# Patient Record
Sex: FEMALE | Race: WHITE | NOT HISPANIC OR LATINO | Employment: OTHER | URBAN - METROPOLITAN AREA
[De-identification: names, ages, dates, MRNs, and addresses within clinical notes are randomized per-mention and may not be internally consistent; named-entity substitution may affect disease eponyms.]

---

## 2017-02-03 DIAGNOSIS — M05.742 RHEUMATOID ARTHRITIS INVOLVING BOTH HANDS WITH POSITIVE RHEUMATOID FACTOR (H): Primary | ICD-10-CM

## 2017-02-03 DIAGNOSIS — M05.741 RHEUMATOID ARTHRITIS INVOLVING BOTH HANDS WITH POSITIVE RHEUMATOID FACTOR (H): Primary | ICD-10-CM

## 2017-02-03 NOTE — TELEPHONE ENCOUNTER
golimumab (SIMPONI) 50 MG/0.5ML auto-injector pen     Last Written Prescription Date:  9/2/16  Last Fill Quantity: 6,   # refills: 0  Last Office Visit with G, P or [facility] prescribing provider: 7/28/16  Future Office visit:   None    Routing refill request to provider for review/approval because:  golimumab (SIMPONI) 50 MG/0.5ML auto-injector pen. lv > 6mths . No f/u scheduled.

## 2017-03-02 ENCOUNTER — TELEPHONE (OUTPATIENT)
Dept: RHEUMATOLOGY | Facility: CLINIC | Age: 62
End: 2017-03-02

## 2017-03-02 DIAGNOSIS — M05.742 RHEUMATOID ARTHRITIS INVOLVING BOTH HANDS WITH POSITIVE RHEUMATOID FACTOR (H): Primary | ICD-10-CM

## 2017-03-02 DIAGNOSIS — M05.741 RHEUMATOID ARTHRITIS INVOLVING BOTH HANDS WITH POSITIVE RHEUMATOID FACTOR (H): Primary | ICD-10-CM

## 2017-03-02 RX ORDER — FOLIC ACID 1 MG/1
2 TABLET ORAL DAILY
Qty: 180 TABLET | Refills: 3 | Status: SHIPPED | OUTPATIENT
Start: 2017-03-02 | End: 2018-01-24

## 2017-03-02 NOTE — TELEPHONE ENCOUNTER
Folic Acid      Last Written Prescription Date:  1-28-16  Last Fill Quantity: 180,   # refills: 3  Last Office Visit : 7-28-16  Future Office visit:  5-25-17    Kathleen M Doege RN

## 2017-03-23 ENCOUNTER — TRANSFERRED RECORDS (OUTPATIENT)
Dept: HEALTH INFORMATION MANAGEMENT | Facility: CLINIC | Age: 62
End: 2017-03-23

## 2017-03-31 ENCOUNTER — TELEPHONE (OUTPATIENT)
Dept: RHEUMATOLOGY | Facility: CLINIC | Age: 62
End: 2017-03-31

## 2017-03-31 NOTE — TELEPHONE ENCOUNTER
Pt seeing Dr. Josh Lopez ([facility], [place], [place]). Says she is having been bad reflux that is hurting her vocal chords. Wants to put her med, thinks it is Prilosec. D/t pt being on methotrexate, Dr. Lopez trying to contact Dr. Walton but has not been able to do so. Dr. Lopez can be reached at [phone]. Please advise. Sent to [employer]

## 2017-04-03 NOTE — TELEPHONE ENCOUNTER
Spoke with Dr. Lopez, relayed Dr. Walton's message. Dr. Lopez plans to start on 20 of omeprazole.

## 2017-04-03 NOTE — TELEPHONE ENCOUNTER
Dr. Lopez's office called back. Dr. Lopez can be reached on his back line instead of through his office at [phone]. Sent to ZOILA Hodge.

## 2017-04-03 NOTE — TELEPHONE ENCOUNTER
I have no objection to her receiving a PPI for her GERD.  The potential interaction with methotrexate is not significant. Thomas BONDS

## 2017-04-04 NOTE — TELEPHONE ENCOUNTER
Pt called back re: PPI used. Dr. Walton's 3/31/17 approval relayed, advised pt to confirm dosing w/ Dr. Lopez before beginning med.  Sent to rheum pool.

## 2017-05-25 DIAGNOSIS — M05.741 RHEUMATOID ARTHRITIS INVOLVING BOTH HANDS WITH POSITIVE RHEUMATOID FACTOR (H): ICD-10-CM

## 2017-05-25 DIAGNOSIS — M05.742 RHEUMATOID ARTHRITIS INVOLVING BOTH HANDS WITH POSITIVE RHEUMATOID FACTOR (H): ICD-10-CM

## 2017-05-25 DIAGNOSIS — Z79.899 ENCOUNTER FOR LONG-TERM CURRENT USE OF MEDICATION: ICD-10-CM

## 2017-05-25 RX ORDER — CELECOXIB 200 MG/1
400 CAPSULE ORAL DAILY
Qty: 180 CAPSULE | Refills: 0 | Status: SHIPPED | OUTPATIENT
Start: 2017-05-25 | End: 2017-08-03

## 2017-05-25 NOTE — TELEPHONE ENCOUNTER
This is a pt of Dr Walton that needs refills of her Celebrex/ methotrexate.  Last seen: 7/28/16  Pending appt: to be scheduled  Medication: Celebrex/ Methotrexate    Last filled: 4/6/17  Qty: 36/96  Lab: last done 11/4/16. Orders written to have labs done every 6 months.    WBC   Date Value Ref Range Status   01/28/2016 6.4 4.0 - 11.0 10e9/L Final     RBC Count   Date Value Ref Range Status   01/28/2016 4.03 3.8 - 5.2 10e12/L Final     Hemoglobin   Date Value Ref Range Status   11/04/2016 12.9 11.7 - 15.7 g/dL Final     Hematocrit   Date Value Ref Range Status   01/28/2016 36.6 35.0 - 47.0 % Final     MCV   Date Value Ref Range Status   01/28/2016 91 78 - 100 fl Final     MCH   Date Value Ref Range Status   01/28/2016 30.5 26.5 - 33.0 pg Final     Platelet Count   Date Value Ref Range Status   01/28/2016 253 150 - 450 10e9/L Final     % Lymphocytes   Date Value Ref Range Status   01/28/2016 43.1 % Final     AST   Date Value Ref Range Status   11/04/2016 21 0 - 45 U/L Final     ALT   Date Value Ref Range Status   11/04/2016 30 0 - 50 U/L Final     Albumin   Date Value Ref Range Status   01/28/2016 4.0 3.4 - 5.0 g/dL Final     Alkaline Phosphatase   Date Value Ref Range Status   12/10/2012 66 40 - 150 U/L Final     Creatinine   Date Value Ref Range Status   11/04/2016 0.68 0.52 - 1.04 mg/dL Final     GFR Estimate   Date Value Ref Range Status   11/04/2016 87 >60 mL/min/1.7m2 Final     Comment:     Non  GFR Calc     GFR Estimate If Black   Date Value Ref Range Status   11/04/2016 >90   GFR Calc   >60 mL/min/1.7m2 Final     Creatinine Urine   Date Value Ref Range Status   05/13/2011 46 mg/dL Final     Sed Rate   Date Value Ref Range Status   09/03/2014 11 0 - 30 mm/h Final     CRP Inflammation   Date Value Ref Range Status   06/11/2015 4.7 0.0 - 8.0 mg/L Final     Set up and routed to Dr Jackie, consult rheumatologist.    LITO TannerN RN  Rheumatology RN Coordinator  [facility]

## 2017-05-25 NOTE — TELEPHONE ENCOUNTER
Pt of Dr Walton reporting she needs a refill of her Simponi.    Last seen: 7/26/17  Pending appt: will be scheduling  Medication: Simponi   Last filled: unknown  Qty: 6  Lab:    Did not meet standing order criteria, routed to MD.

## 2017-06-17 ENCOUNTER — HEALTH MAINTENANCE LETTER (OUTPATIENT)
Age: 62
End: 2017-06-17

## 2017-08-03 ENCOUNTER — OFFICE VISIT (OUTPATIENT)
Dept: RHEUMATOLOGY | Facility: CLINIC | Age: 62
End: 2017-08-03
Attending: INTERNAL MEDICINE
Payer: COMMERCIAL

## 2017-08-03 VITALS
SYSTOLIC BLOOD PRESSURE: 136 MMHG | TEMPERATURE: 98 F | HEART RATE: 72 BPM | DIASTOLIC BLOOD PRESSURE: 82 MMHG | BODY MASS INDEX: 20.89 KG/M2 | WEIGHT: 130 LBS | OXYGEN SATURATION: 100 % | HEIGHT: 66 IN

## 2017-08-03 DIAGNOSIS — M05.741 RHEUMATOID ARTHRITIS INVOLVING BOTH HANDS WITH POSITIVE RHEUMATOID FACTOR (H): Primary | ICD-10-CM

## 2017-08-03 DIAGNOSIS — M05.741 RHEUMATOID ARTHRITIS INVOLVING BOTH HANDS WITH POSITIVE RHEUMATOID FACTOR (H): ICD-10-CM

## 2017-08-03 DIAGNOSIS — Z79.899 ENCOUNTER FOR LONG-TERM CURRENT USE OF MEDICATION: ICD-10-CM

## 2017-08-03 DIAGNOSIS — M05.742 RHEUMATOID ARTHRITIS INVOLVING BOTH HANDS WITH POSITIVE RHEUMATOID FACTOR (H): ICD-10-CM

## 2017-08-03 DIAGNOSIS — M05.742 RHEUMATOID ARTHRITIS INVOLVING BOTH HANDS WITH POSITIVE RHEUMATOID FACTOR (H): Primary | ICD-10-CM

## 2017-08-03 LAB
ALT SERPL W P-5'-P-CCNC: 35 U/L (ref 0–50)
AST SERPL W P-5'-P-CCNC: 22 U/L (ref 0–45)
CREAT SERPL-MCNC: 0.8 MG/DL (ref 0.52–1.04)
CRP SERPL-MCNC: <2.9 MG/L (ref 0–8)
GFR SERPL CREATININE-BSD FRML MDRD: 72 ML/MIN/1.7M2
HGB BLD-MCNC: 13.4 G/DL (ref 11.7–15.7)

## 2017-08-03 PROCEDURE — 36415 COLL VENOUS BLD VENIPUNCTURE: CPT | Performed by: INTERNAL MEDICINE

## 2017-08-03 PROCEDURE — 84460 ALANINE AMINO (ALT) (SGPT): CPT | Performed by: INTERNAL MEDICINE

## 2017-08-03 PROCEDURE — 86140 C-REACTIVE PROTEIN: CPT | Performed by: INTERNAL MEDICINE

## 2017-08-03 PROCEDURE — 84450 TRANSFERASE (AST) (SGOT): CPT | Performed by: INTERNAL MEDICINE

## 2017-08-03 PROCEDURE — 82565 ASSAY OF CREATININE: CPT | Performed by: INTERNAL MEDICINE

## 2017-08-03 PROCEDURE — 85018 HEMOGLOBIN: CPT | Performed by: INTERNAL MEDICINE

## 2017-08-03 PROCEDURE — 99212 OFFICE O/P EST SF 10 MIN: CPT | Mod: ZF

## 2017-08-03 RX ORDER — LEUCOVORIN CALCIUM 5 MG/1
5 TABLET ORAL WEEKLY
Qty: 12 TABLET | Refills: 3 | Status: SHIPPED | OUTPATIENT
Start: 2017-08-03 | End: 2018-01-23

## 2017-08-03 RX ORDER — CELECOXIB 200 MG/1
200 CAPSULE ORAL DAILY
Qty: 90 CAPSULE | Refills: 3 | Status: SHIPPED | OUTPATIENT
Start: 2017-08-03 | End: 2018-07-25

## 2017-08-03 ASSESSMENT — PAIN SCALES - GENERAL: PAINLEVEL: NO PAIN (0)

## 2017-08-03 NOTE — LETTER
8/3/2017      RE: Ruba Major  [id number]  [place] [address]       Rheumatology Visit     Ruba Major MRN# [id number]   YOB: 1955 Age: 62 year old     Date of Visit: August 3, 2017  Primary care provider: Eduarda Arredondo          Assessment and Plan:   1. 63 yo female with RA, CCP+: She had more active disease which was limiting on single agent Methotrexate. I believe that regarding the inflammatory component of her joint pain after 6 months of Humira she was improved but not in a remission, able to play tennis and golf with symptoms. No overt synovitis was noted. We switched to Enbrel to attempt to achieve a better response and hopefully remission. Unfortunately she did not respond at 3 months even partially to this.      She continues to be improved on Simponi without side effects. She has some waning at 3-3.5 weeks. She does have mild weekly SE on Methotrexate that are very stable. Her occasional diffuse mouth soreness may be due to this.   Assessment of response of her RA has been complicated by concomitant DJD affecting first CMC joints and her feet. She is much better now so it would seem inflammation was clearly playing a part.   She had L knee arthroscopic knee surgery.She however is limited and can no longer play tennis, although can golf.   She had cervical spine surgery and now recent epidurals for L5 disc and is now in PT.  She is having some recurrent L arm tingling and scheduled for CT scan.  Lab has been normal and per her report just done by her PCP   2. Type I DM on insulin pump.   3. Possible peripheral neuropathy, now improved   4. DJD      PLAN: discussed in detail with the patient  1. She will continue Simponi and I encouraged her to take at 3-3.5 weeks consistently  2. Continue Methotrexate 15 mg weekly; continue folic acid and Leucovorin - renewed  3. Can continue Celebrex 200 mg daily.   4. She has had the flu shot   5. Return in 6 months; lab in the  interim as well as today  6. Call if problems occur     Gilberto Walton MD                History of Present Illness:   63 yo F is seen in followup for RA, CCP+. I saw her last in July 2016. EPIC reviewed.  HISTORY CARRIED FORWARD:  To review, patient initially presented in 5/2011 with c/o 7-8 year h/o fever, pain and swelling in bilateral wrists, 1st MCPs, Ankles, and 1st MTPs. She also noted prolonged morning stiffness, fatique, dry eyes, and ocular photosensativity. Also endorsed long history of oral aphthous ulcers. Initial labs revealed negative RF, SSA, SSB, TTG, complements, and thyroid studies, however CCP was positive (127). Recent CCP again is positive.  She is currently being managed with Methotrexate 15 mg q week with Leucovorin and Folic acid. Humira added in July 2012 and switched to Enbrel at visit in March 2013. See correspondence. Due to insurance coverage Enbrel was substituted for original consideration of Simponi. With no response to Enbrel it was switched to Simponi in summer 2013.   She was significantly improved at less than 3 months and this continues. At prior visit she did note that variably some months she had more sx at about three weeks. She took the last dose early and she noted immediate improvement. Since then she continues to take it around 23 days and continues to do better. She has minimal sx now except in her L knee which has had arthroscopy and has known DJD. She notes minimal swelling. Her feet are stable with minimal residual hand pain. No systemic symptoms. She is taking Celebrex at 200 mg just daily now. No SE.  She had interval cervical spine surgery and is healed. She has some residual L arm symptoms that are not clearly related, perhaps Rotator cuff; she is in PT.   On Humira she had no rashes or injection site reactions. Enbrel had a little more burning in thigh injection site but did not seem to help.    INTERVAL HISTORY:    The [place] has no pain with  injection. The patient was initially having significant GI side effects related to the methotrexate, and was started on leucovorin 5mg q week with good resolution. She still feels mildly off on the days she takes methotrexate but is otherwise well.  No significant flares.  We discussed any peripheral symptoms at length.  There is little if any change. She thinks her rings are  fitting the same. She has less extension of R wrist.  Feet are stable.  Her current problem is persistence of herniated R L5-S1 disk. She has had 4 epidurals without lasting response.  This inhibits her playing tennis though she can golf. She has no weakness and apparently EMG was reassuring.     She had interval lab again at her PCP yearly check, reported to her as normal.  We will obtain today.  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  She spends a lot of time in [place].  ROS otherwise negative.         Review of Systems:   Review Of Systems  Skin: negative  Eyes: negative  Ears/Nose/Throat: negative  Respiratory: No shortness of breath, dyspnea on exertion, cough, or hemoptysis  Cardiovascular: negative  Gastrointestinal: negative  Genitourinary: negative  Musculoskeletal: see HPI  Neurologic: negative  Psychiatric: negative  Hematologic/Lymphatic/Immunologic: negative  Endocrine: negative          Past Medical History:     Past Medical History:   Diagnosis Date     Rheumatoid arthritis(714.0)      Type II or unspecified type diabetes mellitus without mention of complication, not stated as uncontrolled        Patient Active Problem List    Diagnosis Date Noted     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 Provider to  "review               Past Surgical History:     Past Surgical History:   Procedure Laterality Date     FOOT SURGERY  1998, 10/2010    Bunionectomy             Social History:     Social History   Substance Use Topics     Smoking status: Never Smoker     Smokeless tobacco: Never Used     Alcohol use Yes      Comment: glass of wine daily             Family History:   No family history on file.         Allergies:   No Known Allergies          Medications:     Current Outpatient Prescriptions   Medication Sig Dispense Refill     insulin regular (HUMULIN R/NOVOLIN R) 100 UNIT/ML injection Inject Subcutaneous 3 times daily (before meals)       celecoxib (CELEBREX) 200 MG capsule Take 2 capsules (400 mg) by mouth daily (Patient taking differently: Take 200 mg by mouth daily ) 180 capsule 0     methotrexate 2.5 MG tablet CHEMO Take 6 tablets once weekly 72 tablet 0     golimumab (SIMPONI) 50 MG/0.5ML auto-injector pen Inject subcutaneously every 21 to 28 days 6 each 0     folic acid (FOLVITE) 1 MG tablet Take 2 tablets (2 mg) by mouth daily (Patient taking differently: Take 1 mg by mouth daily ) 180 tablet 3     leucovorin (WELLCOVORIN) 5 MG tablet Take 1 tablet (5 mg) by mouth once a week 12 hours after taking Methotrexate dose. 12 tablet 3     Pregabalin (LYRICA PO) Take 50 mg by mouth daily        zolpidem (AMBIEN) 10 MG tablet Take 10 mg by mouth nightly as needed.       INSULIN INFUSION PUMP        melatonin 5 MG tablet Take 5 mg by mouth nightly as needed for sleep              Physical Exam:   Blood pressure 136/82, pulse 72, temperature 98  F (36.7  C), temperature source Oral, height 1.676 m (5' 6\"), weight 59 kg (130 lb), SpO2 100 %.  Constitutional: WD-WN-WG cooperative   Eyes: nl conjunctiva, sclera   ENT: nl external ears, nose, throat. Healed anterior cervical scar   MS: No definite active synovitis on exam today. She has pain with finger curl and 1+T L 4th PIP.  45 degrees dorsiflexion R wrist without " swelling.  Tinel negative. No dactylitis, tenosynovitis, enthesopathy   Skin: no nail pitting, alopecia, rash, nodules or lesions   Psych: nl affect.       Data:     Lab Results   Component Value Date    WBC 6.4 01/28/2016    WBC 3.2 (L) 06/11/2015    WBC 4.4 12/03/2014    HGB 12.9 11/04/2016    HGB 12.3 01/28/2016    HGB 12.7 06/11/2015    HCT 36.6 01/28/2016    HCT 37.3 06/11/2015    HCT 35.8 12/03/2014    MCV 91 01/28/2016    MCV 89 06/11/2015    MCV 90 12/03/2014     01/28/2016     06/11/2015     12/03/2014     Lab Results   Component Value Date    BUN 19 12/10/2012    BUN 16 08/28/2008     No components found for: SEDRATE  Lab Results   Component Value Date    TSH 0.91 06/11/2015    TSH 1.58 12/10/2012    TSH 1.57 09/12/2008     Lab Results   Component Value Date    AST 21 11/04/2016    AST 19 01/28/2016    AST 18 06/11/2015    ALT 30 11/04/2016    ALT 28 01/28/2016    ALT 28 06/11/2015    ALKPHOS 66 12/10/2012    ALKPHOS 101 08/28/2008     Reviewed Rheumatology lab flowsheet    Gilberto Walton

## 2017-08-03 NOTE — PROGRESS NOTES
Rheumatology Visit     Ruba Major MRN# [id number]   YOB: 1955 Age: 62 year old     Date of Visit: August 3, 2017  Primary care provider: Eduarda Arredondo          Assessment and Plan:   1. 61 yo female with RA, CCP+: She had more active disease which was limiting on single agent Methotrexate. I believe that regarding the inflammatory component of her joint pain after 6 months of Humira she was improved but not in a remission, able to play tennis and golf with symptoms. No overt synovitis was noted. We switched to Enbrel to attempt to achieve a better response and hopefully remission. Unfortunately she did not respond at 3 months even partially to this.      She continues to be improved on Simponi without side effects. She has some waning at 3-3.5 weeks. She does have mild weekly SE on Methotrexate that are very stable. Her occasional diffuse mouth soreness may be due to this.   Assessment of response of her RA has been complicated by concomitant DJD affecting first CMC joints and her feet. She is much better now so it would seem inflammation was clearly playing a part.   She had L knee arthroscopic knee surgery.She however is limited and can no longer play tennis, although can golf.   She had cervical spine surgery and now recent epidurals for L5 disc and is now in PT.  She is having some recurrent L arm tingling and scheduled for CT scan.  Lab has been normal and per her report just done by her PCP   2. Type I DM on insulin pump.   3. Possible peripheral neuropathy, now improved   4. DJD      PLAN: discussed in detail with the patient  1. She will continue Simponi and I encouraged her to take at 3-3.5 weeks consistently  2. Continue Methotrexate 15 mg weekly; continue folic acid and Leucovorin - renewed  3. Can continue Celebrex 200 mg daily.   4. She has had the flu shot   5. Return in 6 months; lab in the interim as well as today  6. Call if problems occur     Gilberto Walton MD   Associate  Professor             History of Present Illness:   61 yo F is seen in followup for RA, CCP+. I saw her last in July 2016. EPIC reviewed.  HISTORY CARRIED FORWARD:  To review, patient initially presented in 5/2011 with c/o 7-8 year h/o fever, pain and swelling in bilateral wrists, 1st MCPs, Ankles, and 1st MTPs. She also noted prolonged morning stiffness, fatique, dry eyes, and ocular photosensativity. Also endorsed long history of oral aphthous ulcers. Initial labs revealed negative RF, SSA, SSB, TTG, complements, and thyroid studies, however CCP was positive (127). Recent CCP again is positive.  She is currently being managed with Methotrexate 15 mg q week with Leucovorin and Folic acid. Humira added in July 2012 and switched to Enbrel at visit in March 2013. See correspondence. Due to insurance coverage Enbrel was substituted for original consideration of Simponi. With no response to Enbrel it was switched to Simponi in summer 2013.   She was significantly improved at less than 3 months and this continues. At prior visit she did note that variably some months she had more sx at about three weeks. She took the last dose early and she noted immediate improvement. Since then she continues to take it around 23 days and continues to do better. She has minimal sx now except in her L knee which has had arthroscopy and has known DJD. She notes minimal swelling. Her feet are stable with minimal residual hand pain. No systemic symptoms. She is taking Celebrex at 200 mg just daily now. No SE.  She had interval cervical spine surgery and is healed. She has some residual L arm symptoms that are not clearly related, perhaps Rotator cuff; she is in PT.   On Humira she had no rashes or injection site reactions. Enbrel had a little more burning in thigh injection site but did not seem to help.    INTERVAL HISTORY:    The Simponi has no pain with injection. The patient was initially having significant GI side effects related to  the methotrexate, and was started on leucovorin 5mg q week with good resolution. She still feels mildly off on the days she takes methotrexate but is otherwise well.  No significant flares.  We discussed any peripheral symptoms at length.  There is little if any change. She thinks her rings are  fitting the same. She has less extension of R wrist.  Feet are stable.  Her current problem is persistence of herniated R L5-S1 disk. She has had 4 epidurals without lasting response.  This inhibits her playing tennis though she can golf. She has no weakness and apparently EMG was reassuring.     She had interval lab again at her PCP yearly check, reported to her as normal.  We will obtain today.  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  She spends a lot of time in [place].  ROS otherwise negative.         Review of Systems:   Review Of Systems  Skin: negative  Eyes: negative  Ears/Nose/Throat: negative  Respiratory: No shortness of breath, dyspnea on exertion, cough, or hemoptysis  Cardiovascular: negative  Gastrointestinal: negative  Genitourinary: negative  Musculoskeletal: see HPI  Neurologic: negative  Psychiatric: negative  Hematologic/Lymphatic/Immunologic: negative  Endocrine: negative          Past Medical History:     Past Medical History:   Diagnosis Date     Rheumatoid arthritis(714.0)      Type II or unspecified type diabetes mellitus without mention of complication, not stated as uncontrolled        Patient Active Problem List    Diagnosis Date Noted     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 Provider to review               Past Surgical History:     Past Surgical History:   Procedure  "Laterality Date     FOOT SURGERY  1998, 10/2010    Bunionectomy             Social History:     Social History   Substance Use Topics     Smoking status: Never Smoker     Smokeless tobacco: Never Used     Alcohol use Yes      Comment: glass of wine daily             Family History:   No family history on file.         Allergies:   No Known Allergies          Medications:     Current Outpatient Prescriptions   Medication Sig Dispense Refill     insulin regular (HUMULIN R/NOVOLIN R) 100 UNIT/ML injection Inject Subcutaneous 3 times daily (before meals)       celecoxib (CELEBREX) 200 MG capsule Take 2 capsules (400 mg) by mouth daily (Patient taking differently: Take 200 mg by mouth daily ) 180 capsule 0     methotrexate 2.5 MG tablet CHEMO Take 6 tablets once weekly 72 tablet 0     golimumab (SIMPONI) 50 MG/0.5ML auto-injector pen Inject subcutaneously every 21 to 28 days 6 each 0     folic acid (FOLVITE) 1 MG tablet Take 2 tablets (2 mg) by mouth daily (Patient taking differently: Take 1 mg by mouth daily ) 180 tablet 3     leucovorin (WELLCOVORIN) 5 MG tablet Take 1 tablet (5 mg) by mouth once a week 12 hours after taking Methotrexate dose. 12 tablet 3     Pregabalin (LYRICA PO) Take 50 mg by mouth daily        zolpidem (AMBIEN) 10 MG tablet Take 10 mg by mouth nightly as needed.       INSULIN INFUSION PUMP        melatonin 5 MG tablet Take 5 mg by mouth nightly as needed for sleep              Physical Exam:   Blood pressure 136/82, pulse 72, temperature 98  F (36.7  C), temperature source Oral, height 1.676 m (5' 6\"), weight 59 kg (130 lb), SpO2 100 %.  Constitutional: WD-WN-WG cooperative   Eyes: nl conjunctiva, sclera   ENT: nl external ears, nose, throat. Healed anterior cervical scar   MS: No definite active synovitis on exam today. She has pain with finger curl and 1+T L 4th PIP.  45 degrees dorsiflexion R wrist without swelling.  Tinel negative. No dactylitis, tenosynovitis, enthesopathy   Skin: no nail " pitting, alopecia, rash, nodules or lesions   Psych: nl affect.       Data:     Lab Results   Component Value Date    WBC 6.4 01/28/2016    WBC 3.2 (L) 06/11/2015    WBC 4.4 12/03/2014    HGB 12.9 11/04/2016    HGB 12.3 01/28/2016    HGB 12.7 06/11/2015    HCT 36.6 01/28/2016    HCT 37.3 06/11/2015    HCT 35.8 12/03/2014    MCV 91 01/28/2016    MCV 89 06/11/2015    MCV 90 12/03/2014     01/28/2016     06/11/2015     12/03/2014     Lab Results   Component Value Date    BUN 19 12/10/2012    BUN 16 08/28/2008     No components found for: SEDRATE  Lab Results   Component Value Date    TSH 0.91 06/11/2015    TSH 1.58 12/10/2012    TSH 1.57 09/12/2008     Lab Results   Component Value Date    AST 21 11/04/2016    AST 19 01/28/2016    AST 18 06/11/2015    ALT 30 11/04/2016    ALT 28 01/28/2016    ALT 28 06/11/2015    ALKPHOS 66 12/10/2012    ALKPHOS 101 08/28/2008     Reviewed Rheumatology lab flowsheet    Gilberto Walton

## 2017-08-03 NOTE — NURSING NOTE
Chief Complaint   Patient presents with     RECHECK     RA   Pt roomed, vitals, meds, and allergies reviewed with pt. Pt ready for provider.  Cirilo Dorman, CMA

## 2017-08-03 NOTE — MR AVS SNAPSHOT
After Visit Summary   8/3/2017    Ruba Major    MRN: [id number]           Patient Information     Date Of Birth          1955        Visit Information        Provider Department      8/3/2017 4:00 PM Gilberto Walton MD [facility] Rheumatology        Today's Diagnoses     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 factor (H)    -  1    Encounter for long-term current use of medication           Follow-ups after your visit        Follow-up notes from your care team     Return in about 6 months (around 2/3/2018).      Your next 10 appointments already scheduled     Jan 17, 2018  9:00 AM CST   (Arrive by 8:45 AM)   Return Visit with Gilberto Walton MD   [facility] Rheumatology ([facility] [place])    [address]  [address] [facility] [address] [phone]              Who to contact     If you have questions or need follow up information about today's clinic visit or your schedule please contact [facility] RHEUMATOLOGY directly at [phone].  Normal or non-critical lab and imaging results will be communicated to you by [employer]Str[employer], letter or phone within 4 business days after the clinic has received the results. If you do not hear from us within 7 days, please contact the clinic through [employer] or phone. If you have a critical or abnormal lab result, we will notify you by phone as soon as possible.  Submit refill requests through [employer] or call your pharmacy and they will forward the refill request to us. Please allow 3 business days for your refill to be completed.          Additional Information About Your Visit        [employer] Information     [employer] gives you secure access to your electronic health record. If you see a primary care provider, you can also send messages to your care team and make appointments. If you have questions, please call your primary care clinic.  If you do not have a primary care provider, please call [phone]  "and they will assist you.        Care EveryWhere ID     This is your Care EveryWhere ID. This could be used by other organizations to access your [facility] medical records  [id number]        Your Vitals Were     Pulse Temperature Height Pulse Oximetry BMI (Body Mass Index)       72 98  F (36.7  C) (Oral) 1.676 m (5' 6\") 100% 20.98 kg/m2        Blood Pressure from Last 3 Encounters:   08/03/17 136/82   07/28/16 145/77   01/28/16 134/73    Weight from Last 3 Encounters:   08/03/17 59 kg (130 lb)   07/28/16 58.5 kg (129 lb)   06/11/15 57.9 kg (127 lb 9.6 oz)                 Today's Medication Changes          These changes are accurate as of: 8/3/17 11:59 PM.  If you have any questions, ask your nurse or doctor.               These medicines have changed or have updated prescriptions.        Dose/Directions    folic acid 1 MG tablet   Commonly known as:  FOLVITE   This may have changed:  how much to take   Used for:  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 factor (H)        Dose:  2 mg   Take 2 tablets (2 mg) by mouth daily   Quantity:  180 tablet   Refills:  3            Where to get your medicines      These medications [id number] - [place], [place] - [address]  [address] [facility] [address]     Phone:  [phone]     celecoxib 200 MG capsule    leucovorin 5 MG tablet    methotrexate 2.5 MG tablet CHEMO                Primary Care Provider Office Phone # Fax #    Eduarda Arredondo [phone]       [facility] [address] [facility] [address]  [facility] [address]        Equal Access to Services     SANKET [facility] Center[facility] : Hadii fortino Cueto, valerie baeza, qahelen mace, beni adorno. So [facility] [phone].    ATENCIÓN: Si habla español, tiene a burgess disposición servicios gratuitos de asistencia lingüística. Llame al [phone].    We comply with applicable federal civil rights laws and " [place] laws. We do not discriminate on the basis of race, color, national origin, age, disability sex, sexual orientation or gender identity.            Thank you!     Thank you for choosing [facility] RHEUMATOLOGY  for your care. Our goal is always to provide you with excellent care. Hearing back from our patients is one way we can continue to improve our services. Please take a few minutes to complete the written survey that you may receive in the mail after your visit with us. Thank you!             Your Updated Medication List - Protect others around you: Learn how to safely use, store and throw away your medicines at www.disposemymeds.org.          This list is accurate as of: 8/3/17 11:59 PM.  Always use your most recent med list.                   Brand Name Dispense Instructions for use Diagnosis    AMBIEN 10 MG tablet   Generic drug:  zolpidem      Take 10 mg by mouth nightly as needed.    Rheumatoid arthritis(714.0), Encounter for long-term (current) use of other medications       celecoxib 200 MG capsule    celeBREX    90 capsule    Take 1 capsule (200 mg) by mouth daily    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 factor (H), Encounter for long-term current use of medication       folic acid 1 MG tablet    FOLVITE    180 tablet    Take 2 tablets (2 mg) by mouth daily    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 factor (H)       golimumab 50 MG/0.5ML auto-injector pen    SIMPONI    6 each    Inject subcutaneously every 21 to 28 days    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 factor (H)       INSULIN INFUSION PUMP       Rheumatoid arthritis(714.0), Encounter for long-term (current) use of other medications       insulin regular 100 UNIT/ML injection    HumuLIN R/NovoLIN R     Inject Subcutaneous 3 times daily (before meals)    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 factor (H)       leucovorin 5 MG  tablet    WELLCOVORIN    12 tablet    Take 1 tablet (5 mg) by mouth once a week 12 hours after taking Methotrexate dose.    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 factor (H), Encounter for long-term current use of medication       LYRICA PO      Take 50 mg by mouth daily        melatonin 5 MG tablet      Take 5 mg by mouth nightly as needed for sleep        methotrexate 2.5 MG tablet CHEMO     72 tablet    Take 6 tablets once weekly    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 factor (H)

## 2017-08-17 ENCOUNTER — TELEPHONE (OUTPATIENT)
Dept: RHEUMATOLOGY | Facility: CLINIC | Age: 62
End: 2017-08-17

## 2017-08-17 NOTE — TELEPHONE ENCOUNTER
I just saw the message about pt having shingles, and holding the medication. Should I hold off on submitting a prior auth for Simponi at this time or should I go forward with this?

## 2017-08-17 NOTE — TELEPHONE ENCOUNTER
Pt called and reported that she was just diagnosed with shingles today.  She started feeling poorly last night, and when she woke up this morning, she had lines running all the way up her arm.  Pt went to PCP this morning.  Has been place on Acyclovir, and is now wondering if she needs to hold her Methotrexate and Simponi?    Pt did take Methotrexate on Monday, and due for Simponi today.  She will hold Simponi until she hears from us.     Will discuss with Dr. Walton let pt know how long to hold medications.    Erum Adame RN  Rheumatology Clinic

## 2017-08-17 NOTE — TELEPHONE ENCOUNTER
[facility] Prior Authorization Team   Phone: [phone]  Fax: [phone]      PA Initiation    Medication: simponi  Insurance Company: [employer] - Phone [phone] Fax [phone]  Pharmacy Filling the Rx: [facility] - [place], [place] - [address]  Filling Pharmacy Phone: [phone]  Filling Pharmacy Fax: [phone]  Start Date: 8/17/2017    NOTE: PTS PRIOR AUTH EXPIRES ON 8/31/17, THIS IS A PROACTIVE PRIOR AUTH AS TO NOT DELAY THERAPY

## 2017-08-17 NOTE — TELEPHONE ENCOUNTER
Discussed with Dr. Walton.  He would like her to hold her Methotrexate and Simponi for 2 weeks.     Have left a message letting pt know how long she should hold her medications as indicated above.  Pt had stated in earlier conversation to please leave a detailed voicemail if she does not answer.    Erum Adame RN  Rheumatology Clinic

## 2017-08-21 NOTE — TELEPHONE ENCOUNTER
[facility] Prior Authorization Team   Phone: [phone]  Fax: [phone]    PA Initiation    Medication: simponi  Insurance Company: [employer] - Phone [phone] Fax [phone]  Pharmacy Filling the Rx: [facility] - [place], [place] - [address]  Filling Pharmacy Phone: [phone]  Filling Pharmacy Fax: [phone]  Start Date: 8/21//2017    PA HAS BEEN SUBMITTED 8/21/17

## 2017-09-01 NOTE — TELEPHONE ENCOUNTER
[facility] Prior Authorization Team   Phone: [phone]  Fax: [phone]      Prior Authorization Approval    Authorization Effective Date: 7/21/2017  Authorization Expiration Date: 8/21/2018  Medication: simponi-approved  Approved Dose/Quantity:0.5/28ds  Reference #: manual fax   Insurance Company: [employer] - Phone [phone] Fax [phone]  Expected CoPay:       CoPay Card Available:      Foundation Assistance Needed:    Which Pharmacy is filling the prescription (Not needed for infusion/clinic administered): [facility] - [place], [place] - [facility] [address]  Pharmacy Notified: Yes  Patient Notified: Yes

## 2017-09-07 ENCOUNTER — TELEPHONE (OUTPATIENT)
Dept: ENDOCRINOLOGY | Facility: CLINIC | Age: 62
End: 2017-09-07

## 2017-09-07 NOTE — TELEPHONE ENCOUNTER
Spoke with pt and scheduled 1/30/17 at 1330 with Dr. Patten, she will have records from [place] fax to our clinic sometime in Nov.     ----- Message -----     From: Nataliia Teresa     Sent: 9/6/2017  10:39 AM       To: Med Specialties Endo Triage-  Subject: [place] foundation pt- Negar appt                  Pt is a [facility] pt that is wanting to be seen in Endocrinology. Pt is a former pt of Dr Bills (last seen in 2009) and is wanting to come back to see Dr Bills again. Pt stated Dr Bills was the one who diagnosed pt with type 1 diabetes and is currently on an insulin pump. Pt is also seeing Dr Walton in Rheumatology and would like to have all of her care here. Would Dr Bills be willing to see pt?    Pt can be reached at [phone]    Thanks,  Nataliia

## 2017-10-04 ENCOUNTER — TRANSFERRED RECORDS (OUTPATIENT)
Dept: HEALTH INFORMATION MANAGEMENT | Facility: CLINIC | Age: 62
End: 2017-10-04

## 2017-10-18 DIAGNOSIS — M05.741 RHEUMATOID ARTHRITIS INVOLVING BOTH HANDS WITH POSITIVE RHEUMATOID FACTOR (H): ICD-10-CM

## 2017-10-18 DIAGNOSIS — M05.742 RHEUMATOID ARTHRITIS INVOLVING BOTH HANDS WITH POSITIVE RHEUMATOID FACTOR (H): ICD-10-CM

## 2017-10-19 NOTE — TELEPHONE ENCOUNTER
golimumab       Last Written Prescription Date:  5/25/17  Last Fill Quantity: 6,   # refills: 0  Last Office Visit : 8/3/17  Future Office visit:  1/17/18

## 2018-01-23 ENCOUNTER — OFFICE VISIT (OUTPATIENT)
Dept: ENDOCRINOLOGY | Facility: CLINIC | Age: 63
End: 2018-01-23
Payer: COMMERCIAL

## 2018-01-23 VITALS
HEART RATE: 69 BPM | BODY MASS INDEX: 20.7 KG/M2 | HEIGHT: 66 IN | WEIGHT: 128.8 LBS | DIASTOLIC BLOOD PRESSURE: 74 MMHG | SYSTOLIC BLOOD PRESSURE: 130 MMHG

## 2018-01-23 DIAGNOSIS — M85.80 OSTEOPENIA, UNSPECIFIED LOCATION: ICD-10-CM

## 2018-01-23 DIAGNOSIS — E10.9 TYPE 1 DIABETES MELLITUS WITHOUT COMPLICATION (H): ICD-10-CM

## 2018-01-23 DIAGNOSIS — E10.9 TYPE 1 DIABETES MELLITUS WITHOUT COMPLICATION (H): Primary | ICD-10-CM

## 2018-01-23 LAB
ALBUMIN SERPL-MCNC: 4.4 G/DL (ref 3.4–5)
CALCIUM SERPL-MCNC: 8.8 MG/DL (ref 8.5–10.1)
CREAT UR-MCNC: 52 MG/DL
DEPRECATED CALCIDIOL+CALCIFEROL SERPL-MC: 21 UG/L (ref 20–75)
HBA1C MFR BLD: 7.3 % (ref 4.3–6)
MICROALBUMIN UR-MCNC: <5 MG/L
MICROALBUMIN/CREAT UR: NORMAL MG/G CR (ref 0–25)
PTH-INTACT SERPL-MCNC: 48 PG/ML (ref 12–72)

## 2018-01-23 ASSESSMENT — PAIN SCALES - GENERAL: PAINLEVEL: MODERATE PAIN (4)

## 2018-01-23 NOTE — PROGRESS NOTES
This 63-year-old woman is here to establish care for her type 1 diabetes.  I made the diagnosis in 2008 when she presented with hyperglycemia in the absence of DKA.  She was managed here for a time but has been followed at [facility] for many years.  She now is getting care for her rheumatoid arthritis at the [place] and she decided to transfer all of her care here.    She currently manages her diabetes with a MiniMed 530 G pump.  She is not wearing the sensor that comes with it because she finds it very uncomfortable.  She always has pain when she inserts her insulin infusion set and often has bleeding.  Her current pump settings are:    Midnight 0.4    5 AM 0.675    7 AM 1.05    Noon 0.925    4 PM 1.0    10 PM 0.375    Carb ratios:    Midnight 12    6 AM 6.8    11:30 AM 15    5:30 PM 8    Sensitivity:    Midnight 65    6 AM 54    7 PM 75    Glucose targets: Midnight 100/1 5 0    7 AM 90/110    7 /1 5 0    Active insulin is 3 hours    Ruthie says she lives a very active life and really does not follow any particular schedule.  She has a home in [place] where she spends a lot of time so she travels between [place] and [place] frequently.  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  She plays golf a lot when she is in [place].  She tends to eat a very low carb diet.  When she gets up in the morning she first has coffee and then will have some oatmeal after about an hour.  She tends to have salads or meat and vegetables at her other meals.  She will sometimes snack on a granola bar.    Review of her pump download shows she changes her sensor site every 2-3 days.  She checks her blood sugar 6.8 times a day on average she uses the bolus wizard exclusively and boluses about 5 times each day.  Her average glucose over the last month was 175.  68% of her values were above target.  Her basal insulin represents 71% of her daily insulin.  Review of  the last couple of weeks shows that she generally wakes in the  range.  In the middle of the day she is frequently in the 200-300 range although she may eat frequently during these times and some of these values are postmeal.  By dinnertime she is usually down to 7250 and her bedtimes are usually in the low 100s as well.  She usually recognizes her lows in the 60s but sometimes they are much lower than that.  She has not needed anyone else to help her recently.    She is up-to-date with her eye visits and has never been told she had retinopathy.  She has no foot concerns.  She denies having any neuropathic symptoms.  She has been treated with a statin in the past but always developed muscle cramps on the drug.  She prefers not to take it.    She brought her recent DEXA scan with her for me to review and asks what needs to be done about it.  She reminded me she had an eating disorder during her early adulthood that is under control currently.  She has had her vitamin D and calcium checked by other providers and has been told they are all normal.  She does get weightbearing exercise.  She has never had a fracture.  Her mother did not have a hip or spinal fracture.    Patient Active Problem List   Diagnosis     Encounter for long-term current use of medication     Muscle cramps     Diabetes mellitus (H)     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 factor (H)     Type 1 diabetes mellitus without complication (H)       Current Outpatient Prescriptions on File Prior to Visit:  golimumab (SIMPONI) 50 MG/0.5ML auto-injector pen INJECT THE CONTENTS OF ONE PEN (50MG) UNDER THE SKIN ONCE EVERY 21 TO 28 DAYS   celecoxib (CELEBREX) 200 MG capsule Take 1 capsule (200 mg) by mouth daily   methotrexate 2.5 MG tablet CHEMO Take 6 tablets once weekly (Patient taking differently: Take 6 tablets once weekly- pt currently taking 5 tablets once weekly)   folic acid (FOLVITE) 1 MG tablet Take 2 tablets (2  "mg) by mouth daily (Patient taking differently: Take 1 mg by mouth daily )   Pregabalin (LYRICA PO) Take 75 mg by mouth daily    melatonin 5 MG tablet Take 5 mg by mouth nightly as needed for sleep   zolpidem (AMBIEN) 10 MG tablet Take 10 mg by mouth nightly as needed.   INSULIN INFUSION PUMP      No current facility-administered medications on file prior to visit.     ROS: 10 point ROS neg other than the symptoms noted above in the HPI.    So Hx -now has 4 grandchildren.  2 of her daughters developed gestational diabetes during her pregnancies.    Vital signs:   /74 (BP Location: Right arm, Patient Position: Sitting, Cuff Size: Adult Regular)  Pulse 69  Ht 1.676 m (5' 6\")  Wt 58.4 kg (128 lb 12.8 oz)  BMI 20.79 kg/m2  Estimated body mass index is 20.79 kg/(m^2) as calculated from the following:    Height as of this encounter: 1.676 m (5' 6\").    Weight as of this encounter: 58.4 kg (128 lb 12.8 oz).    VSS  NAD  Eyes - no periorbital edema, conjunctival injection, scleral icterus  Neck - no thyromegaly  CV - RRR.    Skin - normal texture   Feet - no ulcers   Mood - cheerful    Recent Labs   Lab Test  01/23/18   1331 01/23/18 08/03/17   1654  11/04/16   1130   06/11/15   1326   05/23/13   1601   12/10/12   1051   A1C   --    --    --    --    --    --    --   8.2*   --    --    HEMOGLOBINA1   --   7.3*   --    --    --    --    --    --    --    --    TSH   --    --    --    --    --   0.91   --    --    --   1.58   CR   --    --   0.80  0.68   < >  0.64   < >  0.62   < >  0.67   MICROL  <5   --    --    --    --    --    --    --    --    --     < > = values in this interval not displayed.     DEXA scan was done at [facility].  It showed her T score of her left hip to be -2.36.  Her T score of her LS spine was -1.54.    Assessment and plan:    1.  Diabetes control.  Her overall control is quite good.  However, I am not sure she really needs all of the different basal and bolus rates " she has programmed into her pump, particularly since she does not follow the same schedule each day.  I think she would really benefit from wearing the continuous glucose monitor so we could assess the accuracy of her settings.  Will refer to the nurse educator to learn about the jackson system.  I will see if the nurse educator can see her back after a week or so of wearing the jackson to download her pump and determine if the settings should be changed.  I will also have the nurse educator review her insulin infusion set insertion technique to be certain there is not a way it can be less uncomfortable for the patient.  She will be returning to [place] for just a few days at the beginning of March.  I will have her see 1 of the physician assistants at that time to review her pump and CGM data to determine what other changes need to be made.    2.  Diabetes complications.  I will check her renal function today.  It is been normal in the past.  She is up-to-date with her eye visits.  She has no foot concerns.    3.  Osteoporosis.  Her FRAX score is  13% for major osteoporotic fracture and 2.5% for a hip fracture in the next 10 years.  This is below the level that requires therapy.  However, we will repeat her vitamin D measurements to be sure she is not insufficient.  We will plan to repeat a DEXA scan in 2019.    4. CVD risk. BP is at target.  10 year ASCVD risk is less than 7.5% so statin not needed.    Follow-up with me in about 6 months.    Veronica Bills MD

## 2018-01-23 NOTE — LETTER
1/23/2018       RE: Ruba Major  [address]  [facility] [address]     Dear Colleague,    Thank you for referring your patient, Ruba Major, to the [facility] ENDOCRINOLOGY at [facility]. Please see a copy of my visit note below.    This 63-year-old woman is here to establish care for her type 1 diabetes.  I made the diagnosis in 2008 when she presented with hyperglycemia in the absence of DKA.  She was managed here for a time but has been followed at [facility] for many years.  She now is getting care for her rheumatoid arthritis at the [facility] and she decided to transfer all of her care here.    She currently manages her diabetes with a MiniMed 530 G pump.  She is not wearing the sensor that comes with it because she finds it very uncomfortable.  She always has pain when she inserts her insulin infusion set and often has bleeding.  Her current pump settings are:    Midnight 0.4    5 AM 0.675    7 AM 1.05    Noon 0.925    4 PM 1.0    10 PM 0.375    Carb ratios:    Midnight 12    6 AM 6.8    11:30 AM 15    5:30 PM 8    Sensitivity:    Midnight 65    6 AM 54    7 PM 75    Glucose targets: Midnight 100/1 5 0    7 AM 90/110    7 /1 5 0    Active insulin is 3 hours    Ruthie says she lives a very active life and really does not follow any particular schedule.  She has a home in [place] where she spends a lot of time so she travels between [place] and [place] frequently.  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  She plays golf a lot when she is in [place].  She tends to eat a very low carb diet.  When she gets up in the morning she first has coffee and then will have some oatmeal after about an hour.  She tends to have salads or meat and vegetables at her other meals.  She will sometimes snack on a granola bar.    Review of her pump download shows she changes her sensor site every 2-3 days.   She checks her blood sugar 6.8 times a day on average she uses the bolus wizard exclusively and boluses about 5 times each day.  Her average glucose over the last month was 175.  68% of her values were above target.  Her basal insulin represents 71% of her daily insulin.  Review of the last couple of weeks shows that she generally wakes in the  range.  In the middle of the day she is frequently in the 200-300 range although she may eat frequently during these times and some of these values are postmeal.  By dinnertime she is usually down to 7250 and her bedtimes are usually in the low 100s as well.  She usually recognizes her lows in the 60s but sometimes they are much lower than that.  She has not needed anyone else to help her recently.    She is up-to-date with her eye visits and has never been told she had retinopathy.  She has no foot concerns.  She denies having any neuropathic symptoms.  She has been treated with a statin in the past but always developed muscle cramps on the drug.  She prefers not to take it.    She brought her recent DEXA scan with her for me to review and asks what needs to be done about it.  She reminded me she had an eating disorder during her early adulthood that is under control currently.  She has had her vitamin D and calcium checked by other providers and has been told they are all normal.  She does get weightbearing exercise.  She has never had a fracture.  Her mother did not have a hip or spinal fracture.    Patient Active Problem List   Diagnosis     Encounter for long-term current use of medication     Muscle cramps     Diabetes mellitus (H)     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 factor (H)     Type 1 diabetes mellitus without complication (H)       Current Outpatient Prescriptions on File Prior to Visit:  golimumab (SIMPONI) 50 MG/0.5ML auto-injector pen INJECT THE CONTENTS OF ONE PEN (50MG) UNDER THE SKIN ONCE EVERY 21 TO 28 DAYS  "  celecoxib (CELEBREX) 200 MG capsule Take 1 capsule (200 mg) by mouth daily   methotrexate 2.5 MG tablet CHEMO Take 6 tablets once weekly (Patient taking differently: Take 6 tablets once weekly- pt currently taking 5 tablets once weekly)   folic acid (FOLVITE) 1 MG tablet Take 2 tablets (2 mg) by mouth daily (Patient taking differently: Take 1 mg by mouth daily )   Pregabalin (LYRICA PO) Take 75 mg by mouth daily    melatonin 5 MG tablet Take 5 mg by mouth nightly as needed for sleep   zolpidem (AMBIEN) 10 MG tablet Take 10 mg by mouth nightly as needed.   INSULIN INFUSION PUMP      No current facility-administered medications on file prior to visit.     ROS: 10 point ROS neg other than the symptoms noted above in the HPI.    So Hx -now has 4 grandchildren.  2 of her daughters developed gestational diabetes during her pregnancies.    Vital signs:   /74 (BP Location: Right arm, Patient Position: Sitting, Cuff Size: Adult Regular)  Pulse 69  Ht 1.676 m (5' 6\")  Wt 58.4 kg (128 lb 12.8 oz)  BMI 20.79 kg/m2  Estimated body mass index is 20.79 kg/(m^2) as calculated from the following:    Height as of this encounter: 1.676 m (5' 6\").    Weight as of this encounter: 58.4 kg (128 lb 12.8 oz).    VSS  NAD  Eyes - no periorbital edema, conjunctival injection, scleral icterus  Neck - no thyromegaly  CV - RRR.    Skin - normal texture   Feet - no ulcers   Mood - cheerful    Recent Labs   Lab Test  01/23/18   1331 01/23/18 08/03/17   1654  11/04/16   1130   06/11/15   1326   05/23/13   1601   12/10/12   1051   A1C   --    --    --    --    --    --    --   8.2*   --    --    HEMOGLOBINA1   --   7.3*   --    --    --    --    --    --    --    --    TSH   --    --    --    --    --   0.91   --    --    --   1.58   CR   --    --   0.80  0.68   < >  0.64   < >  0.62   < >  0.67   MICROL  <5   --    --    --    --    --    --    --    --    --     < > = values in this interval not displayed.     DEXA scan was done at " [facility].  It showed her T score of her left hip to be -2.36.  Her T score of her LS spine was -1.54.    Assessment and plan:    1.  Diabetes control.  Her overall control is quite good.  However, I am not sure she really needs all of the different basal and bolus rates she has programmed into her pump, particularly since she does not follow the same schedule each day.  I think she would really benefit from wearing the continuous glucose monitor so we could assess the accuracy of her settings.  Will refer to the nurse educator to learn about the jackson system.  I will see if the nurse educator can see her back after a week or so of wearing the jackson to download her pump and determine if the settings should be changed.  I will also have the nurse educator review her insulin infusion set insertion technique to be certain there is not a way it can be less uncomfortable for the patient.  She will be returning to [place] for just a few days at the beginning of March.  I will have her see 1 of the physician assistants at that time to review her pump and CGM data to determine what other changes need to be made.    2.  Diabetes complications.  I will check her renal function today.  It is been normal in the past.  She is up-to-date with her eye visits.  She has no foot concerns.    3.  Osteoporosis.  Her FRAX score is  13% for major osteoporotic fracture and 2.5% for a hip fracture in the next 10 years.  This is below the level that requires therapy.  However, we will repeat her vitamin D measurements to be sure she is not insufficient.  We will plan to repeat a DEXA scan in 2019.    4. CVD risk. BP is at target.  10 year ASCVD risk is less than 7.5% so statin not needed.    Follow-up with me in about 6 months.    Veronica Bills MD

## 2018-01-23 NOTE — MR AVS SNAPSHOT
After Visit Summary   1/23/2018    Ruba Major    MRN: [id number]           Patient Information     Date Of Birth          1955        Visit Information        Provider Department      1/23/2018 12:00 PM Veronica Bills MD [facility]        Today's Diagnoses     Type 1 diabetes mellitus without complication (H)    -  1    Osteopenia, unspecified location           Follow-ups after your visit        Additional Services     DIABETES EDUCATOR REFERRAL       DIABETES SELF MANAGEMENT TRAINING (DSMT)      Your provider has referred you to Diabetes Education: [facility] [[email or web address]]: Diabetes Education - Formerly named [facility] [phone] [email or web address]    A  will call you to make your appointment. If it has been more than 3 business days since your referral was placed, please call the above phone number to schedule.    Type of training and number of hours: Preoperative Intensive Glucose Management     Medicare covers: 10 hours of initial DSMT in 12 month period from the time of first visit, plus 2 hours of follow-up DSMT annually, and additional hours as requested for insulin training.    Diabetes Type: Type 1             Diabetes Co-Morbidities: none               A1C Goal:  <8.0       A1C is: Lab Results       Component                Value               Date                       A1C                      8.2                 05/23/2013              Diabetes Education Topics: Continuous Glucose Monitor: Personal CGM.  Please show her how to use jackson CGM    Also - review pump insertion technique.  She reports it is always painful    Special Educational Needs Requiring Individual DSMT: None       MEDICAL NUTRITION THERAPY (MNT) for Diabetes    Medical Nutrition Therapy with a Registered Dietitian can be provided in coordination with Diabetes Self-Management Training to assist in achieving  optimal diabetes management.    MNT Type and Hours: Do not initiate MNT at this time.                       Medicare will cover: 3 hours initial MNT in 12 month period after first visit, plus 2 hours of follow-up MNT annually    Please be aware that coverage of these services is subject to the terms and limitations of your health insurance plan.  Call member services at your health plan to determine Diabetes Self-Management Training (Codes  &amp; ) and Medical Nutrition Therapy (Codes 43797 & 31313) benefits and ask which blood glucose monitor brands are covered by your plan.  Please bring the following with you to your appointment:    (1)  List of current medications   (2)  List of Blood Glucose Monitor brands that are covered by your insurance plan  (3)  Blood Glucose Monitor and log book  (4)   Food records for the 3 days prior to your visit    The Certified Diabetes Educator may make diabetes medication adjustments per the CDE Protocol and Collaborative Practice Agreement.                  Your next 10 appointments already scheduled     Jan 24, 2018  9:00 AM CST   (Arrive by 8:45 AM)   Return Visit with Gilberto Walton MD   [facility] Rheumatology ([facility])    [address] [place] [id number]   [phone]            Mar 02, 2018  9:30 AM CST   (Arrive by 9:15 AM)   Office Visit with Iris Sheikh RN   [facility] Diabetes ([facility])    [address] [place] [id number]   [phone]           Bring a current list of meds and any records pertaining to this visit. For Physicals, please bring immunization records and any forms needing to be filled out. Please arrive 10 minutes early to complete paperwork.            May 07, 2018  9:30 AM CDT   (Arrive by 9:15 AM)   RETURN DIABETES with Marbella Min PA-C   [facility] Endocrinology ([facility])    [address] [place] [address]  "[facility] [id number] [phone]            Jul 24, 2018  3:00 PM CDT   (Arrive by 2:45 PM)   RETURN DIABETES with Veronica Bills MD   [facility] Endocrinology ([facility])    [address] [facility]  [address] [facility] [id number] [phone]              Who to contact     Please call your clinic at [phone] to:    Ask questions about your health    Make or cancel appointments    Discuss your medicines    Learn about your test results    Speak to your doctor   If you have compliments or concerns about an experience at your clinic, or if you wish to file a complaint, please contact [facility] Patient Relations at [phone] or email us at [email or web address]         Additional Information About Your Visit        2CRiskhart Information     [employer] gives you secure access to your electronic health record. If you see a primary care provider, you can also send messages to your care team and make appointments. If you have questions, please call your primary care clinic.  If you do not have a primary care provider, please call [phone] and they will assist you.      [employer] is an electronic gateway that provides easy, online access to your medical records. With [employer], you can request a clinic appointment, read your test results, renew a prescription or communicate with your care team.     To access your existing account, please contact your [facility] or call [phone] for assistance.        Care EveryWhere ID     This is your Care EveryWhere ID. This could be used by other organizations to access your [facility] medical records  [id number]        Your Vitals Were     Pulse Height BMI (Body Mass Index)             69 1.676 m (5' 6\") 20.79 kg/m2          Blood Pressure from Last 3 Encounters:   01/23/18 130/74   08/03/17 136/82   07/28/16 145/77    Weight from Last 3 Encounters:   01/23/18 " 58.4 kg (128 lb 12.8 oz)   08/03/17 59 kg (130 lb)   07/28/16 58.5 kg (129 lb)              We Performed the Following     DIABETES EDUCATOR REFERRAL     Hemoglobin A1c POCT          Today's Medication Changes          These changes are accurate as of: 1/23/18  5:23 PM.  If you have any questions, ask your nurse or doctor.               These medicines have changed or have updated prescriptions.        Dose/Directions    folic acid 1 MG tablet   Commonly known as:  FOLVITE   This may have changed:  how much to take   Used for:  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 factor (H)        Dose:  2 mg   Take 2 tablets (2 mg) by mouth daily   Quantity:  180 tablet   Refills:  3       methotrexate 2.5 MG tablet CHEMO   This may have changed:  additional instructions   Used for:  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 factor (H)        Take 6 tablets once weekly   Quantity:  72 tablet   Refills:  1         Stop taking these medicines if you haven't already. Please contact your care team if you have questions.     insulin regular 100 UNIT/ML injection   Commonly known as:  HumuLIN R/NovoLIN R   Stopped by:  Veronica Bills MD           leucovorin 5 MG tablet   Commonly known as:  WELLCOVORIN   Stopped by:  Veronica Bills MD                    Primary Care Provider Office Phone # Fax #    Eduarda MARSHALL Arnulfo [phone]       [facility] [address] [place] [id number]        Equal Access to Services     [facility] AH: Hadii fortino vann hadasho Soken, waaxda luqadaha, qaybta kaalmada adeegyada, beni ortiz hayshaylee toro . So [facility] [phone].    ATENCIÓN: Si habla español, tiene a burgess disposición servicios gratuitos de asistencia lingüística. Frank al [phone].    We comply with applicable federal civil rights laws and [place] laws. We do not discriminate on the basis of race, color, national origin, age,  disability, sex, sexual orientation, or gender identity.            Thank you!     Thank you for choosing [facility] ENDOCRINOLOGY  for your care. Our goal is always to provide you with excellent care. Hearing back from our patients is one way we can continue to improve our services. Please take a few minutes to complete the written survey that you may receive in the mail after your visit with us. Thank you!             Your Updated Medication List - Protect others around you: Learn how to safely use, store and throw away your medicines at www.disposemymeds.org.          This list is accurate as of: 1/23/18  5:23 PM.  Always use your most recent med list.                   Brand Name Dispense Instructions for use Diagnosis    AMBIEN 10 MG tablet   Generic drug:  zolpidem      Take 10 mg by mouth nightly as needed.    Rheumatoid arthritis(714.0), Encounter for long-term (current) use of other medications       celecoxib 200 MG capsule    celeBREX    90 capsule    Take 1 capsule (200 mg) by mouth daily    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 factor (H), Encounter for long-term current use of medication       folic acid 1 MG tablet    FOLVITE    180 tablet    Take 2 tablets (2 mg) by mouth daily    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 factor (H)       golimumab 50 MG/0.5ML auto-injector pen    SIMPONI    6 each    INJECT THE CONTENTS OF ONE PEN (50MG) UNDER THE SKIN ONCE EVERY 21 TO 28 DAYS    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 factor (H)       INSULIN INFUSION PUMP       Rheumatoid arthritis(714.0), Encounter for long-term (current) use of other medications       insulin lispro 100 UNIT/ML injection    HumaLOG     by Device route See Admin Instructions        LYRICA PO      Take 75 mg by mouth daily        melatonin 5 MG tablet      Take 5 mg by mouth nightly as needed for sleep        methotrexate 2.5 MG tablet CHEMO     72  tablet    Take 6 tablets once weekly    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 factor (H)

## 2018-01-23 NOTE — NURSING NOTE
"Chief Complaint   Patient presents with     Consult     DIABETES TYPE 1 F/U        Initial /74 (BP Location: Right arm, Patient Position: Sitting, Cuff Size: Adult Regular)  Pulse 69  Ht 1.676 m (5' 6\")  Wt 58.4 kg (128 lb 12.8 oz)  BMI 20.79 kg/m2 Estimated body mass index is 20.79 kg/(m^2) as calculated from the following:    Height as of this encounter: 1.676 m (5' 6\").    Weight as of this encounter: 58.4 kg (128 lb 12.8 oz).  Medication Reconciliation: complete       Performed A1C test - patient tolerated well.    Nydia Hamilton, [facility]       "

## 2018-01-24 ENCOUNTER — OFFICE VISIT (OUTPATIENT)
Dept: RHEUMATOLOGY | Facility: CLINIC | Age: 63
End: 2018-01-24
Attending: INTERNAL MEDICINE
Payer: COMMERCIAL

## 2018-01-24 VITALS
DIASTOLIC BLOOD PRESSURE: 79 MMHG | BODY MASS INDEX: 20.57 KG/M2 | OXYGEN SATURATION: 100 % | HEIGHT: 66 IN | WEIGHT: 128 LBS | HEART RATE: 68 BPM | SYSTOLIC BLOOD PRESSURE: 128 MMHG

## 2018-01-24 DIAGNOSIS — M05.741 RHEUMATOID ARTHRITIS INVOLVING BOTH HANDS WITH POSITIVE RHEUMATOID FACTOR (H): ICD-10-CM

## 2018-01-24 DIAGNOSIS — Z79.899 ENCOUNTER FOR LONG-TERM CURRENT USE OF MEDICATION: ICD-10-CM

## 2018-01-24 DIAGNOSIS — M05.742 RHEUMATOID ARTHRITIS INVOLVING BOTH HANDS WITH POSITIVE RHEUMATOID FACTOR (H): ICD-10-CM

## 2018-01-24 LAB
ALT SERPL W P-5'-P-CCNC: 32 U/L (ref 0–50)
AST SERPL W P-5'-P-CCNC: 22 U/L (ref 0–45)
CREAT SERPL-MCNC: 0.7 MG/DL (ref 0.52–1.04)
CRP SERPL-MCNC: <2.9 MG/L (ref 0–8)
ERYTHROCYTE [DISTWIDTH] IN BLOOD BY AUTOMATED COUNT: 12.7 % (ref 10–15)
GFR SERPL CREATININE-BSD FRML MDRD: 84 ML/MIN/1.7M2
HCT VFR BLD AUTO: 39.9 % (ref 35–47)
HGB BLD-MCNC: 13.4 G/DL (ref 11.7–15.7)
MCH RBC QN AUTO: 31.1 PG (ref 26.5–33)
MCHC RBC AUTO-ENTMCNC: 33.6 G/DL (ref 31.5–36.5)
MCV RBC AUTO: 93 FL (ref 78–100)
PLATELET # BLD AUTO: 237 10E9/L (ref 150–450)
RBC # BLD AUTO: 4.31 10E12/L (ref 3.8–5.2)
WBC # BLD AUTO: 4.3 10E9/L (ref 4–11)

## 2018-01-24 PROCEDURE — 86140 C-REACTIVE PROTEIN: CPT | Performed by: INTERNAL MEDICINE

## 2018-01-24 PROCEDURE — G0463 HOSPITAL OUTPT CLINIC VISIT: HCPCS | Mod: ZF

## 2018-01-24 PROCEDURE — 84460 ALANINE AMINO (ALT) (SGPT): CPT | Performed by: INTERNAL MEDICINE

## 2018-01-24 PROCEDURE — 84450 TRANSFERASE (AST) (SGOT): CPT | Performed by: INTERNAL MEDICINE

## 2018-01-24 PROCEDURE — 36415 COLL VENOUS BLD VENIPUNCTURE: CPT | Performed by: INTERNAL MEDICINE

## 2018-01-24 PROCEDURE — 82565 ASSAY OF CREATININE: CPT | Performed by: INTERNAL MEDICINE

## 2018-01-24 PROCEDURE — 85027 COMPLETE CBC AUTOMATED: CPT | Performed by: INTERNAL MEDICINE

## 2018-01-24 RX ORDER — FOLIC ACID 1 MG/1
2 TABLET ORAL DAILY
Qty: 180 TABLET | Refills: 3 | Status: SHIPPED | OUTPATIENT
Start: 2018-01-24 | End: 2018-12-10

## 2018-01-24 RX ORDER — LEUCOVORIN CALCIUM 5 MG/1
5 TABLET ORAL WEEKLY
Qty: 12 TABLET | Refills: 3 | Status: SHIPPED | OUTPATIENT
Start: 2018-01-24 | End: 2018-12-10

## 2018-01-24 ASSESSMENT — PAIN SCALES - GENERAL: PAINLEVEL: SEVERE PAIN (6)

## 2018-01-24 NOTE — MR AVS SNAPSHOT
After Visit Summary   1/24/2018    Ruba Major    MRN: [id number]           Patient Information     Date Of Birth          1955        Visit Information        Provider Department      1/24/2018 9:00 AM Gilberto Walton MD [facility] Rheumatology        Today's Diagnoses     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 factor (H)        Encounter for long-term current use of medication           Follow-ups after your visit        Follow-up notes from your care team     Return in about 6 months (around 7/24/2018).      Your next 10 appointments already scheduled     Mar 02, 2018  9:30 AM CST   (Arrive by 9:15 AM)   Office Visit with Iris Sheikh RN   [facility] Diabetes ([facility])    [address] [place] [id number] [phone]           Bring a current list of meds and any records pertaining to this visit. For Physicals, please bring immunization records and any forms needing to be filled out. Please arrive 10 minutes early to complete paperwork.            May 07, 2018  9:30 AM CDT   (Arrive by 9:15 AM)   RETURN DIABETES with Marbella Min PA-C   [facility] Endocrinology ([facility])    [address] [place] [address]   [phone]            Jul 24, 2018  3:00 PM CDT   (Arrive by 2:45 PM)   RETURN DIABETES with Veronica Bills MD   [facility] Endocrinology ([facility])    [address] [place] [address]   [phone]            Jul 25, 2018  9:30 AM CDT   (Arrive by 9:15 AM)   Return Visit with Gilberto Walton MD   [facility] Rheumatology ([facility])    [address] [place] [address]  [address]  [facility] [address]   [phone]              Future tests that were ordered for you today     Open Standing Orders        Priority Remaining Interval Expires Ordered    AST Routine 7/9 every  "12 weeks 1/24/2019 1/24/2018    ALT Routine 7/9 every 12 weeks 1/24/2019 1/24/2018    CBC with platelets Routine 7/9 every 12 weeks 1/24/2019 1/24/2018    Creatinine Routine 7/9 every 12 weeks 1/24/2019 1/24/2018    CRP inflammation Routine 7/9 every 12 weeks 1/24/2019 1/24/2018            Who to contact     If you have questions or need follow up information about today's clinic visit or your schedule please contact [facility] RHEUMATOLOGY directly at [phone].  Normal or non-critical lab and imaging results will be communicated to you by CytoLogichart, letter or phone within 4 business days after the clinic has received the results. If you do not hear from us within 7 days, please contact the clinic through AcuFocus or phone. If you have a critical or abnormal lab result, we will notify you by phone as soon as possible.  Submit refill requests through AcuFocus or call your pharmacy and they will forward the refill request to us. Please allow 3 business days for your refill to be completed.          Additional Information About Your Visit        CytoLogichart Information     AcuFocus gives you secure access to your electronic health record. If you see a primary care provider, you can also send messages to your care team and make appointments. If you have questions, please call your primary care clinic.  If you do not have a primary care provider, please call [phone] and they will assist you.        Care EveryWhere ID     This is your Care EveryWhere ID. This could be used by other organizations to access your [place] medical records  [id number]        Your Vitals Were     Pulse Height Pulse Oximetry BMI (Body Mass Index)          68 1.676 m (5' 6\") 100% 20.66 kg/m2         Blood Pressure from Last 3 Encounters:   01/24/18 128/79   01/23/18 130/74   08/03/17 136/82    Weight from Last 3 Encounters:   01/24/18 58.1 kg (128 lb)   01/23/18 58.4 kg (128 lb 12.8 oz)   08/03/17 59 kg (130 lb)              We Performed the " Following     ALT     AST     CBC with platelets     Creatinine     CRP inflammation          Today's Medication Changes          These changes are accurate as of 1/24/18  4:45 PM.  If you have any questions, ask your nurse or doctor.               Start taking these medicines.        Dose/Directions    leucovorin 5 MG tablet   Commonly known as:  WELLCOVORIN   Used for:  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 factor (H), Encounter for long-term current use of medication   Started by:  Gilberto Walton MD        Dose:  5 mg   Take 1 tablet (5 mg) by mouth once a week 12 hours after taking Methotrexate dose.   Quantity:  12 tablet   Refills:  3         These medicines have changed or have updated prescriptions.        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 [address]     Phone:  [phone]     folic acid 1 MG tablet    leucovorin 5 MG tablet    methotrexate 2.5 MG tablet CHEMO                Primary Care Provider Office Phone # Fax #    Eduarda Arredondo [phone]       [facility] [address] [place] [address] [address]  [facility] [address]        Equal Access to Services     SLIME CRAWFORD AH: Gaviota vann  dominique Cueto, ramónda lulenoraadaha, qaethanta kasandrada nakita, beni vu labozenamillie tila. So [facility] [phone].    ATENCIÓN: Si damionla paco, tiene a burgess disposición servicios gratuitos de asistencia lingüística. Frank al [phone].    We comply with applicable federal civil rights laws and [place] laws. We do not discriminate on the basis of race, color, national origin, age, disability, sex, sexual orientation, or gender identity.            Thank you!     Thank you for choosing [facility] RHEUMATOLOGY  for your care. Our goal is always to provide you with excellent care. Hearing back from our patients is one way we can continue to improve our services. Please take a few minutes to complete the written survey that you may receive in the mail after your visit with us. Thank you!             Your Updated Medication List - Protect others around you: Learn how to safely use, store and throw away your medicines at www.disposemymeds.org.          This list is accurate as of 1/24/18  4:45 PM.  Always use your most recent med list.                   Brand Name Dispense Instructions for use Diagnosis    AMBIEN 10 MG tablet   Generic drug:  zolpidem      Take 10 mg by mouth nightly as needed.    Rheumatoid arthritis(714.0), Encounter for long-term (current) use of other medications       celecoxib 200 MG capsule    celeBREX    90 capsule    Take 1 capsule (200 mg) by mouth daily    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 factor (H), Encounter for long-term current use of medication       folic acid 1 MG tablet    FOLVITE    180 tablet    Take 2 tablets (2 mg) by mouth daily    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 factor (H), Encounter for long-term current use of medication       golimumab 50 MG/0.5ML auto-injector pen    SIMPONI    6 each    INJECT THE CONTENTS OF ONE PEN (50MG) UNDER THE SKIN ONCE EVERY 21 TO 28 DAYS    Rheumatoid arthritis  involving both hands with positive rheumatoid factor (H)       INSULIN INFUSION PUMP       Rheumatoid arthritis(714.0), Encounter for long-term (current) use of other medications       insulin lispro 100 UNIT/ML injection    HumaLOG     by Device route See Admin Instructions        leucovorin 5 MG tablet    WELLCOVORIN    12 tablet    Take 1 tablet (5 mg) by mouth once a week 12 hours after taking Methotrexate dose.    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 factor (H), Encounter for long-term current use of medication       LYRICA PO      Take 75 mg by mouth daily        melatonin 5 MG tablet      Take 5 mg by mouth nightly as needed for sleep        methotrexate 2.5 MG tablet CHEMO     72 tablet    Take 6 tablets once weekly    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 factor (H), Encounter for long-term current use of medication

## 2018-01-24 NOTE — LETTER
1/24/2018      RE: Ruba Major  [address]  [place] [address]       Rheumatology Visit     Ruba Major MRN# [id number]   YOB: 1955 Age: 63 year old     Date of Visit: January 24, 2018  Primary care provider: Eduarda Arredondo          Assessment and Plan:   1. 64 yo female with RA, CCP+: She had more active disease which was limiting on single agent Methotrexate. I believe that regarding the inflammatory component of her joint pain after 6 months of Humira she was improved but not in a remission, able to play tennis and golf with symptoms. No overt synovitis was noted. We switched to Enbrel to attempt to achieve a better response and hopefully remission. Unfortunately she did not respond at 3 months even partially to this.       She continues to be improved on Simponi without side effects. She has some waning at 3-3.5 weeks. She does have mild weekly SE on Methotrexate that are very stable. Her occasional diffuse mouth soreness may be due to this.   Assessment of response of her RA has been complicated by concomitant DJD affecting first CMC joints and her feet. She is much better now so it would seem inflammation was clearly playing a part.   She had L knee arthroscopic knee surgery.She however is limited and can no longer play tennis, although can golf.   She had cervical spine surgery and now recent epidurals for L5 disc and is now in PT.  She is having some recurrent L arm tingling and scheduled for CT scan.  Lab has been normal but she is due.  2. Type I DM on insulin pump.   3. Possible peripheral neuropathy, now improved   4. DJD       PLAN: discussed in detail with the patient  1. She will continue Simponi and I encouraged her to take at 3-3.5 weeks consistently  2. Continue Methotrexate but decrease to 10 mg weekly; continue folic acid and Leucovorin - renewed  3. Can continue Celebrex 200 mg daily. She wonders if some bleeding at insulin pump site is due to this. As a  selective Burgos inhibitor there is little if any platelet effect so this would be unusual.  4. She has had the flu shot   5. Return in 6 months; lab in the interim as well as today  6. Call if problems occur      Gilberto Walton MD                History of Present Illness:   64 yo F is seen in followup for RA, CCP+. I saw her last in August 2017. EPIC reviewed.  HISTORY CARRIED FORWARD:  To review, patient initially presented in 5/2011 with c/o 7-8 year h/o fever, pain and swelling in bilateral wrists, 1st MCPs, Ankles, and 1st MTPs. She also noted prolonged morning stiffness, fatique, dry eyes, and ocular photosensativity. Also endorsed long history of oral aphthous ulcers. Initial labs revealed negative RF, SSA, SSB, TTG, complements, and thyroid studies, however CCP was positive (127). Recent CCP again is positive.  She is currently being managed with Methotrexate 15 mg q week with Leucovorin and Folic acid. Humira added in July 2012 and switched to Enbrel at visit in March 2013. See correspondence. Due to insurance coverage Enbrel was substituted for original consideration of Simponi. With no response to Enbrel it was switched to Simponi in summer 2013.   She was significantly improved at less than 3 months and this continues. At prior visit she did note that variably some months she had more sx at about three weeks. She took the last dose early and she noted immediate improvement. Since then she continues to take it around 23 days and continues to do better. She has minimal sx now except in her L knee which has had arthroscopy and has known DJD. She notes minimal swelling. Her feet are stable with minimal residual hand pain. No systemic symptoms. She is taking Celebrex at 200 mg just daily now. No SE.  She had interval cervical spine surgery and is healed. She has some residual L arm symptoms that are not clearly related, perhaps Rotator cuff; she is in PT.   On Humira she had no rashes or  injection site reactions. Enbrel had a little more burning in thigh injection site but did not seem to help.    INTERVAL HISTORY:    The [place] has no pain with injection.   The patient was initially having significant GI side effects related to the methotrexate, and was started on leucovorin 5mg q week with good resolution. She however is having more symptoms on the days she takes methotrexate. She misses some Leucovorin doses. She cut down herself to 12.5 mg weekly MTX. No significant flares.  We discussed any peripheral symptoms at length.  There is little if any change. She thinks her rings are  fitting the same. She has less extension of R wrist.  Feet are stable.  Her current problem is persistence of herniated R L5-S1 disk. She has had 4 epidurals without lasting response.  This inhibits her playing tennis though she can golf. She has no weakness and apparently EMG was reassuring.     She had interval lab again at her PCP yearly check, reported to her as normal.  We will obtain today.  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  She spends a lot of time in [place].  She saw Dr. Williamson yesterday, transferring her Diabetes care.  ROS otherwise negative.         Review of Systems:   Review Of Systems  Skin: negative  Eyes: negative  Ears/Nose/Throat: negative  Respiratory: No shortness of breath, dyspnea on exertion, cough, or hemoptysis  Cardiovascular: negative  Gastrointestinal: negative  Genitourinary: negative  Musculoskeletal: see HPI  Neurologic: negative  Psychiatric: negative  Hematologic/Lymphatic/Immunologic: negative  Endocrine: see Endo note          Past Medical History:     Past Medical History:   Diagnosis Date     Rheumatoid arthritis(714.0)      Type II or unspecified type diabetes mellitus without mention of complication, not stated as uncontrolled        Patient Active Problem List    Diagnosis Date Noted     Type 1 diabetes mellitus without complication (H) 01/23/2018     Priority:  "Medium     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 Provider to review               Past Surgical History:     Past Surgical History:   Procedure Laterality Date     FOOT SURGERY  1998, 10/2010    Bunionectomy             Social History:     Social History   Substance Use Topics     Smoking status: Never Smoker     Smokeless tobacco: Never Used     Alcohol use Yes      Comment: glass of wine daily             Family History:   No family history on file.         Allergies:   No Known Allergies          Medications:     Current Outpatient Prescriptions   Medication Sig Dispense Refill     insulin lispro (HUMALOG) 100 UNIT/ML injection by Device route See Admin Instructions       golimumab (SIMPONI) 50 MG/0.5ML auto-injector pen INJECT THE CONTENTS OF ONE PEN (50MG) UNDER THE SKIN ONCE EVERY 21 TO 28 DAYS 6 each 0     celecoxib (CELEBREX) 200 MG capsule Take 1 capsule (200 mg) by mouth daily 90 capsule 3     methotrexate 2.5 MG tablet CHEMO Take 6 tablets once weekly (Patient taking differently: Take 6 tablets once weekly- pt currently taking 5 tablets once weekly) 72 tablet 1     folic acid (FOLVITE) 1 MG tablet Take 2 tablets (2 mg) by mouth daily (Patient taking differently: Take 1 mg by mouth daily ) 180 tablet 3     Pregabalin (LYRICA PO) Take 75 mg by mouth daily        melatonin 5 MG tablet Take 5 mg by mouth nightly as needed for sleep       zolpidem (AMBIEN) 10 MG tablet Take 10 mg by mouth nightly as needed.       INSULIN INFUSION PUMP               Physical Exam:   Blood pressure 128/79, pulse 68, height 1.676 m (5' 6\"), weight 58.1 kg (128 lb), SpO2 100 %.    Constitutional: [id number]-[id number] " cooperative   Eyes: nl conjunctiva, sclera   ENT: nl external ears, nose, throat. Healed anterior cervical scar   MS: No definite active synovitis on exam today. She has pain with finger curl and 1+T L 4th PIP.  45 degrees dorsiflexion R wrist without swelling.  Tinel negative. No dactylitis, tenosynovitis, enthesopathy   Skin: no nail pitting, alopecia, rash, nodules or lesions   Psych: nl affect.       Data:     Lab Results   Component Value Date    WBC 6.4 01/28/2016    WBC 3.2 (L) 06/11/2015    WBC 4.4 12/03/2014    HGB 13.4 08/03/2017    HGB 12.9 11/04/2016    HGB 12.3 01/28/2016    HCT 36.6 01/28/2016    HCT 37.3 06/11/2015    HCT 35.8 12/03/2014    MCV 91 01/28/2016    MCV 89 06/11/2015    MCV 90 12/03/2014     01/28/2016     06/11/2015     12/03/2014     Lab Results   Component Value Date    BUN 19 12/10/2012    BUN 16 08/28/2008     No components found for: SEDRATE  Lab Results   Component Value Date    TSH 0.91 06/11/2015    TSH 1.58 12/10/2012    TSH 1.57 09/12/2008     Lab Results   Component Value Date    AST 22 08/03/2017    AST 21 11/04/2016    AST 19 01/28/2016    ALT 35 08/03/2017    ALT 30 11/04/2016    ALT 28 01/28/2016    ALKPHOS 66 12/10/2012    ALKPHOS 101 08/28/2008     Reviewed Rheumatology lab flowsheet    Gilberto Walton

## 2018-01-24 NOTE — PROGRESS NOTES
Rheumatology Visit     Ruba Major MRN# [id number]   YOB: 1955 Age: 63 year old     Date of Visit: January 24, 2018  Primary care provider: Eduarda Arredondo          Assessment and Plan:   1. 62 yo female with RA, CCP+: She had more active disease which was limiting on single agent Methotrexate. I believe that regarding the inflammatory component of her joint pain after 6 months of Humira she was improved but not in a remission, able to play tennis and golf with symptoms. No overt synovitis was noted. We switched to Enbrel to attempt to achieve a better response and hopefully remission. Unfortunately she did not respond at 3 months even partially to this.       She continues to be improved on Simponi without side effects. She has some waning at 3-3.5 weeks. She does have mild weekly SE on Methotrexate that are very stable. Her occasional diffuse mouth soreness may be due to this.   Assessment of response of her RA has been complicated by concomitant DJD affecting first CMC joints and her feet. She is much better now so it would seem inflammation was clearly playing a part.   She had L knee arthroscopic knee surgery.She however is limited and can no longer play tennis, although can golf.   She had cervical spine surgery and now recent epidurals for L5 disc and is now in PT.  She is having some recurrent L arm tingling and scheduled for CT scan.  Lab has been normal but she is due.  2. Type I DM on insulin pump.   3. Possible peripheral neuropathy, now improved   4. DJD       PLAN: discussed in detail with the patient  1. She will continue Simponi and I encouraged her to take at 3-3.5 weeks consistently  2. Continue Methotrexate but decrease to 10 mg weekly; continue folic acid and Leucovorin - renewed  3. Can continue Celebrex 200 mg daily. She wonders if some bleeding at insulin pump site is due to this. As a selective Burgos inhibitor there is little if any platelet effect so this would be  unusual.  4. She has had the flu shot   5. Return in 6 months; lab in the interim as well as today  6. Call if problems occur      Gilberto Walton MD                History of Present Illness:   62 yo F is seen in followup for RA, CCP+. I saw her last in August 2017. EPIC reviewed.  HISTORY CARRIED FORWARD:  To review, patient initially presented in 5/2011 with c/o 7-8 year h/o fever, pain and swelling in bilateral wrists, 1st MCPs, Ankles, and 1st MTPs. She also noted prolonged morning stiffness, fatique, dry eyes, and ocular photosensativity. Also endorsed long history of oral aphthous ulcers. Initial labs revealed negative RF, SSA, SSB, TTG, complements, and thyroid studies, however CCP was positive (127). Recent CCP again is positive.  She is currently being managed with Methotrexate 15 mg q week with Leucovorin and Folic acid. Humira added in July 2012 and switched to Enbrel at visit in March 2013. See correspondence. Due to insurance coverage Enbrel was substituted for original consideration of Simponi. With no response to Enbrel it was switched to Simponi in summer 2013.   She was significantly improved at less than 3 months and this continues. At prior visit she did note that variably some months she had more sx at about three weeks. She took the last dose early and she noted immediate improvement. Since then she continues to take it around 23 days and continues to do better. She has minimal sx now except in her L knee which has had arthroscopy and has known DJD. She notes minimal swelling. Her feet are stable with minimal residual hand pain. No systemic symptoms. She is taking Celebrex at 200 mg just daily now. No SE.  She had interval cervical spine surgery and is healed. She has some residual L arm symptoms that are not clearly related, perhaps Rotator cuff; she is in PT.   On Humira she had no rashes or injection site reactions. Enbrel had a little more burning in thigh injection site  but did not seem to help.    INTERVAL HISTORY:    The [place] has no pain with injection.   The patient was initially having significant GI side effects related to the methotrexate, and was started on leucovorin 5mg q week with good resolution. She however is having more symptoms on the days she takes methotrexate. She misses some Leucovorin doses. She cut down herself to 12.5 mg weekly MTX. No significant flares.  We discussed any peripheral symptoms at length.  There is little if any change. She thinks her rings are  fitting the same. She has less extension of R wrist.  Feet are stable.  Her current problem is persistence of herniated R L5-S1 disk. She has had 4 epidurals without lasting response.  This inhibits her playing tennis though she can golf. She has no weakness and apparently EMG was reassuring.     She had interval lab again at her PCP yearly check, reported to her as normal.  We will obtain today.  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  She spends a lot of time in [place].  She saw Dr. Williamson yesterday, transferring her Diabetes care.  ROS otherwise negative.         Review of Systems:   Review Of Systems  Skin: negative  Eyes: negative  Ears/Nose/Throat: negative  Respiratory: No shortness of breath, dyspnea on exertion, cough, or hemoptysis  Cardiovascular: negative  Gastrointestinal: negative  Genitourinary: negative  Musculoskeletal: see HPI  Neurologic: negative  Psychiatric: negative  Hematologic/Lymphatic/Immunologic: negative  Endocrine: see Endo note          Past Medical History:     Past Medical History:   Diagnosis Date     Rheumatoid arthritis(714.0)      Type II or unspecified type diabetes mellitus without mention of complication, not stated as uncontrolled        Patient Active Problem List    Diagnosis Date Noted     Type 1 diabetes mellitus without complication (H) 01/23/2018     Priority: Medium     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 factor  "(H) 01/28/2016     Priority: Medium     Diabetes mellitus (H) 05/23/2013     Priority: Medium     Muscle cramps 12/10/2012     Priority: Medium     Encounter for long-term current use of medication 08/30/2011     Priority: Medium     Problem list name updated by automated process. Provider to review               Past Surgical History:     Past Surgical History:   Procedure Laterality Date     FOOT SURGERY  1998, 10/2010    Bunionectomy             Social History:     Social History   Substance Use Topics     Smoking status: Never Smoker     Smokeless tobacco: Never Used     Alcohol use Yes      Comment: glass of wine daily             Family History:   No family history on file.         Allergies:   No Known Allergies          Medications:     Current Outpatient Prescriptions   Medication Sig Dispense Refill     insulin lispro (HUMALOG) 100 UNIT/ML injection by Device route See Admin Instructions       golimumab (SIMPONI) 50 MG/0.5ML auto-injector pen INJECT THE CONTENTS OF ONE PEN (50MG) UNDER THE SKIN ONCE EVERY 21 TO 28 DAYS 6 each 0     celecoxib (CELEBREX) 200 MG capsule Take 1 capsule (200 mg) by mouth daily 90 capsule 3     methotrexate 2.5 MG tablet CHEMO Take 6 tablets once weekly (Patient taking differently: Take 6 tablets once weekly- pt currently taking 5 tablets once weekly) 72 tablet 1     folic acid (FOLVITE) 1 MG tablet Take 2 tablets (2 mg) by mouth daily (Patient taking differently: Take 1 mg by mouth daily ) 180 tablet 3     Pregabalin (LYRICA PO) Take 75 mg by mouth daily        melatonin 5 MG tablet Take 5 mg by mouth nightly as needed for sleep       zolpidem (AMBIEN) 10 MG tablet Take 10 mg by mouth nightly as needed.       INSULIN INFUSION PUMP               Physical Exam:   Blood pressure 128/79, pulse 68, height 1.676 m (5' 6\"), weight 58.1 kg (128 lb), SpO2 100 %.    Constitutional: WD-[id number] cooperative   Eyes: nl conjunctiva, sclera   ENT: nl external ears, nose, throat. Healed anterior " cervical scar   MS: No definite active synovitis on exam today. She has pain with finger curl and 1+T L 4th PIP.  45 degrees dorsiflexion R wrist without swelling.  Tinel negative. No dactylitis, tenosynovitis, enthesopathy   Skin: no nail pitting, alopecia, rash, nodules or lesions   Psych: nl affect.       Data:     Lab Results   Component Value Date    WBC 6.4 01/28/2016    WBC 3.2 (L) 06/11/2015    WBC 4.4 12/03/2014    HGB 13.4 08/03/2017    HGB 12.9 11/04/2016    HGB 12.3 01/28/2016    HCT 36.6 01/28/2016    HCT 37.3 06/11/2015    HCT 35.8 12/03/2014    MCV 91 01/28/2016    MCV 89 06/11/2015    MCV 90 12/03/2014     01/28/2016     06/11/2015     12/03/2014     Lab Results   Component Value Date    BUN 19 12/10/2012    BUN 16 08/28/2008     No components found for: SEDRATE  Lab Results   Component Value Date    TSH 0.91 06/11/2015    TSH 1.58 12/10/2012    TSH 1.57 09/12/2008     Lab Results   Component Value Date    AST 22 08/03/2017    AST 21 11/04/2016    AST 19 01/28/2016    ALT 35 08/03/2017    ALT 30 11/04/2016    ALT 28 01/28/2016    ALKPHOS 66 12/10/2012    ALKPHOS 101 08/28/2008     Reviewed Rheumatology lab flowsheet    Gilberto Walton

## 2018-02-14 DIAGNOSIS — M05.741 RHEUMATOID ARTHRITIS INVOLVING BOTH HANDS WITH POSITIVE RHEUMATOID FACTOR (H): ICD-10-CM

## 2018-02-14 DIAGNOSIS — M05.742 RHEUMATOID ARTHRITIS INVOLVING BOTH HANDS WITH POSITIVE RHEUMATOID FACTOR (H): ICD-10-CM

## 2018-02-14 NOTE — TELEPHONE ENCOUNTER
golimumab (SIMPONI  Last Written Prescription Date:  10/19/17  Last Fill Quantity: 6,   # refills: 0  Last Office Visit :1/24/18  Future Office visit:  7/25/18

## 2018-05-07 ENCOUNTER — OFFICE VISIT (OUTPATIENT)
Dept: ENDOCRINOLOGY | Facility: CLINIC | Age: 63
End: 2018-05-07
Payer: COMMERCIAL

## 2018-05-07 ENCOUNTER — OFFICE VISIT (OUTPATIENT)
Dept: EDUCATION SERVICES | Facility: CLINIC | Age: 63
End: 2018-05-07
Payer: COMMERCIAL

## 2018-05-07 VITALS
HEART RATE: 74 BPM | BODY MASS INDEX: 20.66 KG/M2 | DIASTOLIC BLOOD PRESSURE: 73 MMHG | SYSTOLIC BLOOD PRESSURE: 123 MMHG | WEIGHT: 128 LBS

## 2018-05-07 DIAGNOSIS — E10.9 TYPE 1 DIABETES MELLITUS WITHOUT COMPLICATION (H): Primary | ICD-10-CM

## 2018-05-07 LAB — HBA1C MFR BLD: 8 % (ref 4.3–6)

## 2018-05-07 RX ORDER — INSULIN GLARGINE 100 [IU]/ML
15 INJECTION, SOLUTION SUBCUTANEOUS DAILY
COMMUNITY
Start: 2017-03-03 | End: 2018-06-04

## 2018-05-07 ASSESSMENT — PAIN SCALES - GENERAL: PAINLEVEL: SEVERE PAIN (6)

## 2018-05-07 NOTE — PROGRESS NOTES
[facility]  Endocrinology  Marbella Min PA-C  05/07/2018      Chief Complaint:   Diabetes    History of Present Illness:   Ruba Major is a 63 year old female with a history of type 2 diabetes mellitus and RA with positive rheumatoid factor, who presents alone for evaluation of diabetes follow up. The patient was initially diagnosed with diabetes in 2008 when she presented with hyperglycemia.  The patient reports that she has always questioned her type 1 diagnosis from day one and wonders still if she might not be lillian to take a pill. She does endorse a history of DKA one time while she was in [place] though she describes her previous PN physician treating by algorithm over the phone with insulin and Gatorade      Today, the patient's HbA1c is at 8%. She reports that she has been having lows, however she denies needing assistance from anyone else. She reports feeling her highs and lows and is able to adjust for this. Overall, the patient reports that she is not extremely satisfied with her current pump due to the size and interference while she is partaking in daily activities and golf. The patient has had this pump for a few years. She reports having difficulty with sensors as she has difficulty with placement of sensors as she is very sensitive. She has attempted to use an Omnipod in the past. The patient does change her insertion site every three days as she bleeds with every insertion.     She reports that she does not eat many carbs though she notes that her  is currently on a keto diet. The patient reports that she has not been consuming many carbohydrates, but she does like oatmeal in the morning. She mentions that her meals are high in protein and vegetables. She does consume some dairy and is taking a number of supplements to maintain her levels. The patient denies history of heart disease and high cholesterol. She does mention a familial history of tobacco use with extensive cardiac  concerns. Her father had his first MI at 34 yo.      The patient is traveling to [place] soon for a couple weeks and requests medication updates so that she does not run out while traveling.     Of note, the patient has a history of knee concerns. She reports having increased knee discomfort and is looking to have another knee orthoscopic procedure this summer.     Blood Glucose Monitoring:  We reviewed glucometer data together.    Average B+/- 61  BG readings:   107, 7.5 per day   Readings > Target:  73, 68%  Readings < Target:  1, 1%  Average carbs:  26 +/- 10 daily   Average insulin:  26.9 +/- 2.7 daily   Average basal:  18.7, 69%    Hypoglycemia:  68, 70, 74, 78     Current Insulin Pump Settings:  Type of Pump: Medtronic Minimed: Model 530G  BASAL RATES and times:  12    AM (midnight): 0.4 units/hour    5      AM: 0.675 units/hour   7     AM: 1.05 units/hour   12   PM (noon): 0.925 units/hour   4     PM: 1.0 units/hour   10    PM: 0.375 units/hour    Carb ratio:   CARB RATIO and times:  12   AM (midnight): 12  6      AM:  6.8  11:30 AM: 15  5:30 PM:  8  Correction Factor (Sensitivity) and times:  12    AM (midnight): 65 mg/dL  6      AM: 54 mg/dL  7     PM: 75 mg/dL  Target BG Ranges:   BLOOD GLUCOSE TARGET and times:  12    AM (midnight): 100 - 150  7      AM:  90 - 110  7     PM:  100 - 150  Amount of Time Insulin is Active:  3 hrs    Diabetes monitoring and complications:  CAD: None known   Last eye exam results: Unknown at this time   Last dental exam: Unknown at this time   Microalbuminuria: Unable to calculate on 2018 due to low value   HTN: No  On Statin: No  On Aspirin: No  Depression: Unknown at this time   Erectile dysfunction: N/A    Patient Supplied Answers To Diabetic Questionnaire  including 2018   1. Since your last visit to our clinic (or if this your first visit, since you last saw your primary care provider), have you experienced any of the following symptoms that may be  related to low blood sugars? No, I have not experienced any of these symptoms   2. Since your last visit to our clinic (or if this your first visit, since you last saw your primary care provider), have you experienced any of the following symptoms that may be related to prolonged high blood sugars? More thirst than usual, Increased urination, No, I have not experienced any of these symptoms   3. Have you had any concerns that you would like to discuss today? Other   4. Do you have any female family members who have had heart attacks or strokes before age 60 or male relatives who have had heart attacks or strokes before age 50? No   5. Do you have any family members who have had complications from diabetes such as kidney disease, heart disease or strokes, retinopathy (a vision problem), or amputations? No   6. Who do you live with?  spouse   Little interest or pleasure in doing things? Not at all   Feeling down, depressed, or hopeless? Not at all   10.  Are you considering a pregnancy or interested in discussing pregnancy prevention today? No        Review of Systems:   Pertinent items are noted in HPI.  All other systems are negative.    Active Medications:      celecoxib (CELEBREX) 200 MG capsule, Take 1 capsule (200 mg) by mouth daily, Disp: 90 capsule, Rfl: 3     folic acid (FOLVITE) 1 MG tablet, Take 2 tablets (2 mg) by mouth daily, Disp: 180 tablet, Rfl: 3     golimumab (SIMPONI) 50 MG/0.5ML auto-injector pen, INJECT THE CONTENTS OF ONE PEN (50MG) UNDER THE SKIN ONCE EVERY 21 TO 28 DAYS, Disp: 6 each, Rfl: 0     insulin lispro (HUMALOG) 100 UNIT/ML injection, by Device route See Admin Instructions, Disp: , Rfl:      leucovorin (WELLCOVORIN) 5 MG tablet, Take 1 tablet (5 mg) by mouth once a week 12 hours after taking Methotrexate dose., Disp: 12 tablet, Rfl: 3     melatonin 5 MG tablet, Take 5 mg by mouth nightly as needed for sleep, Disp: , Rfl:      methotrexate 2.5 MG tablet CHEMO, Take 6 tablets once weekly,  "Disp: 72 tablet, Rfl: 1     Pregabalin (LYRICA PO), Take 75 mg by mouth daily , Disp: , Rfl:      zolpidem (AMBIEN) 10 MG tablet, Take 10 mg by mouth nightly as needed., Disp: , Rfl:       Allergies:   Review of patient's allergies indicates no known allergies.      Past Medical History:  Rheumatoid arthritis, bilateral hands with positive rheumatoid factor   Type 2 diabetes mellitus without mention of complication   Encounter for long-term current use of medication   Muscle cramps      Past Surgical History:  Bunionectomy     Social History:   The patient is , a nonsmoker, and does consume alcohol. She is currently coaching high school tennis. The patient recently returned from [place] from staying there most of the winter.      Physical Exam:   /73  Pulse 74  Wt 58.1 kg (128 lb)  BMI 20.66 kg/m2     Wt Readings from Last 10 Encounters:   05/07/18 58.1 kg (128 lb)   01/24/18 58.1 kg (128 lb)   01/23/18 58.4 kg (128 lb 12.8 oz)   08/03/17 59 kg (130 lb)   07/28/16 58.5 kg (129 lb)   06/11/15 57.9 kg (127 lb 9.6 oz)   09/03/14 59.1 kg (130 lb 6.4 oz)   07/23/13 59 kg (130 lb)   05/23/13 59 kg (130 lb)   01/16/13 59 kg (130 lb)        General: Pleasant, well nourished and hydrated female in NAD.   Psych:  Mood is \"good,\" affect is appropriate.  Thought form and content are fluid and coherent.    HEENT: Eyes and sclera are clear. Extraocular movements are grossly intact without proptosis.  Nares are patent, mucous membranes moist.  Neck: No masses or JVD are noted.    Resp: Easy and unlabored breathing.   Neuro: Alert and oriented, communicating clearly.   Ext: no swelling or edema     Data:  Lab Results   Component Value Date     12/10/2012    POTASSIUM 4.1 12/10/2012    CHLORIDE 98 12/10/2012    CO2 29 12/10/2012    ANIONGAP 12 12/10/2012     (H) 12/10/2012    BUN 19 12/10/2012    CR 0.70 01/24/2018    CELSO 8.8 01/23/2018     Lab Results   Component Value Date    GFRESTIMATED 84 " 01/24/2018    GFRESTIMATED 72 08/03/2017    GFRESTIMATED 87 11/04/2016    GFRESTBLACK >90 01/24/2018    GFRESTBLACK 88 08/03/2017    GFRESTBLACK >90   GFR Calc   11/04/2016      Lab Results   Component Value Date    MICROL <5 01/23/2018    UMALCR Unable to calculate due to low value 01/23/2018        Lab Results   Component Value Date    A1C 8.2 (H) 05/23/2013    A1C 9.9 (H) 08/28/2008    HEMOGLOBINA1 7.3 (A) 01/23/2018    HEMOGLOBINA1 8.4 (H) 02/11/2009     Lab Results   Component Value Date    GADAB  12/10/2012     <5.0  Reference range: 0.0 to 5.0  Unit: IU/mL  (Note)  INTERPRETIVE INFORMATION:  Glutamic Acid Decarboxylase  Antibody    A value greater than 5.0 IU/mL is considered positive for  Glutamic Acid Decarboxylase Antibody.  Performed by [facility],  [address] [place] [id number] [phone]  [email or web address], Judie Akins MD, Lab. Director       Lipids Panel Completed on 06/23/2018  Total cholesterol:  160  Triglycerides:   61  HDL Cholesterol:  60  Chol/HDL Ratio:  2.67  LDL Cholesterol:  88  Non-HDL Cholesterol: 100    At time of diagnosis, I see had BG >500 and insulin level <2.      Assessment and Plan:  Type 1 diabetes mellitus without complication (H)  Patient will further discuss a different pump and potential sensor with our diabetes educator at her appointment this afternoon as she would like to further control her highs and lows. I also advise considering adjusting the patient's basal rates during the day in particular as I believe these are too high. Could consider a 10% decrease. Repeat lipid panel indicated as last check was in June of last year.    - I provided education why I too believe she is an insulin dependent type 1 diabetic.  Could recheck insulin with glucose level and C-peptide, but I would not expect this to change.    - Lipid panel reflex to direct LDL Fasting   - Back up Basaglar, Humalog quick pen and pen needles in case of pen failure.   - will  review alternate infusion sets and sites with educator, possibly alternate pump and sensor.        Follow-up: RTC as scheduled wit Dr Bills in July.  She will have a fasting lipid panel prior.      >50% of 30 minute visit spent in face to face counseling, education and coordination of care related to options for better glycemic control as well as preventing, detecting, and treating hypoglycemia.      My best wishes,    Marbella Min PA-C, [facility]  [facility]  Diabetes, Endocrinology, and Metabolism  [phone] Appointments/Nurse  [phone] nurse line  [phone] URGENTafter hours/weekend Endocrinologist on call           Scribe Disclosure:  I, Chrissie Puentes, am serving as a scribe to document services personally performed by Marbella iMn PA-C at this visit, based upon the provider's statements to me. All documentation has been reviewed by the aforementioned provider prior to being entered into the official medical record.     Portions of this medical record were completed by a scribe. UPON MY REVIEW AND AUTHENTICATION BY ELECTRONIC SIGNATURE, this confirms (a) I performed the applicable clinical services, and (b) the record is accurate.

## 2018-05-07 NOTE — PATIENT INSTRUCTIONS
It is so nice to meet you!  I'm sorry to hear of your ongoing difficulty with infusion sites.    I see that you overall your doing quite well.  It is excellent that you continue to have a healthy and active lifestyle.    Things to consider:  Please get lipids checked fasting.   I think your basal rates might be too high, I know you see educator today.  Consider a 10% decrease and perhaps can limit low BG in the daytime.      My best wishes,    Marbella Min PA-C, [facility]  [facility]  Diabetes, Endocrinology, and Metabolism  [phone] Appointments/Nurse  [phone] nurse line  [phone] URGENTafter hours/weekend Endocrinologist on call

## 2018-05-07 NOTE — MR AVS SNAPSHOT
After Visit Summary   5/7/2018    Ruba Major    MRN: [id number]           Patient Information     Date Of Birth          1955        Visit Information        Provider Department      5/7/2018 9:30 AM Marbella Min PA-C [facility] Endocrinology        Today's Diagnoses     Type 1 diabetes mellitus without complication (H)    -  1      Care Instructions    It is so nice to meet you!  I'm sorry to hear of your ongoing difficulty with infusion sites.    I see that you overall your doing quite well.  It is excellent that you continue to have a healthy and active lifestyle.    Things to consider:  Please get lipids checked fasting.   I think your basal rates might be too high, I know you see educator today.  Consider a 10% decrease and perhaps can limit low BG in the daytime.      My best wishes,    Marbella Min PA-C, [facility]  [facility]  Diabetes, Endocrinology, and Metabolism  [phone] Appointments/Nurse  [phone] nurse line  [phone] URGENTafter hours/weekend Endocrinologist on call                      Follow-ups after your visit        Your next 10 appointments already scheduled     Jul 24, 2018  3:00 PM CDT   (Arrive by 2:45 PM)   RETURN DIABETES with Veronica Bills MD   [facility] Endocrinology ([facility] [place])    [address]  3rd Floor  [facility] [facility] [address] [phone]            Jul 25, 2018  9:30 AM CDT   (Arrive by 9:15 AM)   Return Visit with Gilberto Walton MD   [facility] Rheumatology ([facility] [place])    [address]  [address]  [place] [place] [facility] [address] [phone]              Future tests that were ordered for you today     Open Future Orders        Priority Expected Expires Ordered    Islet cell antibody IgG Routine  5/7/2019 5/7/2018    Lipid panel reflex to direct LDL Fasting Routine  5/7/2019 5/7/2018            Who to contact     Please call your clinic at [phone]  to:    Ask questions about your health    Make or cancel appointments    Discuss your medicines    Learn about your test results    Speak to your doctor            Additional Information About Your Visit        Needlhartarpipe Information     nuMVC gives you secure access to your electronic health record. If you see a primary care provider, you can also send messages to your care team and make appointments. If you have questions, please call your primary care clinic.  If you do not have a primary care provider, please call [phone] and they will assist you.      nuMVC is an electronic gateway that provides easy, online access to your medical records. With nuMVC, you can request a clinic appointment, read your test results, renew a prescription or communicate with your care team.     To access your existing account, please contact your [facility] or call [phone] for assistance.        Care EveryWhere ID     This is your Care EveryWhere ID. This could be used by other organizations to access your [place] medical records  [id number]        Your Vitals Were     Pulse BMI (Body Mass Index)                74 20.66 kg/m2           Blood Pressure from Last 3 Encounters:   05/07/18 123/73   01/24/18 128/79   01/23/18 130/74    Weight from Last 3 Encounters:   05/07/18 58.1 kg (128 lb)   01/24/18 58.1 kg (128 lb)   01/23/18 58.4 kg (128 lb 12.8 oz)                 Where to get your medicines      These medications were sent to [facility] - [place], [place] - [address]  [address] [address]    Hours:  Mon-Fri 8:30am-5:00pm Toll Free [phone] Phone:  [phone]     insulin pen needle 31G X 5 MM          Primary Care Provider Office Phone # Fax #    Eduarda Arredondo [phone]       [facility] [address]  [facility] [address]        Equal Access to Services     SLIME CRAWFORD AH: Gaviota vann  dominique Cueto, walyndsayda lulenoraadaha, qaethanta kasergio mace, beni brunson panchitoramsey onielzulema laJaspershaylee tila. So [facility] [phone].    ATENCIÓN: Si habla paco, tiene a burgess disposición servicios gratuitos de asistencia lingüística. Frank al [phone].    We comply with applicable federal civil rights laws and [place] laws. We do not discriminate on the basis of race, color, national origin, age, disability, sex, sexual orientation, or gender identity.            Thank you!     Thank you for choosing [facility]  for your care. Our goal is always to provide you with excellent care. Hearing back from our patients is one way we can continue to improve our services. Please take a few minutes to complete the written survey that you may receive in the mail after your visit with us. Thank you!             Your Updated Medication List - Protect others around you: Learn how to safely use, store and throw away your medicines at www.disposemymeds.org.          This list is accurate as of 5/7/18  1:29 PM.  Always use your most recent med list.                   Brand Name Dispense Instructions for use Diagnosis    AMBIEN 10 MG tablet   Generic drug:  zolpidem      Take 10 mg by mouth nightly as needed.    Rheumatoid arthritis(714.0), Encounter for long-term (current) use of other medications       BASAGLAR 100 UNIT/ML injection      Inject 15 Units Subcutaneous daily In case of pump failure        celecoxib 200 MG capsule    celeBREX    90 capsule    Take 1 capsule (200 mg) by mouth daily    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 factor (H), Encounter for long-term current use of medication       folic acid 1 MG tablet    FOLVITE    180 tablet    Take 2 tablets (2 mg) by mouth daily    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 factor (H), Encounter for long-term current use of medication       golimumab 50 MG/0.5ML auto-injector pen    SIMPONI    6 each    INJECT  THE CONTENTS OF ONE PEN (50MG) UNDER THE SKIN ONCE EVERY 21 TO 28 DAYS    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 factor (H)       INSULIN INFUSION PUMP       Rheumatoid arthritis(714.0), Encounter for long-term (current) use of other medications       * insulin lispro 100 UNIT/ML injection    HumaLOG     by Device route See Admin Instructions        * insulin lispro 100 UNIT/ML injection    HumaLOG PEN     Inject 1-8 Units Subcutaneous 4 times daily (before meals and nightly) As instructed (roughly 1 unit: 8 g carbohydrate) incase of pump failure.        insulin pen needle 31G X 5 MM    B-D U/F    100 each    Inject 1 Units Subcutaneous 4 times daily (before meals and nightly)    Type 1 diabetes mellitus without complication (H)       leucovorin 5 MG tablet    WELLCOVORIN    12 tablet    Take 1 tablet (5 mg) by mouth once a week 12 hours after taking Methotrexate dose.    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 factor (H), Encounter for long-term current use of medication       LYRICA PO      Take 75 mg by mouth daily        melatonin 5 MG tablet      Take 5 mg by mouth nightly as needed for sleep        methotrexate 2.5 MG tablet CHEMO     72 tablet    Take 6 tablets once weekly    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 factor (H), Encounter for long-term current use of medication       * Notice:  This list has 2 medication(s) that are the same as other medications prescribed for you. Read the directions carefully, and ask your doctor or other care provider to review them with you.

## 2018-05-07 NOTE — MR AVS SNAPSHOT
After Visit Summary   5/7/2018    Ruba Major    MRN: [id number]           Patient Information     Date Of Birth          1955        Visit Information        Provider Department      5/7/2018 10:30 AM Iris Sheikh RN [facility] Diabetes        Today's Diagnoses     Type 1 diabetes mellitus without complication (H)    -  1       Follow-ups after your visit        Your next 10 appointments already scheduled     Jul 24, 2018  3:00 PM CDT   (Arrive by 2:45 PM)   RETURN DIABETES with Veronica Bills MD   [facility] Endocrinology ([facility])    [address]  3rd Floor  [facility] [id number] [phone]            Jul 25, 2018  9:30 AM CDT   (Arrive by 9:15 AM)   Return Visit with Gilberto Walton MD   [facility] Rheumatology ([facility])    [address]  [address]  [facility] [address] [phone]              Future tests that were ordered for you today     Open Future Orders        Priority Expected Expires Ordered    Islet cell antibody IgG Routine  5/7/2019 5/7/2018    Lipid panel reflex to direct LDL Fasting Routine  5/7/2019 5/7/2018            Who to contact     Please call your clinic at [phone] to:    Ask questions about your health    Make or cancel appointments    Discuss your medicines    Learn about your test results    Speak to your doctor            Additional Information About Your Visit        MyChart Information     Myze gives you secure access to your electronic health record. If you see a primary care provider, you can also send messages to your care team and make appointments. If you have questions, please call your primary care clinic.  If you do not have a primary care provider, please call [phone] and they will assist you.      Myze is an electronic gateway that provides easy, online access to your medical records. With Myze, you can request a clinic appointment, read  your test results, renew a prescription or communicate with your care team.     To access your existing account, please contact your [facility] or call [phone] for assistance.        Care EveryWhere ID     This is your Care EveryWhere ID. This could be used by other organizations to access your [place] medical records  [id number]         Blood Pressure from Last 3 Encounters:   05/07/18 123/73   01/24/18 128/79   01/23/18 130/74    Weight from Last 3 Encounters:   05/07/18 58.1 kg (128 lb)   01/24/18 58.1 kg (128 lb)   01/23/18 58.4 kg (128 lb 12.8 oz)              Today, you had the following     No orders found for display         Where to get your medicines      These medications were sent to [facility] - [place], [place] - [address]   [address] , [facility] [address]    Hours:  Mon-Fri 8:30am-5:00pm Toll Free [phone] Phone:  [phone]     insulin pen needle 31G X 5 MM          Primary Care Provider Office Phone # Fax #    Eduarda MARSHALL Arnulfo [phone]       [facility] [address] [address]        Equal Access to Services     SLIME CRAWFORD AH: Hadii aad ku hadasho Soomaali, waaxda luqadaha, qaybta kaalmada adeegyada, waxay idiin hayaan aderamsey toro . So [facility] [phone].    ATENCIÓN: Si habla español, tiene a burgess disposición servicios gratuitos de asistencia lingüística. Natalieame al [phone].    We comply with applicable federal civil rights laws and [place] laws. We do not discriminate on the basis of race, color, national origin, age, disability, sex, sexual orientation, or gender identity.            Thank you!     Thank you for choosing [facility] DIABETES  for your care. Our goal is always to provide you with excellent care. Hearing back from our patients is one way we can continue to improve our services. Please take a few minutes to complete the written survey that  you may receive in the mail after your visit with us. Thank you!             Your Updated Medication List - Protect others around you: Learn how to safely use, store and throw away your medicines at www.disposemymeds.org.          This list is accurate as of 5/7/18 11:59 PM.  Always use your most recent med list.                   Brand Name Dispense Instructions for use Diagnosis    AMBIEN 10 MG tablet   Generic drug:  zolpidem      Take 10 mg by mouth nightly as needed.    Rheumatoid arthritis(714.0), Encounter for long-term (current) use of other medications       BASAGLAR 100 UNIT/ML injection      Inject 15 Units Subcutaneous daily In case of pump failure        celecoxib 200 MG capsule    celeBREX    90 capsule    Take 1 capsule (200 mg) by mouth daily    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 factor (H), Encounter for long-term current use of medication       folic acid 1 MG tablet    FOLVITE    180 tablet    Take 2 tablets (2 mg) by mouth daily    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 factor (H), Encounter for long-term current use of medication       golimumab 50 MG/0.5ML auto-injector pen    SIMPONI    6 each    INJECT THE CONTENTS OF ONE PEN (50MG) UNDER THE SKIN ONCE EVERY 21 TO 28 DAYS    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 factor (H)       INSULIN INFUSION PUMP       Rheumatoid arthritis(714.0), Encounter for long-term (current) use of other medications       * insulin lispro 100 UNIT/ML injection    HumaLOG     by Device route See Admin Instructions        * insulin lispro 100 UNIT/ML injection    HumaLOG PEN     Inject 1-8 Units Subcutaneous 4 times daily (before meals and nightly) As instructed (roughly 1 unit: 8 g carbohydrate) incase of pump failure.        insulin pen needle 31G X 5 MM    B-D U/F    100 each    Inject 1 Units Subcutaneous 4 times daily (before meals and nightly)    Type 1 diabetes mellitus  without complication (H)       leucovorin 5 MG tablet    WELLCOVORIN    12 tablet    Take 1 tablet (5 mg) by mouth once a week 12 hours after taking Methotrexate dose.    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 factor (H), Encounter for long-term current use of medication       LYRICA PO      Take 75 mg by mouth daily        melatonin 5 MG tablet      Take 5 mg by mouth nightly as needed for sleep        methotrexate 2.5 MG tablet CHEMO     72 tablet    Take 6 tablets once weekly    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 factor (H), Encounter for long-term current use of medication       * Notice:  This list has 2 medication(s) that are the same as other medications prescribed for you. Read the directions carefully, and ask your doctor or other care provider to review them with you.

## 2018-05-07 NOTE — LETTER
5/7/2018       RE: Ruba Major  [address]  [facility] [address]     Dear Colleague,    Thank you for referring your patient, Ruba Major, to the [facility] ENDOCRINOLOGY at [facility]. Please see a copy of my visit note below.    [facility]  Endocrinology  Marbella Min PA-C  05/07/2018      Chief Complaint:   Diabetes    History of Present Illness:   Ruba Major is a 63 year old female with a history of type 2 diabetes mellitus and RA with positive rheumatoid factor, who presents alone for evaluation of diabetes follow up. The patient was initially diagnosed with diabetes in 2008 when she presented with hyperglycemia.  The patient reports that she has always questioned her type 1 diagnosis from day one and wonders still if she might not be lillian to take a pill. She does endorse a history of DKA one time while she was in [place] though she describes her previous PN physician treating by algorithm over the phone with insulin and Gatorade      Today, the patient's HbA1c is at 8%. She reports that she has been having lows, however she denies needing assistance from anyone else. She reports feeling her highs and lows and is able to adjust for this. Overall, the patient reports that she is not extremely satisfied with her current pump due to the size and interference while she is partaking in daily activities and golf. The patient has had this pump for a few years. She reports having difficulty with sensors as she has difficulty with placement of sensors as she is very sensitive. She has attempted to use an Omnipod in the past. The patient does change her insertion site every three days as she bleeds with every insertion.     She reports that she does not eat many carbs though she notes that her  is currently on a keto diet. The patient reports that she has not been consuming many carbohydrates, but she does like oatmeal in the morning. She mentions that  her meals are high in protein and vegetables. She does consume some dairy and is taking a number of supplements to maintain her levels. The patient denies history of heart disease and high cholesterol. She does mention a familial history of tobacco use with extensive cardiac concerns. Her father had his first MI at 36 yo.      The patient is traveling to [place] soon for a couple weeks and requests medication updates so that she does not run out while traveling.     Of note, the patient has a history of knee concerns. She reports having increased knee discomfort and is looking to have another knee orthoscopic procedure this summer.     Blood Glucose Monitoring:  We reviewed glucometer data together.    Average B+/- 61  BG readings:   107, 7.5 per day   Readings > Target:  73, 68%  Readings < Target:  1, 1%  Average carbs:  26 +/- 10 daily   Average insulin:  26.9 +/- 2.7 daily   Average basal:  18.7, 69%    Hypoglycemia:  68, 70, 74, 78     Current Insulin Pump Settings:  Type of Pump: Medtronic Minimed: Model 530G  BASAL RATES and times:  12    AM (midnight): 0.4 units/hour    5      AM: 0.675 units/hour   7     AM: 1.05 units/hour   12   PM (noon): 0.925 units/hour   4     PM: 1.0 units/hour   10    PM: 0.375 units/hour    Carb ratio:   CARB RATIO and times:  12   AM (midnight): 12  6      AM:  6.8  11:30 AM: 15  5:30 PM:  8  Correction Factor (Sensitivity) and times:  12    AM (midnight): 65 mg/dL  6      AM: 54 mg/dL  7     PM: 75 mg/dL  Target BG Ranges:   BLOOD GLUCOSE TARGET and times:  12    AM (midnight): 100 - 150  7      AM:  90 - 110  7     PM:  100 - 150  Amount of Time Insulin is Active:  3 hrs    Diabetes monitoring and complications:  CAD: None known   Last eye exam results: Unknown at this time   Last dental exam: Unknown at this time   Microalbuminuria: Unable to calculate on 2018 due to low value   HTN: No  On Statin: No  On Aspirin: No  Depression: Unknown at this time   Erectile  dysfunction: N/A    Patient Supplied Answers To Diabetic Questionnaire  including 4/30/2018   1. Since your last visit to our clinic (or if this your first visit, since you last saw your primary care provider), have you experienced any of the following symptoms that may be related to low blood sugars? No, I have not experienced any of these symptoms   2. Since your last visit to our clinic (or if this your first visit, since you last saw your primary care provider), have you experienced any of the following symptoms that may be related to prolonged high blood sugars? More thirst than usual, Increased urination, No, I have not experienced any of these symptoms   3. Have you had any concerns that you would like to discuss today? Other   4. Do you have any female family members who have had heart attacks or strokes before age 60 or male relatives who have had heart attacks or strokes before age 50? No   5. Do you have any family members who have had complications from diabetes such as kidney disease, heart disease or strokes, retinopathy (a vision problem), or amputations? No   6. Who do you live with?  spouse   Little interest or pleasure in doing things? Not at all   Feeling down, depressed, or hopeless? Not at all   10.  Are you considering a pregnancy or interested in discussing pregnancy prevention today? No      Review of Systems:   Pertinent items are noted in HPI.  All other systems are negative.    Active Medications:      celecoxib (CELEBREX) 200 MG capsule, Take 1 capsule (200 mg) by mouth daily, Disp: 90 capsule, Rfl: 3     folic acid (FOLVITE) 1 MG tablet, Take 2 tablets (2 mg) by mouth daily, Disp: 180 tablet, Rfl: 3     golimumab (SIMPONI) 50 MG/0.5ML auto-injector pen, INJECT THE CONTENTS OF ONE PEN (50MG) UNDER THE SKIN ONCE EVERY 21 TO 28 DAYS, Disp: 6 each, Rfl: 0     insulin lispro (HUMALOG) 100 UNIT/ML injection, by Device route See Admin Instructions, Disp: , Rfl:      leucovorin (WELLCOVORIN) 5  "MG tablet, Take 1 tablet (5 mg) by mouth once a week 12 hours after taking Methotrexate dose., Disp: 12 tablet, Rfl: 3     melatonin 5 MG tablet, Take 5 mg by mouth nightly as needed for sleep, Disp: , Rfl:      methotrexate 2.5 MG tablet CHEMO, Take 6 tablets once weekly, Disp: 72 tablet, Rfl: 1     Pregabalin (LYRICA PO), Take 75 mg by mouth daily , Disp: , Rfl:      zolpidem (AMBIEN) 10 MG tablet, Take 10 mg by mouth nightly as needed., Disp: , Rfl:       Allergies:   Review of patient's allergies indicates no known allergies.      Past Medical History:  Rheumatoid arthritis, bilateral hands with positive rheumatoid factor   Type 2 diabetes mellitus without mention of complication   Encounter for long-term current use of medication   Muscle cramps      Past Surgical History:  Bunionectomy     Social History:   The patient is , a nonsmoker, and does consume alcohol. She is currently coaching high school tennis. The patient recently returned from [place] from staying there most of the winter.      Physical Exam:   /73  Pulse 74  Wt 58.1 kg (128 lb)  BMI 20.66 kg/m2     Wt Readings from Last 10 Encounters:   05/07/18 58.1 kg (128 lb)   01/24/18 58.1 kg (128 lb)   01/23/18 58.4 kg (128 lb 12.8 oz)   08/03/17 59 kg (130 lb)   07/28/16 58.5 kg (129 lb)   06/11/15 57.9 kg (127 lb 9.6 oz)   09/03/14 59.1 kg (130 lb 6.4 oz)   07/23/13 59 kg (130 lb)   05/23/13 59 kg (130 lb)   01/16/13 59 kg (130 lb)        General: Pleasant, well nourished and hydrated female in NAD.   Psych:  Mood is \"good,\" affect is appropriate.  Thought form and content are fluid and coherent.    HEENT: Eyes and sclera are clear. Extraocular movements are grossly intact without proptosis.  Nares are patent, mucous membranes moist.  Neck: No masses or JVD are noted.    Resp: Easy and unlabored breathing.   Neuro: Alert and oriented, communicating clearly.   Ext: no swelling or edema     Data:  Lab Results   Component Value Date    "  12/10/2012    POTASSIUM 4.1 12/10/2012    CHLORIDE 98 12/10/2012    CO2 29 12/10/2012    ANIONGAP 12 12/10/2012     (H) 12/10/2012    BUN 19 12/10/2012    CR 0.70 01/24/2018    CELSO 8.8 01/23/2018     Lab Results   Component Value Date    GFRESTIMATED 84 01/24/2018    GFRESTIMATED 72 08/03/2017    GFRESTIMATED 87 11/04/2016    GFRESTBLACK >90 01/24/2018    GFRESTBLACK 88 08/03/2017    GFRESTBLACK >90   GFR Calc   11/04/2016      Lab Results   Component Value Date    MICROL <5 01/23/2018    UMALCR Unable to calculate due to low value 01/23/2018        Lab Results   Component Value Date    A1C 8.2 (H) 05/23/2013    A1C 9.9 (H) 08/28/2008    HEMOGLOBINA1 7.3 (A) 01/23/2018    HEMOGLOBINA1 8.4 (H) 02/11/2009     Lab Results   Component Value Date    GADAB  12/10/2012     <5.0  Reference range: 0.0 to 5.0  Unit: IU/mL  (Note)  INTERPRETIVE INFORMATION:  Glutamic Acid Decarboxylase  Antibody    A value greater than 5.0 IU/mL is considered positive for  Glutamic Acid Decarboxylase Antibody.  Performed by [facility],  [address] [place] [id number] [phone]  [email or web address], Judie Akins MD, Lab. Director     Lipids Panel Completed on 06/23/2018  Total cholesterol:  160  Triglycerides:   61  HDL Cholesterol:  60  Chol/HDL Ratio:  2.67  LDL Cholesterol:  88  Non-HDL Cholesterol: 100    At time of diagnosis, I see had BG >500 and insulin level <2.      Assessment and Plan:  Type 1 diabetes mellitus without complication (H)  Patient will further discuss a different pump and potential sensor with our diabetes educator at her appointment this afternoon as she would like to further control her highs and lows. I also advise considering adjusting the patient's basal rates during the day in particular as I believe these are too high. Could consider a 10% decrease. Repeat lipid panel indicated as last check was in June of last year.    - I provided education why I too believe she is an  insulin dependent type 1 diabetic.  Could recheck insulin with glucose level and C-peptide, but I would not expect this to change.    - Lipid panel reflex to direct LDL Fasting   - Back up Basaglar, Humalog quick pen and pen needles in case of pen failure.   - will review alternate infusion sets and sites with educator, possibly alternate pump and sensor.        Follow-up: RTC as scheduled wit Dr Bills in July.  She will have a fasting lipid panel prior.      >50% of 30 minute visit spent in face to face counseling, education and coordination of care related to options for better glycemic control as well as preventing, detecting, and treating hypoglycemia.      My best wishes,    Marbella Min PA-C, [facility]  [facility]  Diabetes, Endocrinology, and Metabolism  [phone] Appointments/Nurse  [phone] nurse line  [phone] URGENTafter hours/weekend Endocrinologist on call    Scribe Disclosure:  I, Chrissie Puentes, am serving as a scribe to document services personally performed by Marbella Min PA-C at this visit, based upon the provider's statements to me. All documentation has been reviewed by the aforementioned provider prior to being entered into the official medical record.     Portions of this medical record were completed by a scribe. UPON MY REVIEW AND AUTHENTICATION BY ELECTRONIC SIGNATURE, this confirms (a) I performed the applicable clinical services, and (b) the record is accurate.     Again, thank you for allowing me to participate in the care of your patient.      Sincerely,    Marbella Min PA-C

## 2018-05-08 NOTE — PROGRESS NOTES
"DIABETES EDUCATION NOTE:    Ruba Major presents today for education related to Type 1 diabetes    Patient is being treated with:  insulin pump  She is accompanied by self    PATIENT CONCERNS RELATED TO DIABETES SELF MANAGEMENT:     \"I'm just sick of all this.\"  She is indicating diabetes management.    She relates that she is having difficulty with her current pump infusion sets (6mm Manilla's), which cause bleeding and some bruising at times.  She is currently only using her belly for insertion.  She has not used other sites.  She is slim, and it's likely that she is sticking the infusion set into muscle, which would account for the bleeding/bruising problem.   She is somewhat interested in getting a CGM, but has tried the Medtronic sensor in the past and found it to be intolerable in terms of taping, discomfort, etc.  Her current pump is getting close to being out of warranty and she is wondering what her options might be for pump replacement.  Apparently she and Dr. Bills have talked about perhaps using the Uriel Flash system.     EDUCATION and INSTRUCTION PROVIDED AT THIS VISIT:       Discussed what sort of things are important to her in an insulin pump.  She is most concerned with having fewer things to carry around, and doesn't like the size of her current pump as she feels that it is too bulky and awkward to wear under dresses, etc.  She wants something smaller.  She doesn't like that the Uriel needs to be worn on the arm, as she wears sleeveless things in the summer and she doesn't like to have anything showing.  She was very much put off by the size of the new Medtronic pump and the CGM.      Reviewed and demonstrated operation of the following pumps:  The Omnipod 400, Medtronic 670G with Guardian 3 sensor, and Tandem X2 with Dexcom G5 sensor.     Discussed unique features of each pump and what qualities are most important to patient.  Discussion included the operation of the 670G Hybrid Closed Loop " "system and the particular behaviors around that pump that need to be adhered to, including using the bolus calculator, counting carbohydrates accurately, calibrating the sensor appropriately.  Discussed that the results seen in the studies of the system that were done were a result of staying in \"auto\" mode > 85% of the time.      Explained the upgrade process, which includes making sure that warranty has  on the current pump, completing the CMN, processing through the pump company and approval by insurance company.  Also reviewed training that would be necessary to ensure safe operation of the pump.      At the end of the discussion, she wanted to pursue using the Dexcom G6, and is going to check with [employer] to find out what the warranty status of her current pump is.  She was attracted to the Omnipod system.  She took a \"2018 Pump Guide\" with her so she can do a more objective comparison.  She filled out an AOB for the Dexcom G6--she liked the fact that it would communicate with her phone but that she would need to open up a program on her phone in order to see the trend graph.  She likes the idea of warnings, as she has little awareness of her lows anymore.  She checks blood glucose 6 times daily.  She golfs frequently and finds herself going low while playing, but doesn't generally feel symptoms until she is less than 55.  She is going to consider which pump option may be best for her.  She is going to reconsider using the Omnipod again as the size of it has gotten smaller.  Suggested that she may want to consider wearing it with saline for a few days so she can get a better idea of how it might work for her.        Patient-stated goal written and given to Ruba Major.  Verbalized and demonstrated understanding of instructions.     PLAN:    Application for Dexcom G6 completed.  She wants to wait until she returns from her  trip in July to actually order the device.  Explained that we will " send in the Authorization of Benefits and then complete the CMN when she returns.      AVS printed and given to patient    FOLLOW-UP:        Time spent with patient at today's visit was 60 minutes.      Any diabetes medication dose changes were made via the CDE Protocol and Collaborative Practice Agreement with [facility] and  [facility].  A copy of this encounter was provided to patient's referring provider.

## 2018-05-16 ENCOUNTER — TELEPHONE (OUTPATIENT)
Dept: RHEUMATOLOGY | Facility: CLINIC | Age: 63
End: 2018-05-16

## 2018-05-16 DIAGNOSIS — M06.9 FLARE OF RHEUMATOID ARTHRITIS (H): Primary | ICD-10-CM

## 2018-05-16 RX ORDER — PREDNISONE 5 MG/1
15 TABLET ORAL DAILY
Qty: 30 TABLET | Refills: 0 | Status: SHIPPED | OUTPATIENT
Start: 2018-05-16 | End: 2018-07-25 | Stop reason: SINTOL

## 2018-05-16 NOTE — TELEPHONE ENCOUNTER
In looking at her labs I note that we do not have any labs since January although she is on methotrexate.  Please have her do those, and include a CRP so we can get a sense of how much inflammation she is dealing with      You can then give her 5 mg prednisone tablets.  She can try taking 15 mg a day for a few days and see if that will get on top of this and then get back with us.  Obviously she wants to use as low dose as possible and taper it as quickly as possible given the diabetes.

## 2018-05-16 NOTE — TELEPHONE ENCOUNTER
Called and spoke with pt, about a week ago, she stated that she started noticing that her hands and knees were swelling and having increased pain.  It has continued to increase, she has tried to manage pain by increasing celebrex 5 days ago to BID, has taken warm baths, hot packs, cold packs and nothing has helped.  She now aches all over, states she feels like she has the flu, but doesn't have a fever, she has checked. She also feels very puffy.  She is still taking her lyrica, took Methotrexate last night, and is not due for Simponi for several weeks yet.    She is very uncomfortable and would like to know what to do, as she is not able to be active and it is impacting her sleep.  She has been resistant to prednisone as she is a Type I DM, however, she is so uncomfortable that she is willing to try anything at this point.     Will send to on-call for review.    Last OFV note 1/24/18:   1. 62 yo female with RA, CCP+: She had more active disease which was limiting on single agent Methotrexate. I believe that regarding the inflammatory component of her joint pain after 6 months of Humira she was improved but not in a remission, able to play tennis and golf with symptoms. No overt synovitis was noted. We switched to Enbrel to attempt to achieve a better response and hopefully remission. Unfortunately she did not respond at 3 months even partially to this.       She continues to be improved on Simponi without side effects. She has some waning at 3-3.5 weeks. She does have mild weekly SE on Methotrexate that are very stable. Her occasional diffuse mouth soreness may be due to this.   Assessment of response of her RA has been complicated by concomitant DJD affecting first CMC joints and her feet. She is much better now so it would seem inflammation was clearly playing a part.   She had L knee arthroscopic knee surgery.She however is limited and can no longer play tennis, although can golf.   She had cervical spine  surgery and now recent epidurals for L5 disc and is now in PT.  She is having some recurrent L arm tingling and scheduled for CT scan.  Lab has been normal but she is due.  2. Type I DM on insulin pump.   3. Possible peripheral neuropathy, now improved   4. DJD       PLAN: discussed in detail with the patient  1. She will continue Simponi and I encouraged her to take at 3-3.5 weeks consistently  2. Continue Methotrexate but decrease to 10 mg weekly; continue folic acid and Leucovorin - renewed  3. Can continue Celebrex 200 mg daily. She wonders if some bleeding at insulin pump site is due to this. As a selective Burgos inhibitor there is little if any platelet effect so this would be unusual.  4. She has had the flu shot   5. Return in 6 months; lab in the interim as well as today  6. Call if problems occur      Gilberto Walton MD          Erum Adame RN  Rheumatology Clinic

## 2018-05-16 NOTE — TELEPHONE ENCOUNTER
[facility] Call Center    Phone Message    May a detailed message be left on voicemail: yes    Reason for Call: Other: Patient is having a major flare-up and has been self treating for a week. Patient would like a clal back.      Action Taken: Message routed to:  [facility] rheum

## 2018-05-16 NOTE — TELEPHONE ENCOUNTER
Called and spoke with pt. Provided pt Dr. Mejia's response, she will get labs done tomorrow morning and then will start prednisone tomorrow am.  She will take for 2 days and then will call back on Friday afternoon.    Erum Adame RN  Rheumatology Clinic

## 2018-05-17 DIAGNOSIS — M05.742 RHEUMATOID ARTHRITIS INVOLVING BOTH HANDS WITH POSITIVE RHEUMATOID FACTOR (H): ICD-10-CM

## 2018-05-17 DIAGNOSIS — Z79.899 ENCOUNTER FOR LONG-TERM CURRENT USE OF MEDICATION: ICD-10-CM

## 2018-05-17 DIAGNOSIS — E10.9 TYPE 1 DIABETES MELLITUS WITHOUT COMPLICATION (H): ICD-10-CM

## 2018-05-17 DIAGNOSIS — M05.741 RHEUMATOID ARTHRITIS INVOLVING BOTH HANDS WITH POSITIVE RHEUMATOID FACTOR (H): ICD-10-CM

## 2018-05-17 LAB
ALT SERPL W P-5'-P-CCNC: 30 U/L (ref 0–50)
AST SERPL W P-5'-P-CCNC: 21 U/L (ref 0–45)
CHOLEST SERPL-MCNC: 167 MG/DL
CREAT SERPL-MCNC: 0.68 MG/DL (ref 0.52–1.04)
CRP SERPL-MCNC: <2.9 MG/L (ref 0–8)
ERYTHROCYTE [DISTWIDTH] IN BLOOD BY AUTOMATED COUNT: 13.2 % (ref 10–15)
GFR SERPL CREATININE-BSD FRML MDRD: 87 ML/MIN/1.7M2
HCT VFR BLD AUTO: 39.5 % (ref 35–47)
HDLC SERPL-MCNC: 81 MG/DL
HGB BLD-MCNC: 13.1 G/DL (ref 11.7–15.7)
LDLC SERPL CALC-MCNC: 78 MG/DL
MCH RBC QN AUTO: 30.9 PG (ref 26.5–33)
MCHC RBC AUTO-ENTMCNC: 33.2 G/DL (ref 31.5–36.5)
MCV RBC AUTO: 93 FL (ref 78–100)
NONHDLC SERPL-MCNC: 86 MG/DL
PLATELET # BLD AUTO: 242 10E9/L (ref 150–450)
RBC # BLD AUTO: 4.24 10E12/L (ref 3.8–5.2)
TRIGL SERPL-MCNC: 37 MG/DL
WBC # BLD AUTO: 4.4 10E9/L (ref 4–11)

## 2018-05-18 NOTE — TELEPHONE ENCOUNTER
Up to her.  If she is getting better without prednisone, given the normal CRP, I would not start it.  If she is not getting better she can start it but just go down quickly every 2-3 days until she is off.

## 2018-05-18 NOTE — TELEPHONE ENCOUNTER
Called and spoke with pt. She is feeling some relief.  Shooting pain in wrist and hand have been subsiding a little bit.  Hands are swollen still.   Blood sugar has been high, but she is monitoring.  Has been able to manage despite prednisone.  Does not have much of an appetite right now.  Things have gotten somewhat better overall.  Still has some issues, but feels that things are improving.    Will send to Dr. Mejia to see if he wants to taper prednisone.    Erum Adame RN  Rheumatology Clinic

## 2018-05-18 NOTE — TELEPHONE ENCOUNTER
Called and spoke with pt, discussed that she is having some relief, but not a lot from prednisone.  She is ok with tapering down prednisone every 2-3 days until off.  Will touch base with pt next week to see how she is feeling.    Erum Adame RN  Rheumatology Clinic

## 2018-05-19 LAB — PANC ISLET CELL AB TITR SER: NORMAL {TITER}

## 2018-05-23 ENCOUNTER — MEDICAL CORRESPONDENCE (OUTPATIENT)
Dept: HEALTH INFORMATION MANAGEMENT | Facility: CLINIC | Age: 63
End: 2018-05-23

## 2018-05-29 ENCOUNTER — TELEPHONE (OUTPATIENT)
Dept: EDUCATION SERVICES | Facility: CLINIC | Age: 63
End: 2018-05-29

## 2018-05-29 NOTE — TELEPHONE ENCOUNTER
Left message last Friday requesting return call to see how she feels.    Erum Adame RN  Rheumatology Clinic

## 2018-05-29 NOTE — TELEPHONE ENCOUNTER
[facility] Call Center    Phone Message    May a detailed message be left on voicemail: yes    Reason for Call: Other: Patient received her Dexacom but is not sure how to use it. Please follow up with patient and guide patient.     Action Taken: Message routed to:  [facility] Vibha Fu

## 2018-06-04 ENCOUNTER — TELEPHONE (OUTPATIENT)
Dept: ENDOCRINOLOGY | Facility: CLINIC | Age: 63
End: 2018-06-04

## 2018-06-04 ENCOUNTER — MYC MEDICAL ADVICE (OUTPATIENT)
Dept: ENDOCRINOLOGY | Facility: CLINIC | Age: 63
End: 2018-06-04

## 2018-06-04 DIAGNOSIS — E10.9 TYPE 1 DIABETES MELLITUS (H): Primary | ICD-10-CM

## 2018-06-04 RX ORDER — INSULIN LISPRO 100 [IU]/ML
INJECTION, SOLUTION INTRAVENOUS; SUBCUTANEOUS
Qty: 30 ML | Refills: 1 | Status: SHIPPED | OUTPATIENT
Start: 2018-06-04 | End: 2019-11-05

## 2018-06-04 RX ORDER — INSULIN GLARGINE 100 [IU]/ML
15 INJECTION, SOLUTION SUBCUTANEOUS DAILY
Qty: 15 ML | Refills: 0 | Status: SHIPPED | OUTPATIENT
Start: 2018-06-04 | End: 2019-11-05

## 2018-06-04 NOTE — TELEPHONE ENCOUNTER
[facility] Call Center    Phone Message    May a detailed message be left on voicemail: no    Reason for Call: Other: [facility] is requesting a call back to request verbal change on pt's test strip rx. They want to dispense the contour next test strips instead of the one listed on the rx, Please f/u.     Action Taken: Message routed to:  [facility] Crystal

## 2018-06-04 NOTE — TELEPHONE ENCOUNTER
[facility] Call Center    Phone Message    May a detailed message be left on voicemail: yes    Reason for Call: Medication Question or concern regarding medication   Prescription Clarification  Name of Medication: insulin lispro (HUMALOG) 100 UNIT/ML injection  Prescribing Provider: ADAMARIS HUSAIN   Pharmacy:    [id number] [place], [place] - [address]     What on the order needs clarification? PATIENT NEEDS QUICKPENS NOT VIALS PLEASE FIX ORDER AND SEND BACK           Action Taken: Message routed to:  [facility] SULEMA

## 2018-06-05 DIAGNOSIS — E10.9 TYPE 1 DIABETES MELLITUS WITHOUT COMPLICATION (H): Primary | ICD-10-CM

## 2018-06-05 RX ORDER — PROCHLORPERAZINE 25 MG/1
1 SUPPOSITORY RECTAL
Qty: 1 EACH | Refills: 3 | Status: SHIPPED | OUTPATIENT
Start: 2018-06-05 | End: 2019-07-08

## 2018-06-05 RX ORDER — PROCHLORPERAZINE 25 MG/1
90 SUPPOSITORY RECTAL CONTINUOUS PRN
Qty: 3 EACH | Refills: 3 | Status: SHIPPED | OUTPATIENT
Start: 2018-06-05 | End: 2018-06-15

## 2018-06-29 ENCOUNTER — TELEPHONE (OUTPATIENT)
Dept: ENDOCRINOLOGY | Facility: CLINIC | Age: 63
End: 2018-06-29

## 2018-06-29 DIAGNOSIS — E10.9 TYPE 1 DIABETES MELLITUS WITHOUT COMPLICATION (H): Primary | ICD-10-CM

## 2018-06-30 NOTE — TELEPHONE ENCOUNTER
Called about insulin pump being lost. She has extra basaglar and humalog for when she went to [place] just in case. She will be getting a new pump on Tuesday.    We discussed her pump settings below.     I instructed her to take 16 units basaglar daily (18.8 is her total basal on the pump)    1/7 with breakfast, 1/15 with lunch, and 1/8 with dinner carb coverage    Use a correction of 1 unit for every 65 above 150     She will check her sugar tonight at 0200 to make sure it is not too high or too low.     On the day she gets her pump, I told her to hook it up and start the basal rate about 2 hours before her scheduled basaglar injection (which would be in the evening around 8 pm on Tuesday)    Type of Pump: Medtronic Minimed: Model 530G  BASAL RATES and times:  12                  AM (midnight): 0.4 units/hour    5                    AM: 0.675 units/hour   7                    AM: 1.05 units/hour   12                  PM (noon): 0.925 units/hour   4                    PM: 1.0 units/hour   10                  PM: 0.375 units/hour    Carb ratio:   CARB RATIO and times:  12                  AM (midnight): 12  6                    AM:  6.8  11:30             AM: 15  5:30               PM:  8  Correction Factor (Sensitivity) and times:  12                  AM (midnight): 65 mg/dL  6                    AM: 54 mg/dL  7                    PM: 75 mg/dL  Target BG Ranges:   BLOOD GLUCOSE TARGET and times:  12                  AM (midnight): 100 - 150  7                    AM:  90 - 110  7                    PM:  100 - 150  Amount of Time Insulin is Active:  3 hrs    Slick Vaughn MD   Endocrinology Fellow  Pager: [phone]

## 2018-07-16 DIAGNOSIS — M05.742 RHEUMATOID ARTHRITIS INVOLVING BOTH HANDS WITH POSITIVE RHEUMATOID FACTOR (H): ICD-10-CM

## 2018-07-16 DIAGNOSIS — M05.741 RHEUMATOID ARTHRITIS INVOLVING BOTH HANDS WITH POSITIVE RHEUMATOID FACTOR (H): ICD-10-CM

## 2018-07-17 ASSESSMENT — ENCOUNTER SYMPTOMS
NAIL CHANGES: 0
INSOMNIA: 1
DEPRESSION: 0
POOR WOUND HEALING: 0
SKIN CHANGES: 1
DECREASED CONCENTRATION: 0
PANIC: 0
NERVOUS/ANXIOUS: 1

## 2018-07-20 DIAGNOSIS — M05.741 RHEUMATOID ARTHRITIS INVOLVING BOTH HANDS WITH POSITIVE RHEUMATOID FACTOR (H): ICD-10-CM

## 2018-07-20 DIAGNOSIS — M05.742 RHEUMATOID ARTHRITIS INVOLVING BOTH HANDS WITH POSITIVE RHEUMATOID FACTOR (H): ICD-10-CM

## 2018-07-20 NOTE — TELEPHONE ENCOUNTER
simponi     Last Written Prescription Date: 2/14/18  Last Fill Quantity: 6,   # refills: 0  Last Office Visit : 1/24/18  Future Office visit:  7/25/18  Spoke to pharmacy, last filled 6/25 no refills left. Pt needs dose prior to next appt

## 2018-07-24 ENCOUNTER — OFFICE VISIT (OUTPATIENT)
Dept: ENDOCRINOLOGY | Facility: CLINIC | Age: 63
End: 2018-07-24
Payer: COMMERCIAL

## 2018-07-24 VITALS
HEART RATE: 93 BPM | HEIGHT: 66 IN | SYSTOLIC BLOOD PRESSURE: 136 MMHG | DIASTOLIC BLOOD PRESSURE: 77 MMHG | BODY MASS INDEX: 21.21 KG/M2 | WEIGHT: 132 LBS

## 2018-07-24 DIAGNOSIS — E10.9 TYPE 1 DIABETES MELLITUS WITHOUT COMPLICATION (H): Primary | ICD-10-CM

## 2018-07-24 ASSESSMENT — PAIN SCALES - GENERAL: PAINLEVEL: MODERATE PAIN (5)

## 2018-07-24 NOTE — MR AVS SNAPSHOT
After Visit Summary   7/24/2018    Ruba Major    MRN: [id number]           Patient Information     Date Of Birth          1955        Visit Information        Provider Department      7/24/2018 3:00 PM Veronica Bills MD  [facility]        Today's Diagnoses     Type 1 diabetes mellitus without complication (H)    -  1       Follow-ups after your visit        Follow-up notes from your care team     Return for f/u 3 mo with Delicia Martel and 6 mo with me.      Your next 10 appointments already scheduled     Jul 25, 2018  9:30 AM CDT   (Arrive by 9:15 AM)   Return Visit with Gilberto Walton MD   [facility] ([facility])    [address]  [address]  [place] [address] [phone]            Oct 29, 2018  9:30 AM CDT   (Arrive by 9:15 AM)   RETURN DIABETES with DONALD Gudino [facility] ([facility])    [address]  3rd Floor  [place] [address] [phone]              Who to contact     Please call your clinic at [phone] to:    Ask questions about your health    Make or cancel appointments    Discuss your medicines    Learn about your test results    Speak to your doctor            Additional Information About Your Visit        Weather[employer] Information     We[employer] gives you secure access to your electronic health record. If you see a primary care provider, you can also send messages to your care team and make appointments. If you have questions, please call your primary care clinic.  If you do not have a primary care provider, please call [phone] and they will assist you.      [employer] is an electronic gateway that provides easy, online access to your medical records. With [employer], you can request a clinic appointment, read your test results, renew a prescription or communicate with your care team.     To access your existing account, please contact your  "[facility] or call [phone] for assistance.        Care EveryWhere ID     This is your Care EveryWhere ID. This could be used by other organizations to access your [place] medical records  [id number]        Your Vitals Were     Pulse Height BMI (Body Mass Index)             93 1.676 m (5' 6\") 21.31 kg/m2          Blood Pressure from Last 3 Encounters:   07/24/18 136/77   05/07/18 123/73   01/24/18 128/79    Weight from Last 3 Encounters:   07/24/18 59.9 kg (132 lb)   05/07/18 58.1 kg (128 lb)   01/24/18 58.1 kg (128 lb)              Today, you had the following     No orders found for display       Primary Care Provider Office Phone # Fax #    Eduarda MARSHALL Arnulfo [phone]       [facility] [address]  [facility] [address]        Equal Access to Services     [facility]: Hadii aad ku hadasho Soomaali, waaxda luqadaha, qaybta kaalmada adeegyada, waxay idiin hayjaquann magy toro . So [facility] [phone].    ATENCIÓN: Si damionla español, tiene a burgess disposición servicios gratuitos de asistencia lingüística. Llame al [phone].    We comply with applicable federal civil rights laws and [place] laws. We do not discriminate on the basis of race, color, national origin, age, disability, sex, sexual orientation, or gender identity.            Thank you!     Thank you for choosing [facility]  for your care. Our goal is always to provide you with excellent care. Hearing back from our patients is one way we can continue to improve our services. Please take a few minutes to complete the written survey that you may receive in the mail after your visit with us. Thank you!             Your Updated Medication List - Protect others around you: Learn how to safely use, store and throw away your medicines at www.disposemymeds.org.          This list is accurate as of 7/24/18  7:09 PM.  Always use your most recent med list.                   Brand " Name Dispense Instructions for use Diagnosis    AMBIEN 10 MG tablet   Generic drug:  zolpidem      Take 10 mg by mouth nightly as needed.    Rheumatoid arthritis(714.0), Encounter for long-term (current) use of other medications       BASAGLAR 100 UNIT/ML injection     15 mL    Inject 15 Units Subcutaneous daily In case of pump failure    Type 1 diabetes mellitus (H)       FARRAH CONTOUR test strip   Generic drug:  blood glucose monitoring     900 strip    Use to test blood sugar 9 times daily or as directed.    Type 1 diabetes mellitus (H)       celecoxib 200 MG capsule    celeBREX    90 capsule    Take 1 capsule (200 mg) by mouth daily    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 factor (H), Encounter for long-term current use of medication       DEXCOM G6 TRANSMITTER Misc     1 each    1 each every 3 months Change every 90 days    Type 1 diabetes mellitus without complication (H)       folic acid 1 MG tablet    FOLVITE    180 tablet    Take 2 tablets (2 mg) by mouth daily    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 factor (H), Encounter for long-term current use of medication       golimumab 50 MG/0.5ML auto-injector pen    SIMPONI    1 each    INJECT THE CONTENTS OF ONE PEN (50MG) UNDER THE SKIN ONCE EVERY 21 TO 28 DAYS    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 factor (H)       INSULIN INFUSION PUMP       Rheumatoid arthritis(714.0), Encounter for long-term (current) use of other medications       * insulin lispro 100 UNIT/ML injection    HumaLOG PEN     Inject 1-8 Units Subcutaneous 4 times daily (before meals and nightly) As instructed (roughly 1 unit: 8 g carbohydrate) incase of pump failure.        * insulin lispro 100 UNIT/ML injection    HumaLOG    30 mL    Inject as needed approx 20 units daily    Type 1 diabetes mellitus (H)       * HumaLOG KWIKpen 100 UNIT/ML injection   Generic drug:  insulin lispro     30 mL    Inject as  needed approx 20 units daily    Type 1 diabetes mellitus (H)       insulin pen needle 31G X 5 MM    B-D U/F    100 each    Inject 1 Units Subcutaneous 4 times daily (before meals and nightly)    Type 1 diabetes mellitus without complication (H)       leucovorin 5 MG tablet    WELLCOVORIN    12 tablet    Take 1 tablet (5 mg) by mouth once a week 12 hours after taking Methotrexate dose.    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 factor (H), Encounter for long-term current use of medication       LYRICA PO      Take 75 mg by mouth daily        melatonin 5 MG tablet      Take 5 mg by mouth nightly as needed for sleep        methotrexate 2.5 MG tablet CHEMO     72 tablet    Take 6 tablets once weekly    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 factor (H), Encounter for long-term current use of medication       predniSONE 5 MG tablet    DELTASONE    30 tablet    Take 3 tablets (15 mg) by mouth daily Call on Friday to let Rheumatology know how you are feeling    Flare of rheumatoid arthritis (H)       * Notice:  This list has 3 medication(s) that are the same as other medications prescribed for you. Read the directions carefully, and ask your doctor or other care provider to review them with you.

## 2018-07-24 NOTE — LETTER
7/24/2018       RE: Ruba Major  [address]  [place] [address]     Dear Colleague,    Thank you for referring your patient, Ruba Major, to the [facility] ENDO[facility]OLOGY at [facility]. Please see a copy of my visit note below.    Ms. Major is a 63 year-old with a history of RA here to follow-up for her type 1 diabetes. Her primary concern today is uneven sugar control in the past few weeks and months.     Ms. Major recently began using the Dexcom G5 and anticipating the G6 in August. Her settings are as follows:    Basal  Mid - 0.5  05 - 0.675  0700- 1.05  1200- 0.925  1600 - 1.0  2200 -0.375    IC Ratio  Mid - 12  0600 - 6.8  1130 - 15.0  1730 - 8.0    Sensitivity  Mid  - 65  0600 - 54  2100 - 75    Target   Mid - 100 - 150  0700 - 90 - 110  2100 - 100 - 150      Reviewing her BGs from June 25-July 8 show that Ms. Major tends to be higher in the late afternoon. On average, her BG was 173 + 65, with 40% of readings above target. Her daily insulin is delivered as 78% basal. Her daily routine involves a lot of activity; she is currently playing golf and used to play tennis before her knee injury. When she plays golf she uses a temp basal of 35%, and suspends when she is playing tennis. She wakes up in the mornings around 6-7am, has coffee, and eats breakfast a few hours later. She boluses right before eating her meal, on average 15 units, but this is based on her BG prior to the meal more than the contents of her meal itself. She will bolus again on average 15 units for lunch, and 10-18 units for dinner depending on if she is running high throughout the day. She experiences lows rarely; the most recent was this morning after a golf lesson. She feels lows in the 70s and corrects with juice.     She is up-to-date with optho. Her RA is managed by Dr. Walton.     ROS  Skin: Lesions on her left elbow (followed-up with biopsy pending results), but no itchiness,  "redness, or rashes otherwise.  ENT: No blurriness, redness, itchiness, tinnitus, difficulty swallowing or post-nasal drip.  Resp: No SOB, dyspnea on exertion, coughing.  CV: No palpitations, chest pain, edema.  GI: No nausea, vomiting, abdominal pain, change in bowel movements.  : No hesitancy, frequency, or urgency. No dysuria.   MSK: Long-standing R knee pain, RA in fingers bilat. No lower back pain  Neuro: No numbness, tingling, loss of sensation.   Psych: Some anxiety and concern given recent stressors in life, but no depression.    Past Medical History:   Diagnosis Date     Rheumatoid arthritis(714.0)      Type II or unspecified type diabetes mellitus without mention of complication, not stated as uncontrolled        Past Surgical History:   Procedure Laterality Date     FOOT SURGERY  1998, 10/2010    Bunionectomy       History reviewed. No pertinent family history.    Social History   Substance Use Topics     Smoking status: Never Smoker     Smokeless tobacco: Never Used     Alcohol use Yes      Comment: glass of wine daily       Current Outpatient Prescriptions   Medication     BASAGLAR 100 UNIT/ML injection     FARRAH CONTOUR test strip     celecoxib (CELEBREX) 200 MG capsule     Continuous Blood Gluc Transmit (DEXCOM G6 TRANSMITTER) MISC     folic acid (FOLVITE) 1 MG tablet     golimumab (SIMPONI) 50 MG/0.5ML auto-injector pen     HUMALOG KWIKPEN 100 UNIT/ML soln     INSULIN INFUSION PUMP     insulin lispro (HUMALOG PEN) 100 UNIT/ML injection     insulin lispro (HUMALOG) 100 UNIT/ML injection     insulin pen needle (B-D U/F) 31G X 5 MM     leucovorin (WELLCOVORIN) 5 MG tablet     melatonin 5 MG tablet     methotrexate 2.5 MG tablet CHEMO     predniSONE (DELTASONE) 5 MG tablet     Pregabalin (LYRICA PO)     zolpidem (AMBIEN) 10 MG tablet     No current facility-administered medications for this visit.      Physical Exam  Vital signs:      BP: 136/77 Pulse: 93           Height: 167.6 cm (5' 6\") Weight: 59.9 " "kg (132 lb)  Estimated body mass index is 21.31 kg/(m^2) as calculated from the following:    Height as of this encounter: 1.676 m (5' 6\").    Weight as of this encounter: 59.9 kg (132 lb).    Gen: NAD, pleasant, outgoing.  Psych: Good mood, thought form and content fluid and coherent.   Eyes: Extraocular movements grossly intact, sclera clear without icterus. No periorbital edema, proptosis, conjunctival injection.  Feet: Intact to monofilament bilaterally. DP and PT pulses present. No edema, ulcers.     Recent Labs   Lab Test  05/17/18   0813 05/07/18 01/24/18   1011  01/23/18   1331 01/23/18   06/11/15   1326   05/23/13   1601   12/10/12   1051   A1C   --    --    --    --    --    --    --    --   8.2*   --    --    HEMOGLOBINA1   --   8.0*   --    --   7.3*   --    --    --    --    --    --    TSH   --    --    --    --    --    --   0.91   --    --    --   1.58   LDL  78   --    --    --    --    --    --    --    --    --    --    HDL  81   --    --    --    --    --    --    --    --    --    --    TRIG  37   --    --    --    --    --    --    --    --    --    --    CR  0.68   --   0.70   --    --    < >  0.64   < >  0.62   < >  0.67   MICROL   --    --    --   <5   --    --    --    --    --    --    --     < > = values in this interval not displayed.     Assessment/Plan:  1. Diabetes: Overall, Ms. Rico diabetes is fairly controlled, but she acknowledges she could do better with her numbers.   -- She recently switched to the Dexcom G5, and is happy with the change. She is anticipating the G6 in August. She will try to use the G6 without help, but will contact for nurse educator if necessary.  -- Given her higher numbers in the afternoons, she will try new basal rates as shown below:   Mid - 0.5  05 - 0.675  0700- 1.05  1200- 1.0 (previously 0.925)  1600 - 1.0  2200 -0.375    2. Diabetes complications:   -- Retinopathy: Up to date with optho.  -- Nephropathy: Kidneys checked and ok in 05/2018.  -- " Neuropathy: Feet intact to monofilament.    3. CV risk: Lipids were checked and ok 05/2018.     4. RA: Managed by Dr. Novak, up-to-date with care.    RTC in 3 months with Delicia, 6 months with Dr. Bills.    I, Patti Garnica MS3, saw this patient in the presence of Dr. Veronica Bills MD.        This 63-year-old woman was seen in follow-up for her type 1 diabetes with medical student Patti Garnica.  Please see student's note of the same date for full details.    In brief, she has found it challenging to get the new Dex com device but hopes that will arrive in a couple of weeks.  She continues to use her pump and generally likes it.  She has not been able to exercise by playing tennis because of knee pain.  She is scheduled to have arthroscopic evaluation in a couple of weeks.  We reviewed the available CGM data as outlined by Ms. Garnica.  Ruba is usually in target in the morning but seems to be above target between noon and 6 PM.  She is recognizing her lows.  She thinks her sugars may be up a bit because the immunotherapy she takes for her rheumatoid arthritis has been delayed in dosing.      Current Outpatient Prescriptions on File Prior to Visit:  BASAGLAR 100 UNIT/ML injection Inject 15 Units Subcutaneous daily In case of pump failure   FARRAH CONTOUR test strip Use to test blood sugar 9 times daily or as directed.   celecoxib (CELEBREX) 200 MG capsule Take 1 capsule (200 mg) by mouth daily   Continuous Blood Gluc Transmit (DEXCOM G6 TRANSMITTER) MISC 1 each every 3 months Change every 90 days   folic acid (FOLVITE) 1 MG tablet Take 2 tablets (2 mg) by mouth daily   golimumab (SIMPONI) 50 MG/0.5ML auto-injector pen INJECT THE CONTENTS OF ONE PEN (50MG) UNDER THE SKIN ONCE EVERY 21 TO 28 DAYS   HUMALOG KWIKPEN 100 UNIT/ML soln Inject as needed approx 20 units daily   INSULIN INFUSION PUMP    insulin lispro (HUMALOG PEN) 100 UNIT/ML injection Inject 1-8 Units Subcutaneous 4 times daily (before meals  "and nightly) As instructed (roughly 1 unit: 8 g carbohydrate) incase of pump failure.   insulin lispro (HUMALOG) 100 UNIT/ML injection Inject as needed approx 20 units daily   insulin pen needle (B-D U/F) 31G X 5 MM Inject 1 Units Subcutaneous 4 times daily (before meals and nightly)   leucovorin (WELLCOVORIN) 5 MG tablet Take 1 tablet (5 mg) by mouth once a week 12 hours after taking Methotrexate dose.   melatonin 5 MG tablet Take 5 mg by mouth nightly as needed for sleep   methotrexate 2.5 MG tablet CHEMO Take 6 tablets once weekly   predniSONE (DELTASONE) 5 MG tablet Take 3 tablets (15 mg) by mouth daily Call on Friday to let Rheumatology know how you are feeling   Pregabalin (LYRICA PO) Take 75 mg by mouth daily    zolpidem (AMBIEN) 10 MG tablet Take 10 mg by mouth nightly as needed.     No current facility-administered medications on file prior to visit.     ROS: 10 point ROS neg other than the symptoms noted above in the HPI.    So Hx -  with adult children.she has been traveling a lot this summer.  Vital signs:   /77  Pulse 93  Ht 1.676 m (5' 6\")  Wt 59.9 kg (132 lb)  BMI 21.31 kg/m2  Estimated body mass index is 21.31 kg/(m^2) as calculated from the following:    Height as of this encounter: 1.676 m (5' 6\").    Weight as of this encounter: 59.9 kg (132 lb).  NAD  Eyes - no periorbital edema, conjunctival injection, scleral icterus  CV - .  No edema  Skin - normal texture   Feet - no ulcers   Mood - not depressed    Recent Labs   Lab Test  05/17/18   0813 05/07/18 01/24/18   1011  01/23/18   1331 01/23/18   06/11/15   1326   05/23/13   1601   12/10/12   1051   A1C   --    --    --    --    --    --    --    --   8.2*   --    --    HEMOGLOBINA1   --   8.0*   --    --   7.3*   --    --    --    --    --    --    TSH   --    --    --    --    --    --   0.91   --    --    --   1.58   LDL  78   --    --    --    --    --    --    --    --    --    --    HDL  81   --    --    --    --    --    -- "    --    --    --    --    TRIG  37   --    --    --    --    --    --    --    --    --    --    CR  0.68   --   0.70   --    --    < >  0.64   < >  0.62   < >  0.67   MICROL   --    --    --   <5   --    --    --    --    --    --    --     < > = values in this interval not displayed.       Assessment and plan:    1.  Diabetes control.  Her A1c continues to be above target, but not by much.  I suggested she increase her basal rate during the day, as noted by the medical student.  I urged her to follow-up with the nurse educators for training on the Dex com if she cannot figure it out by herself.    2.  Diabetes complications.  Up to date with screens and has none.      I spent 25 minutes with this patient face to face and explained the conditions and plans (more than 50% of time was counseling/coordination of diabetes care, discussing management before and after upcoming arthroscopic surgery) . The patient understood and is satisfied with today's visit.   F/u with Delicia Martel in 3 mo and me in 6 months    Veronica Bills MD

## 2018-07-24 NOTE — PROGRESS NOTES
Ms. Major is a 63 year-old with a history of RA here to follow-up for her type 1 diabetes. Her primary concern today is uneven sugar control in the past few weeks and months.     Ms. Major recently began using the Dexcom G5 and anticipating the G6 in August. Her settings are as follows:    Basal  Mid - 0.5  05 - 0.675  0700- 1.05  1200- 0.925  1600 - 1.0  2200 -0.375    IC Ratio  Mid - 12  0600 - 6.8  1130 - 15.0  1730 - 8.0    Sensitivity  Mid  - 65  0600 - 54  2100 - 75    Target   Mid - 100 - 150  0700 - 90 - 110  2100 - 100 - 150      Reviewing her BGs from June 25-July 8 show that Ms. Major tends to be higher in the late afternoon. On average, her BG was 173 + 65, with 40% of readings above target. Her daily insulin is delivered as 78% basal. Her daily routine involves a lot of activity; she is currently playing golf and used to play tennis before her knee injury. When she plays golf she uses a temp basal of 35%, and suspends when she is playing tennis. She wakes up in the mornings around 6-7am, has coffee, and eats breakfast a few hours later. She boluses right before eating her meal, on average 15 units, but this is based on her BG prior to the meal more than the contents of her meal itself. She will bolus again on average 15 units for lunch, and 10-18 units for dinner depending on if she is running high throughout the day. She experiences lows rarely; the most recent was this morning after a golf lesson. She feels lows in the 70s and corrects with juice.     She is up-to-date with [employer]. Her RA is managed by Dr. Walton.     ROS  Skin: Lesions on her left elbow (followed-up with biopsy pending results), but no itchiness, redness, or rashes otherwise.  ENT: No blurriness, redness, itchiness, tinnitus, difficulty swallowing or post-nasal drip.  Resp: No SOB, dyspnea on exertion, coughing.  CV: No palpitations, chest pain, edema.  GI: No nausea, vomiting, abdominal pain, change in bowel movements.  : No  "hesitancy, frequency, or urgency. No dysuria.   MSK: Long-standing R knee pain, RA in fingers bilat. No lower back pain  Neuro: No numbness, tingling, loss of sensation.   Psych: Some anxiety and concern given recent stressors in life, but no depression.    Past Medical History:   Diagnosis Date     Rheumatoid arthritis(714.0)      Type II or unspecified type diabetes mellitus without mention of complication, not stated as uncontrolled        Past Surgical History:   Procedure Laterality Date     FOOT SURGERY  1998, 10/2010    Bunionectomy       History reviewed. No pertinent family history.    Social History   Substance Use Topics     Smoking status: Never Smoker     Smokeless tobacco: Never Used     Alcohol use Yes      Comment: glass of wine daily       Current Outpatient Prescriptions   Medication     BASAGLAR 100 UNIT/ML injection     FARRAH CONTOUR test strip     celecoxib (CELEBREX) 200 MG capsule     Continuous Blood Gluc Transmit (DEXCOM G6 TRANSMITTER) MISC     folic acid (FOLVITE) 1 MG tablet     golimumab (SIMPONI) 50 MG/0.5ML auto-injector pen     HUMALOG KWIKPEN 100 UNIT/ML soln     INSULIN INFUSION PUMP     insulin lispro (HUMALOG PEN) 100 UNIT/ML injection     insulin lispro (HUMALOG) 100 UNIT/ML injection     insulin pen needle (B-D U/F) 31G X 5 MM     leucovorin (WELLCOVORIN) 5 MG tablet     melatonin 5 MG tablet     methotrexate 2.5 MG tablet CHEMO     predniSONE (DELTASONE) 5 MG tablet     Pregabalin (LYRICA PO)     zolpidem (AMBIEN) 10 MG tablet     No current facility-administered medications for this visit.      Physical Exam  Vital signs:      BP: 136/77 Pulse: 93           Height: 167.6 cm (5' 6\") Weight: 59.9 kg (132 lb)  Estimated body mass index is 21.31 kg/(m^2) as calculated from the following:    Height as of this encounter: 1.676 m (5' 6\").    Weight as of this encounter: 59.9 kg (132 lb).    Gen: NAD, pleasant, outgoing.  Psych: Good mood, thought form and content fluid and coherent. "   Eyes: Extraocular movements grossly intact, sclera clear without icterus. No periorbital edema, proptosis, conjunctival injection.  Feet: Intact to monofilament bilaterally. DP and PT pulses present. No edema, ulcers.     Recent Labs   Lab Test  05/17/18   0813 05/07/18 01/24/18   1011  01/23/18   1331 01/23/18   06/11/15   1326   05/23/13   1601   12/10/12   1051   A1C   --    --    --    --    --    --    --    --   8.2*   --    --    HEMOGLOBINA1   --   8.0*   --    --   7.3*   --    --    --    --    --    --    TSH   --    --    --    --    --    --   0.91   --    --    --   1.58   LDL  78   --    --    --    --    --    --    --    --    --    --    HDL  81   --    --    --    --    --    --    --    --    --    --    TRIG  37   --    --    --    --    --    --    --    --    --    --    CR  0.68   --   0.70   --    --    < >  0.64   < >  0.62   < >  0.67   MICROL   --    --    --   <5   --    --    --    --    --    --    --     < > = values in this interval not displayed.     Assessment/Plan:  1. Diabetes: Overall, Ms. Rico diabetes is fairly controlled, but she acknowledges she could do better with her numbers.   -- She recently switched to the Dexcom G5, and is happy with the change. She is anticipating the G6 in August. She will try to use the G6 without help, but will contact for nurse educator if necessary.  -- Given her higher numbers in the afternoons, she will try new basal rates as shown below:   Mid - 0.5  05 - 0.675  0700- 1.05  1200- 1.0 (previously 0.925)  1600 - 1.0  2200 -0.375    2. Diabetes complications:   -- Retinopathy: Up to date with optho.  -- Nephropathy: Kidneys checked and ok in 05/2018.  -- Neuropathy: Feet intact to monofilament.    3. CV risk: Lipids were checked and ok 05/2018.     4. RA: Managed by Dr. Novak, up-to-date with care.    RTC in 3 months with Delicia, 6 months with Dr. Bills.    I, Patti Garnica MS3, saw this patient in the presence of Dr. Veronica REDDING  MD Negar.

## 2018-07-25 ENCOUNTER — OFFICE VISIT (OUTPATIENT)
Dept: RHEUMATOLOGY | Facility: CLINIC | Age: 63
End: 2018-07-25
Attending: INTERNAL MEDICINE
Payer: COMMERCIAL

## 2018-07-25 VITALS
HEIGHT: 66 IN | DIASTOLIC BLOOD PRESSURE: 72 MMHG | HEART RATE: 71 BPM | BODY MASS INDEX: 21.31 KG/M2 | OXYGEN SATURATION: 100 % | RESPIRATION RATE: 16 BRPM | TEMPERATURE: 97.7 F | SYSTOLIC BLOOD PRESSURE: 117 MMHG

## 2018-07-25 DIAGNOSIS — Z79.899 ENCOUNTER FOR LONG-TERM CURRENT USE OF MEDICATION: ICD-10-CM

## 2018-07-25 DIAGNOSIS — M05.741 RHEUMATOID ARTHRITIS INVOLVING BOTH HANDS WITH POSITIVE RHEUMATOID FACTOR (H): Primary | ICD-10-CM

## 2018-07-25 DIAGNOSIS — M05.742 RHEUMATOID ARTHRITIS INVOLVING BOTH HANDS WITH POSITIVE RHEUMATOID FACTOR (H): Primary | ICD-10-CM

## 2018-07-25 PROCEDURE — G0463 HOSPITAL OUTPT CLINIC VISIT: HCPCS | Mod: ZF

## 2018-07-25 RX ORDER — CELECOXIB 200 MG/1
200 CAPSULE ORAL DAILY
Qty: 120 CAPSULE | Refills: 3 | Status: SHIPPED | OUTPATIENT
Start: 2018-07-25 | End: 2019-06-05

## 2018-07-25 ASSESSMENT — PAIN SCALES - GENERAL: PAINLEVEL: SEVERE PAIN (6)

## 2018-07-25 NOTE — LETTER
7/25/2018       RE: Ruba Major  [address]  [place] [address]     Dear Colleague,    Thank you for referring your patient, Ruba Major, to the [facility] RHEUMATOLOGY at [facility]. Please see a copy of my visit note below.    Rheumatology Visit     Ruba Major MRN# [id number]   YOB: 1955 Age: 63 year old     Date of Visit: July 25, 2018  Primary care provider: Eduarda Arredondo          Assessment and Plan:   1. 62 yo female with RA, CCP+: She had more active disease which was limiting on single agent Methotrexate. I believe that regarding the inflammatory component of her joint pain after 6 months of Humira she was improved but not in a remission, able to play tennis and golf with symptoms. No overt synovitis was noted. We switched to Enbrel to attempt to achieve a better response and hopefully remission. Unfortunately she did not respond at 3 months even partially to this.       She continues to be improved on Simponi without side effects. She has some waning at 3-3.5 weeks. She does have mild weekly SE on Methotrexate that are very stable. Her occasional diffuse mouth soreness may be due to this.   Assessment of response of her RA has been complicated by concomitant DJD affecting first CMC joints and her feet. She is much better now so it would seem inflammation was clearly playing a part.   She had L knee arthroscopic knee surgery.She however is limited and can no longer play tennis, although can golf.   She had cervical spine surgery and now recent epidurals for L5 disc and is now in PT.  She is having some recurrent L arm tingling and scheduled for CT scan.  Lab has been normal but she is due.  2. Type I DM on insulin pump.   3. Possible peripheral neuropathy, now improved   4. DJD       PLAN: discussed in detail with the patient  1. She will continue Simponi and I encouraged her to take at 3-3.5 weeks consistently. Rx updated.  2.  Continue Methotrexate 10 mg weekly; continue folic acid and Leucovorin - renewed. I offered trial of Leflunomide instead, she will consider.  3. Can continue Celebrex 200 mg daily and discussed dosing data and prior concerns.  She could take briefly bid for flares.  4. She has had the flu shot   5. Return in 6 months; lab in the interim as scheduled.  6. Call if problems occur      Gilberto Walton MD                History of Present Illness:   64 yo F is seen in followup for RA, CCP+. I saw her last in 1/2018. EPIC reviewed.  HISTORY CARRIED FORWARD:  To review, patient initially presented in 5/2011 with c/o 7-8 year h/o fever, pain and swelling in bilateral wrists, 1st MCPs, Ankles, and 1st MTPs. She also noted prolonged morning stiffness, fatique, dry eyes, and ocular photosensativity. Also endorsed long history of oral aphthous ulcers. Initial labs revealed negative RF, SSA, SSB, TTG, complements, and thyroid studies, however CCP was positive (127). Recent CCP again is positive.  She is currently being managed with Methotrexate 15 mg q week with Leucovorin and Folic acid. Humira added in July 2012 and switched to Enbrel at visit in March 2013. See correspondence. Due to insurance coverage Enbrel was substituted for original consideration of Simponi. With no response to Enbrel it was switched to Simponi in summer 2013.   She was significantly improved at less than 3 months and this continues. At prior visit she did note that variably some months she had more sx at about three weeks. She took the last dose early and she noted immediate improvement. Since then she continues to take it around 23 days and continues to do better. She has minimal sx now except in her L knee which has had arthroscopy and has known DJD. She notes minimal swelling. Her feet are stable with minimal residual hand pain. No systemic symptoms. She is taking Celebrex at 200 mg just daily now. No SE.  She had interval cervical  spine surgery and is healed. She has some residual L arm symptoms that are not clearly related, perhaps Rotator cuff; she is in PT.   On Humira she had no rashes or injection site reactions. Enbrel had a little more burning in thigh injection site but did not seem to help.    INTERVAL HISTORY:    The [place] has no pain with injection.  She had a flare in the spring per phone notes helped minimally with Prednisone. SHe does not like to take it and plans not to in future. She is back to baseline.  She had issues with her Rx for Simponi last week that we reviewed in detail and I apologized from our end for any delay. I have rewritten the Rx for 21 day interval and 1 year refill.  The patient was initially having significant GI side effects related to the methotrexate, and was started on leucovorin 5mg q week with good resolution. She however is having more symptoms on the days she takes methotrexate. She misses some Leucovorin doses. She cut down herself to 12.5 mg weekly MTX. No significant flares. She wonders about alternatives.  We discussed any peripheral symptoms at length.  There is little if any change. She thinks her rings are  fitting the same. She has less extension of R wrist.  Feet are stable.  She is going to have another L knee arthroscopy at [facility] to hopefully delay TKA.  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   She had interval lab in May, normal.  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  She spends a lot of time in [place].  She saw Dr. Williamson and is transferring her Diabetes care.  ROS otherwise negative.         Review of Systems:   Review Of Systems  Skin: negative  Eyes: negative  Ears/Nose/Throat: negative  Respiratory: No shortness of breath, dyspnea on exertion, cough, or hemoptysis  Cardiovascular: negative  Gastrointestinal: negative  Genitourinary: negative  Musculoskeletal: see HPI  Neurologic: negative  Psychiatric: negative  Hematologic/Lymphatic/Immunologic:  negative  Endocrine: negative          Past Medical History:     Past Medical History:   Diagnosis Date     Rheumatoid arthritis(714.0)      Type II or unspecified type diabetes mellitus without mention of complication, not stated as uncontrolled        Patient Active Problem List    Diagnosis Date Noted     Type 1 diabetes mellitus without complication (H) 01/23/2018     Priority: Medium     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 Provider to review               Past Surgical History:     Past Surgical History:   Procedure Laterality Date     FOOT SURGERY  1998, 10/2010    Bunionectomy             Social History:     Social History   Substance Use Topics     Smoking status: Former Smoker     Quit date: 7/25/1985     Smokeless tobacco: Never Used     Alcohol use Yes      Comment: glass of wine daily             Family History:   No family history on file.         Allergies:   No Known Allergies          Medications:     Current Outpatient Prescriptions   Medication Sig Dispense Refill     FARRAH CONTOUR test strip Use to test blood sugar 9 times daily or as directed. 900 strip 1     celecoxib (CELEBREX) 200 MG capsule Take 1 capsule (200 mg) by mouth daily 90 capsule 3     Continuous Blood Gluc Transmit (DEXCOM G6 TRANSMITTER) MISC 1 each every 3 months Change every 90 days 1 each 3     folic acid (FOLVITE) 1 MG tablet Take 2 tablets (2 mg) by mouth daily 180 tablet 3     golimumab (SIMPONI) 50 MG/0.5ML auto-injector pen INJECT THE CONTENTS OF ONE PEN (50MG) UNDER THE SKIN ONCE EVERY 21 TO 28 DAYS 1 each 0     HUMALOG KWIKPEN 100 UNIT/ML soln Inject as needed approx 20 units daily 30 mL 1     INSULIN  "INFUSION PUMP        insulin lispro (HUMALOG PEN) 100 UNIT/ML injection Inject 1-8 Units Subcutaneous 4 times daily (before meals and nightly) As instructed (roughly 1 unit: 8 g carbohydrate) incase of pump failure.       insulin lispro (HUMALOG) 100 UNIT/ML injection Inject as needed approx 20 units daily 30 mL 1     insulin pen needle (B-D U/F) 31G X 5 MM Inject 1 Units Subcutaneous 4 times daily (before meals and nightly) 100 each 1     leucovorin (WELLCOVORIN) 5 MG tablet Take 1 tablet (5 mg) by mouth once a week 12 hours after taking Methotrexate dose. 12 tablet 3     melatonin 5 MG tablet Take 5 mg by mouth nightly as needed for sleep       methotrexate 2.5 MG tablet CHEMO Take 6 tablets once weekly 72 tablet 1     Pregabalin (LYRICA PO) Take 75 mg by mouth daily        zolpidem (AMBIEN) 10 MG tablet Take 10 mg by mouth nightly as needed.       BASAGLAR 100 UNIT/ML injection Inject 15 Units Subcutaneous daily In case of pump failure (Patient not taking: Reported on 7/25/2018) 15 mL 0            Physical Exam:   Blood pressure 117/72, pulse 71, temperature 97.7  F (36.5  C), temperature source Oral, resp. rate 16, height 1.676 m (5' 6\"), SpO2 100 %.  Constitutional: [id number] cooperative   Eyes: nl conjunctiva, sclera   ENT: nl external ears, nose, throat. Healed anterior cervical scar   MS: No definite active synovitis on exam today. She has pain with finger curl and 1+T L 4th PIP without synovial thickening or deformity.  45 degrees dorsiflexion R wrist without swelling.  Tinel negative. No dactylitis, tenosynovitis, enthesopathy   Skin: no nail pitting, alopecia, rash, nodules or lesions   Psych: nl affect.         Data:     Lab Results   Component Value Date    WBC 4.4 05/17/2018    WBC 4.3 01/24/2018    WBC 6.4 01/28/2016    HGB 13.1 05/17/2018    HGB 13.4 01/24/2018    HGB 13.4 08/03/2017    HCT 39.5 05/17/2018    HCT 39.9 01/24/2018    HCT 36.6 01/28/2016    MCV 93 05/17/2018    MCV 93 01/24/2018    MCV " 91 01/28/2016     05/17/2018     01/24/2018     01/28/2016     Lab Results   Component Value Date    BUN 19 12/10/2012    BUN 16 08/28/2008     No components found for: SEDRATE  Lab Results   Component Value Date    TSH 0.91 06/11/2015    TSH 1.58 12/10/2012    TSH 1.57 09/12/2008     Lab Results   Component Value Date    AST 21 05/17/2018    AST 22 01/24/2018    AST 22 08/03/2017    ALT 30 05/17/2018    ALT 32 01/24/2018    ALT 35 08/03/2017    ALKPHOS 66 12/10/2012    ALKPHOS 101 08/28/2008     Reviewed Rheumatology lab flowsheet    Again, thank you for allowing me to participate in the care of your patient.      Sincerely,    Gilberto Walton MD

## 2018-07-25 NOTE — PROGRESS NOTES
This 63-year-old woman was seen in follow-up for her type 1 diabetes with medical student Patti Garnica.  Please see student's note of the same date for full details.    In brief, she has found it challenging to get the new Dex com device but hopes that will arrive in a couple of weeks.  She continues to use her pump and generally likes it.  She has not been able to exercise by playing tennis because of knee pain.  She is scheduled to have arthroscopic evaluation in a couple of weeks.  We reviewed the available CGM data as outlined by Ms. Choudharyfelipe.  Ruba is usually in target in the morning but seems to be above target between noon and 6 PM.  She is recognizing her lows.  She thinks her sugars may be up a bit because the immunotherapy she takes for her rheumatoid arthritis has been delayed in dosing.      Current Outpatient Prescriptions on File Prior to Visit:  BASAGLAR 100 UNIT/ML injection Inject 15 Units Subcutaneous daily In case of pump failure   FARRAH CONTOUR test strip Use to test blood sugar 9 times daily or as directed.   celecoxib (CELEBREX) 200 MG capsule Take 1 capsule (200 mg) by mouth daily   Continuous Blood Gluc Transmit (DEXCOM G6 TRANSMITTER) MISC 1 each every 3 months Change every 90 days   folic acid (FOLVITE) 1 MG tablet Take 2 tablets (2 mg) by mouth daily   golimumab (SIMPONI) 50 MG/0.5ML auto-injector pen INJECT THE CONTENTS OF ONE PEN (50MG) UNDER THE SKIN ONCE EVERY 21 TO 28 DAYS   HUMALOG KWIKPEN 100 UNIT/ML soln Inject as needed approx 20 units daily   INSULIN INFUSION PUMP    insulin lispro (HUMALOG PEN) 100 UNIT/ML injection Inject 1-8 Units Subcutaneous 4 times daily (before meals and nightly) As instructed (roughly 1 unit: 8 g carbohydrate) incase of pump failure.   insulin lispro (HUMALOG) 100 UNIT/ML injection Inject as needed approx 20 units daily   insulin pen needle (B-D U/F) 31G X 5 MM Inject 1 Units Subcutaneous 4 times daily (before meals and nightly)   leucovorin  "(WELLCOVORIN) 5 MG tablet Take 1 tablet (5 mg) by mouth once a week 12 hours after taking Methotrexate dose.   melatonin 5 MG tablet Take 5 mg by mouth nightly as needed for sleep   methotrexate 2.5 MG tablet CHEMO Take 6 tablets once weekly   predniSONE (DELTASONE) 5 MG tablet Take 3 tablets (15 mg) by mouth daily Call on Friday to let Rheumatology know how you are feeling   Pregabalin (LYRICA PO) Take 75 mg by mouth daily    zolpidem (AMBIEN) 10 MG tablet Take 10 mg by mouth nightly as needed.     No current facility-administered medications on file prior to visit.     ROS: 10 point ROS neg other than the symptoms noted above in the HPI.    So Hx -  with adult children.she has been traveling a lot this summer.  Vital signs:   /77  Pulse 93  Ht 1.676 m (5' 6\")  Wt 59.9 kg (132 lb)  BMI 21.31 kg/m2  Estimated body mass index is 21.31 kg/(m^2) as calculated from the following:    Height as of this encounter: 1.676 m (5' 6\").    Weight as of this encounter: 59.9 kg (132 lb).  NAD  Eyes - no periorbital edema, conjunctival injection, scleral icterus  CV - .  No edema  Skin - normal texture   Feet - no ulcers   Mood - not depressed    Recent Labs   Lab Test  05/17/18   0813 05/07/18 01/24/18   1011  01/23/18   1331 01/23/18   06/11/15   1326   05/23/13   1601   12/10/12   1051   A1C   --    --    --    --    --    --    --    --   8.2*   --    --    HEMOGLOBINA1   --   8.0*   --    --   7.3*   --    --    --    --    --    --    TSH   --    --    --    --    --    --   0.91   --    --    --   1.58   LDL  78   --    --    --    --    --    --    --    --    --    --    HDL  81   --    --    --    --    --    --    --    --    --    --    TRIG  37   --    --    --    --    --    --    --    --    --    --    CR  0.68   --   0.70   --    --    < >  0.64   < >  0.62   < >  0.67   MICROL   --    --    --   <5   --    --    --    --    --    --    --     < > = values in this interval not displayed. "       Assessment and plan:    1.  Diabetes control.  Her A1c continues to be above target, but not by much.  I suggested she increase her basal rate during the day, as noted by the medical student.  I urged her to follow-up with the nurse educators for training on the Dex com if she cannot figure it out by herself.    2.  Diabetes complications.  Up to date with screens and has none.      I spent 25 minutes with this patient face to face and explained the conditions and plans (more than 50% of time was counseling/coordination of diabetes care, discussing management before and after upcoming arthroscopic surgery) . The patient understood and is satisfied with today's visit.   F/u with Delicia Martel in 3 mo and me in 6 months    Veronica Bills MD

## 2018-07-25 NOTE — MR AVS SNAPSHOT
After Visit Summary   7/25/2018    Ruba Maojr    MRN: [id number]           Patient Information     Date Of Birth          1955        Visit Information        Provider Department      7/25/2018 9:30 AM Gilberto Walton MD [facility]        Today's Diagnoses     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 factor (H)    -  1    Encounter for long-term current use of medication           Follow-ups after your visit        Follow-up notes from your care team     Return in about 6 months (around 1/25/2019).      Your next 10 appointments already scheduled     Oct 29, 2018  9:30 AM CDT   (Arrive by 9:15 AM)   RETURN DIABETES with Delicia Martel PA-C   [facility] ([facility])    [address]  3rd Floor  [facility] [id number] [phone]            Jan 09, 2019  9:30 AM CST   (Arrive by 9:15 AM)   Return Visit with Gilberto Walton MD   [facility] ([facility] [place])    [address]  [address]  [facility] [address] [phone]              Who to contact     If you have questions or need follow up information about today's clinic visit or your schedule please contact [facility] directly at [phone].  Normal or non-critical lab and imaging results will be communicated to you by MyChart, letter or phone within 4 business days after the clinic has received the results. If you do not hear from us within 7 days, please contact the clinic through [employer] or phone. If you have a critical or abnormal lab result, we will notify you by phone as soon as possible.  Submit refill requests through [email or web address] or call your pharmacy and they will forward the refill request to us. Please allow 3 business days for your refill to be completed.          Additional Information About Your Visit        [employer] Information     [email or web address] gives you secure access to your electronic health  "record. If you see a primary care provider, you can also send messages to your care team and make appointments. If you have questions, please call your primary care clinic.  If you do not have a primary care provider, please call [phone] and they will assist you.        Care EveryWhere ID     This is your Care EveryWhere ID. This could be used by other organizations to access your [facility] medical records  [id number]        Your Vitals Were     Pulse Temperature Respirations Height Pulse Oximetry BMI (Body Mass Index)    71 97.7  F (36.5  C) (Oral) 16 1.676 m (5' 6\") 100% 21.31 kg/m2       Blood Pressure from Last 3 Encounters:   07/25/18 117/72   07/24/18 136/77   05/07/18 123/73    Weight from Last 3 Encounters:   07/24/18 59.9 kg (132 lb)   05/07/18 58.1 kg (128 lb)   01/24/18 58.1 kg (128 lb)              Today, you had the following     No orders found for display         Today's Medication Changes          These changes are accurate as of 7/25/18 11:59 PM.  If you have any questions, ask your nurse or doctor.               These medicines have changed or have updated prescriptions.        Dose/Directions    celecoxib 200 MG capsule   Commonly known as:  celeBREX   This may have changed:  additional instructions   Used for:  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 factor (H), Encounter for long-term current use of medication   Changed by:  Gilberto Walton MD        Dose:  200 mg   Take 1 capsule (200 mg) by mouth daily May take twice daily during RA flares.   Quantity:  120 capsule   Refills:  3       golimumab 50 MG/0.5ML auto-injector pen   Commonly known as:  SIMPONI   This may have changed:  additional instructions   Used for:  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 factor (H)   Changed by:  Gilberto Walton MD        INJECT THE CONTENTS OF ONE PEN (50MG) UNDER THE SKIN ONCE EVERY 21 DAYS   Quantity:  5 each   Refills:  11         Stop taking " these medicines if you haven't already. Please contact your care team if you have questions.     predniSONE 5 MG tablet   Commonly known as:  DELTASONE   Stopped by:  Gilberto Walton MD                Where to get your medicines      These medications were sent to [facility] - [place], [place] - [address]  [address], [facility] [address]    Hours:  Mon-Fri 8:30am-5:00pm Toll Free [phone] Phone:  [phone]     golimumab 50 MG/0.5ML auto-injector pen         These medications were sent to [id number] - [place], [place] - [address]  [address] [facility] [address]     Phone:  [phone]     celecoxib 200 MG capsule                Primary Care Provider Office Phone # Fax #    Eduarda MARSHALL Arnulfo [phone]       [facility] [address] [address]        Equal Access to Services     [facility]: Hadii aad ku hadasho Soomaali, waaxda luqadaha, qaybta kaalmada adeegyada, waxay idiin hayaan magy toro . So [facility] [phone].    ATENCIÓN: Si habla español, tiene a burgess disposición servicios gratuitos de asistencia lingüística. [facility] [phone].    We comply with applicable federal civil rights laws and [place] laws. We do not discriminate on the basis of race, color, national origin, age, disability, sex, sexual orientation, or gender identity.            Thank you!     Thank you for choosing [facility]  for your care. Our goal is always to provide you with excellent care. Hearing back from our patients is one way we can continue to improve our services. Please take a few minutes to complete the written survey that you may receive in the mail after your visit with us. Thank you!             Your Updated Medication List - Protect others around you: Learn how to safely use, store and throw away your medicines at www.disposemymeds.org.          This list is accurate  as of 7/25/18 11:59 PM.  Always use your most recent med list.                   Brand Name Dispense Instructions for use Diagnosis    AMBIEN 10 MG tablet   Generic drug:  zolpidem      Take 10 mg by mouth nightly as needed.    Rheumatoid arthritis(714.0), Encounter for long-term (current) use of other medications       BASAGLAR 100 UNIT/ML injection     15 mL    Inject 15 Units Subcutaneous daily In case of pump failure    Type 1 diabetes mellitus (H)       FARRAH CONTOUR test strip   Generic drug:  blood glucose monitoring     900 strip    Use to test blood sugar 9 times daily or as directed.    Type 1 diabetes mellitus (H)       celecoxib 200 MG capsule    celeBREX    120 capsule    Take 1 capsule (200 mg) by mouth daily May take twice daily during RA flares.    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 factor (H), Encounter for long-term current use of medication       DEXCOM G6 TRANSMITTER Misc     1 each    1 each every 3 months Change every 90 days    Type 1 diabetes mellitus without complication (H)       folic acid 1 MG tablet    FOLVITE    180 tablet    Take 2 tablets (2 mg) by mouth daily    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 factor (H), Encounter for long-term current use of medication       golimumab 50 MG/0.5ML auto-injector pen    SIMPONI    5 each    INJECT THE CONTENTS OF ONE PEN (50MG) UNDER THE SKIN ONCE EVERY 21 DAYS    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 factor (H)       INSULIN INFUSION PUMP       Rheumatoid arthritis(714.0), Encounter for long-term (current) use of other medications       * insulin lispro 100 UNIT/ML injection    HumaLOG PEN     Inject 1-8 Units Subcutaneous 4 times daily (before meals and nightly) As instructed (roughly 1 unit: 8 g carbohydrate) incase of pump failure.        * insulin lispro 100 UNIT/ML injection    HumaLOG    30 mL    Inject as needed approx 20 units daily    Type 1  diabetes mellitus (H)       * HumaLOG KWIKpen 100 UNIT/ML injection   Generic drug:  insulin lispro     30 mL    Inject as needed approx 20 units daily    Type 1 diabetes mellitus (H)       insulin pen needle 31G X 5 MM    B-D U/F    100 each    Inject 1 Units Subcutaneous 4 times daily (before meals and nightly)    Type 1 diabetes mellitus without complication (H)       leucovorin 5 MG tablet    WELLCOVORIN    12 tablet    Take 1 tablet (5 mg) by mouth once a week 12 hours after taking Methotrexate dose.    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 factor (H), Encounter for long-term current use of medication       LYRICA PO      Take 75 mg by mouth daily        melatonin 5 MG tablet      Take 5 mg by mouth nightly as needed for sleep        methotrexate 2.5 MG tablet CHEMO     72 tablet    Take 6 tablets once weekly    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 factor (H), Encounter for long-term current use of medication       * Notice:  This list has 3 medication(s) that are the same as other medications prescribed for you. Read the directions carefully, and ask your doctor or other care provider to review them with you.

## 2018-07-25 NOTE — NURSING NOTE
"Chief Complaint   Patient presents with     RECHECK     RA       /72 (BP Location: Right arm, Patient Position: Sitting, Cuff Size: Adult Small)  Pulse 71  Temp 97.7  F (36.5  C) (Oral)  Resp 16  Ht 1.676 m (5' 6\")  SpO2 100%  BMI 21.31 kg/m2    Merle Dang CMA  7/25/2018 9:31 AM      "

## 2018-07-25 NOTE — PROGRESS NOTES
Rheumatology Visit     Ruba Major MRN# [id number]   YOB: 1955 Age: 63 year old     Date of Visit: July 25, 2018  Primary care provider: Eduarda Arredondo          Assessment and Plan:   1. 64 yo female with RA, CCP+: She had more active disease which was limiting on single agent Methotrexate. I believe that regarding the inflammatory component of her joint pain after 6 months of Humira she was improved but not in a remission, able to play tennis and golf with symptoms. No overt synovitis was noted. We switched to Enbrel to attempt to achieve a better response and hopefully remission. Unfortunately she did not respond at 3 months even partially to this.       She continues to be improved on Simponi without side effects. She has some waning at 3-3.5 weeks. She does have mild weekly SE on Methotrexate that are very stable. Her occasional diffuse mouth soreness may be due to this.   Assessment of response of her RA has been complicated by concomitant DJD affecting first CMC joints and her feet. She is much better now so it would seem inflammation was clearly playing a part.   She had L knee arthroscopic knee surgery.She however is limited and can no longer play tennis, although can golf.   She had cervical spine surgery and now recent epidurals for L5 disc and is now in PT.  She is having some recurrent L arm tingling and scheduled for CT scan.  Lab has been normal but she is due.  2. Type I DM on insulin pump.   3. Possible peripheral neuropathy, now improved   4. DJD       PLAN: discussed in detail with the patient  1. She will continue Simponi and I encouraged her to take at 3-3.5 weeks consistently. Rx updated.  2. Continue Methotrexate 10 mg weekly; continue folic acid and Leucovorin - renewed. I offered trial of Leflunomide instead, she will consider.  3. Can continue Celebrex 200 mg daily and discussed dosing data and prior concerns.  She could take briefly bid for flares.  4. She has had the  flu shot   5. Return in 6 months; lab in the interim as scheduled.  6. Call if problems occur      Gilberto Walton MD                History of Present Illness:   62 yo F is seen in followup for RA, CCP+. I saw her last in 1/2018. EPIC reviewed.  HISTORY CARRIED FORWARD:  To review, patient initially presented in 5/2011 with c/o 7-8 year h/o fever, pain and swelling in bilateral wrists, 1st MCPs, Ankles, and 1st MTPs. She also noted prolonged morning stiffness, fatique, dry eyes, and ocular photosensativity. Also endorsed long history of oral aphthous ulcers. Initial labs revealed negative RF, SSA, SSB, TTG, complements, and thyroid studies, however CCP was positive (127). Recent CCP again is positive.  She is currently being managed with Methotrexate 15 mg q week with Leucovorin and Folic acid. Humira added in July 2012 and switched to Enbrel at visit in March 2013. See correspondence. Due to insurance coverage Enbrel was substituted for original consideration of Simponi. With no response to Enbrel it was switched to Simponi in summer 2013.   She was significantly improved at less than 3 months and this continues. At prior visit she did note that variably some months she had more sx at about three weeks. She took the last dose early and she noted immediate improvement. Since then she continues to take it around 23 days and continues to do better. She has minimal sx now except in her L knee which has had arthroscopy and has known DJD. She notes minimal swelling. Her feet are stable with minimal residual hand pain. No systemic symptoms. She is taking Celebrex at 200 mg just daily now. No SE.  She had interval cervical spine surgery and is healed. She has some residual L arm symptoms that are not clearly related, perhaps Rotator cuff; she is in PT.   On Humira she had no rashes or injection site reactions. Enbrel had a little more burning in thigh injection site but did not seem to help.    INTERVAL  HISTORY:    The Viniat has no pain with injection.  She had a flare in the spring per phone notes helped minimally with Prednisone. SHe does not like to take it and plans not to in future. She is back to baseline.  She had issues with her Rx for Simponi last week that we reviewed in detail and I apologized from our end for any delay. I have rewritten the Rx for 21 day interval and 1 year refill.  The patient was initially having significant GI side effects related to the methotrexate, and was started on leucovorin 5mg q week with good resolution. She however is having more symptoms on the days she takes methotrexate. She misses some Leucovorin doses. She cut down herself to 12.5 mg weekly MTX. No significant flares. She wonders about alternatives.  We discussed any peripheral symptoms at length.  There is little if any change. She thinks her rings are  fitting the same. She has less extension of R wrist.  Feet are stable.  She is going to have another L knee arthroscopy at [facility] to hopefully delay TKA.  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   She had interval lab in May, normal.  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  She spends a lot of time in [place].  She saw Dr. Williamson and is transferring her Diabetes care.  ROS otherwise negative.         Review of Systems:   Review Of Systems  Skin: negative  Eyes: negative  Ears/Nose/Throat: negative  Respiratory: No shortness of breath, dyspnea on exertion, cough, or hemoptysis  Cardiovascular: negative  Gastrointestinal: negative  Genitourinary: negative  Musculoskeletal: see HPI  Neurologic: negative  Psychiatric: negative  Hematologic/Lymphatic/Immunologic: negative  Endocrine: negative          Past Medical History:     Past Medical History:   Diagnosis Date     Rheumatoid arthritis(714.0)      Type II or unspecified type diabetes mellitus without mention of complication, not stated as uncontrolled        Patient Active Problem List    Diagnosis  Date Noted     Type 1 diabetes mellitus without complication (H) 01/23/2018     Priority: Medium     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 Provider to review               Past Surgical History:     Past Surgical History:   Procedure Laterality Date     FOOT SURGERY  1998, 10/2010    Bunionectomy             Social History:     Social History   Substance Use Topics     Smoking status: Former Smoker     Quit date: 7/25/1985     Smokeless tobacco: Never Used     Alcohol use Yes      Comment: glass of wine daily             Family History:   No family history on file.         Allergies:   No Known Allergies          Medications:     Current Outpatient Prescriptions   Medication Sig Dispense Refill     FARRAH CONTOUR test strip Use to test blood sugar 9 times daily or as directed. 900 strip 1     celecoxib (CELEBREX) 200 MG capsule Take 1 capsule (200 mg) by mouth daily 90 capsule 3     Continuous Blood Gluc Transmit (DEXCOM G6 TRANSMITTER) MISC 1 each every 3 months Change every 90 days 1 each 3     folic acid (FOLVITE) 1 MG tablet Take 2 tablets (2 mg) by mouth daily 180 tablet 3     golimumab (SIMPONI) 50 MG/0.5ML auto-injector pen INJECT THE CONTENTS OF ONE PEN (50MG) UNDER THE SKIN ONCE EVERY 21 TO 28 DAYS 1 each 0     HUMALOG KWIKPEN 100 UNIT/ML soln Inject as needed approx 20 units daily 30 mL 1     INSULIN INFUSION PUMP        insulin lispro (HUMALOG PEN) 100 UNIT/ML injection Inject 1-8 Units Subcutaneous 4 times daily (before meals and nightly) As instructed (roughly 1 unit: 8 g carbohydrate) incase of pump failure.       insulin lispro (HUMALOG) 100 UNIT/ML injection Inject as needed approx 20  "units daily 30 mL 1     insulin pen needle (B-D U/F) 31G X 5 MM Inject 1 Units Subcutaneous 4 times daily (before meals and nightly) 100 each 1     leucovorin (WELLCOVORIN) 5 MG tablet Take 1 tablet (5 mg) by mouth once a week 12 hours after taking Methotrexate dose. 12 tablet 3     melatonin 5 MG tablet Take 5 mg by mouth nightly as needed for sleep       methotrexate 2.5 MG tablet CHEMO Take 6 tablets once weekly 72 tablet 1     Pregabalin (LYRICA PO) Take 75 mg by mouth daily        zolpidem (AMBIEN) 10 MG tablet Take 10 mg by mouth nightly as needed.       BASAGLAR 100 UNIT/ML injection Inject 15 Units Subcutaneous daily In case of pump failure (Patient not taking: Reported on 7/25/2018) 15 mL 0            Physical Exam:   Blood pressure 117/72, pulse 71, temperature 97.7  F (36.5  C), temperature source Oral, resp. rate 16, height 1.676 m (5' 6\"), SpO2 100 %.  Constitutional: [id number] cooperative   Eyes: nl conjunctiva, sclera   ENT: nl external ears, nose, throat. Healed anterior cervical scar   MS: No definite active synovitis on exam today. She has pain with finger curl and 1+T L 4th PIP without synovial thickening or deformity.  45 degrees dorsiflexion R wrist without swelling.  Tinel negative. No dactylitis, tenosynovitis, enthesopathy   Skin: no nail pitting, alopecia, rash, nodules or lesions   Psych: nl affect.         Data:     Lab Results   Component Value Date    WBC 4.4 05/17/2018    WBC 4.3 01/24/2018    WBC 6.4 01/28/2016    HGB 13.1 05/17/2018    HGB 13.4 01/24/2018    HGB 13.4 08/03/2017    HCT 39.5 05/17/2018    HCT 39.9 01/24/2018    HCT 36.6 01/28/2016    MCV 93 05/17/2018    MCV 93 01/24/2018    MCV 91 01/28/2016     05/17/2018     01/24/2018     01/28/2016     Lab Results   Component Value Date    BUN 19 12/10/2012    BUN 16 08/28/2008     No components found for: SEDRATE  Lab Results   Component Value Date    TSH 0.91 06/11/2015    TSH 1.58 12/10/2012    TSH 1.57 " 09/12/2008     Lab Results   Component Value Date    AST 21 05/17/2018    AST 22 01/24/2018    AST 22 08/03/2017    ALT 30 05/17/2018    ALT 32 01/24/2018    ALT 35 08/03/2017    ALKPHOS 66 12/10/2012    ALKPHOS 101 08/28/2008     Reviewed Rheumatology lab flowsheet    Gilberto Walton

## 2018-07-25 NOTE — LETTER
7/25/2018      RE: Ruba Major  [address]  [place] [address]       Rheumatology Visit     Ruba Major MRN# [id number]   YOB: 1955 Age: 63 year old     Date of Visit: July 25, 2018  Primary care provider: Eduarda Arredondo          Assessment and Plan:   1. 62 yo female with RA, CCP+: She had more active disease which was limiting on single agent Methotrexate. I believe that regarding the inflammatory component of her joint pain after 6 months of Humira she was improved but not in a remission, able to play tennis and golf with symptoms. No overt synovitis was noted. We switched to Enbrel to attempt to achieve a better response and hopefully remission. Unfortunately she did not respond at 3 months even partially to this.       She continues to be improved on Simponi without side effects. She has some waning at 3-3.5 weeks. She does have mild weekly SE on Methotrexate that are very stable. Her occasional diffuse mouth soreness may be due to this.   Assessment of response of her RA has been complicated by concomitant DJD affecting first CMC joints and her feet. She is much better now so it would seem inflammation was clearly playing a part.   She had L knee arthroscopic knee surgery.She however is limited and can no longer play tennis, although can golf.   She had cervical spine surgery and now recent epidurals for L5 disc and is now in PT.  She is having some recurrent L arm tingling and scheduled for CT scan.  Lab has been normal but she is due.  2. Type I DM on insulin pump.   3. Possible peripheral neuropathy, now improved   4. DJD       PLAN: discussed in detail with the patient  1. She will continue Simponi and I encouraged her to take at 3-3.5 weeks consistently. Rx updated.  2. Continue Methotrexate 10 mg weekly; continue folic acid and Leucovorin - renewed. I offered trial of Leflunomide instead, she will consider.  3. Can continue Celebrex 200 mg daily and discussed dosing  data and prior concerns.  She could take briefly bid for flares.  4. She has had the flu shot   5. Return in 6 months; lab in the interim as scheduled.  6. Call if problems occur      Gilberto Walton MD                History of Present Illness:   64 yo F is seen in followup for RA, CCP+. I saw her last in 1/2018. EPIC reviewed.  HISTORY CARRIED FORWARD:  To review, patient initially presented in 5/2011 with c/o 7-8 year h/o fever, pain and swelling in bilateral wrists, 1st MCPs, Ankles, and 1st MTPs. She also noted prolonged morning stiffness, fatique, dry eyes, and ocular photosensativity. Also endorsed long history of oral aphthous ulcers. Initial labs revealed negative RF, SSA, SSB, TTG, complements, and thyroid studies, however CCP was positive (127). Recent CCP again is positive.  She is currently being managed with Methotrexate 15 mg q week with Leucovorin and Folic acid. Humira added in July 2012 and switched to Enbrel at visit in March 2013. See correspondence. Due to insurance coverage Enbrel was substituted for original consideration of Simponi. With no response to Enbrel it was switched to Simponi in summer 2013.   She was significantly improved at less than 3 months and this continues. At prior visit she did note that variably some months she had more sx at about three weeks. She took the last dose early and she noted immediate improvement. Since then she continues to take it around 23 days and continues to do better. She has minimal sx now except in her L knee which has had arthroscopy and has known DJD. She notes minimal swelling. Her feet are stable with minimal residual hand pain. No systemic symptoms. She is taking Celebrex at 200 mg just daily now. No SE.  She had interval cervical spine surgery and is healed. She has some residual L arm symptoms that are not clearly related, perhaps Rotator cuff; she is in PT.   On Humira she had no rashes or injection site reactions. Enbrel had  a little more burning in thigh injection site but did not seem to help.    INTERVAL HISTORY:    The [place] has no pain with injection.  She had a flare in the spring per phone notes helped minimally with Prednisone. SHe does not like to take it and plans not to in future. She is back to baseline.  She had issues with her Rx for Simponi last week that we reviewed in detail and I apologized from our end for any delay. I have rewritten the Rx for 21 day interval and 1 year refill.  The patient was initially having significant GI side effects related to the methotrexate, and was started on leucovorin 5mg q week with good resolution. She however is having more symptoms on the days she takes methotrexate. She misses some Leucovorin doses. She cut down herself to 12.5 mg weekly MTX. No significant flares. She wonders about alternatives.  We discussed any peripheral symptoms at length.  There is little if any change. She thinks her rings are  fitting the same. She has less extension of R wrist.  Feet are stable.  She is going to have another L knee arthroscopy at [facility] to hopefully delay TKA.  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   She had interval lab in May, normal.  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  She spends a lot of time in [place].  She saw Dr. Williamson and is transferring her Diabetes care.  ROS otherwise negative.         Review of Systems:   Review Of Systems  Skin: negative  Eyes: negative  Ears/Nose/Throat: negative  Respiratory: No shortness of breath, dyspnea on exertion, cough, or hemoptysis  Cardiovascular: negative  Gastrointestinal: negative  Genitourinary: negative  Musculoskeletal: see HPI  Neurologic: negative  Psychiatric: negative  Hematologic/Lymphatic/Immunologic: negative  Endocrine: negative          Past Medical History:     Past Medical History:   Diagnosis Date     Rheumatoid arthritis(714.0)      Type II or unspecified type diabetes mellitus without mention of  complication, not stated as uncontrolled        Patient Active Problem List    Diagnosis Date Noted     Type 1 diabetes mellitus without complication (H) 01/23/2018     Priority: Medium     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 Provider to review               Past Surgical History:     Past Surgical History:   Procedure Laterality Date     FOOT SURGERY  1998, 10/2010    Bunionectomy             Social History:     Social History   Substance Use Topics     Smoking status: Former Smoker     Quit date: 7/25/1985     Smokeless tobacco: Never Used     Alcohol use Yes      Comment: glass of wine daily             Family History:   No family history on file.         Allergies:   No Known Allergies          Medications:     Current Outpatient Prescriptions   Medication Sig Dispense Refill     FARRAH CONTOUR test strip Use to test blood sugar 9 times daily or as directed. 900 strip 1     celecoxib (CELEBREX) 200 MG capsule Take 1 capsule (200 mg) by mouth daily 90 capsule 3     Continuous Blood Gluc Transmit (DEXCOM G6 TRANSMITTER) MISC 1 each every 3 months Change every 90 days 1 each 3     folic acid (FOLVITE) 1 MG tablet Take 2 tablets (2 mg) by mouth daily 180 tablet 3     golimumab (SIMPONI) 50 MG/0.5ML auto-injector pen INJECT THE CONTENTS OF ONE PEN (50MG) UNDER THE SKIN ONCE EVERY 21 TO 28 DAYS 1 each 0     HUMALOG KWIKPEN 100 UNIT/ML soln Inject as needed approx 20 units daily 30 mL 1     INSULIN INFUSION PUMP        insulin lispro (HUMALOG PEN) 100 UNIT/ML injection Inject 1-8 Units Subcutaneous 4 times daily (before meals and nightly) As instructed (roughly 1 unit: 8 g carbohydrate) incase of pump  "failure.       insulin lispro (HUMALOG) 100 UNIT/ML injection Inject as needed approx 20 units daily 30 mL 1     insulin pen needle (B-D U/F) 31G X 5 MM Inject 1 Units Subcutaneous 4 times daily (before meals and nightly) 100 each 1     leucovorin (WELLCOVORIN) 5 MG tablet Take 1 tablet (5 mg) by mouth once a week 12 hours after taking Methotrexate dose. 12 tablet 3     melatonin 5 MG tablet Take 5 mg by mouth nightly as needed for sleep       methotrexate 2.5 MG tablet CHEMO Take 6 tablets once weekly 72 tablet 1     Pregabalin (LYRICA PO) Take 75 mg by mouth daily        zolpidem (AMBIEN) 10 MG tablet Take 10 mg by mouth nightly as needed.       BASAGLAR 100 UNIT/ML injection Inject 15 Units Subcutaneous daily In case of pump failure (Patient not taking: Reported on 7/25/2018) 15 mL 0            Physical Exam:   Blood pressure 117/72, pulse 71, temperature 97.7  F (36.5  C), temperature source Oral, resp. rate 16, height 1.676 m (5' 6\"), SpO2 100 %.  Constitutional: WD-WN-WG cooperative   Eyes: nl conjunctiva, sclera   ENT: nl external ears, nose, throat. Healed anterior cervical scar   MS: No definite active synovitis on exam today. She has pain with finger curl and 1+T L 4th PIP without synovial thickening or deformity.  45 degrees dorsiflexion R wrist without swelling.  Tinel negative. No dactylitis, tenosynovitis, enthesopathy   Skin: no nail pitting, alopecia, rash, nodules or lesions   Psych: nl affect.         Data:     Lab Results   Component Value Date    WBC 4.4 05/17/2018    WBC 4.3 01/24/2018    WBC 6.4 01/28/2016    HGB 13.1 05/17/2018    HGB 13.4 01/24/2018    HGB 13.4 08/03/2017    HCT 39.5 05/17/2018    HCT 39.9 01/24/2018    HCT 36.6 01/28/2016    MCV 93 05/17/2018    MCV 93 01/24/2018    MCV 91 01/28/2016     05/17/2018     01/24/2018     01/28/2016     Lab Results   Component Value Date    BUN 19 12/10/2012    BUN 16 08/28/2008     No components found for: SEDRATE  Lab " Results   Component Value Date    TSH 0.91 06/11/2015    TSH 1.58 12/10/2012    TSH 1.57 09/12/2008     Lab Results   Component Value Date    AST 21 05/17/2018    AST 22 01/24/2018    AST 22 08/03/2017    ALT 30 05/17/2018    ALT 32 01/24/2018    ALT 35 08/03/2017    ALKPHOS 66 12/10/2012    ALKPHOS 101 08/28/2008     Reviewed Rheumatology lab flowsheet    Gilberto Walton MD

## 2018-07-30 ENCOUNTER — TELEPHONE (OUTPATIENT)
Dept: RHEUMATOLOGY | Facility: CLINIC | Age: 63
End: 2018-07-30

## 2018-07-30 ENCOUNTER — MYC MEDICAL ADVICE (OUTPATIENT)
Dept: RHEUMATOLOGY | Facility: CLINIC | Age: 63
End: 2018-07-30

## 2018-07-30 NOTE — TELEPHONE ENCOUNTER
[facility] Prior Authorization Team   Phone: [phone]  Fax: [phone]    PA Initiation    Medication: Simponi 50mg/0.5ml auto-injector  Insurance Company: [employer] - Phone [phone] Fax [phone]  Pharmacy Filling the Rx: [facility] - [place], [place] - [facility] [address]  Filling Pharmacy Phone: [phone]  Filling Pharmacy Fax: [phone]  Start Date: 7/30/2018

## 2018-09-24 ENCOUNTER — ALLIED HEALTH/NURSE VISIT (OUTPATIENT)
Dept: EDUCATION SERVICES | Facility: CLINIC | Age: 63
End: 2018-09-24
Payer: COMMERCIAL

## 2018-09-24 DIAGNOSIS — E10.9 TYPE 1 DIABETES MELLITUS WITHOUT COMPLICATION (H): Primary | ICD-10-CM

## 2018-09-24 NOTE — PROGRESS NOTES
Diabetes Self-Management Education & Support    Diabetes Self-Management Education & Support - Insulin Pump/CGM Review    SUBJECTIVE/OBJECTIVE     Cultural Influences/Ethnic Background:  Maura Hendricks has been having some difficulty with blood glucoses rising in the morning almost as soon as she wakes up at 07:00am.  She states that this is happening whether she eats or not.  In addition she is having some low blood glucoses earlier in the overnight, around 1-2am.  Invariably she has a rebound high after treating these.      Patient seen today for Insulin Pump CGM  Review:    Currently using a Paradigm insulin pump with a Dexcom G6 CGM.      Insulin Pump Information:  Average BG (mg/dl) 194 +/- 46  /  5 BG Readings /day  Readings above Target:  68%   /   Readings below Target: 0 %  Avg Daily Carbs (g)  34 +/- 21  Average TDD insulin 26 +/- 2.8  Basal to Bolus Ratio:  72 % to 28%   BASAL RATES and times:  12   AM (midnight): 0.4 units/hour    5     AM: 0.675 units/hour   7     AM: 1.05 units/hour   12  PM (noon): 1 units/hour   4    PM: 1 units/hour   10   PM: 0.375 units/hour    CARB RATIO and times:  12   AM (midnight): 12  6     AM:  6.8  11:30 AM:  15  05:30 PM:  8  Corection Factor (Sensitivity) and times:  12   AM (midnight): 65 mg/dL  6     AM: 54 mg/dL  9    PM:  75 mg/dL  BLOOD GLUCOSE TARGET and times:  12   AM (midnight): 100 - 150  7     AM:  90 - 110  9    PM:  100 - 150  Active Insulin Time:  3  hours     LOGBOOK report:       CGM report                Taking Medications  Diabetes Medication(s)     Insulin Sig    BASAGLAR 100 UNIT/ML injection Inject 15 Units Subcutaneous daily In case of pump failure     Patient not taking:  Reported on 7/25/2018    HUMALOG KWIKPEN 100 UNIT/ML soln Inject as needed approx 20 units daily    insulin lispro (HUMALOG PEN) 100 UNIT/ML injection Inject 1-8 Units Subcutaneous 4 times daily (before meals and nightly) As instructed (roughly 1 unit: 8 g carbohydrate) aga  "of pump failure.    insulin lispro (HUMALOG) 100 UNIT/ML injection Inject as needed approx 20 units daily          ASSESSMENT    Difficult to interpret secondary to multiple basal rates, correction factors and insulin to carb ratios.  Patient open to trying to simplify a few things today.  She is frustrated because when her blood glucoses are too high, she attempts to correct and states that she feels that she isn't able to get enough insulin.  She has several different correction factors in her pump.  Today we made the following changes:      BASAL RATES and times:  12   AM (midnight): 0.375 units/hour    6     AM: 1 units/hour   9    PM: 0.375units/hour    Insulin to carb ratio:  Unchanged    Corection Factor (Sensitivity) and times:  12   AM (midnight): 55 mg/dL    Target BG:  Unchanged.    Active Insulin Time:  3 hours    Discussed having her upload her pump.  Instructions given as to how to do this.  We are able to see CGM data fairly easily, so in about one week, explained that it would be quite helpful to have her upload her pump so we can continue to adjust as needed.  Her insulin delivery is pretty heavily weighted toward basal insulin, she explains \"because I don't eat much carbohydrate.\"  She is probably eating even less than what shows on the report as she has been putting in \"fake carbs\" in order to get more correction insulin.  Hopefully by reducing the amount of basal late in the evening and overnight we can prevent some of the lows she is having around 1-2am, and by starting her increased basal rate a little earlier we can prevent some of the hyperglycemia she is experiencing upon rising.      Discussed her plans for future pump selection.  She is not enthusiastic about the 670G for several reasons, and unsure about the Tandem.  She expressed interest in the Omnipod as she feels that she would very much like the freedom to wear it in different places and not deal with tubing.  She plays and coaches " tennis, so quite active.  The Omnipod may be a very good fit for her.    Pt verbalized understanding of concepts discussed and recommendations provided today.         PLAN  See Patient Instructions for co-developed, patient-stated behavior change goals.  AVS printed and provided to patient today. See Follow-Up section for recommended follow-up.    Time Spent: 30 minutes  Encounter Type: Individual    Any diabetes medication dose changes were made via the CDE Protocol and Collaborative Practice Agreement with the patient's referring provider. A copy of this encounter was shared with the provider.

## 2018-10-09 DIAGNOSIS — E10.69 TYPE 1 DIABETES MELLITUS WITH OTHER SPECIFIED COMPLICATION (H): ICD-10-CM

## 2018-10-10 ENCOUNTER — MYC MEDICAL ADVICE (OUTPATIENT)
Dept: EDUCATION SERVICES | Facility: CLINIC | Age: 63
End: 2018-10-10

## 2018-10-10 DIAGNOSIS — E10.9 TYPE 1 DIABETES MELLITUS WITHOUT COMPLICATION (H): Primary | ICD-10-CM

## 2018-10-10 NOTE — TELEPHONE ENCOUNTER
"Diabetes Education Note:    Phone conversation of last week regarding Ruba's tendency to have a precipitous rise in her blood glucose in the morning after waking (without eating anything) that lasts about 3-4 hours, despite correction.  This has been a consistent pattern for her for a while but prior to using the Dexcom, it was not visible.  She often wakes up with a BG in range, but then it quickly rises as soon as she gets up, and seems to be unrelated to food.  Last week we increased her basal rate starting at 6am until 10am from 1 unit per hour to 1.25 units per hour and this seems to be addressing that problem.  She had multiple insulin to carb ratios programmed into her pump and we simplified these to one rate of 1 unit per 12 grams CHO for all meals.      Today, she states that she feels as if she is running high all afternoon.  When looking at the Dexcom report it appears that she is spiking after meals.  Suggested that she may want to look at trying to bolus 15-20 minutes before eating.  If that isn't helping with the after meal spikes, suggest that she change her insulin to carb ratio to 1:10 for all meals.     The highs \"all afternoon\" seem to be inconsistent.  It appears to me that the problem is related to either timing of insulin boluses or ICR being too weak, or inaccuracy in carb counting.  She continues to have some overnight hyperglycemia at times, but this is inconsistent as well and may be related to sleep quality or stress.  There isn't a consistent enough pattern, in my estimation, to warrant increasing her overnight basal rate.       Please see Dexcom Clarity report for details.  Asked Ruba to check in again early next week to see if these measures have helped with post meal excursions.      Current pump settings:  Basal rate:  12:00am:  0.375 unit per hour  06:00am:  1.25 units per hour  10:00am:  0.9 units per hour  10:00pm:  0.375 units per hour.    ICR:    12:00am to 12:00am:  " 12    Correction Factor  12:00am to 12:00am:  55     Active insulin Time:  3 hours.     LITO DiazN, RN, CDE  Diabetes   [facility] [address] [place] [address]  Email:  [email or web address]  Phone:  [phone]

## 2018-10-29 ENCOUNTER — PATIENT OUTREACH (OUTPATIENT)
Dept: CARE COORDINATION | Facility: CLINIC | Age: 63
End: 2018-10-29

## 2018-10-30 ENCOUNTER — OFFICE VISIT (OUTPATIENT)
Dept: ENDOCRINOLOGY | Facility: CLINIC | Age: 63
End: 2018-10-30
Payer: COMMERCIAL

## 2018-10-30 ENCOUNTER — ALLIED HEALTH/NURSE VISIT (OUTPATIENT)
Dept: EDUCATION SERVICES | Facility: CLINIC | Age: 63
End: 2018-10-30
Payer: COMMERCIAL

## 2018-10-30 VITALS
HEIGHT: 66 IN | DIASTOLIC BLOOD PRESSURE: 82 MMHG | WEIGHT: 132 LBS | SYSTOLIC BLOOD PRESSURE: 149 MMHG | BODY MASS INDEX: 21.21 KG/M2 | HEART RATE: 64 BPM

## 2018-10-30 DIAGNOSIS — M05.742 RHEUMATOID ARTHRITIS INVOLVING BOTH HANDS WITH POSITIVE RHEUMATOID FACTOR (H): ICD-10-CM

## 2018-10-30 DIAGNOSIS — M05.741 RHEUMATOID ARTHRITIS INVOLVING BOTH HANDS WITH POSITIVE RHEUMATOID FACTOR (H): ICD-10-CM

## 2018-10-30 DIAGNOSIS — E10.69 TYPE 1 DIABETES MELLITUS WITH OTHER SPECIFIED COMPLICATION (H): Primary | ICD-10-CM

## 2018-10-30 DIAGNOSIS — E10.9 TYPE 1 DIABETES MELLITUS WITHOUT COMPLICATION (H): Primary | ICD-10-CM

## 2018-10-30 LAB — HBA1C MFR BLD: 7 % (ref 4.3–6)

## 2018-10-30 ASSESSMENT — PAIN SCALES - GENERAL: PAINLEVEL: NO PAIN (0)

## 2018-10-30 NOTE — MR AVS SNAPSHOT
After Visit Summary   10/30/2018    Ruba Major    MRN: [id number]           Patient Information     Date Of Birth          1955        Visit Information        Provider Department      10/30/2018 8:30 AM Iris Sheikh RN [facility] Diabetes        Today's Diagnoses     Type 1 diabetes mellitus without complication (H)    -  1       Follow-ups after your visit        Your next 10 appointments already scheduled     Nov 26, 2018 10:00 AM CST   (Arrive by 9:45 AM)   RETURN DIABETES with DONALD Gudino [facility] Endocrinology ([facility])    [address]  3rd Floor  [facility] [id number] [phone]            Jan 09, 2019  9:30 AM CST   (Arrive by 9:15 AM)   Return Visit with Gilberto Walton MD   [facility] Rheumatology ([facility])    [address]  [address]  [facility] [address] [phone]              Who to contact     Please call your clinic at [phone] to:    Ask questions about your health    Make or cancel appointments    Discuss your medicines    Learn about your test results    Speak to your doctor            Additional Information About Your Visit        AqwiseharNeolane Information     c8apps gives you secure access to your electronic health record. If you see a primary care provider, you can also send messages to your care team and make appointments. If you have questions, please call your primary care clinic.  If you do not have a primary care provider, please call [phone] and they will assist you.      c8apps is an electronic gateway that provides easy, online access to your medical records. With c8apps, you can request a clinic appointment, read your test results, renew a prescription or communicate with your care team.     To access your existing account, please contact your [facility] or call [phone] for assistance.        Care EveryWhere ID     This is  your Care EveryWhere ID. This could be used by other organizations to access your [place] medical records  [id number]         Blood Pressure from Last 3 Encounters:   10/30/18 149/82   07/25/18 117/72   07/24/18 136/77    Weight from Last 3 Encounters:   10/30/18 59.9 kg (132 lb)   07/24/18 59.9 kg (132 lb)   05/07/18 58.1 kg (128 lb)              Today, you had the following     No orders found for display       Primary Care Provider Office Phone # Fax #    Eduarda Arredondo [phone]       [facility] [address]  [facility] [address]        Equal Access to Services     SLIME CRAWFORD : Hadii fortino ku hadasho Soorlandoali, waaxda luqadaha, qaybta kaalmada adeegyada, waxanjana toro . So [facility] [phone].    ATENCIÓN: Si habla español, tiene a burgess disposición servicios gratuitos de asistencia lingüística. [facility] [phone].    We comply with applicable federal civil rights laws and [place] laws. We do not discriminate on the basis of race, color, national origin, age, disability, sex, sexual orientation, or gender identity.            Thank you!     Thank you for choosing [facility] DIABETES  for your care. Our goal is always to provide you with excellent care. Hearing back from our patients is one way we can continue to improve our services. Please take a few minutes to complete the written survey that you may receive in the mail after your visit with us. Thank you!             Your Updated Medication List - Protect others around you: Learn how to safely use, store and throw away your medicines at www.disposemymeds.org.          This list is accurate as of 10/30/18 11:59 PM.  Always use your most recent med list.                   Brand Name Dispense Instructions for use Diagnosis    AMBIEN 10 MG tablet   Generic drug:  zolpidem      Take 10 mg by mouth nightly as needed.    Rheumatoid arthritis(714.0), Encounter for long-term (current) use of other  medications       BASAGLAR 100 UNIT/ML injection     15 mL    Inject 15 Units Subcutaneous daily In case of pump failure    Type 1 diabetes mellitus (H)       FARRAH CONTOUR test strip   Generic drug:  blood glucose monitoring     900 strip    Use to test blood sugar 9 times daily or as directed.    Type 1 diabetes mellitus (H)       celecoxib 200 MG capsule    celeBREX    120 capsule    Take 1 capsule (200 mg) by mouth daily May take twice daily during RA flares.    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 factor (H), Encounter for long-term current use of medication       DEXCOM G6 TRANSMITTER Misc     1 each    1 each every 3 months Change every 90 days    Type 1 diabetes mellitus without complication (H)       folic acid 1 MG tablet    FOLVITE    180 tablet    Take 2 tablets (2 mg) by mouth daily    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 factor (H), Encounter for long-term current use of medication       golimumab 50 MG/0.5ML auto-injector pen    SIMPONI    5 each    INJECT THE CONTENTS OF ONE PEN (50MG) UNDER THE SKIN ONCE EVERY 21 DAYS    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 factor (H)       INSULIN INFUSION PUMP       Rheumatoid arthritis(714.0), Encounter for long-term (current) use of other medications       * insulin lispro 100 UNIT/ML injection    HumaLOG PEN     Inject 1-8 Units Subcutaneous 4 times daily (before meals and nightly) As instructed (roughly 1 unit: 8 g carbohydrate) incase of pump failure.        * HumaLOG KWIKpen 100 UNIT/ML injection   Generic drug:  insulin lispro     30 mL    Inject as needed approx 20 units daily    Type 1 diabetes mellitus (H)       * insulin lispro 100 UNIT/ML injection    HumaLOG    60 mL    Use with insulin pump approx. 65 units daily    Type 1 diabetes mellitus with other specified complication (H)       insulin pen needle 31G X 5 MM    B-D U/F    100 each    Inject 1 Units  Subcutaneous 4 times daily (before meals and nightly)    Type 1 diabetes mellitus without complication (H)       leucovorin 5 MG tablet    WELLCOVORIN    12 tablet    Take 1 tablet (5 mg) by mouth once a week 12 hours after taking Methotrexate dose.    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 factor (H), Encounter for long-term current use of medication       LYRICA PO      Take 75 mg by mouth daily        melatonin 5 MG tablet      Take 5 mg by mouth nightly as needed for sleep        methotrexate 2.5 MG tablet CHEMO     72 tablet    Take 6 tablets once weekly    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 factor (H), Encounter for long-term current use of medication       * Notice:  This list has 3 medication(s) that are the same as other medications prescribed for you. Read the directions carefully, and ask your doctor or other care provider to review them with you.

## 2018-10-30 NOTE — PROGRESS NOTES
"Ms. Major is a 63 year-old with a history of RA here to follow-up for her type 1 diabetes. Her primary concern today is increasing difficulties of pressing the buttons on her Medtronic pump given her rheumatoid arthritis.      Patient requesting letter only to see if we can acquire a pump that her dexterity will allow her to better manage; met extensively with Iris Sheikh and we reviewed changes together.    She needed to leave clinic for another appointment.     /82  Pulse 64  Ht 1.676 m (5' 6\")  Wt 59.9 kg (132 lb)  BMI 21.31 kg/m2       Please see notes from NAIF Lutz and attached letter.      Blood pressure slightly elevated and needs to be reviewed at RTC.      It is my privilege to be involved in the care of the above patient.     Marbella Min PA-C, MPAS  [facility]  Diabetes, Endocrinology, and Metabolism  [phone] Appointments/Nurse  [phone] Fax  [phone] pager  [phone] nurse line          "

## 2018-10-30 NOTE — PROGRESS NOTES
"[facility]  Endocrinology  Marbella Min PA-C  10/30/2018      Chief Complaint:   Diabetes    History of Present Illness:   Ruba Major is a 63 year old female with a history of type 1 diabetes who presents for evaluation of ***.    The patient was originally diagnosed with diabetes in  when she presented with hyperglycemia in the absence of DKA. She uses immunotherapy for rheumatoid arthritis. The patient saw Dr. Bills on 18 at which time her basal rate was increased during the day. She was not able to exercise by playing tennis because of knee pain.     On 10/10/2018, the patient communicated to Diabetes educator Iris via [employer] that she was having a rise in her fasting sugar in the morning that lasted 3-4 hours despite correction. She would wake up with her sugar in range though it quickly aba as soon as she got up. This was not an issue prior to starting Dexcom in early 2018. In the first week of 2018, her basal rate at 1346-1373 was increased form 1 unit per hour to 1.25 units which seemed to help. She was also having spikes after meals and was encouraged to start her bolus 15-20 minutes prior to eating.    Blood Glucose Monitoring:  We reviewed glucometer and CGM*** data together.  Fasting:  ***    Noon/lunch: *** {BG Time:190832::\"Post-Meal (2 hours)\"}  Dinner:   *** {BG Time:435980::\"Post-Meal (2 hours)\"}  Bedtime: *** {BG Time:942194::\"Post-Meal (2 hours)\"}    Hypoglycemia:  ***    Current Insulin Pump Settings:  Type of Pump: {YES - TYPE/NO:777661}  {Pump Basal Rates-Times:795170::\"BASAL RATES and times:\",\"12   AM (midnight): *** units/hour  \"}  Carb ratio:   {CARB RATIO:928874::\"CARB RATIO and times:\",\"12   AM (midnight): ***\"}  Basal ***%  :  Bolus ***%  {CF / Sensitivity Times:479906::\"Corection Factor (Sensitivity) and times:\",\"12   AM (midnight): *** mg/dL\"}  Target BG Ranges:   {TARGET B::\"BLOOD GLUCOSE TARGET and times:\",\"12   AM (midnight): *** - " "***\"}  Amount of Time Insulin is Active:  *** hrs    Diabetes monitoring and complications:  CAD: {YES (EXPLAIN)/NO:068620}  Last eye exam results: : Not Found  Last dental exam: ***  Microalbuminuria: ***  HTN: {YES (EXPLAIN)/NO:448945}  On Statin: {YES NO:712806}  On Aspirin: {YES NO:250145}  Depression: {YES (EXPLAIN)/NO:114924}  Erectile dysfunction: {Yes No NA 2:146598}    Patient Supplied Answers To Diabetic Questionnaire  including 4/30/2018   1. Since your last visit to our clinic (or if this your first visit, since you last saw your primary care provider), have you experienced any of the following symptoms that may be related to low blood sugars? No, I have not experienced any of these symptoms   2. Since your last visit to our clinic (or if this your first visit, since you last saw your primary care provider), have you experienced any of the following symptoms that may be related to prolonged high blood sugars? More thirst than usual, Increased urination, No, I have not experienced any of these symptoms   3. Have you had any concerns that you would like to discuss today? Other   4. Do you have any female family members who have had heart attacks or strokes before age 60 or male relatives who have had heart attacks or strokes before age 50? No   5. Do you have any family members who have had complications from diabetes such as kidney disease, heart disease or strokes, retinopathy (a vision problem), or amputations? No   6. Who do you live with?  spouse   Little interest or pleasure in doing things? Not at all   Feeling down, depressed, or hopeless? Not at all   10.  Are you considering a pregnancy or interested in discussing pregnancy prevention today? No      Review of Systems:   Pertinent items are noted in HPI.  All other systems are negative.    Active Medications:     Current Outpatient Prescriptions:      BASAGLAR 100 UNIT/ML injection, Inject 15 Units Subcutaneous daily In case of pump failure " (Patient not taking: Reported on 7/25/2018), Disp: 15 mL, Rfl: 0     FARRAH CONTOUR test strip, Use to test blood sugar 9 times daily or as directed., Disp: 900 strip, Rfl: 1     celecoxib (CELEBREX) 200 MG capsule, Take 1 capsule (200 mg) by mouth daily May take twice daily during RA flares., Disp: 120 capsule, Rfl: 3     Continuous Blood Gluc Transmit (DEXCOM G6 TRANSMITTER) MISC, 1 each every 3 months Change every 90 days, Disp: 1 each, Rfl: 3     folic acid (FOLVITE) 1 MG tablet, Take 2 tablets (2 mg) by mouth daily, Disp: 180 tablet, Rfl: 3     golimumab (SIMPONI) 50 MG/0.5ML auto-injector pen, INJECT THE CONTENTS OF ONE PEN (50MG) UNDER THE SKIN ONCE EVERY 21 DAYS, Disp: 5 each, Rfl: 11     HUMALOG KWIKPEN 100 UNIT/ML soln, Inject as needed approx 20 units daily, Disp: 30 mL, Rfl: 1     INSULIN INFUSION PUMP, , Disp: , Rfl:      insulin lispro (HUMALOG PEN) 100 UNIT/ML injection, Inject 1-8 Units Subcutaneous 4 times daily (before meals and nightly) As instructed (roughly 1 unit: 8 g carbohydrate) incase of pump failure., Disp: , Rfl:      insulin lispro (HUMALOG) 100 UNIT/ML injection, Use with insulin pump approx. 65 units daily, Disp: 60 mL, Rfl: 3     insulin pen needle (B-D U/F) 31G X 5 MM, Inject 1 Units Subcutaneous 4 times daily (before meals and nightly), Disp: 100 each, Rfl: 1     leucovorin (WELLCOVORIN) 5 MG tablet, Take 1 tablet (5 mg) by mouth once a week 12 hours after taking Methotrexate dose., Disp: 12 tablet, Rfl: 3     melatonin 5 MG tablet, Take 5 mg by mouth nightly as needed for sleep, Disp: , Rfl:      methotrexate 2.5 MG tablet CHEMO, Take 6 tablets once weekly, Disp: 72 tablet, Rfl: 1     Pregabalin (LYRICA PO), Take 75 mg by mouth daily , Disp: , Rfl:      zolpidem (AMBIEN) 10 MG tablet, Take 10 mg by mouth nightly as needed., Disp: , Rfl:       Allergies:   Review of patient's allergies indicates no known allergies.      Past Medical History:  Rheumatoid arthritis involving both hands  "with positive rheumatoid factor   Type 2 or unspecified type diabetes mellitus without mention of complication, not stated as uncontrolled  Type 1 diabetes mellitus without complication   Encounter for long-term current use of medication  Muscle cramps     Past Surgical History:  Foot surgery: bunionectomy     Family History:   No family history on file.      Social History:   Smoking status: quit 1985  Smokeless tobacco: never used  Alcohol use: glass of wine daily      Physical Exam:   There were no vitals taken for this visit.     Wt Readings from Last 10 Encounters:   07/24/18 59.9 kg (132 lb)   05/07/18 58.1 kg (128 lb)   01/24/18 58.1 kg (128 lb)   01/23/18 58.4 kg (128 lb 12.8 oz)   08/03/17 59 kg (130 lb)   07/28/16 58.5 kg (129 lb)   06/11/15 57.9 kg (127 lb 9.6 oz)   09/03/14 59.1 kg (130 lb 6.4 oz)   07/23/13 59 kg (130 lb)   05/23/13 59 kg (130 lb)        ***   General: Pleasant, well nourished and hydrated female in NAD.   Psych:  Mood is \"good,\" affect is appropriate.  Thought form and content are fluid and coherent.    HEENT: Eyes and sclera are clear. Extraocular movements are grossly intact without proptosis.  Nares are patent, mucous membranes moist.  Neck: No masses or JVD are noted.    Resp: Easy and unlabored breathing.   Neuro: Alert and oriented, communicating clearly.   Ext: no swelling or edema    Data:  Lab Results   Component Value Date     12/10/2012    POTASSIUM 4.1 12/10/2012    CHLORIDE 98 12/10/2012    CO2 29 12/10/2012    ANIONGAP 12 12/10/2012     (H) 12/10/2012    BUN 19 12/10/2012    CR 0.68 05/17/2018    CELSO 8.8 01/23/2018     Lab Results   Component Value Date    GFRESTIMATED 87 05/17/2018    GFRESTIMATED 84 01/24/2018    GFRESTIMATED 72 08/03/2017    GFRESTBLACK >90 05/17/2018    GFRESTBLACK >90 01/24/2018    GFRESTBLACK 88 08/03/2017      Lab Results   Component Value Date    MICROL <5 01/23/2018    UMALCR Unable to calculate due to low value 01/23/2018        Lab " Results   Component Value Date    A1C 8.2 (H) 05/23/2013    A1C 9.9 (H) 08/28/2008    HEMOGLOBINA1 8.0 (A) 05/07/2018    HEMOGLOBINA1 7.3 (A) 01/23/2018    HEMOGLOBINA1 8.4 (H) 02/11/2009     Lab Results   Component Value Date    GADAB  12/10/2012     <5.0  Reference range: 0.0 to 5.0  Unit: IU/mL  (Note)  INTERPRETIVE INFORMATION:  Glutamic Acid Decarboxylase  Antibody    A value greater than 5.0 IU/mL is considered positive for  Glutamic Acid Decarboxylase Antibody.  Performed by [facility],  [address] [place] [address] [phone]  [email or web address], Judie Akins MD, Lab. Director    ISCAB <1:4 05/17/2018       Lab Results   Component Value Date    CHOL 167 05/17/2018    TRIG 37 05/17/2018    HDL 81 05/17/2018    LDL 78 05/17/2018    NHDL 86 05/17/2018       Assessment and Plan:  ***  There are no diagnoses linked to this encounter.         Follow-up: Data Unavailable     >50% of 30 minute visit spent in face to face counseling, education and coordination of care related to options for better glycemic control as well as preventing, detecting, and treating hypoglycemia.         Scribe Disclosure:   I, Cisco Rios, am serving as a scribe to document services personally performed by Marbella Min PA-C at this visit, based upon the provider's statements to me. All documentation has been reviewed by the aforementioned provider prior to being entered into the official medical record.     Portions of this medical record were completed by a scribe. UPON MY REVIEW AND AUTHENTICATION BY ELECTRONIC SIGNATURE, this confirms (a) I performed the applicable clinical services, and (b) the record is accurate.

## 2018-10-30 NOTE — LETTER
"10/30/2018       RE: Ruba Major  [address]  [facility] [address]     Dear Colleague,    Thank you for referring your patient, Ruba Major, to the [facility] ENDOCRINOLOGY at [facility]. Please see a copy of my visit note below.    Ms. Major is a 63 year-old with a history of RA here to follow-up for her type 1 diabetes. Her primary concern today is increasing difficulties of pressing the buttons on her Medtronic pump given her rheumatoid arthritis.      Patient requesting letter only to see if we can acquire a pump that her dexterity will allow her to better manage; met extensively with Iris Sheikh and we reviewed changes together.    She needed to leave clinic for another appointment.     /82  Pulse 64  Ht 1.676 m (5' 6\")  Wt 59.9 kg (132 lb)  BMI 21.31 kg/m2       Please see notes from NAIF Lutz and attached letter.      Blood pressure slightly elevated and needs to be reviewed at RTC.      It is my privilege to be involved in the care of the above patient.     Marbella Min PA-C, MPAS  [facility]  Diabetes, Endocrinology, and Metabolism  [phone] Appointments/Nurse  [phone] Fax  [phone] pager  [phone] nurse line            Again, thank you for allowing me to participate in the care of your patient.      Sincerely,    Marbella Min PA-C      "

## 2018-10-30 NOTE — MR AVS SNAPSHOT
After Visit Summary   10/30/2018    Ruba Major    MRN: [id number]           Patient Information     Date Of Birth          1955        Visit Information        Provider Department      10/30/2018 9:30 AM Marbella Min PA-C [facility] Endocrinology        Today's Diagnoses     Type 1 diabetes mellitus with other specified complication (H)    -  1    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 factor (H)           Follow-ups after your visit        Your next 10 appointments already scheduled     Nov 26, 2018 10:00 AM CST   (Arrive by 9:45 AM)   RETURN DIABETES with DONALD Gudino [facility] Endocrinology ([facility])    [address]  3rd Floor  [place] [address] [phone]            Jan 09, 2019  9:30 AM CST   (Arrive by 9:15 AM)   Return Visit with Gilberto Walton MD   [facility] Rheumatology ([facility])    [address]  [address]  [place] [address] [phone]              Who to contact     Please call your clinic at [phone] to:    Ask questions about your health    Make or cancel appointments    Discuss your medicines    Learn about your test results    Speak to your doctor            Additional Information About Your Visit        PageFreezerharAnsible Information     [employer] gives you secure access to your electronic health record. If you see a primary care provider, you can also send messages to your care team and make appointments. If you have questions, please call your primary care clinic.  If you do not have a primary care provider, please call [phone] and they will assist you.      [employer] is an electronic gateway that provides easy, online access to your medical records. With [employer], you can request a clinic appointment, read your test results, renew a prescription or communicate with your care team.     To access your existing account, please contact your [employer]  "[facility] or call [phone] for assistance.        Care EveryWhere ID     This is your Care EveryWhere ID. This could be used by other organizations to access your [facility] medical records  [id number]        Your Vitals Were     Pulse Height BMI (Body Mass Index)             64 1.676 m (5' 6\") 21.31 kg/m2          Blood Pressure from Last 3 Encounters:   10/30/18 149/82   07/25/18 117/72   07/24/18 136/77    Weight from Last 3 Encounters:   10/30/18 59.9 kg (132 lb)   07/24/18 59.9 kg (132 lb)   05/07/18 58.1 kg (128 lb)              We Performed the Following     Hemoglobin A1c POCT        Primary Care Provider Office Phone # Fax #    Eduarda MARSHALL Arnulfo [phone]       [facility] [address]  [facility] [address]        Equal Access to Services     SLIME CRAWFORD : Hadii aad ku hadasho Soomaali, waaxda luqadaha, qaybta kaalmada adeegyada, waxay idiin hayjaquann magy toro . So [facility] [phone].    ATENCIÓN: Si ken antonio, tiene a burgess disposición servicios gratuitos de asistencia lingüística. Natalieame al [phone].    We comply with applicable federal civil rights laws and [place] laws. We do not discriminate on the basis of race, color, national origin, age, disability, sex, sexual orientation, or gender identity.            Thank you!     Thank you for choosing [facility] ENDOCRINOLOGY  for your care. Our goal is always to provide you with excellent care. Hearing back from our patients is one way we can continue to improve our services. Please take a few minutes to complete the written survey that you may receive in the mail after your visit with us. Thank you!             Your Updated Medication List - Protect others around you: Learn how to safely use, store and throw away your medicines at www.disposemymeds.org.          This list is accurate as of 10/30/18 12:45 PM.  Always use your most recent med list.                   Brand Name Dispense " Instructions for use Diagnosis    AMBIEN 10 MG tablet   Generic drug:  zolpidem      Take 10 mg by mouth nightly as needed.    Rheumatoid arthritis(714.0), Encounter for long-term (current) use of other medications       BASAGLAR 100 UNIT/ML injection     15 mL    Inject 15 Units Subcutaneous daily In case of pump failure    Type 1 diabetes mellitus (H)       FARRAH CONTOUR test strip   Generic drug:  blood glucose monitoring     900 strip    Use to test blood sugar 9 times daily or as directed.    Type 1 diabetes mellitus (H)       celecoxib 200 MG capsule    celeBREX    120 capsule    Take 1 capsule (200 mg) by mouth daily May take twice daily during RA flares.    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 factor (H), Encounter for long-term current use of medication       DEXCOM G6 TRANSMITTER Misc     1 each    1 each every 3 months Change every 90 days    Type 1 diabetes mellitus without complication (H)       folic acid 1 MG tablet    FOLVITE    180 tablet    Take 2 tablets (2 mg) by mouth daily    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 factor (H), Encounter for long-term current use of medication       golimumab 50 MG/0.5ML auto-injector pen    SIMPONI    5 each    INJECT THE CONTENTS OF ONE PEN (50MG) UNDER THE SKIN ONCE EVERY 21 DAYS    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 factor (H)       INSULIN INFUSION PUMP       Rheumatoid arthritis(714.0), Encounter for long-term (current) use of other medications       * insulin lispro 100 UNIT/ML injection    HumaLOG PEN     Inject 1-8 Units Subcutaneous 4 times daily (before meals and nightly) As instructed (roughly 1 unit: 8 g carbohydrate) incase of pump failure.        * HumaLOG KWIKpen 100 UNIT/ML injection   Generic drug:  insulin lispro     30 mL    Inject as needed approx 20 units daily    Type 1 diabetes mellitus (H)       * insulin lispro 100 UNIT/ML injection    HumaLOG    60  mL    Use with insulin pump approx. 65 units daily    Type 1 diabetes mellitus with other specified complication (H)       insulin pen needle 31G X 5 MM    B-D U/F    100 each    Inject 1 Units Subcutaneous 4 times daily (before meals and nightly)    Type 1 diabetes mellitus without complication (H)       leucovorin 5 MG tablet    WELLCOVORIN    12 tablet    Take 1 tablet (5 mg) by mouth once a week 12 hours after taking Methotrexate dose.    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 factor (H), Encounter for long-term current use of medication       LYRICA PO      Take 75 mg by mouth daily        melatonin 5 MG tablet      Take 5 mg by mouth nightly as needed for sleep        methotrexate 2.5 MG tablet CHEMO     72 tablet    Take 6 tablets once weekly    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 factor (H), Encounter for long-term current use of medication       * Notice:  This list has 3 medication(s) that are the same as other medications prescribed for you. Read the directions carefully, and ask your doctor or other care provider to review them with you.

## 2018-10-31 NOTE — PROGRESS NOTES
Diabetes Self-Management Education & Support    Diabetes Self-Management Education & Support - Insulin Pump/CGM Review    SUBJECTIVE/OBJECTIVE     Cultural Influences/Ethnic Background:  American    Patient seen today for Insulin Pump CGM  Review:  She is currently using a Medtronic 530G insulin pump and using a Dexcom G6 continuous glucose monitor (CGM)    Reports:  Daily report from her Dexcom   Overall Sensor Glucose is 164 with 58% of time in target range  1% of the time she is below 70 and 41% of the time she is > 180.    She appears to remain in target range if she goes to bed with a BG in target range.  If she goes to bed with a high BG, she tends to stay high all night. Suspect that her dinner time ICR needs to be strengthened.        Insulin Pump Information   Average BG (mg/dl) 215 +/43  / 4 BG Readings /day  Average TDD insulin 26 units +/- 4  Basal to Bolus Ratio:  73 % to 27%   BASAL RATES and times:  12   AM (midnight): 0.375 units/hour    6     AM: 1.2 units/hour   10   AM: 0.950 units/hour   10   PM: 0.375 units/hour    CARB RATIO and times:  12   AM (midnight): 12  Corection Factor (Sensitivity) and times:  12   AM (midnight): 55 mg/dL  BLOOD GLUCOSE TARGET and times:  12   AM (midnight): 100 - 140  7     AM:  90 - 120  9    PM:  100 - 140  Active Insulin Time:  3 hours      Insulin Pump Review  Taking other diabetes medications?: No  Patient has glucagon emergency kit: Yes  Patient understands DKA prevention: Yes  Patient has ketone test strips: Yes  Patient has an insulin multiple daily injection back-up plan: Yes  Adjustment for exercise: Consumes carbohydrate around time of exercise  Patient would benefit from: Change in carbohydrate ratio(s), Bolusing before meals  Changes made to pump settings: Carb ratio  Changes made to sensor settings: None  Education specific to insulin pump provided today:  (Discussed importance of rotating sites and shown how to insert a longer, angled catheter)      Taking Medications  Diabetes Medication(s)     Insulin Sig    BASAGLAR 100 UNIT/ML injection Inject 15 Units Subcutaneous daily In case of pump failure     Patient not taking:  Reported on 7/25/2018    HUMALOG KWIKPEN 100 UNIT/ML soln Inject as needed approx 20 units daily    insulin lispro (HUMALOG PEN) 100 UNIT/ML injection Inject 1-8 Units Subcutaneous 4 times daily (before meals and nightly) As instructed (roughly 1 unit: 8 g carbohydrate) incase of pump failure.    insulin lispro (HUMALOG) 100 UNIT/ML injection Use with insulin pump approx. 65 units daily        Patient Activation Measure Survey Score:  No flowsheet data found.  ASSESSMENT    Ruba is doing better since our last visit.  We increased her basal rate at that time to 1.2 units/hour to compensate for a precipitous rise in her BG that occurs upon waking.  This has improved.  We had simplified her insulin to carb ratio to one ICR of 12 for the entire day.  It looks like she needs more insulin at both breakfast and dinner, so we changed her ICR from 07:00am to 10:00am from 12 to 10 and did the same for her dinner time bolus.      She is only entering 48 grams of carbohydrate into her pump per day and her insulin regimen appears to be over-basalized, but it seems to be working for her and A1C was measured today during her appointment with Marbella Min and had dropped form 8% to 7%.      She received a replacement pump and brought it in to make sure that she had put the settings in correctly.  She has rheumatoid arthritis and finds that the buttons on her pump are very difficult for her her to push and she is wondering if there is any way to get a touch screen pump before her current pump is out of warranty.  Explained that usually this requires a letter from her health care provider that outlines the need for this.  She would like to proceed with this option.      She is also having a lot of problems with her infusion sets.  She has been using her  abdomen pretty much exclusively since she started wearing a pump, and on exam, it appears that she has some scarring in the areas where she inserts it.  She has also had numerous times when her site bleeds after she discontinues it, as well as having significant discomfort with insertion.  Currently using a Medtronic Zeb 6mm set.  Discussed alternate sites, and had her try placing it in her upper buttock, which she was able to successfully do, but complained of pain with initial insertion, but she states that his resolved by the time she left the appointment.  I showed her how to use the Silohuette infusion set, which has been a good alternative for people who have proportionally less body fat.  Encouraged her to stay away from her abdomen for a while and try using her buttocks, hips, flank area or legs for a while.  Also suggested placing her Dexcom in her arm rather than her abdomen but she was not interested in doing this.       Opportunities for ongoing education and support in diabetes-self management were discussed.    Pt verbalized understanding of concepts discussed and recommendations provided today.      PLAN  We will go ahead and start the process to see if she can qualify for a Tandem X2 insulin pump.  This would have the advantage also of low glucose suspend and enables transmission between her Dexcom G6 and her insulin pump.    Time Spent: 30 minutes  Encounter Type: Individual    Any diabetes medication dose changes were made via the CDE Protocol and Collaborative Practice Agreement with the patient's referring provider. A copy of this encounter was shared with the provider.

## 2018-11-08 ENCOUNTER — MEDICAL CORRESPONDENCE (OUTPATIENT)
Dept: HEALTH INFORMATION MANAGEMENT | Facility: CLINIC | Age: 63
End: 2018-11-08

## 2018-11-12 ENCOUNTER — DOCUMENTATION ONLY (OUTPATIENT)
Dept: ENDOCRINOLOGY | Facility: CLINIC | Age: 63
End: 2018-11-12

## 2018-11-12 NOTE — PROGRESS NOTES
[facility] CLINICAL DOCUMENTATION    Form Documentation Form or Letter Request    Type or form/letter needing completion: Tandem CMN  Provider: Pako   Has provider seen patient for office visit related to reason for form request? Yes  Date form needed: ASAP  Once completed: Fax form to: [facility] [employer] [phone]

## 2018-11-26 ENCOUNTER — OFFICE VISIT (OUTPATIENT)
Dept: ENDOCRINOLOGY | Facility: CLINIC | Age: 63
End: 2018-11-26
Payer: COMMERCIAL

## 2018-11-26 VITALS
SYSTOLIC BLOOD PRESSURE: 135 MMHG | HEIGHT: 66 IN | DIASTOLIC BLOOD PRESSURE: 75 MMHG | WEIGHT: 128 LBS | BODY MASS INDEX: 20.57 KG/M2 | HEART RATE: 71 BPM

## 2018-11-26 DIAGNOSIS — E10.9 WELL CONTROLLED TYPE 1 DIABETES MELLITUS (H): Primary | ICD-10-CM

## 2018-11-26 NOTE — PATIENT INSTRUCTIONS
Change basal rates as follows:   Mid- 0.375 (no change)  7am- 1.45 (up from 1.4)  10am- 0.975 (no change)  10pm- 0.375 (no change)    SEnsitivity-   Mid- 55 (no change)  7am- 45 (was 55)  10am- 55 (no change)    Let me know if you start having AM lows.

## 2018-11-26 NOTE — LETTER
11/26/2018       RE: Ruba Major  [address]  [place] [address]     Dear Colleague,    Thank you for referring your patient, Ruba Major, to the [facility] ENDOCRINOLOGY at [facility]. Please see a copy of my visit note below.    HPI:   Ruba is a 64 yo woman here for follow up of type 1 diabetes, diagnosed in 2008.  She also sees Dr. Bills.  Today her concern is mainly the great spike in her glucose whenever she wakes up in the morning.  She reports that she feels quite sick when her sugars climb and not at all hungry.  On days when she sleeps in later, she does not spike. She has tried to address this by increasing her basal rates in the morning, but it continues.  She also takes multiple corrections, often taking more than recommended.  Once her sugars come down, the rest of the day settles down.  She occasionally has low sugars in the afternoon, but mainly with activity (golfing).  She does a temp basal while golfing.  She lives between [facility] and [place] and spends a lot of time with her grandchildren here in the [facility].      Her pump broke and she needed a new one and now has a Medtronic 530G. With her RA, it is very difficult to push the buttons and she finds this very frustrating.  She now has a new Tandem pump, which she is planning to start this week.  She will be seeing Iris Sheikh and the [employer] rep to start this up.      Ruba wears a Medtronic pump with basal rates as follows:     Mid- 0.375  7am- 1.4  10am- 0.975  10pm- 0.375    IC ratio-  Mid- 1:12g  7am- 1:10g  10am- 1:12g  5:30pm- 1:10g  8:30pm- 1:12g  Sensitivity- 55  Target glucose- 100-140 overnight, 7am- 9pm-     Average total daily insulin dose-29 Units (66 %basal, 34 %bolus)    Her sensor glucose over the past month is an average of 158 mg/dL.  She is testing 5 times a day with an overall average of 214 mg/dl.  A1c was 7.0% on 10/30/18.  Sensor confirms dramatic spike in  the morning, then settles down later in the day.  She does drink coffee with creamer and boluses for 15 g.  Despite this, sugars still climb.  She has no other concerns today.     Past Medical History:   Diagnosis Date     Rheumatoid arthritis(714.0)      Type II or unspecified type diabetes mellitus without mention of complication, not stated as uncontrolled        Past Surgical History:   Procedure Laterality Date     FOOT SURGERY  1998, 10/2010    Bunionectomy       No family history on file.    Social History     Social History     Marital status:      Spouse name: N/A     Number of children: N/A     Years of education: N/A     Social History Main Topics     Smoking status: Former Smoker     Quit date: 7/25/1985     Smokeless tobacco: Never Used     Alcohol use Yes      Comment: glass of wine daily     Drug use: No     Sexual activity: Not Asked     Other Topics Concern     None     Social History Narrative   Social Hx: .  Lives part time in [place], part time in [place].  She has grandchildren in the [facility] so comes often.  Formerly played a lot of tennis, but she has knee problems.  Now golfs 4 days a week and does a lot of weights/exercises at a gym.     Current Outpatient Prescriptions   Medication     FARRAH CONTOUR test strip     celecoxib (CELEBREX) 200 MG capsule     Continuous Blood Gluc Transmit (DEXCOM G6 TRANSMITTER) MISC     folic acid (FOLVITE) 1 MG tablet     golimumab (SIMPONI) 50 MG/0.5ML auto-injector pen     HUMALOG KWIKPEN 100 UNIT/ML soln     INSULIN INFUSION PUMP     insulin lispro (HUMALOG PEN) 100 UNIT/ML injection     insulin lispro (HUMALOG) 100 UNIT/ML injection     insulin pen needle (B-D U/F) 31G X 5 MM     leucovorin (WELLCOVORIN) 5 MG tablet     melatonin 5 MG tablet     methotrexate 2.5 MG tablet CHEMO     Pregabalin (LYRICA PO)     zolpidem (AMBIEN) 10 MG tablet     BASAGLAR 100 UNIT/ML injection     No current facility-administered medications for this visit.   "       No Known Allergies    Physical Exam  /75  Pulse 71  Ht 1.676 m (5' 6\")  Wt 58.1 kg (128 lb)  BMI 20.66 kg/m2  GENERAL:  Alert and oriented X3, NAD, well dressed, answering questions appropriately, appears stated age.  EXTREMITIES: no edema, +pulses, no rashes, no lesions    RESULTS  Lab Results   Component Value Date    A1C 8.2 (H) 05/23/2013    A1C 9.9 (H) 08/28/2008    HEMOGLOBINA1 7.0 (A) 10/30/2018    HEMOGLOBINA1 8.0 (A) 05/07/2018    HEMOGLOBINA1 7.3 (A) 01/23/2018    HEMOGLOBINA1 8.4 (H) 02/11/2009       TSH   Date Value Ref Range Status   06/11/2015 0.91 0.40 - 4.00 mU/L Final   12/10/2012 1.58 0.4 - 5.0 mU/L Final   09/12/2008 1.57 0.4 - 5.0 mU/L Final     T4 Free   Date Value Ref Range Status   09/12/2008 1.18 0.70 - 1.85 ng/dL Final       ALT   Date Value Ref Range Status   05/17/2018 30 0 - 50 U/L Final   01/24/2018 32 0 - 50 U/L Final   ]    Recent Labs   Lab Test  05/17/18   0813   CHOL  167   HDL  81   LDL  78   TRIG  37       Lab Results   Component Value Date     12/10/2012      Lab Results   Component Value Date    POTASSIUM 4.1 12/10/2012     Lab Results   Component Value Date    CHLORIDE 98 12/10/2012     Lab Results   Component Value Date    CELSO 8.8 01/23/2018     Lab Results   Component Value Date    CO2 29 12/10/2012     Lab Results   Component Value Date    BUN 19 12/10/2012     Lab Results   Component Value Date    CR 0.68 05/17/2018       GFR Estimate   Date Value Ref Range Status   05/17/2018 87 >60 mL/min/1.7m2 Final     Comment:     Non  GFR Calc   01/24/2018 84 >60 mL/min/1.7m2 Final     Comment:     Non  GFR Calc   08/03/2017 72 >60 mL/min/1.7m2 Final     Comment:     Non  GFR Calc     GFR Estimate If Black   Date Value Ref Range Status   05/17/2018 >90 >60 mL/min/1.7m2 Final     Comment:      GFR Calc   01/24/2018 >90 >60 mL/min/1.7m2 Final     Comment:      GFR Calc   08/03/2017 88 " >60 mL/min/1.7m2 Final     Comment:      GFR Calc       Lab Results   Component Value Date    MICROL <5 01/23/2018     No results found for: MICROALBUMIN  Lab Results   Component Value Date    GADAB  12/10/2012     <5.0  Reference range: 0.0 to 5.0  Unit: IU/mL  (Note)  INTERPRETIVE INFORMATION:  Glutamic Acid Decarboxylase  Antibody    A value greater than 5.0 IU/mL is considered positive for  Glutamic Acid Decarboxylase Antibody.  Performed by [facility],  [address] [place] [address],[place] [address] [phone]  [email or web address], Judie Akins MD, Lab. Director    ISCAB <1:4 05/17/2018       No results found for: B12]    Most recent eye exam date: : Not Found       Assessment/Plan:     1.  Type 1 diabetes-AM hyperglycemia is very bothersome to Ruba, but she is doing quite well the rest of the day.  Will cautiously increase her basal rate at 7am to 1.45 (was 1.4).  Also, will tighten sensitivity to 45 from 7am-10am and continue at 55 for the rest of the day.  Warned her that if her schedule changes and she sleeps in, she could be at risk for severe hypoglycemia with the higher basal rate.  She does hear the alarms on her sensor and does recognize hypoglycemia.  She will meet with Iris Sheikh this week to start her new Tandem pump with low glucose suspend.  She looks forward to this. She will let me know if she starts to have more hypoglycemia.     2.  Risk factors-     Retinopathy:  No.  Had eye exam within last year.   Nephropathy:  BP well controlled. No microalbuminuria.  Creatinine stable.   Neuropathy: No.    Feet: OK, no ulcers.   Taking ASA: no  Lipids:  LDL at target.     3.  F/U in 3 months with Dr. Bills.  I am also happy to see her as needed in the future.     I spent 25 minutes with this patient face to face and explained the conditions and plans (more than 50% of time was counseling/coordination of care, diabetes management, follow up plan for worsening hyper and hypoglycemia) .  The patient understood and is satisfied with today's visit.        This 63-year-old woman was seen in follow-up for her type 1 diabetes with medical student Patti Garnica.  Please see student's note of the same date for full details.    In brief, she has found it challenging to get the new Dex com device but hopes that will arrive in a couple of weeks.  She continues to use her pump and generally likes it.  She has not been able to exercise by playing tennis because of knee pain.  She is scheduled to have arthroscopic evaluation in a couple of weeks.  We reviewed the available CGM data as outlined by Ms. Sullivanri.  Ruba is usually in target in the morning but seems to be above target between noon and 6 PM.  She is recognizing her lows.  She thinks her sugars may be up a bit because the immunotherapy she takes for her rheumatoid arthritis has been delayed in dosing.      Current Outpatient Prescriptions on File Prior to Visit:  FARRAH CONTOUR test strip Use to test blood sugar 9 times daily or as directed.   celecoxib (CELEBREX) 200 MG capsule Take 1 capsule (200 mg) by mouth daily May take twice daily during RA flares.   Continuous Blood Gluc Transmit (DEXCOM G6 TRANSMITTER) MISC 1 each every 3 months Change every 90 days   folic acid (FOLVITE) 1 MG tablet Take 2 tablets (2 mg) by mouth daily   golimumab (SIMPONI) 50 MG/0.5ML auto-injector pen INJECT THE CONTENTS OF ONE PEN (50MG) UNDER THE SKIN ONCE EVERY 21 DAYS   HUMALOG KWIKPEN 100 UNIT/ML soln Inject as needed approx 20 units daily   INSULIN INFUSION PUMP    insulin lispro (HUMALOG PEN) 100 UNIT/ML injection Inject 1-8 Units Subcutaneous 4 times daily (before meals and nightly) As instructed (roughly 1 unit: 8 g carbohydrate) incase of pump failure.   insulin lispro (HUMALOG) 100 UNIT/ML injection Use with insulin pump approx. 65 units daily   insulin pen needle (B-D U/F) 31G X 5 MM Inject 1 Units Subcutaneous 4 times daily (before meals and nightly)  "  leucovorin (WELLCOVORIN) 5 MG tablet Take 1 tablet (5 mg) by mouth once a week 12 hours after taking Methotrexate dose.   melatonin 5 MG tablet Take 5 mg by mouth nightly as needed for sleep   methotrexate 2.5 MG tablet CHEMO Take 6 tablets once weekly   Pregabalin (LYRICA PO) Take 75 mg by mouth daily    zolpidem (AMBIEN) 10 MG tablet Take 10 mg by mouth nightly as needed.   BASAGLAR 100 UNIT/ML injection Inject 15 Units Subcutaneous daily In case of pump failure (Patient not taking: Reported on 7/25/2018)     No current facility-administered medications on file prior to visit.     ROS: 10 point ROS neg other than the symptoms noted above in the HPI.    So Hx -  with adult children.she has been traveling a lot this summer.  Vital signs:   /75  Pulse 71  Ht 1.676 m (5' 6\")  Wt 58.1 kg (128 lb)  BMI 20.66 kg/m2  Estimated body mass index is 20.66 kg/(m^2) as calculated from the following:    Height as of this encounter: 1.676 m (5' 6\").    Weight as of this encounter: 58.1 kg (128 lb).  NAD  Eyes - no periorbital edema, conjunctival injection, scleral icterus  CV - .  No edema  Skin - normal texture   Feet - no ulcers   Mood - not depressed    Recent Labs   Lab Test 10/30/18  05/17/18   0813 05/07/18 01/24/18   1011  01/23/18   1331   06/11/15   1326   05/23/13   1601   12/10/12   1051   A1C   --    --    --    --    --    --    --    --   8.2*   --    --    HEMOGLOBINA1  7.0*   --   8.0*   --    --    < >   --    --    --    --    --    TSH   --    --    --    --    --    --   0.91   --    --    --   1.58   LDL   --   78   --    --    --    --    --    --    --    --    --    HDL   --   81   --    --    --    --    --    --    --    --    --    TRIG   --   37   --    --    --    --    --    --    --    --    --    CR   --   0.68   --   0.70   --    < >  0.64   < >  0.62   < >  0.67   MICROL   --    --    --    --   <5   --    --    --    --    --    --     < > = values in this interval not " displayed.       Assessment and plan:    1.  Diabetes control.  Her A1c continues to be above target, but not by much.  I suggested she increase her basal rate during the day, as noted by the medical student.  I urged her to follow-up with the nurse educators for training on the Dex com if she cannot figure it out by herself.    2.  Diabetes complications.  Up to date with screens and has none.      I spent 25 minutes with this patient face to face and explained the conditions and plans (more than 50% of time was counseling/coordination of diabetes care, discussing management before and after upcoming arthroscopic surgery) . The patient understood and is satisfied with today's visit.   F/u with Delicia Martel in 3 mo and me in 6 months      Veronica Martel PA-C

## 2018-11-26 NOTE — MR AVS SNAPSHOT
After Visit Summary   11/26/2018    Ruba Major    MRN: [id number]           Patient Information     Date Of Birth          1955        Visit Information        Provider Department      11/26/2018 10:00 AM Delicia Martel PA-C [facility] Endocrinology        Today's Diagnoses     Well controlled type 1 diabetes mellitus (H)    -  1      Care Instructions    Change basal rates as follows:   Mid- 0.375 (no change)  7am- 1.45 (up from 1.4)  10am- 0.975 (no change)  10pm- 0.375 (no change)    SEnsitivity-   Mid- 55 (no change)  7am- 45 (was 55)  10am- 55 (no change)    Let me know if you start having AM lows.               Follow-ups after your visit        Your next 10 appointments already scheduled     Nov 28, 2018 12:30 PM CST   (Arrive by 12:15 PM)   Diabetes Education with Iris Sheikh RN   [facility] Diabetes ([facility])    [address] [place] [address] [phone]           Please bring to your appointment: 1) 2 - 3 day food journal. Write down everything you eat and drink for 2 - 3 days prior to your appointment. 2) Your insurance card. 3) A blood glucose meter. If you do not have one, contact your pharmacy or clinic prior to your appointment. Your pharmacist can assist you with choosing a meter suitable to your insurance needs. 4)  A list of your medications, including prescription, over the counter and herbal. Include dosage and frequency where appropriate.            Dec 10, 2018  8:00 AM CST   (Arrive by 7:45 AM)   Return Visit with Gilberto Walton MD   [facility] Rheumatology ([facility])    [address] [place] [address]  [address]  [facility] [place] [address] [phone]            May 14, 2019 10:00 AM CDT   (Arrive by 9:45 AM)   RETURN DIABETES with Veronica Bills MD   [facility] Endocrinology ([facility])    [address] [place] [address]  "  [phone]              Who to contact     Please call your clinic at [phone] to:    Ask questions about your health    Make or cancel appointments    Discuss your medicines    Learn about your test results    Speak to your doctor            Additional Information About Your Visit        [employer] Information     [employer] gives you secure access to your electronic health record. If you see a primary care provider, you can also send messages to your care team and make appointments. If you have questions, please call your primary care clinic.  If you do not have a primary care provider, please call [phone] and they will assist you.      [employer] is an electronic gateway that provides easy, online access to your medical records. With [employer], you can request a clinic appointment, read your test results, renew a prescription or communicate with your care team.     To access your existing account, please contact your [facility] or call [phone] for assistance.        Care EveryWhere ID     This is your Care EveryWhere ID. This could be used by other organizations to access your [facility] medical records  [id number]        Your Vitals Were     Pulse Height BMI (Body Mass Index)             71 1.676 m (5' 6\") 20.66 kg/m2          Blood Pressure from Last 3 Encounters:   11/26/18 135/75   10/30/18 149/82   07/25/18 117/72    Weight from Last 3 Encounters:   11/26/18 58.1 kg (128 lb)   10/30/18 59.9 kg (132 lb)   07/24/18 59.9 kg (132 lb)              Today, you had the following     No orders found for display       Primary Care Provider Office Phone # Fax #    Eduarda Arredondo [phone]       [facility] [address]  [facility] [address]        Equal Access to Services     SLIME CRAWFORD : Gaviota Cueto, valerie baeza, beni smith. So [facility] [phone].    ATENCIÓN: Si habla " español, tiene a burgess disposición servicios gratuitos de asistencia lingüística. Frank ford [phone].    We comply with applicable federal civil rights laws and [place] laws. We do not discriminate on the basis of race, color, national origin, age, disability, sex, sexual orientation, or gender identity.            Thank you!     Thank you for choosing [facility]  for your care. Our goal is always to provide you with excellent care. Hearing back from our patients is one way we can continue to improve our services. Please take a few minutes to complete the written survey that you may receive in the mail after your visit with us. Thank you!             Your Updated Medication List - Protect others around you: Learn how to safely use, store and throw away your medicines at www.disposemymeds.org.          This list is accurate as of 11/26/18 12:44 PM.  Always use your most recent med list.                   Brand Name Dispense Instructions for use Diagnosis    AMBIEN 10 MG tablet   Generic drug:  zolpidem      Take 10 mg by mouth nightly as needed.    Rheumatoid arthritis(714.0), Encounter for long-term (current) use of other medications       FARRAH CONTOUR test strip   Generic drug:  blood glucose monitoring     900 strip    Use to test blood sugar 9 times daily or as directed.    Type 1 diabetes mellitus (H)       celecoxib 200 MG capsule    celeBREX    120 capsule    Take 1 capsule (200 mg) by mouth daily May take twice daily during RA flares.    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 factor (H), Encounter for long-term current use of medication       DEXCOM G6 TRANSMITTER Misc     1 each    1 each every 3 months Change every 90 days    Type 1 diabetes mellitus without complication (H)       folic acid 1 MG tablet    FOLVITE    180 tablet    Take 2 tablets (2 mg) by mouth daily    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 factor (H), Encounter for  long-term current use of medication       golimumab 50 MG/0.5ML auto-injector pen    SIMPONI    5 each    INJECT THE CONTENTS OF ONE PEN (50MG) UNDER THE SKIN ONCE EVERY 21 DAYS    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 factor (H)       insulin glargine 100 UNIT/ML pen     15 mL    Inject 15 Units Subcutaneous daily In case of pump failure    Type 1 diabetes mellitus (H)       INSULIN INFUSION PUMP       Rheumatoid arthritis(714.0), Encounter for long-term (current) use of other medications       * insulin lispro 100 UNIT/ML pen    HumaLOG PEN     Inject 1-8 Units Subcutaneous 4 times daily (before meals and nightly) As instructed (roughly 1 unit: 8 g carbohydrate) incase of pump failure.        * HumaLOG KWIKpen 100 UNIT/ML pen   Generic drug:  insulin lispro     30 mL    Inject as needed approx 20 units daily    Type 1 diabetes mellitus (H)       * insulin lispro 100 UNIT/ML injection    HumaLOG    60 mL    Use with insulin pump approx. 65 units daily    Type 1 diabetes mellitus with other specified complication (H)       insulin pen needle 31G X 5 MM miscellaneous    B-D U/F    100 each    Inject 1 Units Subcutaneous 4 times daily (before meals and nightly)    Type 1 diabetes mellitus without complication (H)       leucovorin 5 MG tablet    WELLCOVORIN    12 tablet    Take 1 tablet (5 mg) by mouth once a week 12 hours after taking Methotrexate dose.    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 factor (H), Encounter for long-term current use of medication       LYRICA PO      Take 75 mg by mouth daily        melatonin 5 MG tablet      Take 5 mg by mouth nightly as needed for sleep        methotrexate 2.5 MG tablet CHEMO     72 tablet    Take 6 tablets once weekly    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 factor (H), Encounter for long-term current use of medication       * Notice:  This list has 3 medication(s) that are the same as  other medications prescribed for you. Read the directions carefully, and ask your doctor or other care provider to review them with you.

## 2018-11-26 NOTE — PROGRESS NOTES
HPI:   Ruba is a 62 yo woman here for follow up of type 1 diabetes, diagnosed in 2008.  She also sees Dr. Bills.  Today her concern is mainly the great spike in her glucose whenever she wakes up in the morning.  She reports that she feels quite sick when her sugars climb and not at all hungry.  On days when she sleeps in later, she does not spike. She has tried to address this by increasing her basal rates in the morning, but it continues.  She also takes multiple corrections, often taking more than recommended.  Once her sugars come down, the rest of the day settles down.  She occasionally has low sugars in the afternoon, but mainly with activity (golfing).  She does a temp basal while golfing.  She lives between [facility] and [place] and spends a lot of time with her grandchildren here in the [facility].      Her pump broke and she needed a new one and now has a Medtronic 530G. With her RA, it is very difficult to push the buttons and she finds this very frustrating.  She now has a new Tandem pump, which she is planning to start this week.  She will be seeing Iris Sheikh and the Tandem rep to start this up.      Ruba wears a Medtronic pump with basal rates as follows:     Mid- 0.375  7am- 1.4  10am- 0.975  10pm- 0.375    IC ratio-  Mid- 1:12g  7am- 1:10g  10am- 1:12g  5:30pm- 1:10g  8:30pm- 1:12g  Sensitivity- 55  Target glucose- 100-140 overnight, 7am- 9pm-     Average total daily insulin dose-29 Units (66 %basal, 34 %bolus)    Her sensor glucose over the past month is an average of 158 mg/dL.  She is testing 5 times a day with an overall average of 214 mg/dl.  A1c was 7.0% on 10/30/18.  Sensor confirms dramatic spike in the morning, then settles down later in the day.  She does drink coffee with creamer and boluses for 15 g.  Despite this, sugars still climb.  She has no other concerns today.     Past Medical History:   Diagnosis Date     Rheumatoid arthritis(714.0)      Type II or unspecified type  "diabetes mellitus without mention of complication, not stated as uncontrolled        Past Surgical History:   Procedure Laterality Date     FOOT SURGERY  1998, 10/2010    Bunionectomy       No family history on file.    Social History     Social History     Marital status:      Spouse name: N/A     Number of children: N/A     Years of education: N/A     Social History Main Topics     Smoking status: Former Smoker     Quit date: 7/25/1985     Smokeless tobacco: Never Used     Alcohol use Yes      Comment: glass of wine daily     Drug use: No     Sexual activity: Not Asked     Other Topics Concern     None     Social History Narrative   Social Hx: .  Lives part time in [place], part time in [place].  She has grandchildren in the [facility] so comes often.  Formerly played a lot of tennis, but she has knee problems.  Now golfs 4 days a week and does a lot of weights/exercises at a gym.     Current Outpatient Prescriptions   Medication     FARRAH CONTOUR test strip     celecoxib (CELEBREX) 200 MG capsule     Continuous Blood Gluc Transmit (DEXCOM G6 TRANSMITTER) MISC     folic acid (FOLVITE) 1 MG tablet     golimumab (SIMPONI) 50 MG/0.5ML auto-injector pen     HUMALOG KWIKPEN 100 UNIT/ML soln     INSULIN INFUSION PUMP     insulin lispro (HUMALOG PEN) 100 UNIT/ML injection     insulin lispro (HUMALOG) 100 UNIT/ML injection     insulin pen needle (B-D U/F) 31G X 5 MM     leucovorin (WELLCOVORIN) 5 MG tablet     melatonin 5 MG tablet     methotrexate 2.5 MG tablet CHEMO     Pregabalin (LYRICA PO)     zolpidem (AMBIEN) 10 MG tablet     BASAGLAR 100 UNIT/ML injection     No current facility-administered medications for this visit.         No Known Allergies    Physical Exam  /75  Pulse 71  Ht 1.676 m (5' 6\")  Wt 58.1 kg (128 lb)  BMI 20.66 kg/m2  GENERAL:  Alert and oriented X3, NAD, well dressed, answering questions appropriately, appears stated age.  EXTREMITIES: no edema, +pulses, no rashes, no " lesions    RESULTS  Lab Results   Component Value Date    A1C 8.2 (H) 05/23/2013    A1C 9.9 (H) 08/28/2008    HEMOGLOBINA1 7.0 (A) 10/30/2018    HEMOGLOBINA1 8.0 (A) 05/07/2018    HEMOGLOBINA1 7.3 (A) 01/23/2018    HEMOGLOBINA1 8.4 (H) 02/11/2009       TSH   Date Value Ref Range Status   06/11/2015 0.91 0.40 - 4.00 mU/L Final   12/10/2012 1.58 0.4 - 5.0 mU/L Final   09/12/2008 1.57 0.4 - 5.0 mU/L Final     T4 Free   Date Value Ref Range Status   09/12/2008 1.18 0.70 - 1.85 ng/dL Final       ALT   Date Value Ref Range Status   05/17/2018 30 0 - 50 U/L Final   01/24/2018 32 0 - 50 U/L Final   ]    Recent Labs   Lab Test  05/17/18   0813   CHOL  167   HDL  81   LDL  78   TRIG  37       Lab Results   Component Value Date     12/10/2012      Lab Results   Component Value Date    POTASSIUM 4.1 12/10/2012     Lab Results   Component Value Date    CHLORIDE 98 12/10/2012     Lab Results   Component Value Date    CELSO 8.8 01/23/2018     Lab Results   Component Value Date    CO2 29 12/10/2012     Lab Results   Component Value Date    BUN 19 12/10/2012     Lab Results   Component Value Date    CR 0.68 05/17/2018       GFR Estimate   Date Value Ref Range Status   05/17/2018 87 >60 mL/min/1.7m2 Final     Comment:     Non  GFR Calc   01/24/2018 84 >60 mL/min/1.7m2 Final     Comment:     Non  GFR Calc   08/03/2017 72 >60 mL/min/1.7m2 Final     Comment:     Non  GFR Calc     GFR Estimate If Black   Date Value Ref Range Status   05/17/2018 >90 >60 mL/min/1.7m2 Final     Comment:      GFR Calc   01/24/2018 >90 >60 mL/min/1.7m2 Final     Comment:      GFR Calc   08/03/2017 88 >60 mL/min/1.7m2 Final     Comment:      GFR Calc       Lab Results   Component Value Date    MICROL <5 01/23/2018     No results found for: MICROALBUMIN  Lab Results   Component Value Date    GADAB  12/10/2012     <5.0  Reference range: 0.0 to 5.0  Unit:  IU/mL  (Note)  INTERPRETIVE INFORMATION:  Glutamic Acid Decarboxylase  Antibody    A value greater than 5.0 IU/mL is considered positive for  Glutamic Acid Decarboxylase Antibody.  Performed by [facility],  [address] [place] [address] [phone]  [email or web address], Judie Akins MD, Lab. Director    ISCAB <1:4 05/17/2018       No results found for: B12]    Most recent eye exam date: : Not Found       Assessment/Plan:     1.  Type 1 diabetes-AM hyperglycemia is very bothersome to Ruba, but she is doing quite well the rest of the day.  Will cautiously increase her basal rate at 7am to 1.45 (was 1.4).  Also, will tighten sensitivity to 45 from 7am-10am and continue at 55 for the rest of the day.  Warned her that if her schedule changes and she sleeps in, she could be at risk for severe hypoglycemia with the higher basal rate.  She does hear the alarms on her sensor and does recognize hypoglycemia.  She will meet with Iris Sheikh this week to start her new Tandem pump with low glucose suspend.  She looks forward to this. She will let me know if she starts to have more hypoglycemia.     2.  Risk factors-     Retinopathy:  No.  Had eye exam within last year.   Nephropathy:  BP well controlled. No microalbuminuria.  Creatinine stable.   Neuropathy: No.    Feet: OK, no ulcers.   Taking ASA: no  Lipids:  LDL at target.     3.  F/U in 3 months with Dr. Bills.  I am also happy to see her as needed in the future.     I spent 25 minutes with this patient face to face and explained the conditions and plans (more than 50% of time was counseling/coordination of care, diabetes management, follow up plan for worsening hyper and hypoglycemia) . The patient understood and is satisfied with today's visit.      Delicia Martel PA-C, MPAS   [facility]  Department of Medicine  Division of Endocrinology and Diabetes          This 63-year-old woman was seen in follow-up for her type 1 diabetes with medical  student Patti Garnica.  Please see student's note of the same date for full details.    In brief, she has found it challenging to get the new Dex com device but hopes that will arrive in a couple of weeks.  She continues to use her pump and generally likes it.  She has not been able to exercise by playing tennis because of knee pain.  She is scheduled to have arthroscopic evaluation in a couple of weeks.  We reviewed the available CGM data as outlined by Ms. Nahun.  Ruba is usually in target in the morning but seems to be above target between noon and 6 PM.  She is recognizing her lows.  She thinks her sugars may be up a bit because the immunotherapy she takes for her rheumatoid arthritis has been delayed in dosing.      Current Outpatient Prescriptions on File Prior to Visit:  FARRAH CONTOUR test strip Use to test blood sugar 9 times daily or as directed.   celecoxib (CELEBREX) 200 MG capsule Take 1 capsule (200 mg) by mouth daily May take twice daily during RA flares.   Continuous Blood Gluc Transmit (DEXCOM G6 TRANSMITTER) MISC 1 each every 3 months Change every 90 days   folic acid (FOLVITE) 1 MG tablet Take 2 tablets (2 mg) by mouth daily   golimumab (SIMPONI) 50 MG/0.5ML auto-injector pen INJECT THE CONTENTS OF ONE PEN (50MG) UNDER THE SKIN ONCE EVERY 21 DAYS   HUMALOG KWIKPEN 100 UNIT/ML soln Inject as needed approx 20 units daily   INSULIN INFUSION PUMP    insulin lispro (HUMALOG PEN) 100 UNIT/ML injection Inject 1-8 Units Subcutaneous 4 times daily (before meals and nightly) As instructed (roughly 1 unit: 8 g carbohydrate) incase of pump failure.   insulin lispro (HUMALOG) 100 UNIT/ML injection Use with insulin pump approx. 65 units daily   insulin pen needle (B-D U/F) 31G X 5 MM Inject 1 Units Subcutaneous 4 times daily (before meals and nightly)   leucovorin (WELLCOVORIN) 5 MG tablet Take 1 tablet (5 mg) by mouth once a week 12 hours after taking Methotrexate dose.   melatonin 5 MG tablet Take 5 mg by  "mouth nightly as needed for sleep   methotrexate 2.5 MG tablet CHEMO Take 6 tablets once weekly   Pregabalin (LYRICA PO) Take 75 mg by mouth daily    zolpidem (AMBIEN) 10 MG tablet Take 10 mg by mouth nightly as needed.   BASAGLAR 100 UNIT/ML injection Inject 15 Units Subcutaneous daily In case of pump failure (Patient not taking: Reported on 7/25/2018)     No current facility-administered medications on file prior to visit.     ROS: 10 point ROS neg other than the symptoms noted above in the HPI.    So Hx -  with adult children.she has been traveling a lot this summer.  Vital signs:   /75  Pulse 71  Ht 1.676 m (5' 6\")  Wt 58.1 kg (128 lb)  BMI 20.66 kg/m2  Estimated body mass index is 20.66 kg/(m^2) as calculated from the following:    Height as of this encounter: 1.676 m (5' 6\").    Weight as of this encounter: 58.1 kg (128 lb).  NAD  Eyes - no periorbital edema, conjunctival injection, scleral icterus  CV - .  No edema  Skin - normal texture   Feet - no ulcers   Mood - not depressed    Recent Labs   Lab Test 10/30/18  05/17/18   0813 05/07/18 01/24/18   1011  01/23/18   1331   06/11/15   1326   05/23/13   1601   12/10/12   1051   A1C   --    --    --    --    --    --    --    --   8.2*   --    --    HEMOGLOBINA1  7.0*   --   8.0*   --    --    < >   --    --    --    --    --    TSH   --    --    --    --    --    --   0.91   --    --    --   1.58   LDL   --   78   --    --    --    --    --    --    --    --    --    HDL   --   81   --    --    --    --    --    --    --    --    --    TRIG   --   37   --    --    --    --    --    --    --    --    --    CR   --   0.68   --   0.70   --    < >  0.64   < >  0.62   < >  0.67   MICROL   --    --    --    --   <5   --    --    --    --    --    --     < > = values in this interval not displayed.       Assessment and plan:    1.  Diabetes control.  Her A1c continues to be above target, but not by much.  I suggested she increase her basal rate " during the day, as noted by the medical student.  I urged her to follow-up with the nurse educators for training on the Dex com if she cannot figure it out by herself.    2.  Diabetes complications.  Up to date with screens and has none.      I spent 25 minutes with this patient face to face and explained the conditions and plans (more than 50% of time was counseling/coordination of diabetes care, discussing management before and after upcoming arthroscopic surgery) . The patient understood and is satisfied with today's visit.   F/u with Delicia Martel in 3 mo and me in 6 months    Veronica Bills MD

## 2018-11-28 ENCOUNTER — ALLIED HEALTH/NURSE VISIT (OUTPATIENT)
Dept: EDUCATION SERVICES | Facility: CLINIC | Age: 63
End: 2018-11-28
Payer: COMMERCIAL

## 2018-11-28 DIAGNOSIS — E10.9 TYPE 1 DIABETES MELLITUS WITHOUT COMPLICATION (H): Primary | ICD-10-CM

## 2018-11-28 NOTE — MR AVS SNAPSHOT
After Visit Summary   11/28/2018    Ruba Major    MRN: [id number]           Patient Information     Date Of Birth          1955        Visit Information        Provider Department      11/28/2018 12:30 PM Iris Sheikh RN [facility] Diabetes        Today's Diagnoses     Type 1 diabetes mellitus without complication (H)    -  1       Follow-ups after your visit        Your next 10 appointments already scheduled     Dec 10, 2018  8:00 AM CST   (Arrive by 7:45 AM)   Return Visit with Gilberto Walton MD   [facility] Rheumatology ([facility])    [address]  [address]  [facility] [address] [phone]            May 14, 2019 10:00 AM CDT   (Arrive by 9:45 AM)   RETURN DIABETES with Veronica Bills MD   [facility] Endocrinology ([facility])    [address]  3rd Floor  [facility] [address] [phone]              Who to contact     Please call your clinic at [phone] to:    Ask questions about your health    Make or cancel appointments    Discuss your medicines    Learn about your test results    Speak to your doctor            Additional Information About Your Visit        [employer] Information     [employer] gives you secure access to your electronic health record. If you see a primary care provider, you can also send messages to your care team and make appointments. If you have questions, please call your primary care clinic.  If you do not have a primary care provider, please call [phone] and they will assist you.      [employer] is an electronic gateway that provides easy, online access to your medical records. With [employer], you can request a clinic appointment, read your test results, renew a prescription or communicate with your care team.     To access your existing account, please contact your [facility] or call [phone] for assistance.        Care EveryWhere ID      This is your Care EveryWhere ID. This could be used by other organizations to access your [facility] medical records  [id number]         Blood Pressure from Last 3 Encounters:   11/26/18 135/75   10/30/18 149/82   07/25/18 117/72    Weight from Last 3 Encounters:   11/26/18 58.1 kg (128 lb)   10/30/18 59.9 kg (132 lb)   07/24/18 59.9 kg (132 lb)              Today, you had the following     No orders found for display       Primary Care Provider Office Phone # Fax #    Eduarda Arredondo [phone]       [facility] [address]  [facility] [address]        Equal Access to Services     SLIME CRAWFORD : Hadii fortino ku hadasho Soomaali, waaxda luqadaha, qaybta kaalmada adeegyada, waxay shantalin hayshaylee toro . So [facility] [phone].    ATENCIÓN: Si habla español, tiene a burgess disposición servicios gratuitos de asistencia lingüística. [facility] [phone].    We comply with applicable federal civil rights laws and [place] laws. We do not discriminate on the basis of race, color, national origin, age, disability, sex, sexual orientation, or gender identity.            Thank you!     Thank you for choosing [facility] DIABETES  for your care. Our goal is always to provide you with excellent care. Hearing back from our patients is one way we can continue to improve our services. Please take a few minutes to complete the written survey that you may receive in the mail after your visit with us. Thank you!             Your Updated Medication List - Protect others around you: Learn how to safely use, store and throw away your medicines at www.disposemymeds.org.          This list is accurate as of 11/28/18 11:59 PM.  Always use your most recent med list.                   Brand Name Dispense Instructions for use Diagnosis    AMBIEN 10 MG tablet   Generic drug:  zolpidem      Take 10 mg by mouth nightly as needed.    Rheumatoid arthritis(714.0), Encounter for long-term (current) use of other  medications       FARRAH CONTOUR test strip   Generic drug:  blood glucose monitoring     900 strip    Use to test blood sugar 9 times daily or as directed.    Type 1 diabetes mellitus (H)       celecoxib 200 MG capsule    celeBREX    120 capsule    Take 1 capsule (200 mg) by mouth daily May take twice daily during RA flares.    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 factor (H), Encounter for long-term current use of medication       DEXCOM G6 TRANSMITTER Misc     1 each    1 each every 3 months Change every 90 days    Type 1 diabetes mellitus without complication (H)       folic acid 1 MG tablet    FOLVITE    180 tablet    Take 2 tablets (2 mg) by mouth daily    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 factor (H), Encounter for long-term current use of medication       golimumab 50 MG/0.5ML auto-injector pen    SIMPONI    5 each    INJECT THE CONTENTS OF ONE PEN (50MG) UNDER THE SKIN ONCE EVERY 21 DAYS    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 factor (H)       insulin glargine 100 UNIT/ML pen     15 mL    Inject 15 Units Subcutaneous daily In case of pump failure    Type 1 diabetes mellitus (H)       INSULIN INFUSION PUMP       Rheumatoid arthritis(714.0), Encounter for long-term (current) use of other medications       * insulin lispro 100 UNIT/ML pen    HumaLOG PEN     Inject 1-8 Units Subcutaneous 4 times daily (before meals and nightly) As instructed (roughly 1 unit: 8 g carbohydrate) incase of pump failure.        * HumaLOG KWIKpen 100 UNIT/ML pen   Generic drug:  insulin lispro     30 mL    Inject as needed approx 20 units daily    Type 1 diabetes mellitus (H)       * insulin lispro 100 UNIT/ML injection    HumaLOG    60 mL    Use with insulin pump approx. 65 units daily    Type 1 diabetes mellitus with other specified complication (H)       insulin pen needle 31G X 5 MM miscellaneous    B-D U/F    100 each    Inject 1 Units  Subcutaneous 4 times daily (before meals and nightly)    Type 1 diabetes mellitus without complication (H)       leucovorin 5 MG tablet    WELLCOVORIN    12 tablet    Take 1 tablet (5 mg) by mouth once a week 12 hours after taking Methotrexate dose.    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 factor (H), Encounter for long-term current use of medication       LYRICA PO      Take 75 mg by mouth daily        melatonin 5 MG tablet      Take 5 mg by mouth nightly as needed for sleep        methotrexate 2.5 MG tablet     72 tablet    Take 6 tablets once weekly    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 factor (H), Encounter for long-term current use of medication       * Notice:  This list has 3 medication(s) that are the same as other medications prescribed for you. Read the directions carefully, and ask your doctor or other care provider to review them with you.

## 2018-11-29 NOTE — PROGRESS NOTES
Diabetes Self Management Training: Insulin Pump Start    SUBJECTIVE:  Ruba Major presents for an insulin pump start.   Pump Type:  Tandem X2 with Basal IQ and Dexcom G6  ASSESSMENT:    Homework completed: Completed online training and Reviewed user guide.  She actually started with pump last evening with the  from [employer].   EDUCATION PROVIDED:  Training for both the Tandem T-Slim G4 done in accordance with the company training checklist, which has been scanned into the EMR.     Infusion set type for pump start:  T-90,  9mm,  23 inch tubing.  She is now wearing her infusion set in her upper buttocks and finds that this is much more tolerable than wearing it in her abdomen.  She is wearing the Dexcom G6 in the abdomen and wondering if she should wear it in her arm, as she is worried about pulling it out with the activities she does.  Reviewed wear exactly to place the sensor in her arm (back of the arm) as well as fixation.      She continues to have this spike in blood glucose starting at the time she wakes up.  At her last visit with Ms. Martel, her sensitivity was intensified to 45 mg/dL from 55 the remainder of the day.  She has a basal rate that increases from 0.375 overnight to 1.45 between 7am and 10am.  Despite this she is still having a rise in blood glucose at that time.  She is wondering if she should increase it again.  It concerns me that with any change in her routine, such as what happens when she goes to [place] for the winter, that she may have a sudden drop in her BG.  Instead of another basal rate change, we reduced her target during this time from 120 to 100, which would increase the amount of correction she receives if blood glucoses are high during this time.      Problem Solving:   Reviewed the load process with the pump, and answered questions.  She is overall pretty happy that she made the change to the Tandem pump.  Finds the pump easier to operate (touch screen is helpful), and  likes the size and the fact that it communicates with her Dexcom CGM.       PUMP SETTINGS     Time Block 12:00am   Basal Rate: 0.375units per hour  Correction Factor: 55  Carb Ratio:12  Target B    Time Block:  07:00am  Basal Rate:  1.45 units per hour  Correction Factor: 45  Carb Ratio: 10  Target B     Time Block: 10:00am  Basal Rate 0.975 units per hour  Correction Factor: 55  Carb Ratio: 12  Target B    Time Block: 5:30 pm  Basal Rate 0.975 units per hour  Correction Factor: 55  Carb Ratio: 10  Target B    Time Block: 8:30 pm  Basal Rate 0.975 units per hour  Correction Factor: 55  Carb Ratio: 12  Target B    Time Block: 10:00 pm  Basal Rate 0.375 units per hour  Correction Factor: 55  Carb Ratio: 12  Target B    DEXCOM CGM SETTINGS:  High Alert: 220 mg/dL  Basal IQ (low glucose suspend) is ON      PLAN AND FOLLOW-UP:    I encouraged Ruba to contact me directly with any questions about her new pump.  Otherwise follow up in endocrinology as previously planned.     Time Spent: 60  Visit Type: Individual    Scanned documents: Insulin pump initiation settings with MD signature, Insulin pump start checklist.

## 2018-12-10 ENCOUNTER — OFFICE VISIT (OUTPATIENT)
Dept: RHEUMATOLOGY | Facility: CLINIC | Age: 63
End: 2018-12-10
Attending: INTERNAL MEDICINE
Payer: COMMERCIAL

## 2018-12-10 VITALS
OXYGEN SATURATION: 99 % | HEART RATE: 74 BPM | SYSTOLIC BLOOD PRESSURE: 127 MMHG | TEMPERATURE: 98.2 F | DIASTOLIC BLOOD PRESSURE: 75 MMHG

## 2018-12-10 DIAGNOSIS — Z79.899 ENCOUNTER FOR LONG-TERM CURRENT USE OF MEDICATION: ICD-10-CM

## 2018-12-10 DIAGNOSIS — M05.742 RHEUMATOID ARTHRITIS INVOLVING BOTH HANDS WITH POSITIVE RHEUMATOID FACTOR (H): ICD-10-CM

## 2018-12-10 DIAGNOSIS — Z79.899 ENCOUNTER FOR LONG-TERM CURRENT USE OF MEDICATION: Primary | ICD-10-CM

## 2018-12-10 DIAGNOSIS — M05.741 RHEUMATOID ARTHRITIS INVOLVING BOTH HANDS WITH POSITIVE RHEUMATOID FACTOR (H): ICD-10-CM

## 2018-12-10 LAB
ALBUMIN SERPL-MCNC: 3.8 G/DL (ref 3.4–5)
ALT SERPL W P-5'-P-CCNC: 31 U/L (ref 0–50)
AST SERPL W P-5'-P-CCNC: 21 U/L (ref 0–45)
CREAT SERPL-MCNC: 0.68 MG/DL (ref 0.52–1.04)
CRP SERPL-MCNC: <2.9 MG/L (ref 0–8)
ERYTHROCYTE [DISTWIDTH] IN BLOOD BY AUTOMATED COUNT: 13.3 % (ref 10–15)
GFR SERPL CREATININE-BSD FRML MDRD: 87 ML/MIN/1.7M2
HCT VFR BLD AUTO: 38.3 % (ref 35–47)
HGB BLD-MCNC: 12.7 G/DL (ref 11.7–15.7)
MCH RBC QN AUTO: 31 PG (ref 26.5–33)
MCHC RBC AUTO-ENTMCNC: 33.2 G/DL (ref 31.5–36.5)
MCV RBC AUTO: 93 FL (ref 78–100)
PLATELET # BLD AUTO: 225 10E9/L (ref 150–450)
RBC # BLD AUTO: 4.1 10E12/L (ref 3.8–5.2)
WBC # BLD AUTO: 5.8 10E9/L (ref 4–11)

## 2018-12-10 PROCEDURE — 82040 ASSAY OF SERUM ALBUMIN: CPT | Performed by: INTERNAL MEDICINE

## 2018-12-10 PROCEDURE — 85027 COMPLETE CBC AUTOMATED: CPT | Performed by: INTERNAL MEDICINE

## 2018-12-10 PROCEDURE — G0463 HOSPITAL OUTPT CLINIC VISIT: HCPCS | Mod: ZF

## 2018-12-10 PROCEDURE — 86140 C-REACTIVE PROTEIN: CPT | Performed by: INTERNAL MEDICINE

## 2018-12-10 PROCEDURE — 84450 TRANSFERASE (AST) (SGOT): CPT | Performed by: INTERNAL MEDICINE

## 2018-12-10 PROCEDURE — 82565 ASSAY OF CREATININE: CPT | Performed by: INTERNAL MEDICINE

## 2018-12-10 PROCEDURE — 84460 ALANINE AMINO (ALT) (SGPT): CPT | Performed by: INTERNAL MEDICINE

## 2018-12-10 PROCEDURE — 36415 COLL VENOUS BLD VENIPUNCTURE: CPT | Performed by: INTERNAL MEDICINE

## 2018-12-10 RX ORDER — FOLIC ACID 1 MG/1
2 TABLET ORAL DAILY
Qty: 180 TABLET | Refills: 3 | Status: SHIPPED | OUTPATIENT
Start: 2018-12-10 | End: 2019-09-10

## 2018-12-10 RX ORDER — LEUCOVORIN CALCIUM 5 MG/1
5 TABLET ORAL WEEKLY
Qty: 12 TABLET | Refills: 3 | Status: SHIPPED | OUTPATIENT
Start: 2018-12-10 | End: 2019-06-05

## 2018-12-10 ASSESSMENT — PAIN SCALES - GENERAL: PAINLEVEL: MILD PAIN (3)

## 2018-12-10 NOTE — PROGRESS NOTES
Rheumatology Visit     Ruba Major MRN# [id number]   YOB: 1955 Age: 63 year old     Date of Visit: December 10, 2018  Primary care provider: Eduarda Arredondo          Assessment and Plan:   1. 64 yo female with RA, CCP+: She had more active disease which was limiting on single agent Methotrexate. I believe that regarding the inflammatory component of her joint pain after 6 months of Humira she was improved but not in a remission, able to play tennis and golf with symptoms. No overt synovitis was noted. We switched to Enbrel to attempt to achieve a better response and hopefully remission. Unfortunately she did not respond at 3 months even partially to this.       She continues to be improved on Simponi without side effects. She had some waning at 3-3.5 weeks and is doing better on every 3 week dosing. She does have mild weekly SE on Methotrexate that are very stable now with dose reduction to 12.5 mg weekly.     Assessment of response of her RA has been complicated by concomitant DJD affecting first CMC joints and her feet. She is much better now so it would seem inflammation was clearly playing a part.   She had bilateral arthroscopic knee surgery, most recently on R. She however is limited and can no longer play tennis, although can golf.  She will eventually need TKR.  She had cervical spine surgery.    Lab has been normal but she is due today    2. Type I DM on new insulin pump.   3. Possible peripheral neuropathy, now improved   4. DJD       PLAN: discussed in detail with the patient  1. She will continue Simponi q 3 weeks  2. Continue Methotrexate 12.5 mg weekly; continue folic acid and Leucovorin   3. Can continue Celebrex 200 mg daily and discussed dosing data and prior concerns.  She could take briefly bid for flares.  4. She has had the flu shot   5. Return in 6 months; lab in the interim as scheduled.  6. Call if problems occur      Gilberto Walton MD                 History of Present Illness:   62 yo F is seen in followup for RA, CCP+. I saw her last in July 2018. EPIC reviewed.    HISTORY CARRIED FORWARD:    To review, patient initially presented in 5/2011 with c/o 7-8 year h/o fever, pain and swelling in bilateral wrists, 1st MCPs, Ankles, and 1st MTPs. She also noted prolonged morning stiffness, fatique, dry eyes, and ocular photosensativity. Also endorsed long history of oral aphthous ulcers. Initial labs revealed negative RF, SSA, SSB, TTG, complements, and thyroid studies, however CCP was positive (127). Recent CCP again is positive.  She is currently being managed with Methotrexate 15 mg q week with Leucovorin and Folic acid. Humira added in July 2012 and switched to Enbrel at visit in March 2013. See correspondence. Due to insurance coverage Enbrel was substituted for original consideration of Simponi. With no response to Enbrel it was switched to Simponi in summer 2013.   She was significantly improved at less than 3 months and this continues. At prior visit she did note that variably some months she had more sx at about three weeks. She took the last dose early and she noted immediate improvement. Since then she continues to take it around 23 days and continues to do better. She has minimal sx now except in her L knee which has had arthroscopy and has known DJD. She notes minimal swelling. Her feet are stable with minimal residual hand pain. No systemic symptoms. She is taking Celebrex at 200 mg just daily now. No SE.  She had interval cervical spine surgery and is healed. She has some residual L arm symptoms that are not clearly related, perhaps Rotator cuff; she is in PT.   On Humira she had no rashes or injection site reactions. Enbrel had a little more burning in thigh injection site but did not seem to help.      INTERVAL HISTORY:      The Simponi has no pain with injection.  She remains on it every 21 days.      The patient was initially having significant GI  side effects related to the methotrexate, and was started on leucovorin 5mg q week with good resolution. She was having more symptoms on the days she takes methotrexate. She misses some Leucovorin doses. She cut down herself to 12.5 mg weekly MTX. No significant flares.     We discussed any peripheral symptoms at length.  There is little if any change. She thinks her rings are  fitting the same. She has less extension of R wrist.  Feet are stable.    She had another R knee arthroscopy at [facility] in August to hopefully delay TKA.  She did have a torn cartilage but surgeon stated her knee has significant damage.  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     She had interval lab in May which was normal but she is overdue.    She spends a lot of time in [place].    She saw Dr. Williamson and her Diabetes care is now managed here in [facility].  She has a new pump.    ROS otherwise negative.         Review of Systems:   Review Of Systems  Skin: negative  Eyes: negative  Ears/Nose/Throat: negative  Respiratory: No shortness of breath, dyspnea on exertion, cough, or hemoptysis  Cardiovascular: negative  Gastrointestinal: negative  Genitourinary: negative  Musculoskeletal: see HPI  Neurologic: negative  Psychiatric: negative  Hematologic/Lymphatic/Immunologic: negative  Endocrine: negative          Past Medical History:     Past Medical History:   Diagnosis Date     Rheumatoid arthritis(714.0)      Type II or unspecified type diabetes mellitus without mention of complication, not stated as uncontrolled        Patient Active Problem List    Diagnosis Date Noted     Type 1 diabetes mellitus without complication (H) 01/23/2018     Priority: Medium     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 factor (H) 01/28/2016     Priority: Medium     Diabetes mellitus (H) 05/23/2013     Priority: Medium     Muscle cramps 12/10/2012     Priority: Medium     Encounter for long-term current use of  medication 2011     Priority: Medium     Problem list name updated by automated process. Provider to review               Past Surgical History:     Past Surgical History:   Procedure Laterality Date     FOOT SURGERY  , 10/2010    Bunionectomy             Social History:     Social History     Tobacco Use     Smoking status: Former Smoker     Last attempt to quit: 1985     Years since quittin.4     Smokeless tobacco: Never Used   Substance Use Topics     Alcohol use: Yes     Comment: glass of wine daily             Family History:   No family history on file.         Allergies:   No Known Allergies          Medications:     Current Outpatient Medications   Medication Sig Dispense Refill     BASAGLAR 100 UNIT/ML injection Inject 15 Units Subcutaneous daily In case of pump failure (Patient not taking: Reported on 2018) 15 mL 0     FARRAH CONTOUR test strip Use to test blood sugar 9 times daily or as directed. 900 strip 1     celecoxib (CELEBREX) 200 MG capsule Take 1 capsule (200 mg) by mouth daily May take twice daily during RA flares. 120 capsule 3     Continuous Blood Gluc Transmit (DEXCOM G6 TRANSMITTER) MISC 1 each every 3 months Change every 90 days 1 each 3     folic acid (FOLVITE) 1 MG tablet Take 2 tablets (2 mg) by mouth daily 180 tablet 3     golimumab (SIMPONI) 50 MG/0.5ML auto-injector pen INJECT THE CONTENTS OF ONE PEN (50MG) UNDER THE SKIN ONCE EVERY 21 DAYS 5 each 11     HUMALOG KWIKPEN 100 UNIT/ML soln Inject as needed approx 20 units daily 30 mL 1     INSULIN INFUSION PUMP        insulin lispro (HUMALOG PEN) 100 UNIT/ML injection Inject 1-8 Units Subcutaneous 4 times daily (before meals and nightly) As instructed (roughly 1 unit: 8 g carbohydrate) incase of pump failure.       insulin lispro (HUMALOG) 100 UNIT/ML injection Use with insulin pump approx. 65 units daily 60 mL 3     insulin pen needle (B-D U/F) 31G X 5 MM Inject 1 Units Subcutaneous 4 times daily (before meals and  nightly) 100 each 1     leucovorin (WELLCOVORIN) 5 MG tablet Take 1 tablet (5 mg) by mouth once a week 12 hours after taking Methotrexate dose. 12 tablet 3     melatonin 5 MG tablet Take 5 mg by mouth nightly as needed for sleep       methotrexate 2.5 MG tablet CHEMO Take 6 tablets once weekly 72 tablet 1     Pregabalin (LYRICA PO) Take 75 mg by mouth daily        zolpidem (AMBIEN) 10 MG tablet Take 10 mg by mouth nightly as needed.              Physical Exam:     Constitutional: WD-WN-WG cooperative   Eyes: nl conjunctiva, sclera   ENT: nl external ears, nose, throat. Healed anterior cervical scar   MS: No definite active synovitis on exam today. She has pain with finger curl and 1+T L 4th PIP without synovial thickening or deformity.  45 degrees dorsiflexion R wrist without swelling.  Tinel negative. No dactylitis, tenosynovitis, enthesopathy   Skin: no nail pitting, alopecia, rash, nodules or lesions   Psych: nl affect.       Data:     Lab Results   Component Value Date    WBC 4.4 05/17/2018    WBC 4.3 01/24/2018    WBC 6.4 01/28/2016    HGB 13.1 05/17/2018    HGB 13.4 01/24/2018    HGB 13.4 08/03/2017    HCT 39.5 05/17/2018    HCT 39.9 01/24/2018    HCT 36.6 01/28/2016    MCV 93 05/17/2018    MCV 93 01/24/2018    MCV 91 01/28/2016     05/17/2018     01/24/2018     01/28/2016     Lab Results   Component Value Date    BUN 19 12/10/2012    BUN 16 08/28/2008     No components found for: SEDRATE  Lab Results   Component Value Date    TSH 0.91 06/11/2015    TSH 1.58 12/10/2012    TSH 1.57 09/12/2008     Lab Results   Component Value Date    AST 21 05/17/2018    AST 22 01/24/2018    AST 22 08/03/2017    ALT 30 05/17/2018    ALT 32 01/24/2018    ALT 35 08/03/2017    ALKPHOS 66 12/10/2012    ALKPHOS 101 08/28/2008     Reviewed Rheumatology lab flowsheet    Gilberto Walton

## 2018-12-10 NOTE — NURSING NOTE
Chief Complaint   Patient presents with     RECHECK     RA     /75   Pulse 74   Temp 98.2  F (36.8  C) (Oral)   SpO2 99%   Ange Watts MA

## 2018-12-10 NOTE — LETTER
12/10/2018      RE: Ruba Major  [address]  [place] [address]       Rheumatology Visit     Ruba Major MRN# [id number]   YOB: 1955 Age: 63 year old     Date of Visit: December 10, 2018  Primary care provider: Eduarda Arredondo          Assessment and Plan:   1. 62 yo female with RA, CCP+: She had more active disease which was limiting on single agent Methotrexate. I believe that regarding the inflammatory component of her joint pain after 6 months of Humira she was improved but not in a remission, able to play tennis and golf with symptoms. No overt synovitis was noted. We switched to Enbrel to attempt to achieve a better response and hopefully remission. Unfortunately she did not respond at 3 months even partially to this.       She continues to be improved on Simponi without side effects. She had some waning at 3-3.5 weeks and is doing better on every 3 week dosing. She does have mild weekly SE on Methotrexate that are very stable now with dose reduction to 12.5 mg weekly.     Assessment of response of her RA has been complicated by concomitant DJD affecting first CMC joints and her feet. She is much better now so it would seem inflammation was clearly playing a part.   She had bilateral arthroscopic knee surgery, most recently on R. She however is limited and can no longer play tennis, although can golf.  She will eventually need TKR.  She had cervical spine surgery.    Lab has been normal but she is due today    2. Type I DM on new insulin pump.   3. Possible peripheral neuropathy, now improved   4. DJD       PLAN: discussed in detail with the patient  1. She will continue Simponi q 3 weeks  2. Continue Methotrexate 12.5 mg weekly; continue folic acid and Leucovorin   3. Can continue Celebrex 200 mg daily and discussed dosing data and prior concerns.  She could take briefly bid for flares.  4. She has had the flu shot   5. Return in 6 months; lab in the interim as  scheduled.  6. Call if problems occur      Gilberto Walton MD                History of Present Illness:   64 yo F is seen in followup for RA, CCP+. I saw her last in July 2018. EPIC reviewed.    HISTORY CARRIED FORWARD:    To review, patient initially presented in 5/2011 with c/o 7-8 year h/o fever, pain and swelling in bilateral wrists, 1st MCPs, Ankles, and 1st MTPs. She also noted prolonged morning stiffness, fatique, dry eyes, and ocular photosensativity. Also endorsed long history of oral aphthous ulcers. Initial labs revealed negative RF, SSA, SSB, TTG, complements, and thyroid studies, however CCP was positive (127). Recent CCP again is positive.  She is currently being managed with Methotrexate 15 mg q week with Leucovorin and Folic acid. Humira added in July 2012 and switched to Enbrel at visit in March 2013. See correspondence. Due to insurance coverage Enbrel was substituted for original consideration of Simponi. With no response to Enbrel it was switched to Simponi in summer 2013.   She was significantly improved at less than 3 months and this continues. At prior visit she did note that variably some months she had more sx at about three weeks. She took the last dose early and she noted immediate improvement. Since then she continues to take it around 23 days and continues to do better. She has minimal sx now except in her L knee which has had arthroscopy and has known DJD. She notes minimal swelling. Her feet are stable with minimal residual hand pain. No systemic symptoms. She is taking Celebrex at 200 mg just daily now. No SE.  She had interval cervical spine surgery and is healed. She has some residual L arm symptoms that are not clearly related, perhaps Rotator cuff; she is in PT.   On Humira she had no rashes or injection site reactions. Enbrel had a little more burning in thigh injection site but did not seem to help.      INTERVAL HISTORY:      The Sinanponi has no pain with  injection.  She remains on it every 21 days.      The patient was initially having significant GI side effects related to the methotrexate, and was started on leucovorin 5mg q week with good resolution. She was having more symptoms on the days she takes methotrexate. She misses some Leucovorin doses. She cut down herself to 12.5 mg weekly MTX. No significant flares.     We discussed any peripheral symptoms at length.  There is little if any change. She thinks her rings are  fitting the same. She has less extension of R wrist.  Feet are stable.    She had another R knee arthroscopy at [facility] in August to hopefully delay TKA.  She did have a torn cartilage but surgeon stated her knee has significant damage.  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     She had interval lab in May which was normal but she is overdue.    She spends a lot of time in [place].    She saw Dr. Williamson and  her Diabetes care is now managed here in [facility].  She has a new pump.    ROS otherwise negative.         Review of Systems:   Review Of Systems  Skin: negative  Eyes: negative  Ears/Nose/Throat: negative  Respiratory: No shortness of breath, dyspnea on exertion, cough, or hemoptysis  Cardiovascular: negative  Gastrointestinal: negative  Genitourinary: negative  Musculoskeletal: see HPI  Neurologic: negative  Psychiatric: negative  Hematologic/Lymphatic/Immunologic: negative  Endocrine: negative          Past Medical History:     Past Medical History:   Diagnosis Date     Rheumatoid arthritis(714.0)      Type II or unspecified type diabetes mellitus without mention of complication, not stated as uncontrolled        Patient Active Problem List    Diagnosis Date Noted     Type 1 diabetes mellitus without complication (H) 01/23/2018     Priority: Medium     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 factor (H) 01/28/2016     Priority: Medium     Diabetes mellitus (H) 05/23/2013     Priority:  Medium     Muscle cramps 12/10/2012     Priority: Medium     Encounter for long-term current use of medication 2011     Priority: Medium     Problem list name updated by automated process. Provider to review               Past Surgical History:     Past Surgical History:   Procedure Laterality Date     FOOT SURGERY  , 10/2010    Bunionectomy             Social History:     Social History     Tobacco Use     Smoking status: Former Smoker     Last attempt to quit: 1985     Years since quittin.4     Smokeless tobacco: Never Used   Substance Use Topics     Alcohol use: Yes     Comment: glass of wine daily             Family History:   No family history on file.         Allergies:   No Known Allergies          Medications:     Current Outpatient Medications   Medication Sig Dispense Refill     BASAGLAR 100 UNIT/ML injection Inject 15 Units Subcutaneous daily In case of pump failure (Patient not taking: Reported on 2018) 15 mL 0     FARRAH CONTOUR test strip Use to test blood sugar 9 times daily or as directed. 900 strip 1     celecoxib (CELEBREX) 200 MG capsule Take 1 capsule (200 mg) by mouth daily May take twice daily during RA flares. 120 capsule 3     Continuous Blood Gluc Transmit (DEXCOM G6 TRANSMITTER) MISC 1 each every 3 months Change every 90 days 1 each 3     folic acid (FOLVITE) 1 MG tablet Take 2 tablets (2 mg) by mouth daily 180 tablet 3     golimumab (SIMPONI) 50 MG/0.5ML auto-injector pen INJECT THE CONTENTS OF ONE PEN (50MG) UNDER THE SKIN ONCE EVERY 21 DAYS 5 each 11     HUMALOG KWIKPEN 100 UNIT/ML soln Inject as needed approx 20 units daily 30 mL 1     INSULIN INFUSION PUMP        insulin lispro (HUMALOG PEN) 100 UNIT/ML injection Inject 1-8 Units Subcutaneous 4 times daily (before meals and nightly) As instructed (roughly 1 unit: 8 g carbohydrate) incase of pump failure.       insulin lispro (HUMALOG) 100 UNIT/ML injection Use with insulin pump approx. 65 units daily 60 mL 3      insulin pen needle (B-D U/F) 31G X 5 MM Inject 1 Units Subcutaneous 4 times daily (before meals and nightly) 100 each 1     leucovorin (WELLCOVORIN) 5 MG tablet Take 1 tablet (5 mg) by mouth once a week 12 hours after taking Methotrexate dose. 12 tablet 3     melatonin 5 MG tablet Take 5 mg by mouth nightly as needed for sleep       methotrexate 2.5 MG tablet CHEMO Take 6 tablets once weekly 72 tablet 1     Pregabalin (LYRICA PO) Take 75 mg by mouth daily        zolpidem (AMBIEN) 10 MG tablet Take 10 mg by mouth nightly as needed.              Physical Exam:     Constitutional: WD-WN-WG cooperative   Eyes: nl conjunctiva, sclera   ENT: nl external ears, nose, throat. Healed anterior cervical scar   MS: No definite active synovitis on exam today. She has pain with finger curl and 1+T L 4th PIP without synovial thickening or deformity.  45 degrees dorsiflexion R wrist without swelling.  Tinel negative. No dactylitis, tenosynovitis, enthesopathy   Skin: no nail pitting, alopecia, rash, nodules or lesions   Psych: nl affect.       Data:     Lab Results   Component Value Date    WBC 4.4 05/17/2018    WBC 4.3 01/24/2018    WBC 6.4 01/28/2016    HGB 13.1 05/17/2018    HGB 13.4 01/24/2018    HGB 13.4 08/03/2017    HCT 39.5 05/17/2018    HCT 39.9 01/24/2018    HCT 36.6 01/28/2016    MCV 93 05/17/2018    MCV 93 01/24/2018    MCV 91 01/28/2016     05/17/2018     01/24/2018     01/28/2016     Lab Results   Component Value Date    BUN 19 12/10/2012    BUN 16 08/28/2008     No components found for: SEDRATE  Lab Results   Component Value Date    TSH 0.91 06/11/2015    TSH 1.58 12/10/2012    TSH 1.57 09/12/2008     Lab Results   Component Value Date    AST 21 05/17/2018    AST 22 01/24/2018    AST 22 08/03/2017    ALT 30 05/17/2018    ALT 32 01/24/2018    ALT 35 08/03/2017    ALKPHOS 66 12/10/2012    ALKPHOS 101 08/28/2008         Gilberto Walton MD

## 2018-12-28 DIAGNOSIS — R11.0 NAUSEA: Primary | ICD-10-CM

## 2018-12-29 NOTE — TELEPHONE ENCOUNTER
Pt is getting nausea with both Simponi injections and Methotrexate tablets. She is wondering if she is able to have some Zofran to help with this.  She is usually miserable the day after she take both, usually last about 4-5 hours each time.      Will send to Dr. Walton for review and see if he is willing to sign for Zofran.    Erum Adame RN  Rheumatology Clinic    
[facility] Call Center    Phone Message    May a detailed message be left on voicemail: yes    Reason for Call: Symptoms or Concerns     If patient has red-flag symptoms, warm transfer to triage line    Current symptom or concern: nausea x 5 hours each time - requesting RX Zofran - in [place] currently - uses [facility] in [facility], phone [phone]    Symptoms have been present for:   Week(s) - after taking Methotrexate           Action Taken: Message routed to:  [facility] ([facility]): rheum    
(0) swallows foods/liquids without difficulty

## 2018-12-31 RX ORDER — ONDANSETRON 4 MG/1
4 TABLET, ORALLY DISINTEGRATING ORAL EVERY 8 HOURS PRN
Qty: 18 TABLET | Refills: 1 | Status: SHIPPED | OUTPATIENT
Start: 2018-12-31 | End: 2019-01-07

## 2019-01-14 ENCOUNTER — ANCILLARY PROCEDURE (OUTPATIENT)
Dept: GENERAL RADIOLOGY | Facility: CLINIC | Age: 64
End: 2019-01-14
Payer: COMMERCIAL

## 2019-01-14 DIAGNOSIS — M05.742 RHEUMATOID ARTHRITIS INVOLVING BOTH HANDS WITH POSITIVE RHEUMATOID FACTOR (H): ICD-10-CM

## 2019-01-14 DIAGNOSIS — M05.741 RHEUMATOID ARTHRITIS INVOLVING BOTH HANDS WITH POSITIVE RHEUMATOID FACTOR (H): ICD-10-CM

## 2019-05-08 ENCOUNTER — TRANSFERRED RECORDS (OUTPATIENT)
Dept: HEALTH INFORMATION MANAGEMENT | Facility: CLINIC | Age: 64
End: 2019-05-08

## 2019-05-14 ENCOUNTER — OFFICE VISIT (OUTPATIENT)
Dept: ENDOCRINOLOGY | Facility: CLINIC | Age: 64
End: 2019-05-14
Payer: COMMERCIAL

## 2019-05-14 VITALS
HEIGHT: 66 IN | HEART RATE: 71 BPM | WEIGHT: 133.1 LBS | SYSTOLIC BLOOD PRESSURE: 111 MMHG | DIASTOLIC BLOOD PRESSURE: 68 MMHG | BODY MASS INDEX: 21.39 KG/M2

## 2019-05-14 DIAGNOSIS — E10.69 TYPE 1 DIABETES MELLITUS WITH OTHER SPECIFIED COMPLICATION (H): Primary | ICD-10-CM

## 2019-05-14 LAB — HBA1C MFR BLD: 6.7 % (ref 4.3–6)

## 2019-05-14 ASSESSMENT — MIFFLIN-ST. JEOR: SCORE: 1170.49

## 2019-05-14 ASSESSMENT — PAIN SCALES - GENERAL: PAINLEVEL: NO PAIN (0)

## 2019-05-14 NOTE — LETTER
5/14/2019     RE: Ruba Major  [address]  [place] [address]     Dear Colleague,    Thank you for referring your patient, Ruba Major, to the [facility] ENDOCRINOLOGY at [facility]. Please see a copy of my visit note below.    This 64-year-old woman is here to establish care for her type 1 diabetes.  I made the diagnosis in 2008 when she presented with hyperglycemia in the absence of DKA.      She currently manages her diabetes with a tandem pump and the dexcom G6. Her current pump settings are:    Midnight 0.375  7 AM 1.45  10 am  0.975  10 PM 0.375    Carb ratios:  Midnight 12  7 AM 10  10 am 10  5:30 pm 10  8:30 pm 12    Sensitivity:  Midnight 55  7AM 45  10 am 55  8:30 pm 55    Glucose targets: Midnight 140  7   10 am 120  10 pm 140      Active insulin is 3 hours    Ruba has been traveling to see her grandchildren and she knows there is always makes her glucose control a little more challenging.  During the last 2 weeks she checked her blood sugars 2.6 times a day and has an average of 217 and a range of 160-317.  She wore her CGM 13 out of the last 14 days.  The average CGM glucose was 156.  She was within target 70% of the time, above target 29% of the time, and below target 1% of the time.  Her basal represents 76% of her daily insulin.  She usually boluses about 4 times each day.  Reviewing her daily CGM's shows that she tends to drop overnight, with the biggest decline occurring between 4 and 6 AM.  She then will eat something and her sugars will go up she will cover for it and then 2 hours later she will have hypoglycemia.  At dinner her carb ratio appears to bring her to target after her meals.  She had values that approached 70 between 8 AM and 4 PM on nearly all of the past 14 days.  She does recognize her hypoglycemia and has not needed anyone else to help her treat a low.  However, she is quite sick of all of the alarms on her pump and  reports that it makes it hard for her to sleep at night.  She is quite tired.     She is up-to-date with her eye visits and has never been told she had retinopathy.  She has no foot concerns.  She denies having any neuropathic symptoms.  She has been treated with a statin in the past but always developed muscle cramps on the drug.  She prefers not to take it.      Current Outpatient Medications on File Prior to Visit:  BASAGLAR 100 UNIT/ML injection Inject 15 Units Subcutaneous daily In case of pump failure (Patient not taking: Reported on 2018)   FARRAH CONTOUR test strip Use to test blood sugar 9 times daily or as directed.   celecoxib (CELEBREX) 200 MG capsule Take 1 capsule (200 mg) by mouth daily May take twice daily during RA flares.   Continuous Blood Gluc Transmit (DEXCOM G6 TRANSMITTER) MISC 1 each every 3 months Change every 90 days   folic acid (FOLVITE) 1 MG tablet Take 2 tablets (2 mg) by mouth daily   golimumab (SIMPONI) 50 MG/0.5ML auto-injector pen INJECT THE CONTENTS OF ONE PEN (50MG) UNDER THE SKIN ONCE EVERY 21 DAYS   HUMALOG KWIKPEN 100 UNIT/ML soln Inject as needed approx 20 units daily   INSULIN INFUSION PUMP    insulin lispro (HUMALOG PEN) 100 UNIT/ML injection Inject 1-8 Units Subcutaneous 4 times daily (before meals and nightly) As instructed (roughly 1 unit: 8 g carbohydrate) incase of pump failure.   insulin lispro (HUMALOG) 100 UNIT/ML injection Use with insulin pump approx. 65 units daily   insulin pen needle (B-D U/F) 31G X 5 MM Inject 1 Units Subcutaneous 4 times daily (before meals and nightly)   leucovorin (WELLCOVORIN) 5 MG tablet Take 1 tablet (5 mg) by mouth once a week 12 hours after taking Methotrexate dose.   melatonin 5 MG tablet Take 5 mg by mouth nightly as needed for sleep   methotrexate 2.5 MG tablet Take 5 tablets once weekly   [] ondansetron (ZOFRAN-ODT) 4 MG ODT tab Take 1 tablet (4 mg) by mouth every 8 hours as needed for nausea   Pregabalin (LYRICA PO) Take  "75 mg by mouth daily    zolpidem (AMBIEN) 10 MG tablet Take 10 mg by mouth nightly as needed.     No current facility-administered medications on file prior to visit.     ROS: 10 point ROS neg other than the symptoms noted above in the HPI.      So hx - plays a lot of golf    Vital signs:   /68   Pulse 71   Ht 1.676 m (5' 6\")   Wt 60.4 kg (133 lb 1.6 oz)   BMI 21.48 kg/m     Estimated body mass index is 21.48 kg/m  as calculated from the following:    Height as of this encounter: 1.676 m (5' 6\").    Weight as of this encounter: 60.4 kg (133 lb 1.6 oz).    NAD  Eyes - no periorbital edema, conjunctival injection, scleral icterus  CV - .  No edema  Skin - normal texture   Mood - not depressed    Recent Labs   Lab Test 05/14/19 12/10/18  0917 10/30/18 05/17/18  0813  01/23/18  1331  06/11/15  1326  05/23/13  1601  12/10/12  1051   A1C  --   --   --   --   --   --   --   --   --  8.2*  --   --    HEMOGLOBINA1 6.7*  --  7.0*  --    < >  --    < >  --   --   --   --   --    TSH  --   --   --   --   --   --   --  0.91  --   --   --  1.58   LDL  --   --   --  78  --   --   --   --   --   --   --   --    HDL  --   --   --  81  --   --   --   --   --   --   --   --    TRIG  --   --   --  37  --   --   --   --   --   --   --   --    CR  --  0.68  --  0.68   < >  --    < > 0.64   < > 0.62   < > 0.67   MICROL  --   --   --   --   --  <5  --   --   --   --   --   --     < > = values in this interval not displayed.       Assessment and plan:    1.  Diabetes control her A1c looks great, but she is having way too many lows.  I am concerned that she is on way too much basal during the daytime and probably on too much overnight.  Until I see how she does on lower basal rates, I do not feel confident that changing her carb ratios or sensitivity will have much impact.  We made the following changes in her basal settings:  Midnight 0.35 (was 0.375)  6 AM 1.45 (was 7 am)  10 am  0.875 (was 0.975)  10 PM 0.35 (was 0.375)    I " will have her come back in a couple of weeks to meet with the nurse educators.  At that time they will review how these changes have impacted her blood sugars.  We may need to make changes in the carb ratio or sensitivity factor then.    2.  Diabetes complications.  Her serum creatinine is at target but we do not have a recent microalbumin.  We will get one today.  She is up-to-date with her eye visits and has no foot concerns.    3.CVD risk.  Her blood pressure is well controlled.  She develops myalgias on a statin so we have not prescribed on.      I spent 25 minutes with this patient face to face and explained the conditions and plans (more than 50% of time was counseling/coordination of diabetes care with an emphasis on how to reduce hypoglycemia) . The patient understood and is satisfied with today's visit.     Veronica Bills MD

## 2019-05-14 NOTE — PROGRESS NOTES
This 64-year-old woman is here to establish care for her type 1 diabetes.  I made the diagnosis in 2008 when she presented with hyperglycemia in the absence of DKA.      She currently manages her diabetes with a tandem pump and the dexcom G6. Her current pump settings are:    Midnight 0.375  7 AM 1.45  10 am  0.975  10 PM 0.375    Carb ratios:  Midnight 12  7 AM 10  10 am 10  5:30 pm 10  8:30 pm 12    Sensitivity:  Midnight 55  7AM 45  10 am 55  8:30 pm 55    Glucose targets: Midnight 140  7   10 am 120  10 pm 140      Active insulin is 3 hours    Ruba has been traveling to see her grandchildren and she knows there is always makes her glucose control a little more challenging.  During the last 2 weeks she checked her blood sugars 2.6 times a day and has an average of 217 and a range of 160-317.  She wore her CGM 13 out of the last 14 days.  The average CGM glucose was 156.  She was within target 70% of the time, above target 29% of the time, and below target 1% of the time.  Her basal represents 76% of her daily insulin.  She usually boluses about 4 times each day.  Reviewing her daily CGM's shows that she tends to drop overnight, with the biggest decline occurring between 4 and 6 AM.  She then will eat something and her sugars will go up she will cover for it and then 2 hours later she will have hypoglycemia.  At dinner her carb ratio appears to bring her to target after her meals.  She had values that approached 70 between 8 AM and 4 PM on nearly all of the past 14 days.  She does recognize her hypoglycemia and has not needed anyone else to help her treat a low.  However, she is quite sick of all of the alarms on her pump and reports that it makes it hard for her to sleep at night.  She is quite tired.     She is up-to-date with her eye visits and has never been told she had retinopathy.  She has no foot concerns.  She denies having any neuropathic symptoms.  She has been treated with a statin in the past  but always developed muscle cramps on the drug.  She prefers not to take it.      Current Outpatient Medications on File Prior to Visit:  BASAGLAR 100 UNIT/ML injection Inject 15 Units Subcutaneous daily In case of pump failure (Patient not taking: Reported on 2018)   FARRAH CONTOUR test strip Use to test blood sugar 9 times daily or as directed.   celecoxib (CELEBREX) 200 MG capsule Take 1 capsule (200 mg) by mouth daily May take twice daily during RA flares.   Continuous Blood Gluc Transmit (DEXCOM G6 TRANSMITTER) MISC 1 each every 3 months Change every 90 days   folic acid (FOLVITE) 1 MG tablet Take 2 tablets (2 mg) by mouth daily   golimumab (SIMPONI) 50 MG/0.5ML auto-injector pen INJECT THE CONTENTS OF ONE PEN (50MG) UNDER THE SKIN ONCE EVERY 21 DAYS   HUMALOG KWIKPEN 100 UNIT/ML soln Inject as needed approx 20 units daily   INSULIN INFUSION PUMP    insulin lispro (HUMALOG PEN) 100 UNIT/ML injection Inject 1-8 Units Subcutaneous 4 times daily (before meals and nightly) As instructed (roughly 1 unit: 8 g carbohydrate) incase of pump failure.   insulin lispro (HUMALOG) 100 UNIT/ML injection Use with insulin pump approx. 65 units daily   insulin pen needle (B-D U/F) 31G X 5 MM Inject 1 Units Subcutaneous 4 times daily (before meals and nightly)   leucovorin (WELLCOVORIN) 5 MG tablet Take 1 tablet (5 mg) by mouth once a week 12 hours after taking Methotrexate dose.   melatonin 5 MG tablet Take 5 mg by mouth nightly as needed for sleep   methotrexate 2.5 MG tablet Take 5 tablets once weekly   [] ondansetron (ZOFRAN-ODT) 4 MG ODT tab Take 1 tablet (4 mg) by mouth every 8 hours as needed for nausea   Pregabalin (LYRICA PO) Take 75 mg by mouth daily    zolpidem (AMBIEN) 10 MG tablet Take 10 mg by mouth nightly as needed.     No current facility-administered medications on file prior to visit.     ROS: 10 point ROS neg other than the symptoms noted above in the HPI.      So hx - plays a lot of golf    Vital  "signs:   /68   Pulse 71   Ht 1.676 m (5' 6\")   Wt 60.4 kg (133 lb 1.6 oz)   BMI 21.48 kg/m    Estimated body mass index is 21.48 kg/m  as calculated from the following:    Height as of this encounter: 1.676 m (5' 6\").    Weight as of this encounter: 60.4 kg (133 lb 1.6 oz).    NAD  Eyes - no periorbital edema, conjunctival injection, scleral icterus  CV - .  No edema  Skin - normal texture   Mood - not depressed    Recent Labs   Lab Test 05/14/19 12/10/18  0917 10/30/18 05/17/18  0813  01/23/18  1331  06/11/15  1326  05/23/13  1601  12/10/12  1051   A1C  --   --   --   --   --   --   --   --   --  8.2*  --   --    HEMOGLOBINA1 6.7*  --  7.0*  --    < >  --    < >  --   --   --   --   --    TSH  --   --   --   --   --   --   --  0.91  --   --   --  1.58   LDL  --   --   --  78  --   --   --   --   --   --   --   --    HDL  --   --   --  81  --   --   --   --   --   --   --   --    TRIG  --   --   --  37  --   --   --   --   --   --   --   --    CR  --  0.68  --  0.68   < >  --    < > 0.64   < > 0.62   < > 0.67   MICROL  --   --   --   --   --  <5  --   --   --   --   --   --     < > = values in this interval not displayed.       Assessment and plan:    1.  Diabetes control her A1c looks great, but she is having way too many lows.  I am concerned that she is on way too much basal during the daytime and probably on too much overnight.  Until I see how she does on lower basal rates, I do not feel confident that changing her carb ratios or sensitivity will have much impact.  We made the following changes in her basal settings:  Midnight 0.35 (was 0.375)  6 AM 1.45 (was 7 am)  10 am  0.875 (was 0.975)  10 PM 0.35 (was 0.375)    I will have her come back in a couple of weeks to meet with the nurse educators.  At that time they will review how these changes have impacted her blood sugars.  We may need to make changes in the carb ratio or sensitivity factor then.    2.  Diabetes complications.  Her serum creatinine " is at target but we do not have a recent microalbumin.  We will get one today.  She is up-to-date with her eye visits and has no foot concerns.    3.CVD risk.  Her blood pressure is well controlled.  She develops myalgias on a statin so we have not prescribed on.      I spent 25 minutes with this patient face to face and explained the conditions and plans (more than 50% of time was counseling/coordination of diabetes care with an emphasis on how to reduce hypoglycemia) . The patient understood and is satisfied with today's visit.     Veronica Bills MD

## 2019-06-05 ENCOUNTER — TELEPHONE (OUTPATIENT)
Dept: ENDOCRINOLOGY | Facility: CLINIC | Age: 64
End: 2019-06-05

## 2019-06-05 ENCOUNTER — OFFICE VISIT (OUTPATIENT)
Dept: RHEUMATOLOGY | Facility: CLINIC | Age: 64
End: 2019-06-05
Attending: INTERNAL MEDICINE
Payer: COMMERCIAL

## 2019-06-05 VITALS
BODY MASS INDEX: 21.15 KG/M2 | SYSTOLIC BLOOD PRESSURE: 129 MMHG | WEIGHT: 131.6 LBS | HEART RATE: 77 BPM | DIASTOLIC BLOOD PRESSURE: 74 MMHG | TEMPERATURE: 97.7 F | HEIGHT: 66 IN | OXYGEN SATURATION: 99 %

## 2019-06-05 DIAGNOSIS — M05.742 RHEUMATOID ARTHRITIS INVOLVING BOTH HANDS WITH POSITIVE RHEUMATOID FACTOR (H): Primary | ICD-10-CM

## 2019-06-05 DIAGNOSIS — M05.741 RHEUMATOID ARTHRITIS INVOLVING BOTH HANDS WITH POSITIVE RHEUMATOID FACTOR (H): Primary | ICD-10-CM

## 2019-06-05 DIAGNOSIS — Z79.899 ENCOUNTER FOR LONG-TERM CURRENT USE OF MEDICATION: ICD-10-CM

## 2019-06-05 PROCEDURE — G0463 HOSPITAL OUTPT CLINIC VISIT: HCPCS | Mod: ZF

## 2019-06-05 RX ORDER — CELECOXIB 200 MG/1
200 CAPSULE ORAL DAILY
Qty: 120 CAPSULE | Refills: 3 | Status: SHIPPED | OUTPATIENT
Start: 2019-06-05 | End: 2019-09-10

## 2019-06-05 RX ORDER — LEUCOVORIN CALCIUM 5 MG/1
5 TABLET ORAL WEEKLY
Qty: 12 TABLET | Refills: 3 | Status: SHIPPED | OUTPATIENT
Start: 2019-06-05 | End: 2019-09-10

## 2019-06-05 ASSESSMENT — MIFFLIN-ST. JEOR: SCORE: 1163.68

## 2019-06-05 ASSESSMENT — PAIN SCALES - GENERAL: PAINLEVEL: SEVERE PAIN (6)

## 2019-06-05 NOTE — LETTER
6/5/2019      RE: Ruba Major  [address]  [place] [address]       Rheumatology Visit     Ruba Major MRN# [id number]   YOB: 1955 Age: 64 year old     Date of Visit: June 5, 2019  Primary care provider: Eduarda Arredondo          Assessment and Plan:   1. 65 yo female with RA, CCP+: She had more active disease which was limiting on single agent Methotrexate. I believe that regarding the inflammatory component of her joint pain after 6 months of Humira she was improved but not in a remission, able to play tennis and golf with symptoms. No overt synovitis was noted. We switched to Enbrel to attempt to achieve a better response and hopefully remission. Unfortunately she did not respond at 3 months even partially to this.       She continues to be improved on Simponi without side effects. She had some waning at 3-3.5 weeks and is doing better on every 3 week dosing. She does have mild weekly SE on Methotrexate that are very stable now with dose reduction to 12.5 mg weekly.      Assessment of response of her RA has been complicated by concomitant DJD affecting first CMC joints and her feet. She is much better now so it would seem inflammation was clearly playing a part. She did have more pain with the Celebrex being held due to stomach inflammation seen on EGD. She is going back on it regularly with Prilosec daily.    She had bilateral arthroscopic knee surgery, most recently on R. She however is limited and can no longer play tennis, although can golf.  She  has a TKR scheduled at [facility] July 29th. She will do it in third week after Simponi.    She had cervical spine surgery.     Lab has been normal last in April     2. Type I DM on new insulin pump.   3. Possible peripheral neuropathy, now improved   4. DJD       PLAN: discussed in detail with the patient    1. She will continue Simponi q 3 weeks  2. Continue Methotrexate 12.5 mg weekly; continue folic acid and Leucovorin   3. Can  continue Celebrex 200 mg daily and discussed dosing data and issues with GI. She will take Prilosec regularly per her GI son-in-law  4. She had the flu shot last fall  5. Return in 6 months; lab in the interim as scheduled.  6. Call if problems occur      Gilberto Walton MD   Professor             History of Present Illness:   63 yo F is seen in followup for RA, CCP+. I saw her last in  December 2018. EPIC reviewed.     HISTORY CARRIED FORWARD:     To review, patient initially presented in 5/2011 with c/o 7-8 year h/o fever, pain and swelling in bilateral wrists, 1st MCPs, Ankles, and 1st MTPs. She also noted prolonged morning stiffness, fatique, dry eyes, and ocular photosensativity. Also endorsed long history of oral aphthous ulcers. Initial labs revealed negative RF, SSA, SSB, TTG, complements, and thyroid studies, however CCP was positive (127). Recent CCP again is positive.  She is currently being managed with Methotrexate 15 mg q week with Leucovorin and Folic acid. Humira added in July 2012 and switched to Enbrel at visit in March 2013. See correspondence. Due to insurance coverage Enbrel was substituted for original consideration of Simponi. With no response to Enbrel it was switched to Simponi in summer 2013.   She was significantly improved at less than 3 months and this continues. At prior visit she did note that variably some months she had more sx at about three weeks. She took the last dose early and she noted immediate improvement. Since then she continues to take it around 23 days and continues to do better. She has minimal sx now except in her L knee which has had arthroscopy and has known DJD. She notes minimal swelling. Her feet are stable with minimal residual hand pain. No systemic symptoms. She is taking Celebrex at 200 mg just daily now. No SE.  She had interval cervical spine surgery and is healed. She has some residual L arm symptoms that are not clearly related, perhaps Rotator cuff; she is  in PT.   On Humira she had no rashes or injection site reactions. Enbrel had a little more burning in thigh injection site but did not seem to help.       INTERVAL HISTORY:      She had called in January and we did hand/wrist xrays that again showed no erosive disease but severe first CMC arthritis as before.     The Cesari has no pain with injection.  She remains on it every 21 days.      She had GI symptoms in the Spring and ended up having an EGD that showed no ulcer but some inflammation. She went off Celebrex but is more symptomatic.  She is now taking occasionally.  No GI sx now.  Her son in law is a Gastroenterologist and suggested she go back on it with Prilosec daily. We discussed the data behind this.     The patient was initially having significant GI side effects related to the methotrexate, and was started on leucovorin 5mg q week with good resolution. She was having more symptoms on the days she takes methotrexate. She misses some Leucovorin doses. She cut down herself to 12.5 mg weekly MTX. No significant flares.      We discussed any peripheral symptoms again at length.  There is little if any change although more R hand pain in just the last week. She thinks her rings are  fitting the same. She has less extension of R wrist.  Feet are stable.     She had another R knee arthroscopy at [facility] in August 2018 to hopefully delay TKA.  She did have a torn cartilage but surgeon stated her knee has significant damage.  She is going to have a TKR July 29.    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      She had interval lab in December here and in February in [place] and at [facility] in April which was normal  or stable. CRP normal.     She spends a lot of time in [place].     She saw Dr. Williamson and her Diabetes care is now managed here in Endocrine.  She has a new pump.     ROS otherwise negative.          Review of Systems:   Review Of Systems  Skin: negative  Eyes:  negative  Ears/Nose/Throat: negative  Respiratory: No shortness of breath, dyspnea on exertion, cough, or hemoptysis  Cardiovascular: negative  Gastrointestinal: negative  Genitourinary: negative  Musculoskeletal: see HPI  Neurologic: negative  Psychiatric: negative  Hematologic/Lymphatic/Immunologic: negative  Endocrine: DM followed in Endo          Past Medical History:     Past Medical History:   Diagnosis Date     Rheumatoid arthritis(714.0)      Type II or unspecified type diabetes mellitus without mention of complication, not stated as uncontrolled        Patient Active Problem List    Diagnosis Date Noted     Type 1 diabetes mellitus without complication (H) 2018     Priority: Medium     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 factor (H) 2016     Priority: Medium     Diabetes mellitus (H) 2013     Priority: Medium     Muscle cramps 12/10/2012     Priority: Medium     Encounter for long-term current use of medication 2011     Priority: Medium     Problem list name updated by automated process. Provider to review               Past Surgical History:     Past Surgical History:   Procedure Laterality Date     FOOT SURGERY  , 10/2010    Bunionectomy             Social History:     Social History     Tobacco Use     Smoking status: Former Smoker     Last attempt to quit: 1985     Years since quittin.8     Smokeless tobacco: Never Used   Substance Use Topics     Alcohol use: Yes     Comment: glass of wine daily             Family History:   No family history on file.         Allergies:   No Known Allergies          Medications:     Current Outpatient Medications   Medication Sig Dispense Refill     BASAGLAR 100 UNIT/ML injection Inject 15 Units Subcutaneous daily In case of pump failure (Patient not taking: Reported on 2018) 15 mL 0     FARRAH CONTOUR test strip Use to test blood sugar 9 times daily or as directed. 900 strip 1     celecoxib  "(CELEBREX) 200 MG capsule Take 1 capsule (200 mg) by mouth daily May take twice daily during RA flares. 120 capsule 3     Continuous Blood Gluc Transmit (DEXCOM G6 TRANSMITTER) MISC 1 each every 3 months Change every 90 days 1 each 3     folic acid (FOLVITE) 1 MG tablet Take 2 tablets (2 mg) by mouth daily 180 tablet 3     golimumab (SIMPONI) 50 MG/0.5ML auto-injector pen INJECT THE CONTENTS OF ONE PEN (50MG) UNDER THE SKIN ONCE EVERY 21 DAYS 5 each 11     HUMALOG KWIKPEN 100 UNIT/ML soln Inject as needed approx 20 units daily 30 mL 1     INSULIN INFUSION PUMP        insulin lispro (HUMALOG PEN) 100 UNIT/ML injection Inject 1-8 Units Subcutaneous 4 times daily (before meals and nightly) As instructed (roughly 1 unit: 8 g carbohydrate) incase of pump failure.       insulin lispro (HUMALOG) 100 UNIT/ML injection Use with insulin pump approx. 65 units daily 60 mL 3     insulin pen needle (B-D U/F) 31G X 5 MM Inject 1 Units Subcutaneous 4 times daily (before meals and nightly) 100 each 1     leucovorin (WELLCOVORIN) 5 MG tablet Take 1 tablet (5 mg) by mouth once a week 12 hours after taking Methotrexate dose. 12 tablet 3     melatonin 5 MG tablet Take 5 mg by mouth nightly as needed for sleep       methotrexate 2.5 MG tablet Take 5 tablets once weekly 60 tablet 1     Pregabalin (LYRICA PO) Take 75 mg by mouth daily        zolpidem (AMBIEN) 10 MG tablet Take 10 mg by mouth nightly as needed.              Physical Exam:   /74   Pulse 77   Temp 97.7  F (36.5  C) (Oral)   Ht 1.676 m (5' 6\")   Wt 59.7 kg (131 lb 9.6 oz)   SpO2 99%   BMI 21.24 kg/m       Constitutional: WD-WN-WG cooperative   Eyes: nl conjunctiva, sclera   ENT: nl external ears, nose, throat. Healed anterior cervical scar   MS: No definite active synovitis on exam today. She has pain with finger curl and 1+T L 4th PIP without synovial thickening or deformity.  45 degrees dorsiflexion R wrist without swelling.  Tinel negative. No dactylitis, " tenosynovitis, enthesopathy   Skin: no nail pitting, alopecia, rash, nodules or lesions   Psych: nl affect.         Data:     Lab Results   Component Value Date    WBC 5.8 12/10/2018    WBC 4.4 05/17/2018    WBC 4.3 01/24/2018    HGB 12.7 12/10/2018    HGB 13.1 05/17/2018    HGB 13.4 01/24/2018    HCT 38.3 12/10/2018    HCT 39.5 05/17/2018    HCT 39.9 01/24/2018    MCV 93 12/10/2018    MCV 93 05/17/2018    MCV 93 01/24/2018     12/10/2018     05/17/2018     01/24/2018     Lab Results   Component Value Date    BUN 19 12/10/2012    BUN 16 08/28/2008     No components found for: SEDRATE  Lab Results   Component Value Date    TSH 0.91 06/11/2015    TSH 1.58 12/10/2012    TSH 1.57 09/12/2008     Lab Results   Component Value Date    AST 21 12/10/2018    AST 21 05/17/2018    AST 22 01/24/2018    ALT 31 12/10/2018    ALT 30 05/17/2018    ALT 32 01/24/2018    ALKPHOS 66 12/10/2012    ALKPHOS 101 08/28/2008     Reviewed Rheumatology lab flowsheet    Gilberto Walton

## 2019-06-05 NOTE — NURSING NOTE
"Chief Complaint   Patient presents with     RECHECK     RA     /74   Pulse 77   Temp 97.7  F (36.5  C) (Oral)   Ht 1.676 m (5' 6\")   Wt 59.7 kg (131 lb 9.6 oz)   SpO2 99%   BMI 21.24 kg/m    Ange Watts CMA    "

## 2019-06-05 NOTE — TELEPHONE ENCOUNTER
Forms received from: [employer]      Forms were for pump supplies     Faxed Date:    In Dr. Bills box for signature 6/5/19

## 2019-06-05 NOTE — PROGRESS NOTES
Rheumatology Visit     Ruba Major MRN# [id number]   YOB: 1955 Age: 64 year old     Date of Visit: June 5, 2019  Primary care provider: Eduarda Arredondo          Assessment and Plan:   1. 65 yo female with RA, CCP+: She had more active disease which was limiting on single agent Methotrexate. I believe that regarding the inflammatory component of her joint pain after 6 months of Humira she was improved but not in a remission, able to play tennis and golf with symptoms. No overt synovitis was noted. We switched to Enbrel to attempt to achieve a better response and hopefully remission. Unfortunately she did not respond at 3 months even partially to this.       She continues to be improved on Simponi without side effects. She had some waning at 3-3.5 weeks and is doing better on every 3 week dosing. She does have mild weekly SE on Methotrexate that are very stable now with dose reduction to 12.5 mg weekly.      Assessment of response of her RA has been complicated by concomitant DJD affecting first CMC joints and her feet. She is much better now so it would seem inflammation was clearly playing a part. She did have more pain with the Celebrex being held due to stomach inflammation seen on EGD. She is going back on it regularly with Prilosec daily.    She had bilateral arthroscopic knee surgery, most recently on R. She however is limited and can no longer play tennis, although can golf.  She has a TKR scheduled at [facility] July 29th. She will do it in third week after Simponi.    She had cervical spine surgery.     Lab has been normal last in April     2. Type I DM on new insulin pump.   3. Possible peripheral neuropathy, now improved   4. DJD       PLAN: discussed in detail with the patient    1. She will continue Simponi q 3 weeks  2. Continue Methotrexate 12.5 mg weekly; continue folic acid and Leucovorin   3. Can continue Celebrex 200 mg daily and discussed dosing data and issues with GI. She will take  Prilosec regularly per her GI son-in-law  4. She had the flu shot last fall  5. Return in 6 months; lab in the interim as scheduled.  6. Call if problems occur      Gilberto Walton MD   Professor             History of Present Illness:   65 yo F is seen in followup for RA, CCP+. I saw her last in December 2018. EPIC reviewed.     HISTORY CARRIED FORWARD:     To review, patient initially presented in 5/2011 with c/o 7-8 year h/o fever, pain and swelling in bilateral wrists, 1st MCPs, Ankles, and 1st MTPs. She also noted prolonged morning stiffness, fatique, dry eyes, and ocular photosensativity. Also endorsed long history of oral aphthous ulcers. Initial labs revealed negative RF, SSA, SSB, TTG, complements, and thyroid studies, however CCP was positive (127). Recent CCP again is positive.  She is currently being managed with Methotrexate 15 mg q week with Leucovorin and Folic acid. Humira added in July 2012 and switched to Enbrel at visit in March 2013. See correspondence. Due to insurance coverage Enbrel was substituted for original consideration of Simponi. With no response to Enbrel it was switched to Simponi in summer 2013.   She was significantly improved at less than 3 months and this continues. At prior visit she did note that variably some months she had more sx at about three weeks. She took the last dose early and she noted immediate improvement. Since then she continues to take it around 23 days and continues to do better. She has minimal sx now except in her L knee which has had arthroscopy and has known DJD. She notes minimal swelling. Her feet are stable with minimal residual hand pain. No systemic symptoms. She is taking Celebrex at 200 mg just daily now. No SE.  She had interval cervical spine surgery and is healed. She has some residual L arm symptoms that are not clearly related, perhaps Rotator cuff; she is in PT.   On Humira she had no rashes or injection site reactions. Enbrel had a little more  burning in thigh injection site but did not seem to help.       INTERVAL HISTORY:      She had called in January and we did hand/wrist xrays that again showed no erosive disease but severe first CMC arthritis as before.     The [place] has no pain with injection.  She remains on it every 21 days.      She had GI symptoms in the Spring and ended up having an EGD that showed no ulcer but some inflammation. She went off Celebrex but is more symptomatic.  She is now taking occasionally.  No GI sx now.  Her son in law is a Gastroenterologist and suggested she go back on it with Prilosec daily. We discussed the data behind this.     The patient was initially having significant GI side effects related to the methotrexate, and was started on leucovorin 5mg q week with good resolution. She was having more symptoms on the days she takes methotrexate. She misses some Leucovorin doses. She cut down herself to 12.5 mg weekly MTX. No significant flares.      We discussed any peripheral symptoms again at length.  There is little if any change although more R hand pain in just the last week. She thinks her rings are  fitting the same. She has less extension of R wrist.  Feet are stable.     She had another R knee arthroscopy at [facility] in August 2018 to hopefully delay TKA.  She did have a torn cartilage but surgeon stated her knee has significant damage.  She is going to have a TKR July 29.    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      She had interval lab in December here and in February in [place] and at [facility] in April which was normal or stable. CRP normal.     She spends a lot of time in [place].     She saw Dr. Williamson and her Diabetes care is now managed here in Endocrine.  She has a new pump.     ROS otherwise negative.          Review of Systems:   Review Of Systems  Skin: negative  Eyes: negative  Ears/Nose/Throat: negative  Respiratory: No shortness of breath, dyspnea on exertion, cough, or  hemoptysis  Cardiovascular: negative  Gastrointestinal: negative  Genitourinary: negative  Musculoskeletal: see HPI  Neurologic: negative  Psychiatric: negative  Hematologic/Lymphatic/Immunologic: negative  Endocrine: DM followed in Endo          Past Medical History:     Past Medical History:   Diagnosis Date     Rheumatoid arthritis(714.0)      Type II or unspecified type diabetes mellitus without mention of complication, not stated as uncontrolled        Patient Active Problem List    Diagnosis Date Noted     Type 1 diabetes mellitus without complication (H) 2018     Priority: Medium     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 factor (H) 2016     Priority: Medium     Diabetes mellitus (H) 2013     Priority: Medium     Muscle cramps 12/10/2012     Priority: Medium     Encounter for long-term current use of medication 2011     Priority: Medium     Problem list name updated by automated process. Provider to review               Past Surgical History:     Past Surgical History:   Procedure Laterality Date     FOOT SURGERY  , 10/2010    Bunionectomy             Social History:     Social History     Tobacco Use     Smoking status: Former Smoker     Last attempt to quit: 1985     Years since quittin.8     Smokeless tobacco: Never Used   Substance Use Topics     Alcohol use: Yes     Comment: glass of wine daily             Family History:   No family history on file.         Allergies:   No Known Allergies          Medications:     Current Outpatient Medications   Medication Sig Dispense Refill     BASAGLAR 100 UNIT/ML injection Inject 15 Units Subcutaneous daily In case of pump failure (Patient not taking: Reported on 2018) 15 mL 0     FARRAH CONTOUR test strip Use to test blood sugar 9 times daily or as directed. 900 strip 1     celecoxib (CELEBREX) 200 MG capsule Take 1 capsule (200 mg) by mouth daily May take twice daily during RA flares. 120  "capsule 3     Continuous Blood Gluc Transmit (DEXCOM G6 TRANSMITTER) MISC 1 each every 3 months Change every 90 days 1 each 3     folic acid (FOLVITE) 1 MG tablet Take 2 tablets (2 mg) by mouth daily 180 tablet 3     golimumab (SIMPONI) 50 MG/0.5ML auto-injector pen INJECT THE CONTENTS OF ONE PEN (50MG) UNDER THE SKIN ONCE EVERY 21 DAYS 5 each 11     HUMALOG KWIKPEN 100 UNIT/ML soln Inject as needed approx 20 units daily 30 mL 1     INSULIN INFUSION PUMP        insulin lispro (HUMALOG PEN) 100 UNIT/ML injection Inject 1-8 Units Subcutaneous 4 times daily (before meals and nightly) As instructed (roughly 1 unit: 8 g carbohydrate) incase of pump failure.       insulin lispro (HUMALOG) 100 UNIT/ML injection Use with insulin pump approx. 65 units daily 60 mL 3     insulin pen needle (B-D U/F) 31G X 5 MM Inject 1 Units Subcutaneous 4 times daily (before meals and nightly) 100 each 1     leucovorin (WELLCOVORIN) 5 MG tablet Take 1 tablet (5 mg) by mouth once a week 12 hours after taking Methotrexate dose. 12 tablet 3     melatonin 5 MG tablet Take 5 mg by mouth nightly as needed for sleep       methotrexate 2.5 MG tablet Take 5 tablets once weekly 60 tablet 1     Pregabalin (LYRICA PO) Take 75 mg by mouth daily        zolpidem (AMBIEN) 10 MG tablet Take 10 mg by mouth nightly as needed.              Physical Exam:   /74   Pulse 77   Temp 97.7  F (36.5  C) (Oral)   Ht 1.676 m (5' 6\")   Wt 59.7 kg (131 lb 9.6 oz)   SpO2 99%   BMI 21.24 kg/m      Constitutional: WD-WN-WG cooperative   Eyes: nl conjunctiva, sclera   ENT: nl external ears, nose, throat. Healed anterior cervical scar   MS: No definite active synovitis on exam today. She has pain with finger curl and 1+T L 4th PIP without synovial thickening or deformity.  45 degrees dorsiflexion R wrist without swelling.  Tinel negative. No dactylitis, tenosynovitis, enthesopathy   Skin: no nail pitting, alopecia, rash, nodules or lesions   Psych: nl affect.         " Data:     Lab Results   Component Value Date    WBC 5.8 12/10/2018    WBC 4.4 05/17/2018    WBC 4.3 01/24/2018    HGB 12.7 12/10/2018    HGB 13.1 05/17/2018    HGB 13.4 01/24/2018    HCT 38.3 12/10/2018    HCT 39.5 05/17/2018    HCT 39.9 01/24/2018    MCV 93 12/10/2018    MCV 93 05/17/2018    MCV 93 01/24/2018     12/10/2018     05/17/2018     01/24/2018     Lab Results   Component Value Date    BUN 19 12/10/2012    BUN 16 08/28/2008     No components found for: SEDRATE  Lab Results   Component Value Date    TSH 0.91 06/11/2015    TSH 1.58 12/10/2012    TSH 1.57 09/12/2008     Lab Results   Component Value Date    AST 21 12/10/2018    AST 21 05/17/2018    AST 22 01/24/2018    ALT 31 12/10/2018    ALT 30 05/17/2018    ALT 32 01/24/2018    ALKPHOS 66 12/10/2012    ALKPHOS 101 08/28/2008     Reviewed Rheumatology lab flowsheet    Gilberto Walton

## 2019-06-06 NOTE — TELEPHONE ENCOUNTER
Spoke with Ruba Gilbert sign form and faxed over to [phone] and also emailed form to [email or web address] also gave Ruba my direct number to give to [employer] rep [phone].

## 2019-06-06 NOTE — TELEPHONE ENCOUNTER
[facility] Call Center    Phone Message    May a detailed message be left on voicemail: yes    Reason for Call: Other: per pt- is wondering about the status of her forms for [employer] supplies- pt is very upset and would like a call back asap, thanks!     Action Taken: Message routed to:  [facility] ([facility] endocrine

## 2019-06-11 ENCOUNTER — ALLIED HEALTH/NURSE VISIT (OUTPATIENT)
Dept: EDUCATION SERVICES | Facility: CLINIC | Age: 64
End: 2019-06-11
Attending: INTERNAL MEDICINE
Payer: COMMERCIAL

## 2019-06-11 DIAGNOSIS — E10.9 TYPE 1 DIABETES MELLITUS WITHOUT COMPLICATION (H): Primary | ICD-10-CM

## 2019-06-11 NOTE — Clinical Note
Margarito Hair.  The basal rate changes you made appear to be working.  She continues to eat a super-low carb diet and is bolusing after eating, so I couldn't determine what changes to make to her ICR.  She eats an incredibly low carb diet--apparently the entire family lives on salads and protein only, which accounts for the imbalance in her basal-bolus ratio.

## 2019-06-12 NOTE — PROGRESS NOTES
"Diabetes Self-Management Education & Support    Diabetes Education Self Management & Training    SUBJECTIVE/OBJECTIVE:     Cultural Influences/Ethnic Background:  American  Type 1 Diabetes since age 53.    Former .   Retired.  .  2 daughters and 5 grandchildren.   Currently wearing [employer] X2 insulin pump with Dexcom G6 CGM.    Diabetes Symptoms & Complications     Patient Problem List and Family Medical History reviewed for relevant medical history, current medical status, and diabetes risk factors.    Vitals:  There were no vitals taken for this visit.  Estimated body mass index is 21.24 kg/m  as calculated from the following:    Height as of 6/5/19: 1.676 m (5' 6\").    Weight as of 6/5/19: 59.7 kg (131 lb 9.6 oz).   Last 3 BP:   BP Readings from Last 3 Encounters:   06/05/19 129/74   05/14/19 111/68   12/10/18 127/75       History   Smoking Status     Former Smoker     Quit date: 7/25/1985   Smokeless Tobacco     Never Used       Labs:  Lab Results   Component Value Date    A1C 8.2 05/23/2013     Lab Results   Component Value Date     12/10/2012     Lab Results   Component Value Date    LDL 78 05/17/2018     HDL Cholesterol   Date Value Ref Range Status   05/17/2018 81 >49 mg/dL Final   ]  GFR Estimate   Date Value Ref Range Status   12/10/2018 87 >60 mL/min/1.7m2 Final     Comment:     Non  GFR Calc     GFR Estimate If Black   Date Value Ref Range Status   12/10/2018 >90 >60 mL/min/1.7m2 Final     Comment:      GFR Calc     Lab Results   Component Value Date    CR 0.68 12/10/2018     Taking Medications  Diabetes Medication(s)     Insulin       BASAGLAR 100 UNIT/ML injection    Inject 15 Units Subcutaneous daily In case of pump failure     Patient not taking:  Reported on 7/25/2018     HUMALOG KWIKPEN 100 UNIT/ML soln    Inject as needed approx 20 units daily     insulin lispro (HUMALOG PEN) 100 UNIT/ML injection    Inject 1-8 Units Subcutaneous 4 times " daily (before meals and nightly) As instructed (roughly 1 unit: 8 g carbohydrate) incase of pump failure.     insulin lispro (HUMALOG) 100 UNIT/ML injection    Use with insulin pump approx. 65 units daily        ASSESSMENT:    Saw Dr. Bills on May 14 and basal changes were made at that time (reduced).  Since then she feels that she is having much less hypoglycemia.  Her Dexcom report follows:          PUMP SETTINGS  Start time Basal Rate U/hr ICR  1 unit/gm Correction Factor Target BG    Midnight 0.35 12 55 140   06:00AM 1.45 10 45 100   10:00AM 0.875 12 55 120   05:30PM 0.875 10 55 120   08:30PM 0.875 12 55 120   09:00PM 0.875 12 55 140   10:00PM 0.350 12 55 140   Active Insulin Time:  3 hours  Maximum Bolus:  10  Reservoir Warnin    DEXCOM CGM SETTINGS    High Alert High Snooze Low Alert Low Snooze Rise Rate Fall Rate    220 OFF 70 OFF OFF OFF     She has been happy that her overnight lows have gone away.    Her basal to bolus ratio is off-balanced with 80% of her insulin coming from basal and 20% from bolus.  She reports that not only she, but her entire family is eating an extremely low carb diet.  In fact she estimates that she eats between 15 and 30 grams of CHO at each meal and in fact the average number of carbs eaten per day is only 31 grams.  She verifies that this would be about right.  This would account for the imbalance in her insulin delivery.  She is still having some excursions post meal, nonetheless, however she states that she continues to deliver her insulin after eating, rather than before, because she states she is never sure about what she is going to eat.      She is frustrated with some of the alarms she is receiving from her insulin pump/related to sensor loss of signal, particularly at night when she is asleep.   She has been wearing the sensor on her hip and then laying on it, which is reducing the transmission.  She wants to try wearing it on her arm.        Patient's most recent    Lab Results   Component Value Date    A1C 6.7 05/14/2019    is meeting goal of <7.0    INTERVENTION:   Diabetes knowledge and skills assessment:     Patient is knowledgeable in diabetes management concepts related to: Healthy Eating, Being Active, Monitoring, Taking Medication, Problem Solving, Reducing Risks and Healthy Coping    Based on learning assessment above, most appropriate setting for further diabetes education would be: Individual setting.    Education provided today:    Assisted with placing the sensor into her upper arm.  She was able to do this with minimal assistance.  She would like to be able to not have the sensor paired with her phone, as she finds that the dual alarms, etc are annoying.  She has most of the alarms in her phone turned off, but because of the way the [employer] works, she has to have her phone with her constantly as well as her pump and she would rather not have to carry the phone everywhere as she is quite active and as soon as her total knee surgery is done, she'd like to get back to golfing and playing tennis.  She is very pleased with the better control that she has been able to achieve using the Dexcom. Hypoglycemia at this point is minimal.  Because of her practice of bolusing after eating rather than before, it's difficult to determine what, if any, changes need to be made to her ICR.  Encouraged her to at least bolus for a minimal amount of carbohydrate (say 5 grams) 15 minutes prior to eating, as well as any correction she needs.  She said she will try.    Opportunities for ongoing education and support in diabetes-self management were discussed.    Pt verbalized understanding of concepts discussed and recommendations provided today.       PLAN:  See Patient Instructions for co-developed, patient-stated behavior change goals.  AVS printed and provided to patient today. See Follow-Up section for recommended follow-up.  Follow up with Dr. Bills as scheduled.   Will  contact [employer] to determine the best method of un-coupling her phone from her transmitter and will contact her with this information.      Time Spent: 60 minutes  Encounter Type: Individual    Any diabetes medication dose changes were made via the CDE Protocol and Collaborative Practice Agreement with the patient's referring provider. A copy of this encounter was shared with the provider.

## 2019-07-02 DIAGNOSIS — E10.9 TYPE 1 DIABETES MELLITUS WITHOUT COMPLICATION (H): ICD-10-CM

## 2019-07-08 RX ORDER — PROCHLORPERAZINE 25 MG/1
1 SUPPOSITORY RECTAL
Qty: 1 EACH | Refills: 3 | Status: SHIPPED | OUTPATIENT
Start: 2019-07-08 | End: 2020-07-27

## 2019-07-08 RX ORDER — PROCHLORPERAZINE 25 MG/1
1 SUPPOSITORY RECTAL
Qty: 9 EACH | Refills: 3 | Status: SHIPPED | OUTPATIENT
Start: 2019-07-08 | End: 2020-07-30

## 2019-07-22 ENCOUNTER — TELEPHONE (OUTPATIENT)
Dept: RHEUMATOLOGY | Facility: CLINIC | Age: 64
End: 2019-07-22

## 2019-07-22 NOTE — TELEPHONE ENCOUNTER
[facility] Prior Authorization Team   Phone: [phone]  Fax: [phone]    PA Initiation    Medication: Simponi 50mg/0.5ml auto-injector  Insurance Company: [employer] - Phone [phone] Fax [phone]  Pharmacy Filling the Rx: [facility] - [place], [place] - [facility] [address]  Filling Pharmacy Phone: [phone]  Filling Pharmacy Fax: [phone]  Start Date: 7/22/2019

## 2019-07-23 NOTE — TELEPHONE ENCOUNTER
Insurance called PA team needing more information. Answers were relayed via phone.    Was patient seen by a specialist in the last 12 months?  Yes    Is the patient benefiting from the use of this medication?  Yes

## 2019-08-07 NOTE — TELEPHONE ENCOUNTER
MEDICATION APPEAL APPROVED    Medication: Simponi 50mg/0.5ml auto-injector  Authorization Effective Date: 6/22/2019  Authorization Expiration Date: 7/22/2020  Approved Dose/Quantity: ud  Reference #: [id number]   Insurance Company:    Expected CoPay: $0     CoPay Card Available:      Foundation Assistance Needed:    Which Pharmacy is filling the prescription (Not needed for infusion/clinic administered): [facility] - [place], [place] - [address]

## 2019-08-09 DIAGNOSIS — E10.69 TYPE 1 DIABETES MELLITUS WITH OTHER SPECIFIED COMPLICATION (H): ICD-10-CM

## 2019-08-09 NOTE — TELEPHONE ENCOUNTER
insulin lispro (HUMALOG) 100 UNIT/ML injection        Last Written Prescription Date:  10/09/18  Last Fill Quantity: 60mL,   # refills: 3  Last Office Visit : 05/14/19  Future Office visit:  11/04/19    Routing refill request to provider for review/approval because:  Insulin - refilled per clinic

## 2019-08-09 NOTE — TELEPHONE ENCOUNTER
Short Acting Insulin Protocol Failed8/9 8:09 AM   LDL on file in past 12 months    Microalbumin on file in past 12 months     rtc appt in place 09/27/2019.  Edwina Monet RN on 8/9/2019 at 9:10 AM

## 2019-09-10 ENCOUNTER — OFFICE VISIT (OUTPATIENT)
Dept: RHEUMATOLOGY | Facility: CLINIC | Age: 64
End: 2019-09-10
Attending: INTERNAL MEDICINE
Payer: COMMERCIAL

## 2019-09-10 VITALS
BODY MASS INDEX: 20.89 KG/M2 | HEART RATE: 81 BPM | OXYGEN SATURATION: 99 % | DIASTOLIC BLOOD PRESSURE: 76 MMHG | HEIGHT: 66 IN | TEMPERATURE: 98.2 F | SYSTOLIC BLOOD PRESSURE: 133 MMHG | WEIGHT: 130 LBS

## 2019-09-10 DIAGNOSIS — Z79.899 ENCOUNTER FOR LONG-TERM CURRENT USE OF MEDICATION: ICD-10-CM

## 2019-09-10 DIAGNOSIS — M05.741 RHEUMATOID ARTHRITIS INVOLVING BOTH HANDS WITH POSITIVE RHEUMATOID FACTOR (H): ICD-10-CM

## 2019-09-10 DIAGNOSIS — M05.742 RHEUMATOID ARTHRITIS INVOLVING BOTH HANDS WITH POSITIVE RHEUMATOID FACTOR (H): ICD-10-CM

## 2019-09-10 LAB
ALBUMIN SERPL-MCNC: 4.2 G/DL (ref 3.4–5)
ALBUMIN UR-MCNC: NEGATIVE MG/DL
ALT SERPL W P-5'-P-CCNC: 22 U/L (ref 0–50)
APPEARANCE UR: ABNORMAL
AST SERPL W P-5'-P-CCNC: 13 U/L (ref 0–45)
BILIRUB UR QL STRIP: NEGATIVE
COLOR UR AUTO: YELLOW
CREAT SERPL-MCNC: 0.62 MG/DL (ref 0.52–1.04)
CRP SERPL-MCNC: <2.9 MG/L (ref 0–8)
ERYTHROCYTE [DISTWIDTH] IN BLOOD BY AUTOMATED COUNT: 12.9 % (ref 10–15)
GFR SERPL CREATININE-BSD FRML MDRD: >90 ML/MIN/{1.73_M2}
GLUCOSE UR STRIP-MCNC: NEGATIVE MG/DL
HCT VFR BLD AUTO: 39.2 % (ref 35–47)
HGB BLD-MCNC: 12.6 G/DL (ref 11.7–15.7)
HGB UR QL STRIP: NEGATIVE
KETONES UR STRIP-MCNC: 5 MG/DL
LEUKOCYTE ESTERASE UR QL STRIP: NEGATIVE
MCH RBC QN AUTO: 30.6 PG (ref 26.5–33)
MCHC RBC AUTO-ENTMCNC: 32.1 G/DL (ref 31.5–36.5)
MCV RBC AUTO: 95 FL (ref 78–100)
MUCOUS THREADS #/AREA URNS LPF: PRESENT /LPF
NITRATE UR QL: NEGATIVE
PH UR STRIP: 5 PH (ref 5–7)
PLATELET # BLD AUTO: 301 10E9/L (ref 150–450)
RBC # BLD AUTO: 4.12 10E12/L (ref 3.8–5.2)
RBC #/AREA URNS AUTO: 3 /HPF (ref 0–2)
SOURCE: ABNORMAL
SP GR UR STRIP: 1.02 (ref 1–1.03)
SQUAMOUS #/AREA URNS AUTO: 5 /HPF (ref 0–1)
UROBILINOGEN UR STRIP-MCNC: 0 MG/DL (ref 0–2)
WBC # BLD AUTO: 4.5 10E9/L (ref 4–11)
WBC #/AREA URNS AUTO: <1 /HPF (ref 0–5)

## 2019-09-10 PROCEDURE — 82565 ASSAY OF CREATININE: CPT | Performed by: INTERNAL MEDICINE

## 2019-09-10 PROCEDURE — 85027 COMPLETE CBC AUTOMATED: CPT | Performed by: INTERNAL MEDICINE

## 2019-09-10 PROCEDURE — 86140 C-REACTIVE PROTEIN: CPT | Performed by: INTERNAL MEDICINE

## 2019-09-10 PROCEDURE — G0463 HOSPITAL OUTPT CLINIC VISIT: HCPCS | Mod: ZF

## 2019-09-10 PROCEDURE — 36415 COLL VENOUS BLD VENIPUNCTURE: CPT | Performed by: INTERNAL MEDICINE

## 2019-09-10 PROCEDURE — 81001 URINALYSIS AUTO W/SCOPE: CPT | Performed by: INTERNAL MEDICINE

## 2019-09-10 PROCEDURE — 82040 ASSAY OF SERUM ALBUMIN: CPT | Performed by: INTERNAL MEDICINE

## 2019-09-10 PROCEDURE — 84460 ALANINE AMINO (ALT) (SGPT): CPT | Performed by: INTERNAL MEDICINE

## 2019-09-10 PROCEDURE — 84450 TRANSFERASE (AST) (SGOT): CPT | Performed by: INTERNAL MEDICINE

## 2019-09-10 PROCEDURE — 82306 VITAMIN D 25 HYDROXY: CPT | Performed by: INTERNAL MEDICINE

## 2019-09-10 RX ORDER — FOLIC ACID 1 MG/1
2 TABLET ORAL DAILY
Qty: 180 TABLET | Refills: 3 | Status: SHIPPED | OUTPATIENT
Start: 2019-09-10 | End: 2020-04-21

## 2019-09-10 ASSESSMENT — MIFFLIN-ST. JEOR: SCORE: 1156.18

## 2019-09-10 ASSESSMENT — PAIN SCALES - GENERAL: PAINLEVEL: MODERATE PAIN (5)

## 2019-09-10 NOTE — NURSING NOTE
"Chief Complaint   Patient presents with     RECHECK     Follow up 6 month     /76 (BP Location: Right arm, Patient Position: Sitting, Cuff Size: Adult Regular)   Pulse 81   Temp 98.2  F (36.8  C) (Oral)   Ht 1.676 m (5' 5.98\")   Wt 59 kg (130 lb)   SpO2 99%   BMI 20.99 kg/m    Enedina Barnes on 9/10/2019 at 9:45 AM    "

## 2019-09-10 NOTE — LETTER
9/10/2019      RE: Ruba Major  [address]  [facility] [address]       Ms. Major presents to my clinic to transfer care for seropositive rheumatoid arthritis affecting multiple joints. +CCP, -RF, -ROBERT. Failed Enbrel, Humira. No known erosions.      She reports a recent right TKA, and she complains of neuropathic pain radiating to the great toe, and numbness on that leg.  She complains of a flexion contracture of about 10 degrees and persistent quadriceps weakness.     Her rheumatoid arthritis is quiet. She has some aching in the hands with activity, sometimes with cramping. Occasional swelling of the hands, but no redness or warmth. No other joint deformities or dysfunction. She has some persistent left knee functional problems and pain.     Her celebrex therapy has been complicated by PUD. Now    Simponi 50 mg once every 3 week injection, is well tolerated and she feels this is well tolerated. No infection to report. Celebrex 200 mg use was complicated by gastric erosions, now with symptomatic improvement. Methotrexate 10 mg weekly is tolerated okay, but any more and she will have nausea. Folic 1 mg daily.     PMI:  Medical-osteopenia (T = -2.4 ), type 1 diabetes, rheumatoid arthritis, osteoarthritis of the hands, PUD, trigeminal neuralgia, hyperlipidemia, mild CAD by cardiac CT, sciatica  Surgical-cervical spine fusion surgery, multiple bilateral arthroscopic surgeries, right TKA, bilateral bunionectomy  Injuries-none    SH:  Retired . Lives in [facility] in the winter. 2 daughters. No tobacco, 1 EtOH daily.     FH:  Mother with spinal arthritis and DJD  Father dead with CAD and MI, AAA  Brother is fine  Daughter with celiac disease  Daughter is healthy    ROS:  +intolerance to statins  +muscle cramping  +nerve pain in the right foot  +intolerant of statins  Remainder of the 14 point ROS obtained and found negative.    Physical Exam:  Constitutional: WD-[id number]  cooperative  Eyes: nl EOM, PERRLA, vision, conjunctiva, sclera  ENT: nl external ears, nose, hearing, lips, teeth, gums, throat  Neck: no mass or thyroid enlargement  Resp: lungs clear to auscultation, nl to palpation, nl effort  CV: RRR, no murmurs, rubs or gallops, no edema  GI: no ABD mass or tenderness, no HSM  : not tested  Lymph: no cervical or epitrochlear nodes  MS: +left 1st MCP trace swelling; right knee with 10 degree contracture, flexion to 120 degrees, 2+ diffuse swelling, warmth, and well healed incisional scar; bunions with 1st MTP scars; All other TMJ, neck, shoulder, elbow, wrist, hand, spine, hip, knee, ankle, and foot joints were examined and otherwise found normal. Normal  strength, tuck and prayer;   Skin: no nail pitting, alopecia, rash  Neuro: nl cranial nerves, strength, sensation  Psych: nl judgement, orientation, memory, affect.    Laboratory:    Component      Latest Ref Rng & Units 5/13/2011 7/18/2012 12/10/2012 1/16/2013   M Tuberculosis Result      NEG       M Tuberculosis Antigen Value      IU/mL       ROBERT Screen by EIA      <1.0 <1.0 . . .      Hep B Surface Agn      NEG  Negative     Hepatitis C Antibody      NEG  Negative     Lupus Result      NEG   Negative . . .    Rheumatoid Factor      0 - 14 IU/mL    10   Cyclic Cit Pept IgG/IgA      <20 UNITS    25 (H)     Component      Latest Ref Rng & Units 3/6/2013   M Tuberculosis Result      NEG Negative   M Tuberculosis Antigen Value      IU/mL 0.00   ROBERT Screen by EIA      <1.0    Hep B Surface Agn      NEG    Hepatitis C Antibody      NEG    Lupus Result      NEG    Rheumatoid Factor      0 - 14 IU/mL    Cyclic Cit Pept IgG/IgA      <20 UNITS      Hepatic chemistry panel   Order: [id number]   (suggestion)  Information displayed in this report will not trend or trigger automated decision support.    Ref Range & Units Value   ALBUMIN 3.2 - 4.6 g/dL 4.4    PROTEIN,TOTAL 6.0 - 8.0 g/dL 6.9    GLOBULIN                  2.0 - 3.7 g/dL  2.5    A/G RATIO 1.0 - 2.0  1.8    BILIRUBIN,TOTAL 0.2 - 1.2 mg/dL 1.1    BILIRUBIN,DIRECT 0.1 - 0.5 mg/dL 0.4    BILIRUBIN,INDIRECT        0.2 - 0.8 mg/dL 0.7    ALK PHOSPHATASE 50 - 136 IU/L 66    ALT (SGPT) 8 - 45 IU/L 23    AST (SGOT) 2 - 40 IU/L 25      Contains abnormal data Basic Metabolic Panel   Order: [id number]   (suggestion)  Information displayed in this report will not trend or trigger automated decision support.    Ref Range & Units Value   Sodium 136 - 145 mmol/L 139    Potassium 3.5 - 5.1 mmol/L 3.6    Chloride 98 - 107 mmol/L 101    CO2 23.0 - 29.0 mmol/L 31.5High     Anion Gap 3 - 11 mmol/L 7    Glucose 70 - 105 mg/dL 89    BUN 7 - 25 mg/dL 13    Creatinine 0.6 - 1.2 mg/dL 0.6    BUN/Creatinine Ratio  Ratio 21.7    Osmolality Calc  mOsm/kg 277    Calcium 8.6 - 10.3 mg/dL 9.3    eGFR >60.00 mL/min/1.73m*2 96.64    Comment:    eGFR results <60 mL/min/1.73m sq are below the reference range.   Please note: Results below the reference range do not flag in the lab computer system.   The equation has not been validated in patients older than age 70, but an MDRD-derived eGFR may still be useful tool for providers caring for patients older than 70.   Resulting Agency       CBC with Auto Differential   Order: [id number]   (suggestion)  Information displayed in this report will not trend or trigger automated decision support.    Ref Range & Units Value   WBC 3.8 - 10.8 K/uL 4.1    RBC 4.18 - 5.22 M/uL 4.11Low     Hemoglobin 12.0 - 16.0 g/dL 12.5    Hematocrit 36.0 - 48.0 % 37.5    MCV 80.0 - 99.0 fL 91.2    MCH 27.0 - 33.7 pg 30.4    MCHC 32.0 - 37.0 gm/dL 33.3    RDW 11.5 - 14.5 % 13.2    Platelet Count 150 - 450 K/uL 219.0    MPV 8.9 - 12.3 fL 9.9    nRBC % 0.0 - 0.0 % 0.0    IANC 1.9 - 10.8 K/uL 1.8Low     Comment: The IANC is a preliminary analyzer ANC that is subject to change based on further analysis that may include results from a manual differential.    Granulocyte % 51.0 - 89.0 % 44.8Low      Lymphocytes % 10.0 - 40.0 % 45.6High     Monocytes % 4.0 - 14.0 % 8.9    Eosinophil % 0.0 - 10.0 % 0.5    Basophil % 0.0 - 3.0 % 0.2    Immuture Granulocyte % 0.0 - 1.0 % 0.0Low     Granulocyte Absolute 1.6 - 7.8 K/uL 1.8    Lymphocytes Absolute 1.2 - 3.7 K/uL 1.9    Monocytes Absolute 0.2 - 1.0 K/uL 0.4    Eosinophils Absolute 0.0 - 0.7 K/uL 0.0    Basophils Absolute 0.0 - 0.3 K/uL 0.0    Immature Granulocyte Absolute 0.0 - 1.1 K/uL 0.0    NRBC Absolute 0.0 - 0.0 K/uL 0.0    Resulting Agency  [facility] LABORAT[facility]         Study Result     3 views bilateral hand and wrist radiographs 1/14/2019 2:37 PM     History: Rheumatoid arthritis involving both hands with positive  rheumatoid factor (H); Rheumatoid arthritis involving both hands with  positive rheumatoid factor (H)     Comparison: X-ray 8/2/2016     Findings:     PA, oblique and lateral view(s) of the bilateral hand and wrist were  obtained.      Left:     No acute osseous abnormality.  No definite erosion.     Severe degenerative changes of the first carpometacarpal joint and  mild degenerative changes of triscaphe joint.  Additional scattered  minimal degenerative change in the interphalangeal joints,  particularly distally.     Soft tissue is unremarkable.     Right:     No acute osseous abnormality.  No definite erosion.     Severe degenerative changes of the first carpometacarpal joint and  mild degenerative changes of triscaphe joint.  Additional scattered  minimal degenerative change in the interphalangeal joints,  particularly distally.     Soft tissue is unremarkable.                                                                      Impression:  1. No definite erosions bilaterally.  2. Bilateral severe degenerative changes of the first carpometacarpal  joint, greater on the left side.     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 Now s/p right TKA and in rehab to  improve her flexion contracture. She has had very good control of symptoms with the combination of low dose methotrexate plus high dose simponi. There are no signs of active synovitis or evidence of disease progression. She tolerates these agents well and I will not change this regimen. Discontinue the leucovorin, but continue the folic acid.    Bone health-will check the vitamin D level today. She needs a repeat DEXA to establish the rate of bone mineral decline.     Long term use of immunosuppressive-with toxicity screening due. Recent diagnosis of PUD, and increased risk for CVD with metabolic disease and diabetes. Based on this I have advised discontinuation of the celebrex and substitution of tylenol 1 gm TID and she agrees with this plan.     Plan:  Inflammatory markers and vitamin D level  Complete methotrexate toxicity screening today and every 2-3 months  Repeat DEXA with new OB/Gyn  RTC in ~6 months      Asim Aly MD

## 2019-09-10 NOTE — LETTER
9/10/2019       RE: Ruba Major  [address]  [facility] [address]     Dear Colleague,    Thank you for referring your patient, Ruba Major, to the [facility] RHEUMATOLOGY at [facility] Please see a copy of my visit note below.    Ms. Major presents to my clinic to transfer care for seropositive rheumatoid arthritis affecting multiple joints. +CCP, -RF, -ROBERT. Failed Enbrel, Humira. No known erosions.      She reports a recent right TKA, and she complains of neuropathic pain radiating to the great toe, and numbness on that leg.  She complains of a flexion contracture of about 10 degrees and persistent quadriceps weakness.     Her rheumatoid arthritis is quiet. She has some aching in the hands with activity, sometimes with cramping. Occasional swelling of the hands, but no redness or warmth. No other joint deformities or dysfunction. She has some persistent left knee functional problems and pain.     Her celebrex therapy has been complicated by PUD. Now    Simponi 50 mg once every 3 week injection, is well tolerated and she feels this is well tolerated. No infection to report. Celebrex 200 mg use was complicated by gastric erosions, now with symptomatic improvement. Methotrexate 10 mg weekly is tolerated okay, but any more and she will have nausea. Folic 1 mg daily.     PMI:  Medical-osteopenia (T = -2.4 ), type 1 diabetes, rheumatoid arthritis, osteoarthritis of the hands, PUD, trigeminal neuralgia, hyperlipidemia, mild CAD by cardiac CT, sciatica  Surgical-cervical spine fusion surgery, multiple bilateral arthroscopic surgeries, right TKA, bilateral bunionectomy  Injuries-none    SH:  Retired . Lives in [facility] in the winter. 2 daughters. No tobacco, 1 EtOH daily.     FH:  Mother with spinal arthritis and DJD  Father dead with CAD and MI, AAA  Brother is fine  Daughter with celiac disease  Daughter is healthy    ROS:  +intolerance  to statins  +muscle cramping  +nerve pain in the right foot  +intolerant of statins  Remainder of the 14 point ROS obtained and found negative.    Physical Exam:  Constitutional: WD-WN-WG cooperative  Eyes: nl EOM, PERRLA, vision, conjunctiva, sclera  ENT: nl external ears, nose, hearing, lips, teeth, gums, throat  Neck: no mass or thyroid enlargement  Resp: lungs clear to auscultation, nl to palpation, nl effort  CV: RRR, no murmurs, rubs or gallops, no edema  GI: no ABD mass or tenderness, no HSM  : not tested  Lymph: no cervical or epitrochlear nodes  MS: +left 1st MCP trace swelling; right knee with 10 degree contracture, flexion to 120 degrees, 2+ diffuse swelling, warmth, and well healed incisional scar; bunions with 1st MTP scars; All other TMJ, neck, shoulder, elbow, wrist, hand, spine, hip, knee, ankle, and foot joints were examined and otherwise found normal. Normal  strength, tuck and prayer;   Skin: no nail pitting, alopecia, rash  Neuro: nl cranial nerves, strength, sensation  Psych: nl judgement, orientation, memory, affect.    Laboratory:    Component      Latest Ref Rng & Units 5/13/2011 7/18/2012 12/10/2012 1/16/2013   M Tuberculosis Result      NEG       M Tuberculosis Antigen Value      IU/mL       ROBERT Screen by EIA      <1.0 <1.0 . . .      Hep B Surface Agn      NEG  Negative     Hepatitis C Antibody      NEG  Negative     Lupus Result      NEG   Negative . . .    Rheumatoid Factor      0 - 14 IU/mL    10   Cyclic Cit Pept IgG/IgA      <20 UNITS    25 (H)     Component      Latest Ref Rng & Units 3/6/2013   M Tuberculosis Result      NEG Negative   M Tuberculosis Antigen Value      IU/mL 0.00   ROBERT Screen by EIA      <1.0    Hep B Surface Agn      NEG    Hepatitis C Antibody      NEG    Lupus Result      NEG    Rheumatoid Factor      0 - 14 IU/mL    Cyclic Cit Pept IgG/IgA      <20 UNITS      Hepatic chemistry panel   Order: [id number]   (suggestion)  Information displayed in this report  will not trend or trigger automated decision support.    Ref Range & Units Value   ALBUMIN 3.2 - 4.6 g/dL 4.4    PROTEIN,TOTAL 6.0 - 8.0 g/dL 6.9    GLOBULIN                  2.0 - 3.7 g/dL 2.5    A/G RATIO 1.0 - 2.0  1.8    BILIRUBIN,TOTAL 0.2 - 1.2 mg/dL 1.1    BILIRUBIN,DIRECT 0.1 - 0.5 mg/dL 0.4    BILIRUBIN,INDIRECT        0.2 - 0.8 mg/dL 0.7    ALK PHOSPHATASE 50 - 136 IU/L 66    ALT (SGPT) 8 - 45 IU/L 23    AST (SGOT) 2 - 40 IU/L 25      Contains abnormal data Basic Metabolic Panel   Order: [id number]   (suggestion)  Information displayed in this report will not trend or trigger automated decision support.    Ref Range & Units Value   Sodium 136 - 145 mmol/L 139    Potassium 3.5 - 5.1 mmol/L 3.6    Chloride 98 - 107 mmol/L 101    CO2 23.0 - 29.0 mmol/L 31.5High     Anion Gap 3 - 11 mmol/L 7    Glucose 70 - 105 mg/dL 89    BUN 7 - 25 mg/dL 13    Creatinine 0.6 - 1.2 mg/dL 0.6    BUN/Creatinine Ratio  Ratio 21.7    Osmolality Calc  mOsm/kg 277    Calcium 8.6 - 10.3 mg/dL 9.3    eGFR >60.00 mL/min/1.73m*2 96.64    Comment:    eGFR results <60 mL/min/1.73m sq are below the reference range.   Please note: Results below the reference range do not flag in the lab computer system.   The equation has not been validated in patients older than age 70, but an MDRD-derived eGFR may still be useful tool for providers caring for patients older than 70.   Resulting Agency       CBC with Auto Differential   Order: [id number]   (suggestion)  Information displayed in this report will not trend or trigger automated decision support.    Ref Range & Units Value   WBC 3.8 - 10.8 K/uL 4.1    RBC 4.18 - 5.22 M/uL 4.11Low     Hemoglobin 12.0 - 16.0 g/dL 12.5    Hematocrit 36.0 - 48.0 % 37.5    MCV 80.0 - 99.0 fL 91.2    MCH 27.0 - 33.7 pg 30.4    MCHC 32.0 - 37.0 gm/dL 33.3    RDW 11.5 - 14.5 % 13.2    Platelet Count 150 - 450 K/uL 219.0    MPV 8.9 - 12.3 fL 9.9    nRBC % 0.0 - 0.0 % 0.0    IANC 1.9 - 10.8 K/uL 1.8Low     Comment: The  IANC is a preliminary analyzer ANC that is subject to change based on further analysis that may include results from a manual differential.    Granulocyte % 51.0 - 89.0 % 44.8Low     Lymphocytes % 10.0 - 40.0 % 45.6High     Monocytes % 4.0 - 14.0 % 8.9    Eosinophil % 0.0 - 10.0 % 0.5    Basophil % 0.0 - 3.0 % 0.2    Immuture Granulocyte % 0.0 - 1.0 % 0.0Low     Granulocyte Absolute 1.6 - 7.8 K/uL 1.8    Lymphocytes Absolute 1.2 - 3.7 K/uL 1.9    Monocytes Absolute 0.2 - 1.0 K/uL 0.4    Eosinophils Absolute 0.0 - 0.7 K/uL 0.0    Basophils Absolute 0.0 - 0.3 K/uL 0.0    Immature Granulocyte Absolute 0.0 - 1.1 K/uL 0.0    NRBC Absolute 0.0 - 0.0 K/uL 0.0    Resulting Agency  [facility] LABORAT[facility]         Study Result     3 views bilateral hand and wrist radiographs 1/14/2019 2:37 PM     History: Rheumatoid arthritis involving both hands with positive  rheumatoid factor (H); Rheumatoid arthritis involving both hands with  positive rheumatoid factor (H)     Comparison: X-ray 8/2/2016     Findings:     PA, oblique and lateral view(s) of the bilateral hand and wrist were  obtained.      Left:     No acute osseous abnormality.  No definite erosion.     Severe degenerative changes of the first carpometacarpal joint and  mild degenerative changes of triscaphe joint.  Additional scattered  minimal degenerative change in the interphalangeal joints,  particularly distally.     Soft tissue is unremarkable.     Right:     No acute osseous abnormality.  No definite erosion.     Severe degenerative changes of the first carpometacarpal joint and  mild degenerative changes of triscaphe joint.  Additional scattered  minimal degenerative change in the interphalangeal joints,  particularly distally.     Soft tissue is unremarkable.                                                                      Impression:  1. No definite erosions bilaterally.  2. Bilateral severe degenerative changes of the first  carpometacarpal  joint, greater on the left side.     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 Now s/p right TKA and in rehab to improve her flexion contracture. She has had very good control of symptoms with the combination of low dose methotrexate plus high dose simponi. There are no signs of active synovitis or evidence of disease progression. She tolerates these agents well and I will not change this regimen. Discontinue the leucovorin, but continue the folic acid.    Bone health-will check the vitamin D level today. She needs a repeat DEXA to establish the rate of bone mineral decline.     Long term use of immunosuppressive-with toxicity screening due. Recent diagnosis of PUD, and increased risk for CVD with metabolic disease and diabetes. Based on this I have advised discontinuation of the celebrex and substitution of tylenol 1 gm TID and she agrees with this plan.     Plan:  Inflammatory markers and vitamin D level  Complete methotrexate toxicity screening today and every 2-3 months  Repeat DEXA with new OB/Gyn  RTC in ~6 months      Again, thank you for allowing me to participate in the care of your patient.      Sincerely,    Asim Aly MD

## 2019-09-10 NOTE — PROGRESS NOTES
Ms. Major presents to my clinic to transfer care for seropositive rheumatoid arthritis affecting multiple joints. +CCP, -RF, -ROBERT. Failed Enbrel, Humira. No known erosions.      She reports a recent right TKA, and she complains of neuropathic pain radiating to the great toe, and numbness on that leg.  She complains of a flexion contracture of about 10 degrees and persistent quadriceps weakness.     Her rheumatoid arthritis is quiet. She has some aching in the hands with activity, sometimes with cramping. Occasional swelling of the hands, but no redness or warmth. No other joint deformities or dysfunction. She has some persistent left knee functional problems and pain.     Her celebrex therapy has been complicated by PUD. Now    Simponi 50 mg once every 3 week injection, is well tolerated and she feels this is well tolerated. No infection to report. Celebrex 200 mg use was complicated by gastric erosions, now with symptomatic improvement. Methotrexate 10 mg weekly is tolerated okay, but any more and she will have nausea. Folic 1 mg daily.     PMI:  Medical-osteopenia (T = -2.4 ), type 1 diabetes, rheumatoid arthritis, osteoarthritis of the hands, PUD, trigeminal neuralgia, hyperlipidemia, mild CAD by cardiac CT, sciatica  Surgical-cervical spine fusion surgery, multiple bilateral arthroscopic surgeries, right TKA, bilateral bunionectomy  Injuries-none    SH:  Retired . Lives in [facility] in the winter. 2 daughters. No tobacco, 1 EtOH daily.     FH:  Mother with spinal arthritis and DJD  Father dead with CAD and MI, AAA  Brother is fine  Daughter with celiac disease  Daughter is healthy    ROS:  +intolerance to statins  +muscle cramping  +nerve pain in the right foot  +intolerant of statins  Remainder of the 14 point ROS obtained and found negative.    Physical Exam:  Constitutional: WD-[id number] cooperative  Eyes: nl EOM, PERRLA, vision, conjunctiva, sclera  ENT: nl external ears, nose,  hearing, lips, teeth, gums, throat  Neck: no mass or thyroid enlargement  Resp: lungs clear to auscultation, nl to palpation, nl effort  CV: RRR, no murmurs, rubs or gallops, no edema  GI: no ABD mass or tenderness, no HSM  : not tested  Lymph: no cervical or epitrochlear nodes  MS: +left 1st MCP trace swelling; right knee with 10 degree contracture, flexion to 120 degrees, 2+ diffuse swelling, warmth, and well healed incisional scar; bunions with 1st MTP scars; All other TMJ, neck, shoulder, elbow, wrist, hand, spine, hip, knee, ankle, and foot joints were examined and otherwise found normal. Normal  strength, tuck and prayer;   Skin: no nail pitting, alopecia, rash  Neuro: nl cranial nerves, strength, sensation  Psych: nl judgement, orientation, memory, affect.    Laboratory:    Component      Latest Ref Rng & Units 5/13/2011 7/18/2012 12/10/2012 1/16/2013   M Tuberculosis Result      NEG       M Tuberculosis Antigen Value      IU/mL       ROBERT Screen by EIA      <1.0 <1.0 . . .      Hep B Surface Agn      NEG  Negative     Hepatitis C Antibody      NEG  Negative     Lupus Result      NEG   Negative . . .    Rheumatoid Factor      0 - 14 IU/mL    10   Cyclic Cit Pept IgG/IgA      <20 UNITS    25 (H)     Component      Latest Ref Rng & Units 3/6/2013   M Tuberculosis Result      NEG Negative   M Tuberculosis Antigen Value      IU/mL 0.00   ROBERT Screen by EIA      <1.0    Hep B Surface Agn      NEG    Hepatitis C Antibody      NEG    Lupus Result      NEG    Rheumatoid Factor      0 - 14 IU/mL    Cyclic Cit Pept IgG/IgA      <20 UNITS      Hepatic chemistry panel   Order: [id number]   (suggestion)  Information displayed in this report will not trend or trigger automated decision support.    Ref Range & Units Value   ALBUMIN 3.2 - 4.6 g/dL 4.4    PROTEIN,TOTAL 6.0 - 8.0 g/dL 6.9    GLOBULIN                  2.0 - 3.7 g/dL 2.5    A/G RATIO 1.0 - 2.0  1.8    BILIRUBIN,TOTAL 0.2 - 1.2 mg/dL 1.1    BILIRUBIN,DIRECT  0.1 - 0.5 mg/dL 0.4    BILIRUBIN,INDIRECT        0.2 - 0.8 mg/dL 0.7    ALK PHOSPHATASE 50 - 136 IU/L 66    ALT (SGPT) 8 - 45 IU/L 23    AST (SGOT) 2 - 40 IU/L 25      Contains abnormal data Basic Metabolic Panel   Order: [id number]   (suggestion)  Information displayed in this report will not trend or trigger automated decision support.    Ref Range & Units Value   Sodium 136 - 145 mmol/L 139    Potassium 3.5 - 5.1 mmol/L 3.6    Chloride 98 - 107 mmol/L 101    CO2 23.0 - 29.0 mmol/L 31.5High     Anion Gap 3 - 11 mmol/L 7    Glucose 70 - 105 mg/dL 89    BUN 7 - 25 mg/dL 13    Creatinine 0.6 - 1.2 mg/dL 0.6    BUN/Creatinine Ratio  Ratio 21.7    Osmolality Calc  mOsm/kg 277    Calcium 8.6 - 10.3 mg/dL 9.3    eGFR >60.00 mL/min/1.73m*2 96.64    Comment:    eGFR results <60 mL/min/1.73m sq are below the reference range.   Please note: Results below the reference range do not flag in the lab computer system.   The equation has not been validated in patients older than age 70, but an MDRD-derived eGFR may still be useful tool for providers caring for patients older than 70.   Resulting Agency       CBC with Auto Differential   Order: [id number]   (suggestion)  Information displayed in this report will not trend or trigger automated decision support.    Ref Range & Units Value   WBC 3.8 - 10.8 K/uL 4.1    RBC 4.18 - 5.22 M/uL 4.11Low     Hemoglobin 12.0 - 16.0 g/dL 12.5    Hematocrit 36.0 - 48.0 % 37.5    MCV 80.0 - 99.0 fL 91.2    MCH 27.0 - 33.7 pg 30.4    MCHC 32.0 - 37.0 gm/dL 33.3    RDW 11.5 - 14.5 % 13.2    Platelet Count 150 - 450 K/uL 219.0    MPV 8.9 - 12.3 fL 9.9    nRBC % 0.0 - 0.0 % 0.0    IANC 1.9 - 10.8 K/uL 1.8Low     Comment: The IANC is a preliminary analyzer ANC that is subject to change based on further analysis that may include results from a manual differential.    Granulocyte % 51.0 - 89.0 % 44.8Low     Lymphocytes % 10.0 - 40.0 % 45.6High     Monocytes % 4.0 - 14.0 % 8.9    Eosinophil % 0.0 - 10.0 %  0.5    Basophil % 0.0 - 3.0 % 0.2    Immuture Granulocyte % 0.0 - 1.0 % 0.0Low     Granulocyte Absolute 1.6 - 7.8 K/uL 1.8    Lymphocytes Absolute 1.2 - 3.7 K/uL 1.9    Monocytes Absolute 0.2 - 1.0 K/uL 0.4    Eosinophils Absolute 0.0 - 0.7 K/uL 0.0    Basophils Absolute 0.0 - 0.3 K/uL 0.0    Immature Granulocyte Absolute 0.0 - 1.1 K/uL 0.0    NRBC Absolute 0.0 - 0.0 K/uL 0.0    Resulting Agency  [facility]ORATORY         Study Result     3 views bilateral hand and wrist radiographs 1/14/2019 2:37 PM     History: Rheumatoid arthritis involving both hands with positive  rheumatoid factor (H); Rheumatoid arthritis involving both hands with  positive rheumatoid factor (H)     Comparison: X-ray 8/2/2016     Findings:     PA, oblique and lateral view(s) of the bilateral hand and wrist were  obtained.      Left:     No acute osseous abnormality.  No definite erosion.     Severe degenerative changes of the first carpometacarpal joint and  mild degenerative changes of triscaphe joint.  Additional scattered  minimal degenerative change in the interphalangeal joints,  particularly distally.     Soft tissue is unremarkable.     Right:     No acute osseous abnormality.  No definite erosion.     Severe degenerative changes of the first carpometacarpal joint and  mild degenerative changes of triscaphe joint.  Additional scattered  minimal degenerative change in the interphalangeal joints,  particularly distally.     Soft tissue is unremarkable.                                                                      Impression:  1. No definite erosions bilaterally.  2. Bilateral severe degenerative changes of the first carpometacarpal  joint, greater on the left side.     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 Now s/p right TKA and in rehab to improve her flexion contracture. She has had very good control of symptoms with the combination of low dose  methotrexate plus high dose simponi. There are no signs of active synovitis or evidence of disease progression. She tolerates these agents well and I will not change this regimen. Discontinue the leucovorin, but continue the folic acid.    Bone health-will check the vitamin D level today. She needs a repeat DEXA to establish the rate of bone mineral decline.     Long term use of immunosuppressive-with toxicity screening due. Recent diagnosis of PUD, and increased risk for CVD with metabolic disease and diabetes. Based on this I have advised discontinuation of the celebrex and substitution of tylenol 1 gm TID and she agrees with this plan.     Plan:  Inflammatory markers and vitamin D level  Complete methotrexate toxicity screening today and every 2-3 months  Repeat DEXA with new OB/Gyn  RTC in ~6 months

## 2019-09-16 LAB
DEPRECATED CALCIDIOL+CALCIFEROL SERPL-MC: <33 UG/L (ref 20–75)
VITAMIN D2 SERPL-MCNC: <5 UG/L
VITAMIN D3 SERPL-MCNC: 28 UG/L

## 2019-10-01 ENCOUNTER — HEALTH MAINTENANCE LETTER (OUTPATIENT)
Age: 64
End: 2019-10-01

## 2019-10-02 ENCOUNTER — DOCUMENTATION ONLY (OUTPATIENT)
Dept: ENDOCRINOLOGY | Facility: CLINIC | Age: 64
End: 2019-10-02

## 2019-11-05 ENCOUNTER — OFFICE VISIT (OUTPATIENT)
Dept: ENDOCRINOLOGY | Facility: CLINIC | Age: 64
End: 2019-11-05
Payer: COMMERCIAL

## 2019-11-05 VITALS
WEIGHT: 130 LBS | DIASTOLIC BLOOD PRESSURE: 77 MMHG | SYSTOLIC BLOOD PRESSURE: 135 MMHG | HEART RATE: 76 BPM | HEIGHT: 66 IN | BODY MASS INDEX: 20.89 KG/M2

## 2019-11-05 DIAGNOSIS — E10.9 TYPE 1 DIABETES MELLITUS WITHOUT COMPLICATION (H): Primary | ICD-10-CM

## 2019-11-05 LAB — HBA1C MFR BLD: 7.1 % (ref 4.3–6)

## 2019-11-05 RX ORDER — INSULIN GLARGINE 100 [IU]/ML
10 INJECTION, SOLUTION SUBCUTANEOUS DAILY
Qty: 15 ML | Refills: 0 | Status: SHIPPED | OUTPATIENT
Start: 2019-11-05 | End: 2021-12-03

## 2019-11-05 RX ORDER — PEN NEEDLE, DIABETIC 30 GX5/16"
1 NEEDLE, DISPOSABLE MISCELLANEOUS
Qty: 150 EACH | Refills: 1 | Status: SHIPPED | OUTPATIENT
Start: 2019-11-05

## 2019-11-05 RX ORDER — AMITRIPTYLINE HYDROCHLORIDE 10 MG/1
20 TABLET ORAL AT BEDTIME
COMMUNITY
Start: 2019-11-03

## 2019-11-05 RX ORDER — INSULIN LISPRO 100 [IU]/ML
INJECTION, SOLUTION INTRAVENOUS; SUBCUTANEOUS
Qty: 30 ML | Refills: 1 | Status: SHIPPED | OUTPATIENT
Start: 2019-11-05 | End: 2024-09-10

## 2019-11-05 ASSESSMENT — PATIENT HEALTH QUESTIONNAIRE - PHQ9: SUM OF ALL RESPONSES TO PHQ QUESTIONS 1-9: 0

## 2019-11-05 ASSESSMENT — MIFFLIN-ST. JEOR: SCORE: 1156.43

## 2019-11-05 NOTE — NURSING NOTE
Chief Complaint   Patient presents with     RECHECK     Type 1 Diabetes     Capillary puncture performed for Hemoglobin A1C test. Patient tolerated well.    Jazmyne Arriaga MA

## 2019-11-05 NOTE — PATIENT INSTRUCTIONS
It is always good to see you!  I am glad that you continue to avoid overnight lows.  Today we changed you basal rate in the morning so that you continue to receive a low basal rate of just 0.35 u/hour until 8 am then rising just slightly.  We did not change your carbohydrate ratio. If BG still rises too much after breakfast, please look at increasing carbohydrate ratio at 8 am time block to just 1 unit : 9 grams of carbohydrate.    Enjoy your travels!    My best wishes,    Marbella Min PA-C, MPAS  [facility]  Diabetes, Endocrinology, and Metabolism  [phone] Appointments/Nurse  [phone] Fax  [phone] nurse line  [phone] URGENTafter hours/weekend Endocrinologist on call

## 2019-11-05 NOTE — LETTER
"11/5/2019       RE: Ruba Major  [address]  [place] [facility] [address]     Dear Colleague,    Thank you for referring your patient, Ruba Major, to the [facility] ENDOCRINOLOGY at [facility]. Please see a copy of my visit note below.    [facility]  Endocrinology  Marbella Min PA-C  11/05/2019      Chief Complaint:   Diabetes    History of Present Illness:   Ruba Major is a 64 year old female with a history of type 1 diabetes and rheumatoid arthritis who presents for diabetes follow-up.     Type 1 Diabetes   She was diagnosed with type 1 diabetes in 2008 by Dr. Veronica Bills after she presented with hyperglycemia in the absence of DKA. She wears a Tandem pump with Dexcom G6 sensor. She was last seen on 5/14/19 by Dr. Bills with A1c of 6.7% at goal, but with too many lows noted on review of blood sugars. Her basal settings were changed accordingly. She met with diabetes education in June 2019 and reported overnight lows resolved.     Interval History:   A1c of 7.1% today. She had a right knee replacement in July 2019 and reports difficulty controlling her BG then, due to pain and lying down often for recovery. She did have complications and used oxycodone for relief. She is recovered now, but continues to have lows overnight that lead to lows in the morning. She has been doing a temporary basal setting, giving 80% of her usually insulin for 9 hours, with lows in the morning nonetheless. She will eat something to fix this, but her BG will then shoot up and she is \"unable to catch it.\" She struggles to correct it, as her pump is already shut off when she wakes up. BG elevation occurs even if she drinks just coffee. She notes that she used to only have juice and 5-7 minutes later, her BG would be corrected. She notes if she does not do anything, her BG will eventually come back down. Recently with breakfast , she is unable to dose at all as pump suspended " "with low BG, leading to highs.  She reports \"not being a big eater\" and does not reach even 100g of carbs a day. Right after breakfast for which she cannot dose,  Pump will suggest to give 3.2 units, but she knows she will go low then. She feels at most she can take at one time is 2 units. She is in bed at 8:45pm and falls asleep at 10-10:30pm -- She does not eat after 7pm. She is waking up at 7-7:30am usually, which is later than prior to her surgery. She is going to [place] next week for several weeks and usually gets up earlier then to play golf. She worries she will go too low when she goes to the gym and plays golf in the mornings.     She has several upcoming vacations and requests a prescription for insulin pens to have on hand if her pump fails as well as review doses.       She reports frustration with getting in contact with Tandem and Dexcom.     She wears the pump on the back of her hip and the Dexcom on the LLQ of her abdomen. She does not get readings on her Dexcom if her phone is upstairs in her home. She likes looking at the BG on her phone versus having it connected to her pump. She notes always needing her phone on her, because her pump will not suspend if her phone is not connected to the pump. She was told to try disconnecting her phone, but she is apprehensive. She notes if she goes low, her watch, phone, and pump will all notify her.     She continues to have additional nerve problems. From the nerve block during surgery, she lost some feeling in her right lower leg and has some burning pain from her inner thigh to her foot. 2/3 providers advised her it may be from diabetes complications.  She does not believe it is from the diabetes, as her sugars have been well controlled and per her reading it related to surgery and nerve block.       Blood Glucose Monitoring:  We reviewed CGM (Dexcom G6) data together.    Average B mg/dL  SD: 51 mg/dL    Above range: 33%  In range: 64%  Below " range: 2%    She has general flat overnights with abrupt low BG beginning around 7 am.  Frequent BG rise after breakfats around 9 am.      Current Insulin Pump Settings After Adjustments Today:  Type of Pump: Tandem Pump with Dexcom G6 CGM sensor   BASAL RATES and times:   12   AM (midnight): 0.350 units/hour   8      AM: 0.5 units/hour ( down from 1.375 u/h 6am)   10    AM: 0.875 units/hour  5:30    PM: 0.875 units/hour   10   PM: 0.350 units/hour    Carb ratio:   CARB RATIO and times:  12   AM (midnight): 12  8     AM:  10  10   AM:  12  5:30    PM:  10  10   PM:  12  Correction Factor (Sensitivity) and times:  12   AM (midnight): 55 mg/dL  8     AM: 55 mg/dL (6 am 40 removed.)   10   AM: 55 mg/dL  5:30    PM: 55 mg/dL  10   PM: 55 mg/dL  Basal: 79%, Bolus 21%  Target BG Ranges:   BLOOD GLUCOSE TARGET and times:  12   AM (midnight): 140  8     AM:  140 (6am goal of 100 removed)  10   AM:  120  5:30    PM:  120  10   PM:  140    Average daily insulin: 14.7 units  Average daily basal: 11.5 units   Average daily bolus: 3.1%  Average carbs a day: 26g     Diabetes monitoring and complications:  CAD: No  Last eye exam results: UTD, per Dr. Bills's note on 5/14/19, no retinopathy   Microalbuminuria: negative on 1/23/18  Neuropathy: No  HTN: No  On Statin: No, muscle cramps with previous use   On Aspirin: No  Depression: No  Erectile dysfunction: N/A    Patient Supplied Answers To Diabetic Questionnaire  including 11/2/2019   1. Since your last visit to our clinic (or if this your first visit, since you last saw your primary care provider), have you experienced any of the following symptoms that may be related to low blood sugars? No, I have not experienced any of these symptoms   2. Since your last visit to our clinic (or if this your first visit, since you last saw your primary care provider), have you experienced any of the following symptoms that may be related to prolonged high blood sugars? No, I have not  experienced any of these symptoms   3. Have you had any concerns that you would like to discuss today? -   4. Do you have any female family members who have had heart attacks or strokes before age 60 or male relatives who have had heart attacks or strokes before age 50? Yes   5. Do you have any family members who have had complications from diabetes such as kidney disease, heart disease or strokes, retinopathy (a vision problem), or amputations? No   6. Who do you live with?  spouse   Little interest or pleasure in doing things? Not at all   Feeling down, depressed, or hopeless? Not at all   10.  Are you considering a pregnancy or interested in discussing pregnancy prevention today? No      Review of Systems:   Pertinent items are noted in HPI.  All other systems are negative.    Active Medications:      amitriptyline (ELAVIL) 10 MG tablet, Take 10 mg by mouth 3 times daily, Disp: , Rfl:      BASAGLAR 100 UNIT/ML injection, Inject 15 Units Subcutaneous daily In case of pump failure, Disp: 15 mL, Rfl: 0     folic acid (FOLVITE) 1 MG tablet, Take 2 tablets (2 mg) by mouth daily, Disp: 180 tablet, Rfl: 3     golimumab (SIMPONI) 50 MG/0.5ML auto-injector pen, INJECT THE CONTENTS OF ONE PEN (50MG) UNDER THE SKIN ONCE EVERY 21 DAYS, Disp: 5 each, Rfl: 11     HUMALOG KWIKPEN 100 UNIT/ML soln, Inject as needed approx 20 units daily, Disp: 30 mL, Rfl: 1     insulin lispro (HUMALOG PEN) 100 UNIT/ML injection, Inject 1-8 Units Subcutaneous 4 times daily (before meals and nightly) As instructed (roughly 1 unit: 8 g carbohydrate) incase of pump failure., Disp: , Rfl:      insulin lispro (HUMALOG) 100 UNIT/ML injection, Use with insulin pump approx. 65 units daily, Disp: 70 mL, Rfl: 3     melatonin 5 MG tablet, Take 5 mg by mouth nightly as needed for sleep, Disp: , Rfl:      methotrexate 2.5 MG tablet, Take 4 tablets (10 mg) by mouth every 7 days, Disp: 60 tablet, Rfl: 1     Pregabalin (LYRICA PO), Take 75 mg by mouth daily ,  "Disp: , Rfl:      zolpidem (AMBIEN) 10 MG tablet, Take 10 mg by mouth nightly as needed., Disp: , Rfl:       Allergies:   Patient has no known allergies.      Past Medical History:  Rheumatoid arthritis  Type 1 diabetes      Past Surgical History:  Bunionectomy     Family History:    History reviewed. No pertinent family history.       Social History:   The patient was alone.   Smoking Status: Former smoker, quit 1985  Smokeless Tobacco: Never used   Alcohol Use: Yes, glass of wine daily   Marital Status:    Employment status: Retired     Physical Exam:   /77   Pulse 76   Ht 1.676 m (5' 6\")   Wt 59 kg (130 lb)   BMI 20.98 kg/m        Wt Readings from Last 10 Encounters:   11/05/19 59 kg (130 lb)   09/10/19 59 kg (130 lb)   06/05/19 59.7 kg (131 lb 9.6 oz)   05/14/19 60.4 kg (133 lb 1.6 oz)   11/26/18 58.1 kg (128 lb)   10/30/18 59.9 kg (132 lb)   07/24/18 59.9 kg (132 lb)   05/07/18 58.1 kg (128 lb)   01/24/18 58.1 kg (128 lb)   01/23/18 58.4 kg (128 lb 12.8 oz)      General: Pleasant, well nourished and hydrated female in NAD.   Psych:  Mood is \"good,\" affect is appropriate.  Thought form and content are fluid and coherent.    HEENT: Eyes and sclera are clear. Extraocular movements are grossly intact without proptosis.  Nares are patent, mucous membranes moist.  Neck: No masses or JVD are noted.    Resp: Easy and unlabored breathing.   Neuro: Alert and oriented, communicating clearly.   Ext: No swelling or edema.     Data:  Lab Results   Component Value Date     12/10/2012    POTASSIUM 4.1 12/10/2012    CHLORIDE 98 12/10/2012    CO2 29 12/10/2012    ANIONGAP 12 12/10/2012     (H) 12/10/2012    BUN 19 12/10/2012    CR 0.62 09/10/2019    CELSO 8.8 01/23/2018     Lab Results   Component Value Date    GFRESTIMATED >90 09/10/2019    GFRESTIMATED 87 12/10/2018    GFRESTIMATED 87 05/17/2018    GFRESTBLACK >90 09/10/2019    GFRESTBLACK >90 12/10/2018    GFRESTBLACK >90 05/17/2018      Lab " "Results   Component Value Date    MICROL <5 01/23/2018    UMALCR Unable to calculate due to low value 01/23/2018      Lab Results   Component Value Date    A1C 8.2 (H) 05/23/2013    A1C 9.9 (H) 08/28/2008    HEMOGLOBINA1 7.1 (A) 11/05/2019    HEMOGLOBINA1 6.7 (A) 05/14/2019    HEMOGLOBINA1 7.0 (A) 10/30/2018    HEMOGLOBINA1 8.0 (A) 05/07/2018    HEMOGLOBINA1 7.3 (A) 01/23/2018     Lab Results   Component Value Date    GADAB  12/10/2012         ISCAB <1:4 05/17/2018     Lab Results   Component Value Date    CHOL 167 05/17/2018    TRIG 37 05/17/2018    HDL 81 05/17/2018    LDL 78 05/17/2018    NHDL 86 05/17/2018     Assessment and Plan:  Type 1 diabetes mellitus without complication (H)  Well controlled diabetes, with limited quickly recognized and corrected. We just made slight changes to her pump settings to avoid early morning hypoglycemia, which should allow her to bolus presumptively for breakfast and also avoid post breakfast hyperglycemia. Advised her to increase carbohydrate ratio at 8am to 1 unit: 9g carb if BG is still rising too much after breakfast.   Reviewed dosing if pump not working on vacation.   Reviewed how to edit settings with her current pump.   - Hemoglobin A1c POCT  - insulin pen needle (B-D U/F) 31G X 5 MM miscellaneous  Dispense: 100 each; Refill: 1  - Insulin Pen Needle (PEN NEEDLES 3/16\") 31G X 5 MM MISC  Dispense: 150 each; Refill: 1    Type 1 diabetes mellitus (H)  - HUMALOG KWIKPEN 100 UNIT/ML soln  Dispense: 30 mL; Refill: 1  - insulin glargine (BASAGLAR KWIKPEN) 100 UNIT/ML pen  Dispense: 15 mL; Refill: 0    Follow-up: Return in about 6 months (around 5/5/2020) with Dr. Bills.     >50% of 40 minute visit spent in face to face counseling, education and coordination of care related to options for better glycemic control as well as preventing, detecting, and treating hypoglycemia.      It is my privilege to be involved in the care of the above patient.     Marbella Min PA-C, " [facility]  [facility]  Diabetes, Endocrinology, and Metabolism  [phone] Appointments/Nurse  [phone] Fax  [phone] pager  [phone] nurse line    Scribe Disclosure:  I, Lakesha Harry, am serving as a scribe to document services personally performed by Marbella Min PA-C at this visit, based upon the provider's statements to me. All documentation has been reviewed by the aforementioned provider prior to being entered into the official medical record.     Portions of this medical record were completed by a scribe. UPON MY REVIEW AND AUTHENTICATION BY ELECTRONIC SIGNATURE, this confirms (a) I performed the applicable clinical services, and (b) the record is accurate.

## 2019-11-05 NOTE — PROGRESS NOTES
"[facility]  Endocrinology  Marbella Min PA-C  11/05/2019      Chief Complaint:   Diabetes    History of Present Illness:   Ruba Major is a 64 year old female with a history of type 1 diabetes and rheumatoid arthritis who presents for diabetes follow-up.     Type 1 Diabetes   She was diagnosed with type 1 diabetes in 2008 by Dr. Veronica Bills after she presented with hyperglycemia in the absence of DKA. She wears a Tandem pump with Dexcom G6 sensor. She was last seen on 5/14/19 by Dr. Bills with A1c of 6.7% at goal, but with too many lows noted on review of blood sugars. Her basal settings were changed accordingly. She met with diabetes education in June 2019 and reported overnight lows resolved.     Interval History:   A1c of 7.1% today. She had a right knee replacement in July 2019 and reports difficulty controlling her BG then, due to pain and lying down often for recovery. She did have complications and used oxycodone for relief. She is recovered now, but continues to have lows overnight that lead to lows in the morning. She has been doing a temporary basal setting, giving 80% of her usually insulin for 9 hours, with lows in the morning nonetheless. She will eat something to fix this, but her BG will then shoot up and she is \"unable to catch it.\" She struggles to correct it, as her pump is already shut off when she wakes up. BG elevation occurs even if she drinks just coffee. She notes that she used to only have juice and 5-7 minutes later, her BG would be corrected. She notes if she does not do anything, her BG will eventually come back down. Recently with breakfast , she is unable to dose at all as pump suspended with low BG, leading to highs.  She reports \"not being a big eater\" and does not reach even 100g of carbs a day. Right after breakfast for which she cannot dose,  Pump will suggest to give 3.2 units, but she knows she will go low then. She feels at most she can take at one time is 2 " units. She is in bed at 8:45pm and falls asleep at 10-10:30pm -- She does not eat after 7pm. She is waking up at 7-7:30am usually, which is later than prior to her surgery. She is going to [place] next week for several weeks and usually gets up earlier then to play golf. She worries she will go too low when she goes to the gym and plays golf in the mornings.     She has several upcoming vacations and requests a prescription for insulin pens to have on hand if her pump fails as well as review doses.       She reports frustration with getting in contact with Tandem and Dexcom.     She wears the pump on the back of her hip and the Dexcom on the LLQ of her abdomen. She does not get readings on her Dexcom if her phone is upstairs in her home. She likes looking at the BG on her phone versus having it connected to her pump. She notes always needing her phone on her, because her pump will not suspend if her phone is not connected to the pump. She was told to try disconnecting her phone, but she is apprehensive. She notes if she goes low, her watch, phone, and pump will all notify her.     She continues to have additional nerve problems. From the nerve block during surgery, she lost some feeling in her right lower leg and has some burning pain from her inner thigh to her foot. 2/3 providers advised her it may be from diabetes complications.  She does not believe it is from the diabetes, as her sugars have been well controlled and per her reading it related to surgery and nerve block.       Blood Glucose Monitoring:  We reviewed CGM (Dexcom G6) data together.    Average B mg/dL  SD: 51 mg/dL    Above range: 33%  In range: 64%  Below range: 2%    She has general flat overnights with abrupt low BG beginning around 7 am.  Frequent BG rise after breakfats around 9 am.      Current Insulin Pump Settings After Adjustments Today:  Type of Pump: Tandem Pump with Dexcom G6 CGM sensor   BASAL RATES and times:   12   AM  (midnight): 0.350 units/hour   8      AM: 0.5 units/hour ( down from 1.375 u/h 6am)   10    AM: 0.875 units/hour  5:30    PM: 0.875 units/hour   10   PM: 0.350 units/hour    Carb ratio:   CARB RATIO and times:  12   AM (midnight): 12  8     AM:  10  10   AM:  12  5:30    PM:  10  10   PM:  12  Correction Factor (Sensitivity) and times:  12   AM (midnight): 55 mg/dL  8     AM: 55 mg/dL (6 am 40 removed.)   10   AM: 55 mg/dL  5:30    PM: 55 mg/dL  10   PM: 55 mg/dL  Basal: 79%, Bolus 21%  Target BG Ranges:   BLOOD GLUCOSE TARGET and times:  12   AM (midnight): 140  8     AM:  140 (6am goal of 100 removed)  10   AM:  120  5:30    PM:  120  10   PM:  140    Average daily insulin: 14.7 units  Average daily basal: 11.5 units   Average daily bolus: 3.1%  Average carbs a day: 26g     Diabetes monitoring and complications:  CAD: No  Last eye exam results: UTD, per Dr. Bills's note on 5/14/19, no retinopathy   Microalbuminuria: negative on 1/23/18  Neuropathy: No  HTN: No  On Statin: No, muscle cramps with previous use   On Aspirin: No  Depression: No  Erectile dysfunction: N/A    Patient Supplied Answers To Diabetic Questionnaire  including 11/2/2019   1. Since your last visit to our clinic (or if this your first visit, since you last saw your primary care provider), have you experienced any of the following symptoms that may be related to low blood sugars? No, I have not experienced any of these symptoms   2. Since your last visit to our clinic (or if this your first visit, since you last saw your primary care provider), have you experienced any of the following symptoms that may be related to prolonged high blood sugars? No, I have not experienced any of these symptoms   3. Have you had any concerns that you would like to discuss today? -   4. Do you have any female family members who have had heart attacks or strokes before age 60 or male relatives who have had heart attacks or strokes before age 50? Yes   5. Do you  have any family members who have had complications from diabetes such as kidney disease, heart disease or strokes, retinopathy (a vision problem), or amputations? No   6. Who do you live with?  spouse   Little interest or pleasure in doing things? Not at all   Feeling down, depressed, or hopeless? Not at all   10.  Are you considering a pregnancy or interested in discussing pregnancy prevention today? No      Review of Systems:   Pertinent items are noted in HPI.  All other systems are negative.    Active Medications:      amitriptyline (ELAVIL) 10 MG tablet, Take 10 mg by mouth 3 times daily, Disp: , Rfl:      BASAGLAR 100 UNIT/ML injection, Inject 15 Units Subcutaneous daily In case of pump failure, Disp: 15 mL, Rfl: 0     folic acid (FOLVITE) 1 MG tablet, Take 2 tablets (2 mg) by mouth daily, Disp: 180 tablet, Rfl: 3     golimumab (SIMPONI) 50 MG/0.5ML auto-injector pen, INJECT THE CONTENTS OF ONE PEN (50MG) UNDER THE SKIN ONCE EVERY 21 DAYS, Disp: 5 each, Rfl: 11     HUMALOG KWIKPEN 100 UNIT/ML soln, Inject as needed approx 20 units daily, Disp: 30 mL, Rfl: 1     insulin lispro (HUMALOG PEN) 100 UNIT/ML injection, Inject 1-8 Units Subcutaneous 4 times daily (before meals and nightly) As instructed (roughly 1 unit: 8 g carbohydrate) incase of pump failure., Disp: , Rfl:      insulin lispro (HUMALOG) 100 UNIT/ML injection, Use with insulin pump approx. 65 units daily, Disp: 70 mL, Rfl: 3     melatonin 5 MG tablet, Take 5 mg by mouth nightly as needed for sleep, Disp: , Rfl:      methotrexate 2.5 MG tablet, Take 4 tablets (10 mg) by mouth every 7 days, Disp: 60 tablet, Rfl: 1     Pregabalin (LYRICA PO), Take 75 mg by mouth daily , Disp: , Rfl:      zolpidem (AMBIEN) 10 MG tablet, Take 10 mg by mouth nightly as needed., Disp: , Rfl:       Allergies:   Patient has no known allergies.      Past Medical History:  Rheumatoid arthritis  Type 1 diabetes      Past Surgical History:  Bunionectomy     Family History:   "  History reviewed. No pertinent family history.       Social History:   The patient was alone.   Smoking Status: Former smoker, quit 1985  Smokeless Tobacco: Never used   Alcohol Use: Yes, glass of wine daily   Marital Status:    Employment status: Retired     Physical Exam:   /77   Pulse 76   Ht 1.676 m (5' 6\")   Wt 59 kg (130 lb)   BMI 20.98 kg/m       Wt Readings from Last 10 Encounters:   11/05/19 59 kg (130 lb)   09/10/19 59 kg (130 lb)   06/05/19 59.7 kg (131 lb 9.6 oz)   05/14/19 60.4 kg (133 lb 1.6 oz)   11/26/18 58.1 kg (128 lb)   10/30/18 59.9 kg (132 lb)   07/24/18 59.9 kg (132 lb)   05/07/18 58.1 kg (128 lb)   01/24/18 58.1 kg (128 lb)   01/23/18 58.4 kg (128 lb 12.8 oz)      General: Pleasant, well nourished and hydrated female in NAD.   Psych:  Mood is \"good,\" affect is appropriate.  Thought form and content are fluid and coherent.    HEENT: Eyes and sclera are clear. Extraocular movements are grossly intact without proptosis.  Nares are patent, mucous membranes moist.  Neck: No masses or JVD are noted.    Resp: Easy and unlabored breathing.   Neuro: Alert and oriented, communicating clearly.   Ext: No swelling or edema.     Data:  Lab Results   Component Value Date     12/10/2012    POTASSIUM 4.1 12/10/2012    CHLORIDE 98 12/10/2012    CO2 29 12/10/2012    ANIONGAP 12 12/10/2012     (H) 12/10/2012    BUN 19 12/10/2012    CR 0.62 09/10/2019    CELSO 8.8 01/23/2018     Lab Results   Component Value Date    GFRESTIMATED >90 09/10/2019    GFRESTIMATED 87 12/10/2018    GFRESTIMATED 87 05/17/2018    GFRESTBLACK >90 09/10/2019    GFRESTBLACK >90 12/10/2018    GFRESTBLACK >90 05/17/2018      Lab Results   Component Value Date    MICROL <5 01/23/2018    UMALCR Unable to calculate due to low value 01/23/2018      Lab Results   Component Value Date    A1C 8.2 (H) 05/23/2013    A1C 9.9 (H) 08/28/2008    HEMOGLOBINA1 7.1 (A) 11/05/2019    HEMOGLOBINA1 6.7 (A) 05/14/2019    HEMOGLOBINA1 " "7.0 (A) 10/30/2018    HEMOGLOBINA1 8.0 (A) 05/07/2018    HEMOGLOBINA1 7.3 (A) 01/23/2018     Lab Results   Component Value Date    GADAB  12/10/2012         ISCAB <1:4 05/17/2018     Lab Results   Component Value Date    CHOL 167 05/17/2018    TRIG 37 05/17/2018    HDL 81 05/17/2018    LDL 78 05/17/2018    NHDL 86 05/17/2018     Assessment and Plan:  Type 1 diabetes mellitus without complication (H)  Well controlled diabetes, with limited quickly recognized and corrected. We just made slight changes to her pump settings to avoid early morning hypoglycemia, which should allow her to bolus presumptively for breakfast and also avoid post breakfast hyperglycemia. Advised her to increase carbohydrate ratio at 8am to 1 unit: 9g carb if BG is still rising too much after breakfast.   Reviewed dosing if pump not working on vacation.   Reviewed how to edit settings with her current pump.   - Hemoglobin A1c POCT  - insulin pen needle (B-D U/F) 31G X 5 MM miscellaneous  Dispense: 100 each; Refill: 1  - Insulin Pen Needle (PEN NEEDLES 3/16\") 31G X 5 MM MISC  Dispense: 150 each; Refill: 1    Type 1 diabetes mellitus (H)  - HUMALOG KWIKPEN 100 UNIT/ML soln  Dispense: 30 mL; Refill: 1  - insulin glargine (BASAGLAR KWIKPEN) 100 UNIT/ML pen  Dispense: 15 mL; Refill: 0    Follow-up: Return in about 6 months (around 5/5/2020) with Dr. Bills.     >50% of 40 minute visit spent in face to face counseling, education and coordination of care related to options for better glycemic control as well as preventing, detecting, and treating hypoglycemia.      It is my privilege to be involved in the care of the above patient.     Marbella Min PA-C, MPAS  [facility]  Diabetes, Endocrinology, and Metabolism  [phone] Appointments/Nurse  [phone] Fax  [phone] pager  [phone] nurse line             Scribe Disclosure:  I, Lakesha Harry, am serving as a scribe to document services personally performed by Marbella " JAZMYN Min PA-C at this visit, based upon the provider's statements to me. All documentation has been reviewed by the aforementioned provider prior to being entered into the official medical record.     Portions of this medical record were completed by a scribe. UPON MY REVIEW AND AUTHENTICATION BY ELECTRONIC SIGNATURE, this confirms (a) I performed the applicable clinical services, and (b) the record is accurate.

## 2019-11-08 ENCOUNTER — TELEPHONE (OUTPATIENT)
Dept: EDUCATION SERVICES | Facility: CLINIC | Age: 64
End: 2019-11-08

## 2019-11-08 NOTE — TELEPHONE ENCOUNTER
Phone call to Ruba:  We checked with [employer] support.  The basal IQ feature will work even if she is more than 20 feet away from her phone.  Ruba understands and states this is good to know. Brooklyn Elizalde RN,CDE

## 2019-12-04 ENCOUNTER — TRANSFERRED RECORDS (OUTPATIENT)
Dept: HEALTH INFORMATION MANAGEMENT | Facility: CLINIC | Age: 64
End: 2019-12-04

## 2019-12-04 DIAGNOSIS — M81.0 SENILE OSTEOPOROSIS: ICD-10-CM

## 2019-12-04 DIAGNOSIS — K21.00 GASTROESOPHAGEAL REFLUX DISEASE WITH ESOPHAGITIS: ICD-10-CM

## 2019-12-05 ENCOUNTER — HOSPITAL ENCOUNTER (OUTPATIENT)
Facility: CLINIC | Age: 64
Setting detail: SPECIMEN
Discharge: HOME | End: 2019-12-05
Attending: OBSTETRICS & GYNECOLOGY | Admitting: OBSTETRICS & GYNECOLOGY
Payer: COMMERCIAL

## 2019-12-05 ENCOUNTER — INFUSION THERAPY VISIT (OUTPATIENT)
Dept: INFUSION THERAPY | Facility: CLINIC | Age: 64
End: 2019-12-05
Attending: OBSTETRICS & GYNECOLOGY
Payer: COMMERCIAL

## 2019-12-05 DIAGNOSIS — K21.00 GASTROESOPHAGEAL REFLUX DISEASE WITH ESOPHAGITIS: ICD-10-CM

## 2019-12-05 DIAGNOSIS — M81.0 SENILE OSTEOPOROSIS: Primary | ICD-10-CM

## 2019-12-05 LAB
ALBUMIN SERPL-MCNC: 4.2 G/DL (ref 3.4–5)
CALCIUM SERPL-MCNC: 8.8 MG/DL (ref 8.5–10.1)
CREAT SERPL-MCNC: 0.65 MG/DL (ref 0.52–1.04)
GFR SERPL CREATININE-BSD FRML MDRD: >90 ML/MIN/{1.73_M2}
PHOSPHATE SERPL-MCNC: 3.3 MG/DL (ref 2.5–4.5)

## 2019-12-05 PROCEDURE — 84100 ASSAY OF PHOSPHORUS: CPT | Performed by: OBSTETRICS & GYNECOLOGY

## 2019-12-05 PROCEDURE — 82310 ASSAY OF CALCIUM: CPT | Performed by: OBSTETRICS & GYNECOLOGY

## 2019-12-05 PROCEDURE — 82040 ASSAY OF SERUM ALBUMIN: CPT | Performed by: OBSTETRICS & GYNECOLOGY

## 2019-12-05 PROCEDURE — 82565 ASSAY OF CREATININE: CPT | Performed by: OBSTETRICS & GYNECOLOGY

## 2019-12-05 PROCEDURE — 36415 COLL VENOUS BLD VENIPUNCTURE: CPT

## 2019-12-05 NOTE — PROGRESS NOTES
Medical Assistant Note:  Ruba Major presents today for lab draw.    Patient seen by provider today: No.   present during visit today: Not Applicable.    Concerns: No Concerns.    Procedure:  Lab draw site: LAC, Needle type: Butterfly, Gauge: 23.    Post Assessment:  Labs drawn without difficulty: Yes.    Discharge Plan:  Departure Mode: Ambulatory.    Face to Face Time: 4 min.    Shari Schoenberger, CMA

## 2019-12-09 ENCOUNTER — HOSPITAL ENCOUNTER (OUTPATIENT)
Facility: CLINIC | Age: 64
Setting detail: SPECIMEN
End: 2019-12-09
Attending: OBSTETRICS & GYNECOLOGY
Payer: COMMERCIAL

## 2019-12-09 ENCOUNTER — INFUSION THERAPY VISIT (OUTPATIENT)
Dept: INFUSION THERAPY | Facility: CLINIC | Age: 64
End: 2019-12-09
Attending: OBSTETRICS & GYNECOLOGY
Payer: COMMERCIAL

## 2019-12-09 VITALS
HEART RATE: 84 BPM | RESPIRATION RATE: 16 BRPM | DIASTOLIC BLOOD PRESSURE: 74 MMHG | SYSTOLIC BLOOD PRESSURE: 137 MMHG | TEMPERATURE: 97.6 F

## 2019-12-09 DIAGNOSIS — M81.0 SENILE OSTEOPOROSIS: Primary | ICD-10-CM

## 2019-12-09 DIAGNOSIS — K21.00 GASTROESOPHAGEAL REFLUX DISEASE WITH ESOPHAGITIS: ICD-10-CM

## 2019-12-09 PROCEDURE — 96372 THER/PROPH/DIAG INJ SC/IM: CPT

## 2019-12-09 PROCEDURE — 25000128 H RX IP 250 OP 636: Performed by: OBSTETRICS & GYNECOLOGY

## 2019-12-09 RX ADMIN — DENOSUMAB 60 MG: 60 INJECTION SUBCUTANEOUS at 14:37

## 2019-12-09 ASSESSMENT — PAIN SCALES - GENERAL: PAINLEVEL: MODERATE PAIN (5)

## 2019-12-09 NOTE — PROGRESS NOTES
Infusion Nursing Note:  Ruba Major presents today for prolia.    Patient seen by provider today: No   present during visit today: Not Applicable.    Note: N/A.    Intravenous Access:  No Intravenous access/labs at this visit.    Treatment Conditions:  Lab Results   Component Value Date     12/10/2012                   Lab Results   Component Value Date    POTASSIUM 4.1 12/10/2012           Lab Results   Component Value Date    MAG 1.9 12/10/2012            Lab Results   Component Value Date    CR 0.65 12/05/2019                   Lab Results   Component Value Date    CELSO 8.8 12/05/2019                Lab Results   Component Value Date    BILITOTAL 1.2 12/10/2012           Lab Results   Component Value Date    ALBUMIN 4.2 12/05/2019                    Lab Results   Component Value Date    ALT 22 09/10/2019           Lab Results   Component Value Date    AST 13 09/10/2019       Results reviewed, labs MET treatment parameters, ok to proceed with treatment.      Post Infusion Assessment:  Patient tolerated injection without incident.       Discharge Plan:   Discharge instructions reviewed with: Patient.  Patient and/or family verbalized understanding of discharge instructions and all questions answered.  Patient discharged in stable condition accompanied by: self.  Departure Mode: Ambulatory.    Kylie Tolbert RN

## 2020-03-22 ENCOUNTER — HEALTH MAINTENANCE LETTER (OUTPATIENT)
Age: 65
End: 2020-03-22

## 2020-03-31 ENCOUNTER — DOCUMENTATION ONLY (OUTPATIENT)
Dept: CARE COORDINATION | Facility: CLINIC | Age: 65
End: 2020-03-31

## 2020-04-21 ENCOUNTER — VIRTUAL VISIT (OUTPATIENT)
Dept: RHEUMATOLOGY | Facility: CLINIC | Age: 65
End: 2020-04-21
Attending: INTERNAL MEDICINE
Payer: COMMERCIAL

## 2020-04-21 DIAGNOSIS — K21.00 GASTROESOPHAGEAL REFLUX DISEASE WITH ESOPHAGITIS: ICD-10-CM

## 2020-04-21 DIAGNOSIS — M05.742 RHEUMATOID ARTHRITIS INVOLVING BOTH HANDS WITH POSITIVE RHEUMATOID FACTOR (H): Primary | ICD-10-CM

## 2020-04-21 DIAGNOSIS — K21.9 GASTROESOPHAGEAL REFLUX DISEASE WITHOUT ESOPHAGITIS: ICD-10-CM

## 2020-04-21 DIAGNOSIS — Z79.60 LONG-TERM USE OF IMMUNOSUPPRESSANT MEDICATION: Chronic | ICD-10-CM

## 2020-04-21 DIAGNOSIS — M05.741 RHEUMATOID ARTHRITIS INVOLVING BOTH HANDS WITH POSITIVE RHEUMATOID FACTOR (H): Primary | ICD-10-CM

## 2020-04-21 RX ORDER — GOLIMUMAB 50 MG/.5ML
INJECTION, SOLUTION SUBCUTANEOUS
Qty: 5 EACH | Refills: 11 | Status: SHIPPED | OUTPATIENT
Start: 2020-04-21 | End: 2020-07-03

## 2020-04-21 RX ORDER — CELECOXIB 200 MG/1
200 CAPSULE ORAL DAILY PRN
Qty: 90 CAPSULE | Refills: 3 | Status: SHIPPED | OUTPATIENT
Start: 2020-04-21 | End: 2021-10-12

## 2020-04-21 ASSESSMENT — PAIN SCALES - GENERAL: PAINLEVEL: NO PAIN (0)

## 2020-04-21 NOTE — PATIENT INSTRUCTIONS
Lab testing this week. We will arrange for the outside lab testing.  Stop the methotrexate, and you can stop the folic acid as well.  Continue the celebrex 200 mg daily prn but add omeprazole 20 mg daily  Continue the Simponi injections every 21 days  Plan follow up with Cj May in 6 months and 1 year with me

## 2020-04-21 NOTE — PROGRESS NOTES
"Patient was called was to set video visit. Patient unable to access CardioLogs. Appt changed to telephone visit. Husam Turner MA on 4/21/2020 at 8:40 AM    Ruba Major is a 65 year old female who is being evaluated via a billable telephone visit.      The patient has been notified of following:     \"This telephone visit will be conducted via a call between you and your physician/provider. We have found that certain health care needs can be provided without the need for a physical exam.  This service lets us provide the care you need with a short phone conversation.  If a prescription is necessary we can send it directly to your pharmacy.  If lab work is needed we can place an order for that and you can then stop by our lab to have the test done at a later time.    Telephone visits are billed at different rates depending on your insurance coverage. During this emergency period, for some insurers they may be billed the same as an in-person visit.  Please reach out to your insurance provider with any questions.    If during the course of the call the physician/provider feels a telephone visit is not appropriate, you will not be charged for this service.\"    Patient has given verbal consent for Telephone visit?  yes    How would you like to obtain your AVS? Hayliehart    Ms. Major has seropositive rheumatoid arthritis affecting multiple joints. +CCP, -RF, -ROBERT. Failed Enbrel, Humira. No known erosions.     She continues her Simponi and methotrexate. She also had to continue the celebrex 200 mg prn because it was so beneficial. Her stomach does okay if she only uses it 3x per week. No regular use of a proton pump inhibitor.    She is fit and exercises. She denies joint swelling, redness or warmth. Her knee replacement is the most painful joint with a secondary neuropathy. Her energy and function are good. No new deformities. No AM stiffness.    Simponi 50 mg once every 3 week injection, with good tolerance and no " infection. Methotrexate 10 mg weekly plus Folic 1 mg daily. She feels that this is a problem for her stomach even at this low dose. She wishes to try stopping this.     Boniva injections for new osteoporosis diagnosis.    PMI:  Medical-osteopenia (T = -2.8 ), type 1 diabetes, rheumatoid arthritis, osteoarthritis of the hands, PUD, trigeminal neuralgia, hyperlipidemia, mild CAD by cardiac CT, sciatica, GERD  Surgical-cervical spine fusion surgery, multiple bilateral arthroscopic surgeries, right TKA, bilateral bunionectomy  Injuries-none    SH:  Retired . Lives in [facility] in the winter. 2 daughters. No tobacco, 1 EtOH daily.     FH:  Mother with spinal arthritis and DJD  Father dead with CAD and MI, AAA  Brother is fine  Daughter with celiac disease  Daughter is healthy    PMSF history personally obtained and updated by me this visit.    ROS:  +right knee pain and numbness  +intolerant of statins  Remainder of the 14 point ROS obtained and found negative.    Laboratory:    Component      Latest Ref Rng & Units 12/5/2019   Creatinine      0.52 - 1.04 mg/dL 0.65   GFR Estimate      >60 mL/min/1.73:m2 >90   GFR Estimate If Black      >60 mL/min/1.73:m2 >90   Calcium      8.5 - 10.1 mg/dL 8.8   Phosphorus      2.5 - 4.5 mg/dL 3.3   Albumin      3.4 - 5.0 g/dL 4.2     Component      Latest Ref Rng & Units 9/10/2019   Color Urine       Yellow   Appearance Urine       Slightly Cloudy   Glucose Urine      NEG:Negative mg/dL Negative   Bilirubin Urine      NEG:Negative Negative   Ketones Urine      NEG:Negative mg/dL 5 (A)   Specific Gravity Urine      1.003 - 1.035 1.016   Blood Urine      NEG:Negative Negative   pH Urine      5.0 - 7.0 pH 5.0   Protein Albumin Urine      NEG:Negative mg/dL Negative   Urobilinogen mg/dL      0.0 - 2.0 mg/dL 0.0   Nitrite Urine      NEG:Negative Negative   Leukocyte Esterase Urine      NEG:Negative Negative   Source       Midstream Urine   WBC Urine      0 - 5  /HPF <1   RBC Urine      0 - 2 /HPF 3 (H)   Squamous Epithelial /HPF Urine      0 - 1 /HPF 5 (H)   Mucous Urine      NEG:Negative /LPF Present (A)   WBC      4.0 - 11.0 10e9/L 4.5   RBC Count      3.8 - 5.2 10e12/L 4.12   Hemoglobin      11.7 - 15.7 g/dL 12.6   Hematocrit      35.0 - 47.0 % 39.2   MCV      78 - 100 fl 95   MCH      26.5 - 33.0 pg 30.6   MCHC      31.5 - 36.5 g/dL 32.1   RDW      10.0 - 15.0 % 12.9   Platelet Count      150 - 450 10e9/L 301   25 OH Vit D2      ug/L <5   25 OH Vit D3      ug/L 28   25 OH Vit D total      20 - 75 ug/L <33   Creatinine      0.52 - 1.04 mg/dL 0.62   GFR Estimate      >60 mL/min/1.73:m2 >90   GFR Estimate If Black      >60 mL/min/1.73:m2 >90   CRP Inflammation      0.0 - 8.0 mg/L <2.9   Albumin      3.4 - 5.0 g/dL 4.2   ALT      0 - 50 U/L 22   AST      0 - 45 U/L 13     Impression:    Seropositive rheumatoid arthritis-with history of joint replacements. It seems that she is very stable on the Simponi with no loss of function or comfort. She does have problems with the methotrexate and I will stop this now. If it proves that its efficacy was important to ensure good health, then consideration would be given to sulfasalazine or leflunomide. Plan to continue the simponi and prn Celebrex.     Osteoporosis-now treated with boniva.    GERD-plan to start omeprazole daily.     Long term use of immunosuppressive-with toxicity screening due. We also agree that the benefits of continued immunosuppression outweigh the risks of COVID-19 infection.     Plan RTC with Cj May in 6 months and 12 months with me.    Phone call duration: 27 minutes    Asim Aly MD

## 2020-04-24 ENCOUNTER — MYC MEDICAL ADVICE (OUTPATIENT)
Dept: RHEUMATOLOGY | Facility: CLINIC | Age: 65
End: 2020-04-24

## 2020-04-24 ENCOUNTER — TELEPHONE (OUTPATIENT)
Dept: ENDOCRINOLOGY | Facility: CLINIC | Age: 65
End: 2020-04-24

## 2020-04-24 DIAGNOSIS — M05.742 RHEUMATOID ARTHRITIS INVOLVING BOTH HANDS WITH POSITIVE RHEUMATOID FACTOR (H): ICD-10-CM

## 2020-04-24 DIAGNOSIS — Z79.60 LONG-TERM USE OF IMMUNOSUPPRESSANT MEDICATION: Primary | Chronic | ICD-10-CM

## 2020-04-24 DIAGNOSIS — M05.741 RHEUMATOID ARTHRITIS INVOLVING BOTH HANDS WITH POSITIVE RHEUMATOID FACTOR (H): ICD-10-CM

## 2020-04-24 DIAGNOSIS — Z79.899 HIGH RISK MEDICATION USE: ICD-10-CM

## 2020-04-24 NOTE — LETTER
Date: 2020    Ruba Major  [address]  [place] [address]      MRN: [id number]  : 1955    Dx:    ICD-10-CM    1. Long-term use of immunosuppressant medication  Z79.899 Albumin level     ALT     AST     CBC with platelets     Creatinine     CRP inflammation     Routine UA with microscopic - No culture ([facility])   2. 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 factor (H)  M05.741 Albumin level    M05.742 ALT     AST     CBC with platelets     Creatinine     CRP inflammation     Routine UA with microscopic - No culture ([facility])   3. High risk medication use  Z79.899 Albumin level     ALT     AST     CBC with platelets     Creatinine     CRP inflammation     Routine UA with microscopic - No culture ([facility])       Orders Placed This Encounter     Albumin level     Standing Status:   Standing     Number of Occurrences:   15     Standing Expiration Date:   2021     ALT     Standing Status:   Standing     Number of Occurrences:   15     Standing Expiration Date:   2021     AST     Standing Status:   Standing     Number of Occurrences:   15     Standing Expiration Date:   2021     CBC with platelets     Standing Status:   Standing     Number of Occurrences:   15     Standing Expiration Date:   2021     Creatinine     Standing Status:   Standing     Number of Occurrences:   15     Standing Expiration Date:   2021     CRP inflammation     Standing Status:   Standing     Number of Occurrences:   15     Standing Expiration Date:   2021     Routine UA with microscopic - No culture ([facility])     Standing Status:   Standing     Number of Occurrences:   15     Standing Expiration Date:   2021       Fax results to [phone]    Sincerely,    Asim Aly MD/la  Professor of Medicine   Director, Division of Rheumatic and Autoimmune Diseases  [address] [place] [id number]

## 2020-04-24 NOTE — TELEPHONE ENCOUNTER
[facility] Call Center    Phone Message    May a detailed message be left on voicemail: yes     Reason for Call: Other: Pt call about her upcoming Appt and would like to reschedule but thinking can not be Virtual. Please call Pt to discuss    Thank You,    Action Taken: Message routed to:  [facility] ([facility] endocrinology    Travel Screening: Not Applicable

## 2020-04-28 NOTE — TELEPHONE ENCOUNTER
Contacted pt with the information below.  She would like the lab orders be faxed to [facility] at [phone]. Phone number for this clinic is [phone]. Pt is asking for an A1C to be done as she has an appointment scheduled with Dr Laxmi Bills on 5/19/2020. Told Ruba that I would contact that office to see if they could order what ever labs they are needing for the upcoming appointment. I contacted ZOILA Thompson who will follow up their the labs that they are needing. Fax number to lab given to her also. Lab orders updated so that they can print and be faxed to the above lab.    Received the following from Dr Aly:    Here were my instructions:         Return in about 6 months (around 10/21/2020) for with Cj May and 1 year with me.     Lab testing this week. We will arrange for the outside lab testing.   Stop the methotrexate, and you can stop the folic acid as well.   Continue the celebrex 200 mg daily prn but add omeprazole 20 mg daily   Continue the Simponi injections every 21 days   Plan follow up with Cj May in 6 months and 1 year with me    Message text

## 2020-04-29 NOTE — TELEPHONE ENCOUNTER
Lab orders have been faxed to [facility] at [phone].    Jeanne Locke CMA   4/29/2020 9:57 AM

## 2020-05-01 ENCOUNTER — TELEPHONE (OUTPATIENT)
Dept: RHEUMATOLOGY | Facility: CLINIC | Age: 65
End: 2020-05-01

## 2020-05-01 NOTE — TELEPHONE ENCOUNTER
[facility] Call [facility]    Phone Message    May a detailed message be left on voicemail: yes     Reason for Call: Other: Margo would like to know how frequent should the lab work be completed. Please follow up on this matter, Thanks    Fax # [phone]    Action Taken: Message routed to:  [facility] RHEUM    Travel Screening: Not Applicable

## 2020-05-01 NOTE — TELEPHONE ENCOUNTER
Returned call to Margo and informed her that pt should have the labs done every 2-3 months. Margo verbalized understanding.    LITO TannerN RN  Rheumatology Care Coordinator  [facility]

## 2020-05-06 ENCOUNTER — TRANSFERRED RECORDS (OUTPATIENT)
Dept: HEALTH INFORMATION MANAGEMENT | Facility: CLINIC | Age: 65
End: 2020-05-06

## 2020-05-07 ENCOUNTER — TELEPHONE (OUTPATIENT)
Dept: RHEUMATOLOGY | Facility: CLINIC | Age: 65
End: 2020-05-07

## 2020-05-07 DIAGNOSIS — Z79.60 LONG-TERM USE OF IMMUNOSUPPRESSANT MEDICATION: Primary | ICD-10-CM

## 2020-05-07 DIAGNOSIS — M05.741 RHEUMATOID ARTHRITIS INVOLVING BOTH HANDS WITH POSITIVE RHEUMATOID FACTOR (H): ICD-10-CM

## 2020-05-07 DIAGNOSIS — M05.742 RHEUMATOID ARTHRITIS INVOLVING BOTH HANDS WITH POSITIVE RHEUMATOID FACTOR (H): ICD-10-CM

## 2020-05-07 NOTE — TELEPHONE ENCOUNTER
[facility] Call [facility]    Phone Message    May a detailed message be left on voicemail: yes     Reason for Call: Requesting Results   Name/type of test: blood test   Date of test: 5/6/20  Was test done at a location other than [facility] (Please fill in the location if not [facility])?: No pt states she had it done in [place]. Please give pt a call back to discuss    Action Taken: Message routed to:  [facility] Rheumatology    Travel Screening: Not Applicable

## 2020-05-08 LAB
% GRANULOCYTES: 28.2 % (ref 51–89)
% IMMATURE GRANULOCYTES: 0.3 (ref 0–1)
ABSOLUTE BASOPHILS (EXTERNAL): 0 (ref 0–0.3)
ABSOLUTE GRANULOCYTES: 1.1 (ref 1.6–7.8)
ABSOLUTE IMMATURE GRANULOCYTES (EXTERNAL): 0 (ref 0–1.1)
ALBUMIN SERPL-MCNC: 4 G/DL (ref 3.5–5.7)
ALT SERPL-CCNC: 17 U/L (ref 7–52)
AST SERPL-CCNC: 22 U/L (ref 13–39)
BACTERIA URINE: ABNORMAL
BASOPHILS NFR BLD AUTO: 0.8 % (ref 0–3)
BILIRUBIN UR: NEGATIVE MG/DL
BLOOD UR: NEGATIVE U/L
CLARITY, URINE: CLEAR
COLOR UR: COLORLESS
CREAT SERPL-MCNC: 0.8 MG/DL (ref 0.6–1.2)
CRP INFLAMMATION (EXTERNAL): 0.1
EOSINOPHIL # BLD AUTO: 0.2 10*3/UL (ref 0–0.7)
EOSINOPHIL NFR BLD AUTO: 4.5 % (ref 0–10)
ERYTHROCYTE [DISTWIDTH] IN BLOOD BY AUTOMATED COUNT: 13.2 % (ref 11.5–14.5)
GFR SERPL CREATININE-BSD FRML MDRD: 77.62 ML/MIN/1.73M2
GLUCOSE URINE: ABNORMAL MG/DL
HCT VFR BLD AUTO: 35.9 % (ref 36–48)
HEMOGLOBIN: 11.7 G/DL (ref 12–16)
HYALINE CASTS: 0 (ref 0–9)
KETONES UR QL: ABNORMAL MG/DL
LEUKOCYTE ESTERASE UR: NEGATIVE U/L
LYMPHOCYTES # BLD AUTO: 2.1 10*3/UL (ref 1.2–3.7)
LYMPHOCYTES NFR BLD AUTO: 55.4 % (ref 10–40)
MCH RBC QN AUTO: 29.6 PG (ref 27–33.7)
MCHC RBC AUTO-ENTMCNC: 32.6 G/DL (ref 32–37)
MCV RBC AUTO: 90.9 FL (ref 80–99)
MONOCYTES # BLD AUTO: 0.4 10*3/UL (ref 0.2–1)
MONOCYTES NFR BLD AUTO: 10.8 % (ref 4–14)
NITRITE UR QL STRIP: ABNORMAL
PH UR: 6.5 [PH] (ref 5–8)
PLATELET COUNT - QUEST: 239 10^9/L (ref 150–450)
PMV BLD: 10.1 FL (ref 8.9–12.3)
PROTEIN UR: NEGATIVE MG/DL
RBC # BLD AUTO: 3.95 10^12/L (ref 4.18–5.22)
RBC URINE: <1 (ref 0–5)
SPECIFIC GRAVITY URINE (EXTERNAL): 1 (ref 1.02–1.02)
SQUAMOUS EPI: 8
UROBILINOGEN UR QL STRIP: 0.2 EU/DL (ref 0.2–1)
WBC # BLD AUTO: 3.8 10^9/L (ref 3.8–10.8)
WBC URINE: <1 (ref 0–5)

## 2020-05-08 NOTE — TELEPHONE ENCOUNTER
Labs results were received, abstracted and sent on to the nurse to review.    Jeanne Locke CMA   5/8/2020 8:16 AM

## 2020-05-08 NOTE — TELEPHONE ENCOUNTER
Called pt with her lab results and recommendation that she repeat the CBC/Diff/Plts in 2 weeks.    Pt verbalized understanding by repeating these instructions.    APOORVA Tanner RN  Rheumatology Care Coordinator  [facility]

## 2020-05-18 NOTE — PROGRESS NOTES
"Ruba Major is a 65 year old female who is being evaluated via a billable video visit.      The patient has been notified of following:     \"This video visit will be conducted via a call between you and your physician/provider. We have found that certain health care needs can be provided without the need for an in-person physical exam.  This service lets us provide the care you need with a video conversation.  If a prescription is necessary we can send it directly to your pharmacy.  If lab work is needed we can place an order for that and you can then stop by our lab to have the test done at a later time.    Video visits are billed at different rates depending on your insurance coverage.  Please reach out to your insurance provider with any questions.    If during the course of the call the physician/provider feels a video visit is not appropriate, you will not be charged for this service.\"    Patient has given verbal consent for Video visit? Yes    How would you like to obtain your AVS? Mail a copy    Patient would like the video invitation sent by: Send to e-mail at: [email or web address]    Will anyone else be joining your video visit? No          This 65 year-old woman was seen virtually for follow-up of her type 1 diabetes.  I made the diagnosis in 2008 when she presented with hyperglycemia in the absence of DKA.      She currently manages her diabetes with a tandem pump and the dexcom G6.  Unfortunately, she has not uploaded her data to the cloud so we have nothing specific to review today.  Ruba has been spending the last several weeks in [place].  She will be coming back to [place] next week.  While in [place] she has been able to exercise more.  Her knee replacement was done many months ago and she is finally beginning to feel like she can move again.  She is been able to golf using a golf cart.    With respect to her blood sugars, she reports that things seem to be up and down.  She is not " "having hypoglycemia but is having more hypoglycemia than she would like.  She is aware that she has the option of uploading the software to make her current tandem pump into an the control IQ pump.  She has not really thought about doing that but wants to do so now.  She also says there is many features of the tandem pump that she is not quite sure about.  She manages exercise by adjusting basal rates temporarily, not changing glucose targets.  She would like to spend some time with the nurse educator learning more about this pump when she gets home.      She is up-to-date with her eye visits and has never been told she had retinopathy.  She has no foot concerns.  She denies having any neuropathic symptoms.  She has been treated with a statin in the past but always developed muscle cramps on the drug.  She prefers not to take it.        Current Outpatient Medications   Medication     amitriptyline (ELAVIL) 10 MG tablet     FARRAH CONTOUR test strip     celecoxib (CELEBREX) 200 MG capsule     Continuous Blood Gluc Sensor (DEXCOM G6 SENSOR) MISC     Continuous Blood Gluc Transmit (DEXCOM G6 TRANSMITTER) MISC     golimumab (SIMPONI) 50 MG/0.5ML auto-injector pen     HUMALOG KWIKPEN 100 UNIT/ML soln     insulin glargine (BASAGLAR KWIKPEN) 100 UNIT/ML pen     INSULIN INFUSION PUMP     insulin lispro (HUMALOG PEN) 100 UNIT/ML injection     insulin lispro (HUMALOG) 100 UNIT/ML injection     insulin pen needle (B-D U/F) 31G X 5 MM miscellaneous     Insulin Pen Needle (PEN NEEDLES 3/16\") 31G X 5 MM MISC     melatonin 5 MG tablet     omeprazole (PRILOSEC) 20 MG DR capsule     Pregabalin (LYRICA PO)     zolpidem (AMBIEN) 10 MG tablet     No current facility-administered medications for this visit.      On exam she is in no acute distress.  Her mood is neutral.  Her speech is fluent.        Recent Labs   Lab Test 05/06/20  1050 12/05/19  1129 11/05/19 05/14/19 05/17/18  0813  01/23/18  1331  06/11/15  1326  05/23/13  1601  " 12/10/12  1051   A1C  --   --   --   --   --   --   --   --   --   --   --   --  8.2*  --   --    HEMOGLOBINA1  --   --  7.1*  --  6.7*   < >  --    < >  --    < >  --   --   --   --   --    TSH  --   --   --   --   --   --   --   --   --   --  0.91  --   --   --  1.58   LDL  --   --   --   --   --   --  78  --   --   --   --   --   --   --   --    HDL  --   --   --   --   --   --  81  --   --   --   --   --   --   --   --    TRIG  --   --   --   --   --   --  37  --   --   --   --   --   --   --   --    CR 0.8 0.65  --    < >  --    < > 0.68   < >  --    < > 0.64   < > 0.62   < > 0.67   MICROL  --   --   --   --   --   --   --   --  <5  --   --   --   --   --   --     < > = values in this interval not displayed.     A1c done in outside lab 5/6/20 was 7.3 and urine albumin was undetectable    Assessment and plan:    1.  Diabetes control.  Based on her A1c, her glucose control is quite good.  However, she is unhappy with some of the high sugars she has had.  I suggested she download the software so her pump can be reconfigured as a tandem control IQ pump.  She says she will do so.  I also suggested she meet with our nurse educator to review some of the features of her pump.  Specifically, I want them to review how to manage exercise with changes in targets and to determine if she needs to have a higher target overnight after exercise.    2.  Diabetes complications.  Up to date with screens and has none.    3.  CVD risk.  Blood pressure was okay at her last visit.  She is not interested in taking his a statin.    F/u with Mikki Gilbert in 3 mo and f/u with me in 6 mo    I spent 25 minutes on the video visit with this patient.  We use the [employer] platform.    Veronica Bills MD

## 2020-05-19 ENCOUNTER — VIRTUAL VISIT (OUTPATIENT)
Dept: ENDOCRINOLOGY | Facility: CLINIC | Age: 65
End: 2020-05-19
Payer: COMMERCIAL

## 2020-05-19 DIAGNOSIS — E10.9 TYPE 1 DIABETES MELLITUS WITHOUT COMPLICATION (H): Primary | ICD-10-CM

## 2020-05-28 ENCOUNTER — TELEPHONE (OUTPATIENT)
Dept: RHEUMATOLOGY | Facility: CLINIC | Age: 65
End: 2020-05-28

## 2020-05-28 NOTE — TELEPHONE ENCOUNTER
[facility] Call [facility]    Phone Message    May a detailed message be left on voicemail: no     Reason for Call: Other: Patient would like to discuss repeat lab results, that was completed 05/21. Please follow up on this matter, Thanks     Action Taken: Message routed to:  [facility] RHEUM    Travel Screening: Not Applicable

## 2020-05-29 NOTE — TELEPHONE ENCOUNTER
Contacted pt and told her that I have not see the results come through but I will check with other staff who may have seen them. Ruba said she had the labs done a week ago Thursday.    Note routed to Jeanne.    LITO TannerN RN  Rheumatology Care Coordinator  [facility]

## 2020-06-02 NOTE — TELEPHONE ENCOUNTER
[facility] Call [facility]    Phone Message    May a detailed message be left on voicemail: yes     Reason for Call: Other: Patient called said she has been communicating with Jayce about some labs she had done. She said the labs will be refaxed to us as we did not recv the first fax, patient would like a call from [place].     Action Taken: Other: [facility] RHEUMATOLOGY    Travel Screening: Not Applicable

## 2020-06-03 NOTE — TELEPHONE ENCOUNTER
Had called pt to see if she had her lab results and was told she did. Lab results are in Care Everywhere and viewable for the provider. Message routed to Dr Aly to review and advise.    LITO TannerN RN  Rheumatology Care Coordinator  [facility]

## 2020-06-03 NOTE — TELEPHONE ENCOUNTER
Asim Aly MD    Physician    Specialty:  Rheumatology    Telephone Encounter    Signed    Encounter Date:  5/28/2020              The results are satisfactory.     APOORVA Tanner RN  Rheumatology Care Coordinator  [facility]

## 2020-06-15 ENCOUNTER — HOSPITAL ENCOUNTER (OUTPATIENT)
Facility: CLINIC | Age: 65
Setting detail: SPECIMEN
Discharge: HOME | End: 2020-06-15
Attending: OBSTETRICS & GYNECOLOGY | Admitting: OBSTETRICS & GYNECOLOGY
Payer: COMMERCIAL

## 2020-06-15 ENCOUNTER — INFUSION THERAPY VISIT (OUTPATIENT)
Dept: INFUSION THERAPY | Facility: CLINIC | Age: 65
End: 2020-06-15
Attending: OBSTETRICS & GYNECOLOGY
Payer: COMMERCIAL

## 2020-06-15 DIAGNOSIS — K21.00 GASTROESOPHAGEAL REFLUX DISEASE WITH ESOPHAGITIS: ICD-10-CM

## 2020-06-15 DIAGNOSIS — M81.0 SENILE OSTEOPOROSIS: Primary | ICD-10-CM

## 2020-06-15 LAB
CALCIUM SERPL-MCNC: 9.6 MG/DL (ref 8.5–10.1)
CREAT SERPL-MCNC: 0.71 MG/DL (ref 0.52–1.04)
GFR SERPL CREATININE-BSD FRML MDRD: 89 ML/MIN/{1.73_M2}
PHOSPHATE SERPL-MCNC: 4.2 MG/DL (ref 2.5–4.5)

## 2020-06-15 PROCEDURE — 82565 ASSAY OF CREATININE: CPT | Performed by: OBSTETRICS & GYNECOLOGY

## 2020-06-15 PROCEDURE — 82310 ASSAY OF CALCIUM: CPT | Performed by: OBSTETRICS & GYNECOLOGY

## 2020-06-15 PROCEDURE — 36415 COLL VENOUS BLD VENIPUNCTURE: CPT

## 2020-06-15 PROCEDURE — 84100 ASSAY OF PHOSPHORUS: CPT | Performed by: OBSTETRICS & GYNECOLOGY

## 2020-06-15 NOTE — PROGRESS NOTES
Medical Assistant Note:  Ruba Major presents today for blood draw.    Patient seen by provider today: No.   present during visit today: Not Applicable.    Concerns: No Concerns.    Procedure:  Lab draw site: lac, Needle type: bf, Gauge: 23.    Post Assessment:  Labs drawn without difficulty: Yes.    Discharge Plan:  Departure Mode: Ambulatory.    Face to Face Time: 5 min  .    Sallie Sims, CMA

## 2020-06-17 ENCOUNTER — TELEPHONE (OUTPATIENT)
Dept: INFUSION THERAPY | Facility: CLINIC | Age: 65
End: 2020-06-17

## 2020-06-17 NOTE — TELEPHONE ENCOUNTER
"Patient is currently scheduled for an appointment at [facility] in [place].  Called patient to review current visitor restrictions and complete COVID-19 Patient Infection/Travel Screening Tool.     Due to the recent public health concerns around COVID-19 and in an effort to keep our patients and staff safe and healthy, we are implementing a screening process for the patients that come to our clinic.      I am going to ask you a few questions, please answer yes or no.  Your honesty about any symptoms is critical, as it keeps patients and staff healthy.      Do you have a:  Fever (or reported chills)?  No  New cough (started within the past 14 days)?  No  New shortness of breath (started within the past 14 days)?  No  Rash?  No    In the last month, have you been in contact with someone who was confirmed or suspected to have Coronavirus/COVID-19?  No    Have you traveled internationally in the last month?  No  If so, where?       I also wanted to let you know that to protect our patients from the flu and other common illnesses, [facility] [place] enforce visitor restrictions year round, but due to the community spread of COVID-19 in [place], we are taking additional precautionary steps to ensure the health of our patients.  At this time, NO visitors are allowed on our hospital and clinic campuses.     Patient PASSED the screening assessment.    Patient instructed to come to the clinic as planned for their scheduled appointment and to call the clinic if any symptoms develop prior to their appointment.    \"Per CDC Guidelines, we are asking all patients that are coming into the building to wear a cloth covering that covers your mouth and nose.  You will be screened again at the entrance to the clinic for any Covid 19 symptoms. If you screen positive to any Covid 19 symptoms during our screening process you will be provided a surgical mask to wear during your time in the " "building.\"    \"COVID-19 is contagious and can be dangerous for our patients and staff.  Please send us a [employer] message or call our clinic before coming in if you feel any of the following symptoms: fever, cough, congestion, runny nose, sore throat, muscle aches and pains, or shortness of breath.  If you are already at our clinic, it is very important that you be honest about any symptoms you are experiencing to ensure your safety and that of other patients and staff who treat you.  If you do have symptoms, we will have a nurse and/or provider asses you to determine next steps.\"    Sobeida Valencia MA on 6/17/2020 at 3:37 PM    "

## 2020-06-18 ENCOUNTER — ALLIED HEALTH/NURSE VISIT (OUTPATIENT)
Dept: INFUSION THERAPY | Facility: CLINIC | Age: 65
End: 2020-06-18
Attending: OBSTETRICS & GYNECOLOGY
Payer: COMMERCIAL

## 2020-06-18 VITALS
DIASTOLIC BLOOD PRESSURE: 81 MMHG | SYSTOLIC BLOOD PRESSURE: 136 MMHG | HEART RATE: 74 BPM | OXYGEN SATURATION: 98 % | RESPIRATION RATE: 18 BRPM

## 2020-06-18 DIAGNOSIS — K21.00 GASTROESOPHAGEAL REFLUX DISEASE WITH ESOPHAGITIS: ICD-10-CM

## 2020-06-18 DIAGNOSIS — M81.0 SENILE OSTEOPOROSIS: Primary | ICD-10-CM

## 2020-06-18 PROCEDURE — 96372 THER/PROPH/DIAG INJ SC/IM: CPT

## 2020-06-18 PROCEDURE — 25000128 H RX IP 250 OP 636: Performed by: OBSTETRICS & GYNECOLOGY

## 2020-06-18 RX ADMIN — DENOSUMAB 60 MG: 60 INJECTION SUBCUTANEOUS at 13:40

## 2020-06-18 NOTE — PROGRESS NOTES
Nursing Note:  Ruba Major presents today for Prolia.    Patient seen by provider today: No   present during visit today: Not Applicable.    Note: Reports to tolerating Prolia without issue.  One Prolia injection given to left arm without issue.    Intravenous Access:  No Intravenous access/labs at this visit.    Discharge Plan:   Patient was discharged home in stable condition.    Pauline Avery RN

## 2020-06-30 ENCOUNTER — TELEPHONE (OUTPATIENT)
Dept: RHEUMATOLOGY | Facility: CLINIC | Age: 65
End: 2020-06-30

## 2020-06-30 NOTE — TELEPHONE ENCOUNTER
PRIOR AUTHORIZATION DENIED    Medication: Simponi    Denial Date:  07/01/2020    Denial Rational: 1/21    Appeal Information:                 PA Initiation    Medication: Simponi  Insurance Company: [employer] - Phone [phone] Fax [phone]  [facility] - [place], [place] - [address]  Filling Pharmacy Phone: [phone]  Filling Pharmacy Fax: [phone]  Start Date: 6/30/2020

## 2020-07-02 NOTE — TELEPHONE ENCOUNTER
Please let the patient know that her insurance plan will no longer cover the Simponi injection with a 3 week interval.     Please ask her to make an appointment to follow up with either me or with Cj May to discuss dosing Simponi every 4 weeks, or else switching to another agent such as tocilizumab.

## 2020-07-02 NOTE — TELEPHONE ENCOUNTER
Contacted pt to inform her that her insurance will no longer cover the Simponi every 3 weeks. Pt wishes to continue with the Simponi so she would be willing to try every 4 weeks because she feels it is working so well for her. Ruba prefers to have a visit with Dr Aly as he is the one that has been able to get her off of methotrexate and folic acid. Appointment offered and accepted for tomorrow, Friday, 7/3/2020 at 8:45 am.  ZOILA Lei assisted with the scheduling of this appointment.    Verified with Helga that pt's insurance will cover the Simponi every 4 weeks. I will ask Dr Aly to tell pt at the video appointment tomorrow.    APOORVA Tanner RN  Rheumatology Care Coordinator  [facility]

## 2020-07-03 ENCOUNTER — VIRTUAL VISIT (OUTPATIENT)
Dept: RHEUMATOLOGY | Facility: CLINIC | Age: 65
End: 2020-07-03
Payer: COMMERCIAL

## 2020-07-03 DIAGNOSIS — Z79.60 LONG-TERM USE OF IMMUNOSUPPRESSANT MEDICATION: Chronic | ICD-10-CM

## 2020-07-03 DIAGNOSIS — M05.741 RHEUMATOID ARTHRITIS INVOLVING BOTH HANDS WITH POSITIVE RHEUMATOID FACTOR (H): Primary | ICD-10-CM

## 2020-07-03 DIAGNOSIS — K21.00 GASTROESOPHAGEAL REFLUX DISEASE WITH ESOPHAGITIS: ICD-10-CM

## 2020-07-03 DIAGNOSIS — M05.742 RHEUMATOID ARTHRITIS INVOLVING BOTH HANDS WITH POSITIVE RHEUMATOID FACTOR (H): Primary | ICD-10-CM

## 2020-07-03 PROCEDURE — 99215 OFFICE O/P EST HI 40 MIN: CPT | Mod: GT | Performed by: INTERNAL MEDICINE

## 2020-07-03 RX ORDER — GOLIMUMAB 50 MG/.5ML
INJECTION, SOLUTION SUBCUTANEOUS
Qty: 4 EACH | Refills: 11 | Status: SHIPPED | OUTPATIENT
Start: 2020-07-03 | End: 2021-06-11

## 2020-07-03 RX ORDER — LEFLUNOMIDE 20 MG/1
20 TABLET ORAL DAILY
Qty: 30 TABLET | Refills: 2 | Status: SHIPPED | OUTPATIENT
Start: 2020-07-03 | End: 2021-01-29

## 2020-07-03 NOTE — PATIENT INSTRUCTIONS
Continue Simponi with injections every 4 weeks.  Start leflunomide 20 mg once daily  Get lab testing now and then every 8 weeks  Follow up with Cj May in 10 weeks and 6 months with me

## 2020-07-03 NOTE — PROGRESS NOTES
"Ruba Major is a 65 year old female who is being evaluated via a billable video visit.      The patient has been notified of following:     \"This video visit will be conducted via a call between you and your physician/provider. We have found that certain health care needs can be provided without the need for an in-person physical exam.  This service lets us provide the care you need with a video conversation.  If a prescription is necessary we can send it directly to your pharmacy.  If lab work is needed we can place an order for that and you can then stop by our lab to have the test done at a later time.    Video visits are billed at different rates depending on your insurance coverage.  Please reach out to your insurance provider with any questions.    If during the course of the call the physician/provider feels a video visit is not appropriate, you will not be charged for this service.\"    Patient has given verbal consent for Video visit? Yes  How would you like to obtain your AVS? [employer]  Patient would like the video invitation sent by: will be in [facility] virtual waiting room  Will anyone else be joining your video visit? No        Ms. Major has seropositive rheumatoid arthritis affecting multiple joints. +CCP, -RF, -ROBERT. Failed Enbrel, Humira. No known erosions.     She follows up to consider her Simponi therapy. She feels well. She does feel worse at the end of the Simponi interval. She is not on methotrexate due to GI upset. Furthermore, she doesn't think that methotrexate had provided any therapeutic benefit.  Her hands can be painful and swollen. She has lost strength in her hands. Her wrists can be painful.    Celebrex use is only prn and heartburn is now resolved. No current use of omeprazole.    PMI:  Medical-osteopenia (T = -2.8 ), type 1 diabetes, rheumatoid arthritis, osteoarthritis of the hands, PUD, trigeminal neuralgia, hyperlipidemia, mild CAD by cardiac CT, sciatica, " GERD  Surgical-cervical spine fusion surgery, multiple bilateral arthroscopic surgeries, right TKA, bilateral bunionectomy  Injuries-none    SH:  Retired . Lives in [place] in the winter. 2 daughters. No tobacco, 1 EtOH daily.     FH:  Mother with spinal arthritis and DJD  Father dead with CAD and MI, AAA  Brother is fine  Daughter with celiac disease  Daughter is healthy    PMSF history personally obtained and updated by me this visit.    ROS:  +prosthetic knee mechanical problems  +intolerant of statins  Remainder of the 14 point ROS obtained and found negative    Physical Exam:  Constitutional: WD-WN-[id number] cooperative  Psych: nl affect.      Laboratory:     Ref Range & Units  1mo ago    CRP  <1.0 mg/dL  0.1     Specimen Collected: 05/06/20 12:50 PM  Last Resulted: 05/06/20  4:52 PM    Received From: [facility]   Result Received: 05/18/20  8:00 AM     Received Information    Creatinine Blood   Order: [id number]   (suggestion)   Information displayed in this report will not trend or trigger automated decision support.     Ref Range & Units  1mo ago    Creatinine  0.6 - 1.2 mg/dL  0.8     eGFR  >60.00 mL/min/1.73m*2  77.62     Comment: eGFR results <60 mL/min/1.73m sq are below the reference range.   Please note: Results below the reference range do not flag in the lab computer system.   The equation has not been validated in patients older than age 70, but an MDRD-derived eGFR may still be useful tool for providers caring for patients older than 70.    Specimen Collected: 05/06/20 12:50 PM  Last Resulted: 05/06/20  4:52 PM    Received From: [facility]   Result Received: 05/18/20  8:00 AM     Received Information    Contains abnormal data  CBC with Auto Differential   Order: [id number]   (suggestion)   Information displayed in this report will not trend or trigger automated decision support.     Ref Range & Units  1mo ago    WBC  3.8 - 10.8 K/uL  3.8     RBC  4.18 - 5.22 M/uL   3.95Low       Hemoglobin  12.0 - 16.0 g/dL  11.7Low       Hematocrit  36.0 - 48.0 %  35.9Low       MCV  80.0 - 99.0 fL  90.9     MCH  27.0 - 33.7 pg  29.6     MCHC  32.0 - 37.0 gm/dL  32.6     RDW  11.5 - 14.5 %  13.2     Platelet Count  150 - 450 K/uL  239.0     MPV  8.9 - 12.3 fL  10.1     nRBC %  0.0 - 0.0 %  0.0     IANC  1.9 - 10.8 K/uL  1.1Low       Comment: The IANC is a preliminary analyzer ANC that is subject to change based on further analysis that may include results from a manual differential.     Granulocyte %  51.0 - 89.0 %  28.2Low       Lymphocytes %  10.0 - 40.0 %  55.4High       Monocytes %  4.0 - 14.0 %  10.8     Eosinophil %  0.0 - 10.0 %  4.5     Basophil %  0.0 - 3.0 %  0.8     Immuture Granulocyte %  0.0 - 1.0 %  0.3     Granulocyte Absolute  1.6 - 7.8 K/uL  1.1Low       Lymphocytes Absolute  1.2 - 3.7 K/uL  2.1     Monocytes Absolute  0.2 - 1.0 K/uL  0.4     Eosinophils Absolute  0.0 - 0.7 K/uL  0.2     Basophils Absolute  0.0 - 0.3 K/uL  0.0     Immature Granulocyte Absolute  0.0 - 1.1 K/uL  0.0     NRBC Absolute  0.0 - 0.0 K/uL  0.0        Impression:    Seropositive rheumatoid arthritis-with history of joint replacements. She now requires an increase in her Simponi injection interval due to insurance formulary requirement, even though previously she developed breakthrough symptoms in the week prior to injection. She tolerates the Simponi well and generally wishes to continue this. She previously did not tolerate well the methotrexate, and has not worsened since stopping it; therefore, methotrexate is a treatment failure. In general, she is cautious about starting a new agent at this time due to COVID-19 pandemic. In my opinion, Simponi works better when combined with a DMARD agent. Based on all of this, we have discussed the risks and benefits of starting leflunomide 20 mg once daily, and she agrees to its use. Plan to continue Simponi with q4 weeks injections. If this combination fails to  control her disease, then consideration would be given to the initiation of tocilizumab or abatacept. We will defer prednisone at this time and use of the celebrex (and omeprazole)only prn.     Osteoporosis-now treated with prolia.    GERD-now resolved.    Long term use of immunosuppressive-get baseline laboratory testing for arava use. We also agree that the benefits of continued immunosuppression outweigh the risks of COVID-19 infection.     Plan RTC with Cj May in September and 6 months with me.    Greater than 50% of the 34 minute encounter was spent in counseling about her medications.      Video-Visit Details    Type of service:  Video Visit    Video Start Time: 8:42 AM  Video End Time: 9:16 AM    Originating Location (pt. Location): Home    Distant Location (provider location):  [facility]     Platform used for Video Visit: Azael Aly MD

## 2020-07-17 ENCOUNTER — AMBULATORY - HEALTHEAST (OUTPATIENT)
Dept: SURGERY | Facility: CLINIC | Age: 65
End: 2020-07-17

## 2020-07-17 DIAGNOSIS — Z11.59 ENCOUNTER FOR SCREENING FOR OTHER VIRAL DISEASES: ICD-10-CM

## 2020-07-27 ENCOUNTER — MYC MEDICAL ADVICE (OUTPATIENT)
Dept: ENDOCRINOLOGY | Facility: CLINIC | Age: 65
End: 2020-07-27

## 2020-07-27 DIAGNOSIS — E10.9 TYPE 1 DIABETES MELLITUS WITHOUT COMPLICATION (H): ICD-10-CM

## 2020-07-27 RX ORDER — PROCHLORPERAZINE 25 MG/1
1 SUPPOSITORY RECTAL
Qty: 1 EACH | Refills: 3 | Status: SHIPPED | OUTPATIENT
Start: 2020-07-27 | End: 2020-07-30

## 2020-07-28 ENCOUNTER — TELEPHONE (OUTPATIENT)
Dept: ENDOCRINOLOGY | Facility: CLINIC | Age: 65
End: 2020-07-28

## 2020-07-28 DIAGNOSIS — E10.9 TYPE 1 DIABETES MELLITUS WITHOUT COMPLICATION (H): ICD-10-CM

## 2020-07-28 NOTE — TELEPHONE ENCOUNTER
[facility] Call Center    Phone Message    May a detailed message be left on voicemail: no     Reason for Call: Medication Refill Request    Has the patient contacted the pharmacy for the refill? Yes   Name of medication being requested: Continuous Blood Gluc Transmit (DEXCOM G6 TRANSMITTER) MISC   Continuous [facility]uc [facility]ENS[facility]   Provider who prescribed the medication: Dr. Bills  Pharmacy: [facility]  Date medication is needed: ASAP/TODAY, Pt wants it immediately since she hasn't slept since the transmitter  and is at a 2 day gold tournament.     Action Taken: Message routed to:  [facility]    Travel Screening: Not Applicable

## 2020-07-29 NOTE — TELEPHONE ENCOUNTER
[facility] Call [facility]    Phone Message    May a detailed message be left on voicemail: yes     Reason for Call: Other: pt is calling to see when the transmitter will be sent to the pharmacy. she needs to the transmitter. Please send to the pharmacy.  she needs this today. Please call when this has sent.    Action Taken: Message routed to:  [facility] Endocrine    Travel Screening: Not Applicable

## 2020-07-29 NOTE — TELEPHONE ENCOUNTER
[facility] Call [facility]    Phone Message    May a detailed message be left on voicemail: yes     Reason for Call: Medication Refill Request    Has the patient contacted the pharmacy for the refill? Yes   Name of medication being requested: Continuous Blood Gluc Transmit (DEXCOM G6 TRANSMITTER) MISC; and Continuous Blood Gluc Sensor (DEXCOM G6 SENSOR) MISC    Provider who prescribed the medication: Dr Bills    Pharmacy: [facility] - [address] [place] [address], Ph # [phone]    Date medication is needed: ASAP     Transmitter is  and needs new one ASAP and sensors to go with it also needed    So needs those 2 Rx's sent over to her [facility] ASAP - they said they have not rec'd those Rx's yet and Pt said she has been working on this for awhile now - Please call Pt to confirm these were sent to her [facility] - Thank you    Action Taken: Message routed to:  [facility] ENDO    Travel Screening: Not Applicable

## 2020-07-30 RX ORDER — PROCHLORPERAZINE 25 MG/1
1 SUPPOSITORY RECTAL
Qty: 9 EACH | Refills: 3 | Status: SHIPPED | OUTPATIENT
Start: 2020-07-30 | End: 2021-06-09

## 2020-07-30 RX ORDER — PROCHLORPERAZINE 25 MG/1
1 SUPPOSITORY RECTAL
Qty: 1 EACH | Refills: 3 | Status: SHIPPED | OUTPATIENT
Start: 2020-07-30 | End: 2021-08-17

## 2020-08-27 ENCOUNTER — TELEPHONE (OUTPATIENT)
Dept: ENDOCRINOLOGY | Facility: CLINIC | Age: 65
End: 2020-08-27

## 2020-08-27 NOTE — TELEPHONE ENCOUNTER
LVM for pt to schedule:    -3 month VIRTUAL VISIT RETURN  from 5/19 with Gloria Gilbert    -6 month VIRTUAL VISIT RETURN from 5/19 with Negar

## 2020-09-03 ENCOUNTER — TELEPHONE (OUTPATIENT)
Dept: ENDOCRINOLOGY | Facility: CLINIC | Age: 65
End: 2020-09-03

## 2020-09-03 NOTE — TELEPHONE ENCOUNTER
[facility] Call Center    Phone Message    May a detailed message be left on voicemail: yes     Reason for Call: pt requesting order for A1C. Please call pt back so she can schedule the lab appt.     Action Taken: Message routed to:  [facility]    Travel Screening: Not Applicable

## 2020-09-04 DIAGNOSIS — E10.9 TYPE 1 DIABETES MELLITUS WITHOUT COMPLICATION (H): Primary | ICD-10-CM

## 2020-09-15 DIAGNOSIS — M05.741 RHEUMATOID ARTHRITIS INVOLVING BOTH HANDS WITH POSITIVE RHEUMATOID FACTOR (H): ICD-10-CM

## 2020-09-15 DIAGNOSIS — Z79.60 LONG-TERM USE OF IMMUNOSUPPRESSANT MEDICATION: Chronic | ICD-10-CM

## 2020-09-15 DIAGNOSIS — E10.9 TYPE 1 DIABETES MELLITUS WITHOUT COMPLICATION (H): ICD-10-CM

## 2020-09-15 DIAGNOSIS — K21.00 GASTROESOPHAGEAL REFLUX DISEASE WITH ESOPHAGITIS: ICD-10-CM

## 2020-09-15 DIAGNOSIS — M05.742 RHEUMATOID ARTHRITIS INVOLVING BOTH HANDS WITH POSITIVE RHEUMATOID FACTOR (H): ICD-10-CM

## 2020-09-15 LAB
BASOPHILS # BLD AUTO: 0 10E9/L (ref 0–0.2)
BASOPHILS NFR BLD AUTO: 0.4 %
CREAT UR-MCNC: 25 MG/DL
CRP SERPL-MCNC: <2.9 MG/L (ref 0–8)
DIFFERENTIAL METHOD BLD: NORMAL
EOSINOPHIL # BLD AUTO: 0.2 10E9/L (ref 0–0.7)
EOSINOPHIL NFR BLD AUTO: 2.9 %
ERYTHROCYTE [DISTWIDTH] IN BLOOD BY AUTOMATED COUNT: 13 % (ref 10–15)
ERYTHROCYTE [SEDIMENTATION RATE] IN BLOOD BY WESTERGREN METHOD: 27 MM/H (ref 0–30)
HBA1C MFR BLD: 7.2 % (ref 0–5.6)
HCT VFR BLD AUTO: 38.3 % (ref 35–47)
HGB BLD-MCNC: 13 G/DL (ref 11.7–15.7)
LYMPHOCYTES # BLD AUTO: 2.8 10E9/L (ref 0.8–5.3)
LYMPHOCYTES NFR BLD AUTO: 53.9 %
MCH RBC QN AUTO: 30.4 PG (ref 26.5–33)
MCHC RBC AUTO-ENTMCNC: 33.9 G/DL (ref 31.5–36.5)
MCV RBC AUTO: 90 FL (ref 78–100)
MICROALBUMIN UR-MCNC: <5 MG/L
MICROALBUMIN/CREAT UR: NORMAL MG/G CR (ref 0–25)
MONOCYTES # BLD AUTO: 0.4 10E9/L (ref 0–1.3)
MONOCYTES NFR BLD AUTO: 8.4 %
NEUTROPHILS # BLD AUTO: 1.8 10E9/L (ref 1.6–8.3)
NEUTROPHILS NFR BLD AUTO: 34.4 %
PLATELET # BLD AUTO: 211 10E9/L (ref 150–450)
RBC # BLD AUTO: 4.28 10E12/L (ref 3.8–5.2)
WBC # BLD AUTO: 5.1 10E9/L (ref 4–11)

## 2020-09-15 PROCEDURE — 36415 COLL VENOUS BLD VENIPUNCTURE: CPT | Performed by: INTERNAL MEDICINE

## 2020-09-15 PROCEDURE — 83036 HEMOGLOBIN GLYCOSYLATED A1C: CPT | Performed by: INTERNAL MEDICINE

## 2020-09-15 PROCEDURE — 85652 RBC SED RATE AUTOMATED: CPT | Performed by: INTERNAL MEDICINE

## 2020-09-15 PROCEDURE — 85025 COMPLETE CBC W/AUTO DIFF WBC: CPT | Performed by: INTERNAL MEDICINE

## 2020-09-15 PROCEDURE — 82043 UR ALBUMIN QUANTITATIVE: CPT | Performed by: INTERNAL MEDICINE

## 2020-09-15 PROCEDURE — 86140 C-REACTIVE PROTEIN: CPT | Performed by: INTERNAL MEDICINE

## 2020-09-15 PROCEDURE — 80061 LIPID PANEL: CPT | Performed by: INTERNAL MEDICINE

## 2020-09-15 PROCEDURE — 80053 COMPREHEN METABOLIC PANEL: CPT | Performed by: INTERNAL MEDICINE

## 2020-09-16 ENCOUNTER — TELEPHONE (OUTPATIENT)
Dept: ENDOCRINOLOGY | Facility: CLINIC | Age: 65
End: 2020-09-16

## 2020-09-16 ENCOUNTER — MEDICAL CORRESPONDENCE (OUTPATIENT)
Dept: HEALTH INFORMATION MANAGEMENT | Facility: CLINIC | Age: 65
End: 2020-09-16

## 2020-09-16 LAB
ALBUMIN SERPL-MCNC: 3.8 G/DL (ref 3.4–5)
ALP SERPL-CCNC: 64 U/L (ref 40–150)
ALT SERPL W P-5'-P-CCNC: 30 U/L (ref 0–50)
ANION GAP SERPL CALCULATED.3IONS-SCNC: 3 MMOL/L (ref 3–14)
AST SERPL W P-5'-P-CCNC: 24 U/L (ref 0–45)
BILIRUB SERPL-MCNC: 0.8 MG/DL (ref 0.2–1.3)
BUN SERPL-MCNC: 18 MG/DL (ref 7–30)
CALCIUM SERPL-MCNC: 8.6 MG/DL (ref 8.5–10.1)
CHLORIDE SERPL-SCNC: 102 MMOL/L (ref 94–109)
CHOLEST SERPL-MCNC: 163 MG/DL
CO2 SERPL-SCNC: 28 MMOL/L (ref 20–32)
CREAT SERPL-MCNC: 0.7 MG/DL (ref 0.52–1.04)
GFR SERPL CREATININE-BSD FRML MDRD: >90 ML/MIN/{1.73_M2}
GLUCOSE SERPL-MCNC: 247 MG/DL (ref 70–99)
HDLC SERPL-MCNC: 75 MG/DL
LDLC SERPL CALC-MCNC: 81 MG/DL
NONHDLC SERPL-MCNC: 88 MG/DL
POTASSIUM SERPL-SCNC: 3.8 MMOL/L (ref 3.4–5.3)
PROT SERPL-MCNC: 7.2 G/DL (ref 6.8–8.8)
SODIUM SERPL-SCNC: 133 MMOL/L (ref 133–144)
TRIGL SERPL-MCNC: 34 MG/DL

## 2020-09-16 NOTE — TELEPHONE ENCOUNTER
[facility] Call [facility]    Phone Message    May a detailed message be left on voicemail: yes     Reason for Call: Form or Letter   Type or form/letter needing completion: Pt s appt today had to be rescheduled due to provider not being available, so Pt requested this message be sent.  Please sign the form from [employer] allowing me to upgrade my software to Control IQ.  Pt was unsure of where or how the form was to be returned to [employer], but assumed the clinic was aware. Please call Pt to confirm once this has been completed.  Provider: Gilbert  Date form needed: ASAP  Once completed: N/A      Action Taken: Message routed to:  [facility]    Travel Screening: Not Applicable

## 2020-09-24 ENCOUNTER — TELEPHONE (OUTPATIENT)
Dept: RHEUMATOLOGY | Facility: CLINIC | Age: 65
End: 2020-09-24

## 2020-09-24 ENCOUNTER — VIRTUAL VISIT (OUTPATIENT)
Dept: RHEUMATOLOGY | Facility: CLINIC | Age: 65
End: 2020-09-24
Attending: NURSE PRACTITIONER
Payer: COMMERCIAL

## 2020-09-24 DIAGNOSIS — M05.79 RHEUMATOID ARTHRITIS, SEROPOSITIVE, MULTIPLE SITES (H): Primary | ICD-10-CM

## 2020-09-24 DIAGNOSIS — Z79.899 HIGH RISK MEDICATION USE: ICD-10-CM

## 2020-09-24 RX ORDER — TRAMADOL HYDROCHLORIDE 50 MG/1
1 TABLET ORAL EVERY 12 HOURS PRN
COMMUNITY
Start: 2020-08-24 | End: 2021-12-03

## 2020-09-24 ASSESSMENT — PAIN SCALES - GENERAL: PAINLEVEL: SEVERE PAIN (6)

## 2020-09-24 NOTE — LETTER
"9/24/2020       RE: Ruba Major  [address]  [facility] [address]     Dear Colleague,    Thank you for referring your patient, Ruba Major, to the [facility] RHEUMATOLOGY at [facility]. Please see a copy of my visit note below.    Is in [facility] for virtual appointment.     Ruba Major is a 65 year old female who is being evaluated via a billable video visit.      The patient has been notified of following:     \"This video visit will be conducted via a call between you and your physician/provider. We have found that certain health care needs can be provided without the need for an in-person physical exam.  This service lets us provide the care you need with a video conversation.  If a prescription is necessary we can send it directly to your pharmacy.  If lab work is needed we can place an order for that and you can then stop by our lab to have the test done at a later time.    Video visits are billed at different rates depending on your insurance coverage.  Please reach out to your insurance provider with any questions.    If during the course of the call the physician/provider feels a video visit is not appropriate, you will not be charged for this service.\"    Patient has given verbal consent for Video visit? Yes  How would you like to obtain your AVS? MyChart  If you are dropped from the video visit, the video invite should be resent to: Send to e-mail at: [email or web address]  Will anyone else be joining your video visit? No        Video-Visit Details    Type of service:  Video Visit    Video Start Time: 12:52 PM  Video End Time: 1:30 PM--finished with doximity     Originating Location (pt. Location): Home    Distant Location (provider location):  [facility] RHEUMATOLOGY     Platform used for Video Visit: Azael May, APRN CNP          Ms. Major has seropositive rheumatoid arthritis dx 2011 affecting multiple joints and history iritis. " "+ then 25, -RF, -ROBERT. Failed Enbrel, Humira.  Methotrexate GI side-effects and effective. No known erosions 1-2019. Golimumab (simponi) therapy, +added leflunomide (arava) 7-3-2020. Co-manage with Dr. Aly. Former Dr. Walton patient.      TKR 10-12 at [place] \"collapsed\", in July 2019 when had last one was on golimumab (simponi) every 3 week and methotrexate  --took golimumab (simponi) 1-2 week before and is concerned of holding for so long as this works the best. Not sure if leflunomide (arava) is helping wearing down of golimumab (simponi). Hands are weak and achey. No large flares in a long time. At times ache in hands or feet more with weather. No iritis, this was only once many years ago and felt to be due to autoimmune disease. Neck is good since surgery years ago. Right knee does give out. Hoping to be more active after this. T1 DM controlled, feels will be better when gets software update.     Celebrex use is only prn as causing GI side-effects and no heartburn. Golimumab (simponi) 50 mg every month last 9-1 would be due 9-29, leflunomide (arava) 20 mg once day. Tolerating no side-effects. No weight loss. Prolia per womens health. Golimumab (simponi) worked get when at every 3 weeks, not able to due to insurance.     PMI:  Medical-osteopenia (T = -2.8 ), type 1 diabetes, rheumatoid arthritis, osteoarthritis of the hands, PUD, trigeminal neuralgia, hyperlipidemia, mild CAD by cardiac CT, sciatica, GERD, eating disorder in remission. Iritis once many yrs ago, URI in Jan usually    Surgical-4-2015 [facility] C5-7 Dr. Rodriguez cervical spine fusion surgery , multiple bilateral arthroscopic surgeries 3 on each knee, right TKA, bilateral bunionectomy 1998 and in 2010 bilateral foot modification forefoot fusion Dr. Chávez, [facility]   Injuries-none    SH:  Retired . Lives in [place] [facility] in the winter leaving 12-1, comes back in Jan and March. 3rd degree . 2 daughters. No tobacco, 1 " EtOH daily.     FH:  Mother with spinal arthritis and DJD  Father dead with CAD and MI, AAA, gout, diverticulitis   Brother is fine  Daughter with celiac disease, borderline diabetes(ped APRN)  Daughter is healthy    PMSF history personally obtained and updated by me this visit.    ROS:  +prosthetic knee mechanical problems  +intolerant of statins  Remainder of the 14 point ROS obtained and found negative    Assessment via video:    Constitutional: WD-WN-WG cooperative  Eyes: nl EOM, PERRLA, vision, conjunctiva, sclera  ENT: nl external ears, nose, hearing, lips, teeth, gums, throat  Neck: no mass or thyroid enlargement  RESP: No cough, no audible wheezing, able to talk in full sentences  Skin: no nail pitting, alopecia, rash  Neuro: Cranial nerves grossly intact.  Mentation and speech appropriate for age.  PSYCH: Mentation appears normal, affect normal/bright, judgement and insight intact, normal speech and appearance well-groomed, memory, affect. Alert and oriented times 3; coherent speech, normal rate and volume, able to articulate logical thoughts, able to abstract reason, no tangential thoughts, no hallucinations or delusions  His affect is normal and pleasant  MSK: All other TMJ, neck, shoulder, elbow, wrist, hand, found normal. No deformity or nodule. Full ROM.Normal prayer sign. Negative Lhermitte's sign. No periuncle erythema  Remainder of exam unable to be completed due to video visits    Due to efforts to reduce the spread of COVID-19 in the clinic, state, nation, telephone or video visits are encouraged currently. Patient understands that diagnose and advice is limited by the inability to exam him/her/them face-to-face.  Discussed corovid-19 prevention, CDC guidelines/resources including hand washing, social distance and what to do for exposure signs or symptoms and where to go, to follow CDC/MDH guidelines, and if any signs or symptoms of infection to stop immunosuppression and seek immediate medical  att. That prednisone can increase change of serious infections so should be avoided. COVID-19 Precautions. Discussed the importance of good hand washing techniques with soap and water for at least 20 seconds, avoid touching eyes, nose, mouth, avoiding crowds, social distancing. We also agree that the benefits of continued immunosuppression outweigh the risks of COVID-19 infection.       Laboratory:  -hep B/C 7-2012 -MTB 2013  Component      Latest Ref Rng & Units 9/15/2020   WBC      4.0 - 11.0 10e9/L 5.1   RBC Count      3.8 - 5.2 10e12/L 4.28   Hemoglobin      11.7 - 15.7 g/dL 13.0   Hematocrit      35.0 - 47.0 % 38.3   MCV      78 - 100 fl 90   MCH      26.5 - 33.0 pg 30.4   MCHC      31.5 - 36.5 g/dL 33.9   RDW      10.0 - 15.0 % 13.0   Platelet Count      150 - 450 10e9/L 211   % Neutrophils      % 34.4   % Lymphocytes      % 53.9   % Monocytes      % 8.4   % Eosinophils      % 2.9   % Basophils      % 0.4   Absolute Neutrophil      1.6 - 8.3 10e9/L 1.8   Absolute Lymphocytes      0.8 - 5.3 10e9/L 2.8   Absolute Monocytes      0.0 - 1.3 10e9/L 0.4   Absolute Eosinophils      0.0 - 0.7 10e9/L 0.2   Absolute Basophils      0.0 - 0.2 10e9/L 0.0   Diff Method       Automated Method   Sodium      133 - 144 mmol/L 133   Potassium      3.4 - 5.3 mmol/L 3.8   Chloride      94 - 109 mmol/L 102   Carbon Dioxide      20 - 32 mmol/L 28   Anion Gap      3 - 14 mmol/L 3   Glucose      70 - 99 mg/dL 247 (H)   Urea Nitrogen      7 - 30 mg/dL 18   Creatinine      0.52 - 1.04 mg/dL 0.70   GFR Estimate      >60 mL/min/1.73:m2 >90   GFR Estimate If Black      >60 mL/min/1.73:m2 >90   Calcium      8.5 - 10.1 mg/dL 8.6   Bilirubin Total      0.2 - 1.3 mg/dL 0.8   Albumin      3.4 - 5.0 g/dL 3.8   Protein Total      6.8 - 8.8 g/dL 7.2   Alkaline Phosphatase      40 - 150 U/L 64   ALT      0 - 50 U/L 30   AST      0 - 45 U/L 24   Cholesterol      <200 mg/dL 163   Triglycerides      <150 mg/dL 34   HDL Cholesterol      >49 mg/dL 75    LDL Cholesterol Calculated      <100 mg/dL 81   Non HDL Cholesterol      <130 mg/dL 88   Creatinine Urine      mg/dL 25   Albumin Urine mg/L      mg/L <5   Albumin Urine mg/g Cr      0 - 25 mg/g Cr Unable to calculate due to low value   Hemoglobin A1C      0 - 5.6 % 7.2 (H)   Sed Rate      0 - 30 mm/h 27   CRP Inflammation      0.0 - 8.0 mg/L <2.9       Impression:    Seropositive rheumatoid arthritis-with history of joint replacements. Rheumatoid arthritis (RA) overall is the same, she is tolerating leflunomide (arava) but no serious infections or large flares nor any iritis. Based on all of this, we talked about continue Simponi with q 4 weeks injections and leflunomide (arava). If this combination fails to control her disease, then next options tocilizumab or abatacept. Celebrex (and omeprazole) only prn.     Osteoporosis-now treated with prolia per OB     GERD-now resolved.    Long term use of immunosuppressive-normal CBC, liver and kidney tests, labs every 8 weeks. Due . We also agree that the benefits of continued immunosuppression outweigh the risks of COVID-19 infection. Advise her to get pneumonia vaccines locally PCP if not done per CDC guidelines.     TKR 10-12. Discussed surgical guidelines hold golimumab (simponi) Golimumab (simponi) is held 4 weeks prior to and at 3-4 week post-surgery. I will send dr yousif a message as she in all her past surgeries only held for 1-2 week before, she is due 9-29 for injection last was 9-1 --she is very concerned of holding this long     Leflunomide (arava) is to be held 1 week prior to surgery and post. Wounds are to be closed, dry -no drainage and no signs or symptoms of infection. Not to take while on antibiotics. I informed her to talk to anesthesia before surgery due to history and neck     Plan RTC Dr. Yousif 3 months --leaves 12-1 for [place] for winter. I informed her cant do virtual visits if she is in [place]. I will send Dr. Jermain maurer  message.       Again, thank you for allowing me to participate in the care of your patient.      Sincerely,    KAREN Nguyen CNP

## 2020-09-24 NOTE — TELEPHONE ENCOUNTER
Seen patient today rheumatoid arthritis (RA) and +history T1 DM, history numerous surgeries.     1. Some questions for Dr Aly for upcoming TKR 10-12 MON. Last injection 9-1, next due 9-29 Tues. She is very concerned of holding golimumab (simponi) for so long. Reports in 7-2019 was on golimumab (simponi) every 3 weeks and methotrexate --only held golimumab (simponi) for 1-2 week before and after. She asks Dr. Aly to review this.     ACR guidelines and we have for our clinic are:  Golimumab (simponi) is held 4 weeks prior to and at 3-4 week post-surgery. Wounds are to be closed, dry -no drainage and no signs or symptoms of infection. Not to take while on antibiotics.     Leflunomide (arava) is to be held 1 week prior to surgery and post. Wounds are to be closed, dry -no drainage and no signs or symptoms of infection. Not to take while on antibiotics      2. Goes to winter in [place] 12-1, does come back in Jan and March for brief time, not sure of the dates. She asks if he would see her before she leaves. She is aware we cant do virtual visits if she is in [place] --she does not have appt yet      **I asked her to talk to PCP to make sure her pneumonia vaccines are up to date.

## 2020-09-24 NOTE — PROGRESS NOTES
"Ruba Major is a 65 year old female who is being evaluated via a billable video visit.      The patient has been notified of following:     \"This video visit will be conducted via a call between you and your physician/provider. We have found that certain health care needs can be provided without the need for an in-person physical exam.  This service lets us provide the care you need with a video conversation.  If a prescription is necessary we can send it directly to your pharmacy.  If lab work is needed we can place an order for that and you can then stop by our lab to have the test done at a later time.    Video visits are billed at different rates depending on your insurance coverage.  Please reach out to your insurance provider with any questions.    If during the course of the call the physician/provider feels a video visit is not appropriate, you will not be charged for this service.\"    Patient has given verbal consent for Video visit? Yes    How would you like to obtain your AVS? MyChart    *e-mail video visit link to: [email or web address]*    Will anyone else be joining your video visit? No      Video-Visit Details    Type of service:  Video Visit    Distant Location (provider location):  [facility] ENDOCRINOLOGY     Jazmyne Arriaga MA    Patients Glucose Data was Sent via Email      Due to the COVID 19 pandemic this visit was converted to a video visit in order to help prevent spread of infection in this patient and the general population.    Time of start: 9:30 am  Time of end: 9:56 am  Total duration of video visit: 26 minutes.    HPI  Ruba Major is a 65 year old female with type 1 diabetes mellitus. Video visit for diabetes follow up.  Pt was last seen by Dr. Bills in May 2020.  Ruba was dx having type 1 diabetes in 2008.  She also has a hx of RA and is followed here by Rheumatology staff.  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  She denies hx of retinopathy, nephropathy or " neuropathy.  Pt is currently using a [employer] insulin pump- basal IQ and DexcomG6 sensor.  Her current basal insulin rates are below:  Midnight= 0.35 units/hr.  8 am = 0.5 units/hr.  10 am = 0.875 units/hr.  5:30 pm = 0.875 units/hr.  10 pm = 0.35 units/hr.  Her 24 hrs basal insulin dose is 15 units/hr.  Pt's I/C ratio is below:  Midnight = 1:12  8 am = 1:10  10 am = 1:12  5:30 pm = 1:10  Sensitivity is 55.  Most recent A1C was 7.2 % on 9/15/2020. Previous A1C was 7.1 % in 11/2019.  I reviewed her insulin pump download today.   Pt is having low blood sugars around 5-6 pm daily- before dinner.  Her average glucose was 156 with SD 58.  Her glucose is in target 38 % of the time, above target 56 % of the time and below target 7 % of the time.  I told her we first need to eliminate the low blood sugars and I will also refer her to see our CDE to upgrade her pump to control IQ.  She does wear her Dexcom sensor full time and I do not have that data at this time.  On ROS today, she tells me she will be having another surgery ( # 6 ) on her right knee in Oct 2020.  She denies blurred vision, n/v, SOB at rest, cough, fever or chills.  No chest pain, abd pain, diarrhea, dysuria or hematuria.  She denies sx of neuropathy or foot ulcers at this time.    Diabetes Care  Retinopathy: none; pt seen by Oph this year.  Nephropathy:none; urine microalbuminuria negative in Sept 2020.  Neuropathy:none.  Foot Exam: no exam today.  Taking aspirin: no.  Lipids: LDL 81 in 9/2020. Hx of intolerance to statins- nausea per patient.  CAD: no.  Mental health: denies depression. Past hx of eating disorder > 20 years ago in remission.  Insulin: [employer] insulin pump - basal IQ. Pt has software upgrade for control IQ which she will start once she has met with our diabetes educator.  Testing: DexcomG6 sensor.      ROS  Please see under HPI.    Allergies  No Known Allergies    Medications  Current Outpatient Medications   Medication Sig Dispense Refill  "    amitriptyline (ELAVIL) 10 MG tablet Take 40 mg by mouth At Bedtime        FARRAH CONTOUR test strip Use to test blood sugar 9 times daily or as directed. 900 strip 1     celecoxib (CELEBREX) 200 MG capsule Take 1 capsule (200 mg) by mouth daily as needed for pain (Patient not taking: Reported on 9/24/2020) 90 capsule 3     Continuous Blood Gluc Sensor (DEXCOM G6 SENSOR) MISC 1 each every 10 days Use as directed for continuous glucose monitoring every 10 days. 9 each 3     Continuous Blood Gluc Transmit (DEXCOM G6 TRANSMITTER) MISC 1 each every 3 months Change every 90 days 1 each 3     golimumab (SIMPONI) 50 MG/0.5ML auto-injector pen INJECT THE CONTENTS OF ONE PEN (50MG) UNDER THE SKIN ONCE EVERY 4 WEEKS 4 each 11     HUMALOG KWIKPEN 100 UNIT/ML soln 1u:10 g carb + 1u:50mg/dl >150, approx 20 units daily. 30 mL 1     insulin glargine (BASAGLAR KWIKPEN) 100 UNIT/ML pen Inject 10 Units Subcutaneous daily In case of pump failure 15 mL 0     INSULIN INFUSION PUMP        insulin lispro (HUMALOG PEN) 100 UNIT/ML injection Inject 1-8 Units Subcutaneous 4 times daily (before meals and nightly) As instructed (roughly 1 unit: 8 g carbohydrate) incase of pump failure.       insulin lispro (HUMALOG) 100 UNIT/ML injection Use with insulin pump approx. 65 units daily 70 mL 3     insulin pen needle (B-D U/F) 31G X 5 MM miscellaneous Inject 1 Units Subcutaneous 4 times daily (before meals and nightly) 100 each 1     Insulin Pen Needle (PEN NEEDLES 3/16\") 31G X 5 MM MISC 1 Units 5 times daily 150 each 1     leflunomide (ARAVA) 20 MG tablet Take 1 tablet (20 mg) by mouth daily Labs every 8-12 weeks 30 tablet 2     melatonin 5 MG tablet Take 5 mg by mouth nightly as needed for sleep       omeprazole (PRILOSEC) 20 MG DR capsule Take 1 capsule (20 mg) by mouth daily (Patient taking differently: Take 20 mg by mouth as needed ) 90 capsule 3     Pregabalin (LYRICA PO) Take 75 mg by mouth daily        zolpidem (AMBIEN) 10 MG tablet Take " 10 mg by mouth nightly as needed.       traMADol (ULTRAM) 50 MG tablet Take 1 tablet by mouth every 12 hours as needed for pain         Family History  family history is not on file.    Social History   reports that she quit smoking about 35 years ago. She has never used smokeless tobacco. She reports current alcohol use. She reports that she does not use drugs.     Past Medical History  Past Medical History:   Diagnosis Date     Rheumatoid arthritis(714.0)      Type II or unspecified type diabetes mellitus without mention of complication, not stated as uncontrolled        Past Surgical History:   Procedure Laterality Date     FOOT SURGERY  1998, 10/2010    Bunionectomy       Physical Exam    No exam today.      RESULTS  Creatinine   Date Value Ref Range Status   09/15/2020 0.70 0.52 - 1.04 mg/dL Final     GFR Estimate   Date Value Ref Range Status   09/15/2020 >90 >60 mL/min/[1.73_m2] Final     Comment:     Non  GFR Calc  Starting 12/18/2018, serum creatinine based estimated GFR (eGFR) will be   calculated using the Chronic Kidney Disease Epidemiology Collaboration   (CKD-EPI) equation.       Hemoglobin A1C   Date Value Ref Range Status   09/15/2020 7.2 (H) 0 - 5.6 % Final     Comment:     Normal <5.7% Prediabetes 5.7-6.4%  Diabetes 6.5% or higher - adopted from ADA   consensus guidelines.       Potassium   Date Value Ref Range Status   09/15/2020 3.8 3.4 - 5.3 mmol/L Final     ALT   Date Value Ref Range Status   09/15/2020 30 0 - 50 U/L Final     AST   Date Value Ref Range Status   09/15/2020 24 0 - 45 U/L Final     TSH   Date Value Ref Range Status   06/11/2015 0.91 0.40 - 4.00 mU/L Final     T4 Free   Date Value Ref Range Status   09/12/2008 1.18 0.70 - 1.85 ng/dL Final       Cholesterol   Date Value Ref Range Status   09/15/2020 163 <200 mg/dL Final   05/17/2018 167 <200 mg/dL Final     HDL Cholesterol   Date Value Ref Range Status   09/15/2020 75 >49 mg/dL Final   05/17/2018 81 >49 mg/dL Final      LDL Cholesterol Calculated   Date Value Ref Range Status   09/15/2020 81 <100 mg/dL Final     Comment:     Desirable:       <100 mg/dl   05/17/2018 78 <100 mg/dL Final     Comment:     Desirable:       <100 mg/dl     Triglycerides   Date Value Ref Range Status   09/15/2020 34 <150 mg/dL Final   05/17/2018 37 <150 mg/dL Final     A1C  7.2 % on 9/15/2020    ASSESSMENT/PLAN:     1.  TYPE 1 DIABETES MELLITUS:  Ruba is having hypoglycemia around 5-6 pm daily before her evening meal.  I had her add a lower 4 pm basal insulin rate 0.775 units/hr.  No basal rates below:  Midnight= 0.35 units/hr.  8 am = 0.5 units/hr.  10 am = 0.875 units/hr.  4 pm = 0.775 units /hr ( was 5:30 pm 0.875 units/hr ).  10 pm = 0.35 units/hr.   No change in I/C ratio.  I will arrange for her to meet with our CDE next week to be trained how to use control IQ.  Pt was seen by Oph this year. No visual complaints.  Her urine microalbuminuria negative 9/15/2020 with normal creat and GFR.  She denies sx of neuropathy or foot ulcers/sores at this time.    2.  LIPIDS: LDL 81 and HDL 75.  She states she does not tolerate statins- nausea.    3.  RA: Followed here by Rheumatology staff.    4.  FOLLOW UP: visit with CDE next week.  Dr. Bills in early Jan 2021.

## 2020-09-24 NOTE — PROGRESS NOTES
"Is in [facility] for virtual appointment.     Ruba Major is a 65 year old female who is being evaluated via a billable video visit.      The patient has been notified of following:     \"This video visit will be conducted via a call between you and your physician/provider. We have found that certain health care needs can be provided without the need for an in-person physical exam.  This service lets us provide the care you need with a video conversation.  If a prescription is necessary we can send it directly to your pharmacy.  If lab work is needed we can place an order for that and you can then stop by our lab to have the test done at a later time.    Video visits are billed at different rates depending on your insurance coverage.  Please reach out to your insurance provider with any questions.    If during the course of the call the physician/provider feels a video visit is not appropriate, you will not be charged for this service.\"    Patient has given verbal consent for Video visit? Yes  How would you like to obtain your AVS? MyChart  If you are dropped from the video visit, the video invite should be resent to: Send to e-mail at: [email or web address]  Will anyone else be joining your video visit? No        Video-Visit Details    Type of service:  Video Visit    Video Start Time: 12:52 PM  Video End Time: 1:30 PM--finished with doximity     Originating Location (pt. Location): Home    Distant Location (provider location):  [facility] RHEUMATOLOGY     Platform used for Video Visit: KAREN Rosales CNP        "

## 2020-09-24 NOTE — PROGRESS NOTES
"Ms. Major has seropositive rheumatoid arthritis dx 2011 affecting multiple joints and history iritis. + then 25, -RF, -ROBERT. Failed Enbrel, Humira.  Methotrexate GI side-effects and effective. No known erosions 1-2019. Golimumab (simponi) therapy, +added leflunomide (arava) 7-3-2020. Co-manage with Dr. Aly. Former Dr. Walton patient.      TKR 10-12 at [place] \"collapsed\", in July 2019 when had last one was on golimumab (simponi) every 3 week and methotrexate  --took golimumab (simponi) 1-2 week before and is concerned of holding for so long as this works the best. Not sure if leflunomide (arava) is helping wearing down of golimumab (simponi). Hands are weak and achey. No large flares in a long time. At times ache in hands or feet more with weather. No iritis, this was only once many years ago and felt to be due to autoimmune disease. Neck is good since surgery years ago. Right knee does give out. Hoping to be more active after this. T1 DM controlled, feels will be better when gets software update.     Celebrex use is only prn as causing GI side-effects and no heartburn. Golimumab (simponi) 50 mg every month last 9-1 would be due 9-29, leflunomide (arava) 20 mg once day. Tolerating no side-effects. No weight loss. Prolia per womens health. Golimumab (simponi) worked get when at every 3 weeks, not able to due to insurance.     PMI:  Medical-osteopenia (T = -2.8 ), type 1 diabetes, rheumatoid arthritis, osteoarthritis of the hands, PUD, trigeminal neuralgia, hyperlipidemia, mild CAD by cardiac CT, sciatica, GERD, eating disorder in remission. Iritis once many yrs ago, URI in Jan usually    Surgical-4-2015 [facility] C5-7 Dr. Rodriguez cervical spine fusion surgery , multiple bilateral arthroscopic surgeries 3 on each knee, right TKA, bilateral bunionectomy 1998 and in 2010 bilateral foot modification forefoot fusion Dr. Chávez, [facility]   Injuries-none    SH:  Retired . Lives in [facility] in " the winter leaving 12-1, comes back in Jan and March. 3rd degree . 2 daughters. No tobacco, 1 EtOH daily.     FH:  Mother with spinal arthritis and DJD  Father dead with CAD and MI, AAA, gout, diverticulitis   Brother is fine  Daughter with celiac disease, borderline diabetes(ped APRN)  Daughter is healthy    PMSF history personally obtained and updated by me this visit.    ROS:  +prosthetic knee mechanical problems  +intolerant of statins  Remainder of the 14 point ROS obtained and found negative    Assessment via video:    Constitutional: WD-WN-[id number] cooperative  Eyes: nl EOM, PERRLA, vision, conjunctiva, sclera  ENT: nl external ears, nose, hearing, lips, teeth, gums, throat  Neck: no mass or thyroid enlargement  RESP: No cough, no audible wheezing, able to talk in full sentences  Skin: no nail pitting, alopecia, rash  Neuro: Cranial nerves grossly intact.  Mentation and speech appropriate for age.  PSYCH: Mentation appears normal, affect normal/bright, judgement and insight intact, normal speech and appearance well-groomed, memory, affect. Alert and oriented times 3; coherent speech, normal rate and volume, able to articulate logical thoughts, able to abstract reason, no tangential thoughts, no hallucinations or delusions  His affect is normal and pleasant  MSK: All other TMJ, neck, shoulder, elbow, wrist, hand, found normal. No deformity or nodule. Full ROM.Normal prayer sign. Negative Lhermitte's sign. No periuncle erythema  Remainder of exam unable to be completed due to video visits    Due to efforts to reduce the spread of COVID-19 in the clinic, state, nation, telephone or video visits are encouraged currently. Patient understands that diagnose and advice is limited by the inability to exam him/her/them face-to-face.  Discussed corovid-19 prevention, CDC guidelines/resources including hand washing, social distance and what to do for exposure signs or symptoms and where to go, to follow CDC/MDH  guidelines, and if any signs or symptoms of infection to stop immunosuppression and seek immediate medical att. That prednisone can increase change of serious infections so should be avoided. COVID-19 Precautions. Discussed the importance of good hand washing techniques with soap and water for at least 20 seconds, avoid touching eyes, nose, mouth, avoiding crowds, social distancing. We also agree that the benefits of continued immunosuppression outweigh the risks of COVID-19 infection.       Laboratory:  -hep B/C 7-2012 -MTB 2013  Component      Latest Ref Rng & Units 9/15/2020   WBC      4.0 - 11.0 10e9/L 5.1   RBC Count      3.8 - 5.2 10e12/L 4.28   Hemoglobin      11.7 - 15.7 g/dL 13.0   Hematocrit      35.0 - 47.0 % 38.3   MCV      78 - 100 fl 90   MCH      26.5 - 33.0 pg 30.4   MCHC      31.5 - 36.5 g/dL 33.9   RDW      10.0 - 15.0 % 13.0   Platelet Count      150 - 450 10e9/L 211   % Neutrophils      % 34.4   % Lymphocytes      % 53.9   % Monocytes      % 8.4   % Eosinophils      % 2.9   % Basophils      % 0.4   Absolute Neutrophil      1.6 - 8.3 10e9/L 1.8   Absolute Lymphocytes      0.8 - 5.3 10e9/L 2.8   Absolute Monocytes      0.0 - 1.3 10e9/L 0.4   Absolute Eosinophils      0.0 - 0.7 10e9/L 0.2   Absolute Basophils      0.0 - 0.2 10e9/L 0.0   Diff Method       Automated Method   Sodium      133 - 144 mmol/L 133   Potassium      3.4 - 5.3 mmol/L 3.8   Chloride      94 - 109 mmol/L 102   Carbon Dioxide      20 - 32 mmol/L 28   Anion Gap      3 - 14 mmol/L 3   Glucose      70 - 99 mg/dL 247 (H)   Urea Nitrogen      7 - 30 mg/dL 18   Creatinine      0.52 - 1.04 mg/dL 0.70   GFR Estimate      >60 mL/min/1.73:m2 >90   GFR Estimate If Black      >60 mL/min/1.73:m2 >90   Calcium      8.5 - 10.1 mg/dL 8.6   Bilirubin Total      0.2 - 1.3 mg/dL 0.8   Albumin      3.4 - 5.0 g/dL 3.8   Protein Total      6.8 - 8.8 g/dL 7.2   Alkaline Phosphatase      40 - 150 U/L 64   ALT      0 - 50 U/L 30   AST      0 - 45 U/L 24    Cholesterol      <200 mg/dL 163   Triglycerides      <150 mg/dL 34   HDL Cholesterol      >49 mg/dL 75   LDL Cholesterol Calculated      <100 mg/dL 81   Non HDL Cholesterol      <130 mg/dL 88   Creatinine Urine      mg/dL 25   Albumin Urine mg/L      mg/L <5   Albumin Urine mg/g Cr      0 - 25 mg/g Cr Unable to calculate due to low value   Hemoglobin A1C      0 - 5.6 % 7.2 (H)   Sed Rate      0 - 30 mm/h 27   CRP Inflammation      0.0 - 8.0 mg/L <2.9       Impression:    Seropositive rheumatoid arthritis-with history of joint replacements. Rheumatoid arthritis (RA) overall is the same, she is tolerating leflunomide (arava) but no serious infections or large flares nor any iritis. Based on all of this, we talked about continue Simponi with q 4 weeks injections and leflunomide (arava). If this combination fails to control her disease, then next options tocilizumab or abatacept. Celebrex (and omeprazole) only prn.     Osteoporosis-now treated with prolia per OB     GERD-now resolved.    Long term use of immunosuppressive-normal CBC, liver and kidney tests, labs every 8 weeks. Due . We also agree that the benefits of continued immunosuppression outweigh the risks of COVID-19 infection. Advise her to get pneumonia vaccines locally PCP if not done per CDC guidelines.     TKR 10-12. Discussed surgical guidelines hold golimumab (simponi) Golimumab (simponi) is held 4 weeks prior to and at 3-4 week post-surgery. I will send dr yousif a message as she in all her past surgeries only held for 1-2 week before, she is due 9-29 for injection last was 9-1 --she is very concerned of holding this long     Leflunomide (arava) is to be held 1 week prior to surgery and post. Wounds are to be closed, dry -no drainage and no signs or symptoms of infection. Not to take while on antibiotics. I informed her to talk to anesthesia before surgery due to history and neck     Plan RTC Dr. Yousif 3 months --leaves 12-1 for [place]  for winter. I informed her cant do virtual visits if she is in [place]. I will send Dr. Aly a message.

## 2020-09-24 NOTE — PATIENT INSTRUCTIONS
Advise annual flu vaccine  Advise talk to primary care provider about pneumonia vaccines for immunocompromised patients if not had them. It is a one time prevnar 13 then wait at least 8 weeks later pneumonia 23 vaccine if you have never had them. If you have the primary will know can review which is due if any. The pneumonia 23 will last 5 years.     Golimumab (simponi) is held 4 weeks prior to and at 3-4 week post-surgery. Wounds are to be closed, dry -no drainage and no signs or symptoms of infection. Not to take while on antibiotics. I will send dr yousif a message    Leflunomide (arava) is to be held 1 week prior to surgery and post. Wounds are to be closed, dry -no drainage and no signs or symptoms of infection. Not to take while on antibiotics        3 months Dr. Yousif     Labs are due in Nov

## 2020-09-25 ENCOUNTER — VIRTUAL VISIT (OUTPATIENT)
Dept: ENDOCRINOLOGY | Facility: CLINIC | Age: 65
End: 2020-09-25
Payer: COMMERCIAL

## 2020-09-25 DIAGNOSIS — E10.69 TYPE 1 DIABETES MELLITUS WITH OTHER SPECIFIED COMPLICATION (H): Primary | ICD-10-CM

## 2020-09-25 NOTE — LETTER
"9/25/2020       RE: Ruba Major  [address]  [place] [address]     Dear Colleague,    Thank you for referring your patient, Ruba Major, to the [facility] ENDOCRINOLOGY at [facility]. Please see a copy of my visit note below.    Ruba Major is a 65 year old female who is being evaluated via a billable video visit.      The patient has been notified of following:     \"This video visit will be conducted via a call between you and your physician/provider. We have found that certain health care needs can be provided without the need for an in-person physical exam.  This service lets us provide the care you need with a video conversation.  If a prescription is necessary we can send it directly to your pharmacy.  If lab work is needed we can place an order for that and you can then stop by our lab to have the test done at a later time.    Video visits are billed at different rates depending on your insurance coverage.  Please reach out to your insurance provider with any questions.    If during the course of the call the physician/provider feels a video visit is not appropriate, you will not be charged for this service.\"    Patient has given verbal consent for Video visit? Yes    How would you like to obtain your AVS? MyChart    *e-mail video visit link to: [email or web address]*    Will anyone else be joining your video visit? No      Video-Visit Details    Type of service:  Video Visit    Distant Location (provider location):  [facility] ENDOCRINOLOGY     Jazmyne Arriaga MA    Patients Glucose Data was Sent via Email      Due to the COVID 19 pandemic this visit was converted to a video visit in order to help prevent spread of infection in this patient and the general population.    Time of start: 9:30 am  Time of end: 9:56 am  Total duration of video visit: 26 minutes.    HPI  Ruba Major is a 65 year old female with type 1 diabetes mellitus. Video visit for " diabetes follow up.  Pt was last seen by Dr. Bills in May 2020.  Ruba was dx having type 1 diabetes in 2008.  She also has a hx of RA and is followed here by Rheumatology staff.  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  She denies hx of retinopathy, nephropathy or neuropathy.  Pt is currently using a Tandem insulin pump- basal IQ and DexcomG6 sensor.  Her current basal insulin rates are below:  Midnight= 0.35 units/hr.  8 am = 0.5 units/hr.  10 am = 0.875 units/hr.  5:30 pm = 0.875 units/hr.  10 pm = 0.35 units/hr.  Her 24 hrs basal insulin dose is 15 units/hr.  Pt's I/C ratio is below:  Midnight = 1:12  8 am = 1:10  10 am = 1:12  5:30 pm = 1:10  Sensitivity is 55.  Most recent A1C was 7.2 % on 9/15/2020. Previous A1C was 7.1 % in 11/2019.  I reviewed her insulin pump download today.   Pt is having low blood sugars around 5-6 pm daily- before dinner.  Her average glucose was 156 with SD 58.  Her glucose is in target 38 % of the time, above target 56 % of the time and below target 7 % of the time.  I told her we first need to eliminate the low blood sugars and I will also refer her to see our CDE to upgrade her pump to control IQ.  She does wear her Dexcom sensor full time and I do not have that data at this time.  On ROS today, she tells me she will be having another surgery ( # 6 ) on her right knee in Oct 2020.  She denies blurred vision, n/v, SOB at rest, cough, fever or chills.  No chest pain, abd pain, diarrhea, dysuria or hematuria.  She denies sx of neuropathy or foot ulcers at this time.    Diabetes Care  Retinopathy: none; pt seen by Oph this year.  Nephropathy:none; urine microalbuminuria negative in Sept 2020.  Neuropathy:none.  Foot Exam: no exam today.  Taking aspirin: no.  Lipids: LDL 81 in 9/2020. Hx of intolerance to statins- nausea per patient.  CAD: no.  Mental health: denies depression. Past hx of eating disorder > 20 years ago in remission.  Insulin: Tandem insulin pump - basal IQ.  "Pt has software upgrade for control IQ which she will start once she has met with our diabetes educator.  Testing: DexcomG6 sensor.      ROS  Please see under HPI.    Allergies  No Known Allergies    Medications  Current Outpatient Medications   Medication Sig Dispense Refill     amitriptyline (ELAVIL) 10 MG tablet Take 40 mg by mouth At Bedtime        FARRAH CONTOUR test strip Use to test blood sugar 9 times daily or as directed. 900 strip 1     celecoxib (CELEBREX) 200 MG capsule Take 1 capsule (200 mg) by mouth daily as needed for pain (Patient not taking: Reported on 9/24/2020) 90 capsule 3     Continuous Blood Gluc Sensor (DEXCOM G6 SENSOR) MISC 1 each every 10 days Use as directed for continuous glucose monitoring every 10 days. 9 each 3     Continuous Blood Gluc Transmit (DEXCOM G6 TRANSMITTER) MISC 1 each every 3 months Change every 90 days 1 each 3     golimumab (SIMPONI) 50 MG/0.5ML auto-injector pen INJECT THE CONTENTS OF ONE PEN (50MG) UNDER THE SKIN ONCE EVERY 4 WEEKS 4 each 11     HUMALOG KWIKPEN 100 UNIT/ML soln 1u:10 g carb + 1u:50mg/dl >150, approx 20 units daily. 30 mL 1     insulin glargine (BASAGLAR KWIKPEN) 100 UNIT/ML pen Inject 10 Units Subcutaneous daily In case of pump failure 15 mL 0     INSULIN INFUSION PUMP        insulin lispro (HUMALOG PEN) 100 UNIT/ML injection Inject 1-8 Units Subcutaneous 4 times daily (before meals and nightly) As instructed (roughly 1 unit: 8 g carbohydrate) incase of pump failure.       insulin lispro (HUMALOG) 100 UNIT/ML injection Use with insulin pump approx. 65 units daily 70 mL 3     insulin pen needle (B-D U/F) 31G X 5 MM miscellaneous Inject 1 Units Subcutaneous 4 times daily (before meals and nightly) 100 each 1     Insulin Pen Needle (PEN NEEDLES 3/16\") 31G X 5 MM MISC 1 Units 5 times daily 150 each 1     leflunomide (ARAVA) 20 MG tablet Take 1 tablet (20 mg) by mouth daily Labs every 8-12 weeks 30 tablet 2     melatonin 5 MG tablet Take 5 mg by mouth " nightly as needed for sleep       omeprazole (PRILOSEC) 20 MG DR capsule Take 1 capsule (20 mg) by mouth daily (Patient taking differently: Take 20 mg by mouth as needed ) 90 capsule 3     Pregabalin (LYRICA PO) Take 75 mg by mouth daily        zolpidem (AMBIEN) 10 MG tablet Take 10 mg by mouth nightly as needed.       traMADol (ULTRAM) 50 MG tablet Take 1 tablet by mouth every 12 hours as needed for pain         Family History  family history is not on file.    Social History   reports that she quit smoking about 35 years ago. She has never used smokeless tobacco. She reports current alcohol use. She reports that she does not use drugs.     Past Medical History  Past Medical History:   Diagnosis Date     Rheumatoid arthritis(714.0)      Type II or unspecified type diabetes mellitus without mention of complication, not stated as uncontrolled        Past Surgical History:   Procedure Laterality Date     FOOT SURGERY  1998, 10/2010    Bunionectomy       Physical Exam    No exam today.      RESULTS  Creatinine   Date Value Ref Range Status   09/15/2020 0.70 0.52 - 1.04 mg/dL Final     GFR Estimate   Date Value Ref Range Status   09/15/2020 >90 >60 mL/min/[1.73_m2] Final     Comment:     Non  GFR Calc  Starting 12/18/2018, serum creatinine based estimated GFR (eGFR) will be   calculated using the Chronic Kidney Disease Epidemiology Collaboration   (CKD-EPI) equation.       Hemoglobin A1C   Date Value Ref Range Status   09/15/2020 7.2 (H) 0 - 5.6 % Final     Comment:     Normal <5.7% Prediabetes 5.7-6.4%  Diabetes 6.5% or higher - adopted from ADA   consensus guidelines.       Potassium   Date Value Ref Range Status   09/15/2020 3.8 3.4 - 5.3 mmol/L Final     ALT   Date Value Ref Range Status   09/15/2020 30 0 - 50 U/L Final     AST   Date Value Ref Range Status   09/15/2020 24 0 - 45 U/L Final     TSH   Date Value Ref Range Status   06/11/2015 0.91 0.40 - 4.00 mU/L Final     T4 Free   Date Value Ref  Range Status   09/12/2008 1.18 0.70 - 1.85 ng/dL Final       Cholesterol   Date Value Ref Range Status   09/15/2020 163 <200 mg/dL Final   05/17/2018 167 <200 mg/dL Final     HDL Cholesterol   Date Value Ref Range Status   09/15/2020 75 >49 mg/dL Final   05/17/2018 81 >49 mg/dL Final     LDL Cholesterol Calculated   Date Value Ref Range Status   09/15/2020 81 <100 mg/dL Final     Comment:     Desirable:       <100 mg/dl   05/17/2018 78 <100 mg/dL Final     Comment:     Desirable:       <100 mg/dl     Triglycerides   Date Value Ref Range Status   09/15/2020 34 <150 mg/dL Final   05/17/2018 37 <150 mg/dL Final     A1C  7.2 % on 9/15/2020    ASSESSMENT/PLAN:     1.  TYPE 1 DIABETES MELLITUS:  Ruba is having hypoglycemia around 5-6 pm daily before her evening meal.  I had her add a lower 4 pm basal insulin rate 0.775 units/hr.  No basal rates below:  Midnight= 0.35 units/hr.  8 am = 0.5 units/hr.  10 am = 0.875 units/hr.  4 pm = 0.775 units /hr ( was 5:30 pm 0.875 units/hr ).  10 pm = 0.35 units/hr.   No change in I/C ratio.  I will arrange for her to meet with our CDE next week to be trained how to use control IQ.  Pt was seen by Oph this year. No visual complaints.  Her urine microalbuminuria negative 9/15/2020 with normal creat and GFR.  She denies sx of neuropathy or foot ulcers/sores at this time.    2.  LIPIDS: LDL 81 and HDL 75.  She states she does not tolerate statins- nausea.    3.  RA: Followed here by Rheumatology staff.    4.  FOLLOW UP: visit with CDE next week.  Dr. Bills in early Jan 2021.      Again, thank you for allowing me to participate in the care of your patient.      Sincerely,    Gloria Gilbert PA-C

## 2020-09-25 NOTE — TELEPHONE ENCOUNTER
Spoke to Ruba and provided the below information from Dr. Aly/Cj May:    Golimumab (simponi) is held 4 weeks prior to and at 3-4 week post-surgery. Wounds are to be closed, dry -no drainage and no signs or symptoms of infection. Not to take while on antibiotics.  followup    Ruba is concerned because she states she will be without her Simponi for 10weeks (?) and her previous knee surgery of last year, she was told to only hold one dosing.    Ruba would like Dr. Aly / Cj to know that she would like to know what is she supposed to do between those weeks when she is not to be taking the Simponi ?    Will route to Cj for recommendations.     Brittany Reyes MSN, RN  Rheumatology RN Care Coordinator   [place]

## 2020-10-02 ENCOUNTER — VIRTUAL VISIT (OUTPATIENT)
Dept: EDUCATION SERVICES | Facility: CLINIC | Age: 65
End: 2020-10-02
Payer: COMMERCIAL

## 2020-10-02 DIAGNOSIS — E10.9 TYPE 1 DIABETES MELLITUS WITHOUT COMPLICATION (H): Primary | ICD-10-CM

## 2020-10-02 PROCEDURE — G0108 DIAB MANAGE TRN  PER INDIV: HCPCS | Mod: GT | Performed by: REGISTERED NURSE

## 2020-10-02 ASSESSMENT — MIFFLIN-ST. JEOR: SCORE: 1146.43

## 2020-10-04 NOTE — PROGRESS NOTES
"Ruba Major is a 65 year old female who is being evaluated via a billable video visit.      The patient has been notified of following:     \"This video visit will be conducted via a call between you and your physician/provider. We have found that certain health care needs can be provided without the need for an in-person physical exam.  This service lets us provide the care you need with a video conversation.  If a prescription is necessary we can send it directly to your pharmacy.  If lab work is needed we can place an order for that and you can then stop by our lab to have the test done at a later time.    Video visits are billed at different rates depending on your insurance coverage.  Please reach out to your insurance provider with any questions.    If during the course of the call the physician/provider feels a video visit is not appropriate, you will not be charged for this service.\"    Patient has given verbal consent for Video visit? Yes  How would you like to obtain your AVS? MyChart  If you are dropped from the video visit, the video invite should be resent to: Send to e-mail at: [email or web address]  Will anyone else be joining your video visit? No        Video-Visit Details    Type of service:  Video Visit    Video Start Time:  1300  Video End Time:    1328    Originating Location (pt. Location): Home    Distant Location (provider location):  [facility] [place]     Platform used for Video Visit: Azael Sheikh RN    DIABETES EDUCATOR NOTE:    Subjective/Objective:    Ruba is having some questions about how to download the Control IQ software onto her insulin pump.  She has done all of the on-line training and is ready to start it.      Assessment/Plan:  Answered some questions about Control IQ operation.    Discussed that it is often necessary to make pump adjustments after the software is started and suggested that it takes about a week to make a good " assessment of the overall patterns.  She would like to get her pump settings adjusted appropriately before she has her knee revision done on October 12.  Made a follow up appointment and instructed her to download the pump prior to this appointment and also to use the [employer] Mobile application to make downloads easier.     Any diabetes medication dose changes were made via the CDE Protocol and Collaborative Practice Agreement with [facility]. A copy of this encounter was provided to the patient's primary care provider.      Time spent in this visit:  30 minutes.

## 2020-10-05 ENCOUNTER — TELEPHONE (OUTPATIENT)
Dept: ENDOCRINOLOGY | Facility: CLINIC | Age: 65
End: 2020-10-05

## 2020-10-05 DIAGNOSIS — E10.9 TYPE 1 DIABETES MELLITUS WITHOUT COMPLICATION (H): Primary | ICD-10-CM

## 2020-10-05 NOTE — TELEPHONE ENCOUNTER
[facility] Call Center    Phone Message    May a detailed message be left on voicemail: yes     Reason for Call: Medication Refill Request    Has the patient contacted the pharmacy for the refill? Yes   Name of medication being requested: FARRAH CONTOUR test strip  Provider who prescribed the medication: Pako  Pharmacy: [id number] - [place], [place] - [address] AT [address]  Date medication is needed: asap      Action Taken: Message routed to:  [facility] ([facility] Endo    Travel Screening: Not Applicable

## 2020-10-05 NOTE — TELEPHONE ENCOUNTER
FARRAH CONTOUR test strip  Last Written Prescription Date:  6/4/2018  Last Fill Quantity: 900,   # refills: 1  Last Office Visit : 9/25/20 Vladimir  Future Office visit:   Dr. Bills in early Jan 2021.     Refilled per protocol

## 2020-10-09 ENCOUNTER — AMBULATORY - HEALTHEAST (OUTPATIENT)
Dept: LAB | Facility: CLINIC | Age: 65
End: 2020-10-09

## 2020-10-09 DIAGNOSIS — Z11.59 ENCOUNTER FOR SCREENING FOR OTHER VIRAL DISEASES: ICD-10-CM

## 2020-10-11 ENCOUNTER — COMMUNICATION - HEALTHEAST (OUTPATIENT)
Dept: SCHEDULING | Facility: CLINIC | Age: 65
End: 2020-10-11

## 2020-10-12 ENCOUNTER — SURGERY - HEALTHEAST (OUTPATIENT)
Dept: SURGERY | Facility: CLINIC | Age: 65
End: 2020-10-12

## 2020-10-12 ENCOUNTER — ANESTHESIA - HEALTHEAST (OUTPATIENT)
Dept: SURGERY | Facility: CLINIC | Age: 65
End: 2020-10-12

## 2020-10-12 ASSESSMENT — MIFFLIN-ST. JEOR: SCORE: 1160.88

## 2020-10-26 ENCOUNTER — COMMUNICATION - HEALTHEAST (OUTPATIENT)
Dept: SCHEDULING | Facility: CLINIC | Age: 65
End: 2020-10-26

## 2020-11-04 ENCOUNTER — APPOINTMENT (OUTPATIENT)
Dept: URBAN - METROPOLITAN AREA CLINIC 256 | Age: 65
Setting detail: DERMATOLOGY
End: 2020-11-04

## 2020-11-16 ENCOUNTER — APPOINTMENT (OUTPATIENT)
Dept: URBAN - METROPOLITAN AREA CLINIC 256 | Age: 65
Setting detail: DERMATOLOGY
End: 2020-11-16

## 2020-11-24 ENCOUNTER — APPOINTMENT (OUTPATIENT)
Dept: URBAN - METROPOLITAN AREA CLINIC 256 | Age: 65
Setting detail: DERMATOLOGY
End: 2020-11-24

## 2020-12-30 NOTE — PATIENT INSTRUCTIONS
We appreciate your assistance in coordinating your healthcare.     Please upload your insulin pump, blood sugar meter and/or continuous glucose monitor at home 1-2 days before your next diabetes-related appointment.   This will allow your provider to review your  data before your scheduled virtual visit.    To ask a question to your Endocrine care team, please send them a [employer] message, or reach them by phone at [phone]     To expedite your medication refill(s), please contact your pharmacy and have them   fax a refill request to: [phone].  *Please allow 3 business days for routine medication refills.  *Please allow 5 business days for controlled substance medication refills.    For after-hours urgent Endocrine issues, that do not require 911, please dial [phone], and ask to speak with the Endocrinologist On-Call      Here are the changes we made in your pump settings.  Basal rate:  Midnight 0.4 (was 0.35)  8 AM 0.55 (was 0.5)  10 AM 0.9 (was 0.875)  4 PM 0.8 (was 0.775)  10 PM 0.4 (was 0.35)    I am checking about the software upgrade.  If you don't hear from the clinic or tandem in the next few days please let me know.

## 2020-12-31 NOTE — PROGRESS NOTES
"Outcome for 12/31/20 12:27 PM: spoke with patient states that she will upload her dexcom and states that her tandem pump hasn't started the control IQ yet- soj  Outcome for 01/04/21 8:00 AM :sent mychart reminder to upload dexcom    Ruba Major is a 65 year old female who is being evaluated via a billable video visit.      The patient has been notified of following:     \"This video visit will be conducted via a call between you and your physician/provider. We have found that certain health care needs can be provided without the need for an in-person physical exam.  This service lets us provide the care you need with a video conversation.  If a prescription is necessary we can send it directly to your pharmacy.  If lab work is needed we can place an order for that and you can then stop by our lab to have the test done at a later time.    Video visits are billed at different rates depending on your insurance coverage.  Please reach out to your insurance provider with any questions.    If during the course of the call the physician/provider feels a video visit is not appropriate, you will not be charged for this service.\"    Patient has given verbal consent for Video visit? Yes  How would you like to obtain your AVS? MyChart  If you are dropped from the video visit, the video invite should be resent to: Text to cell phone: [phone]  Will anyone else be joining your video visit? No        Video-Visit Details    Type of service:  Video Visit    Video Start Time: 4:05  Video End Time: 4:30    Originating Location (pt. Location): Home    Distant Location (provider location):  [facility] [place]     Platform used for Video Visit: Azael Bills MD       This 65 year-old woman was seen virtually for follow-up of her type 1 diabetes.  I made the diagnosis in 2008 when she presented with hyperglycemia in the absence of DKA.      She currently manages her diabetes with a " tandem pump and the dexcom G6.  She planned to update the software to the control IQ product but was unable to do that before her access to the software was outdated.  She was concerned that if she was not completely familiar with how to use the new tandem pump, she would be able to manage the upgrade to the control IQ.  She has been using her tandem pump now for several weeks and feels much more comfortable with the settings.  She underwent revision of her knee replacement surgery in the fall and has had a lot of pain during her recovery.  She is much less active than she has been and has been feeling very stressed.  This is increased her blood sugars quite a bit and she is unhappy about that.  However, she has completed physical therapy and been told to return to slowly increasing amounts of exercise.  She is hopeful she will be able to do that and improve her glucose control.    She uploaded her pump to the cloud and I reviewed the data online.  Her current pump settings are:    Basal rate:  Midnight 0.35  8 AM 0.5  10 AM 0.875  4 PM 0.775  10 PM 0.35    Correction factor is 55    Carb ratio  Midnight 12  8 AM 10  10 PM 12    Target blood glucose  Midnight 140  8   10     During the last 2 weeks she wore her CGM 96% of the time.  Her average sensor glucose was 161 with a range from 5 7-344.  Her basal insulin comprises 57% of her daily dose, her food bolus is 31%, and her correction bolus is 12% of her daily dose.  38% of her blood sugars were between 90 and 140 and 58 were greater than 140.  4% were less than 90.  Looking at the individual days, I see that she is usually above target overnight and has been quite high at 3+ hours after meals.  She is also high overnight and gives corrections most nights.She is bolusing regularly for her food.  She does have hypoglycemia but the pump suspends for that.  She has not had any problems with managing her hypoglycemia.    She is established care with a new  "internist in [place].  At a recent visit she was told her blood pressure was too high.  She is never been told this before so she is getting a blood pressure cuff and will start using it.  This new internist also convinced her that she should start taking a statin, something we have talked about in the past.  She has started taking 1 and will take 1 dose a week and see how she tolerates it      She is up-to-date with her eye visits and has never been told she had retinopathy.  She has no foot concerns.  She denies having any neuropathic symptoms.     Current Outpatient Medications   Medication     amitriptyline (ELAVIL) 10 MG tablet     Continuous Blood Gluc Sensor (DEXCOM G6 SENSOR) MISC     Continuous Blood Gluc Transmit (DEXCOM G6 TRANSMITTER) MISC     golimumab (SIMPONI) 50 MG/0.5ML auto-injector pen     HUMALOG KWIKPEN 100 UNIT/ML soln     INSULIN INFUSION PUMP     insulin lispro (HUMALOG PEN) 100 UNIT/ML injection     insulin lispro (HUMALOG) 100 UNIT/ML injection     leflunomide (ARAVA) 20 MG tablet     melatonin 5 MG tablet     Pregabalin (LYRICA PO)     traMADol (ULTRAM) 50 MG tablet     zolpidem (AMBIEN) 10 MG tablet     FARRAH CONTOUR test strip     blood glucose (NO BRAND SPECIFIED) test strip     celecoxib (CELEBREX) 200 MG capsule     insulin glargine (BASAGLAR KWIKPEN) 100 UNIT/ML pen     insulin pen needle (B-D U/F) 31G X 5 MM miscellaneous     Insulin Pen Needle (PEN NEEDLES 3/16\") 31G X 5 MM MISC     omeprazole (PRILOSEC) 20 MG DR capsule     No current facility-administered medications for this visit.      On exam she is in no acute distress.  She seems a little bit anxious and unhappy.        Recent Labs   Lab Test 09/15/20  1110 06/15/20  1001 11/05/19  0000 11/05/19 05/14/19  0000 05/14/19 05/17/18  0813 05/17/18  0813 01/23/18  1331 01/23/18  1331 06/11/15  1326 06/11/15  1326 05/23/13  1601 05/23/13  1601 12/10/12  1051 12/10/12  1051   A1C 7.2*  --   --   --   --   --   --   --   --   --   " --   --   --  8.2*  --   --    HEMOGLOBINA1  --   --   --  7.1*  --  6.7*   < >  --    < >  --    < >  --   --   --   --   --    TSH  --   --   --   --   --   --   --   --   --   --   --  0.91  --   --   --  1.58   LDL 81  --   --   --   --   --   --  78  --   --   --   --   --   --   --   --    HDL 75  --   --   --   --   --   --  81  --   --   --   --   --   --   --   --    TRIG 34  --   --   --   --   --   --  37  --   --   --   --   --   --   --   --    CR 0.70 0.71   < >  --    < >  --    < > 0.68   < >  --    < > 0.64   < > 0.62   < > 0.67   MICROL <5  --   --   --   --   --   --   --   --  <5  --   --   --   --   --   --     < > = values in this interval not displayed.     Assessment and plan:    Problem #1.  Diabetes control.  Ruba has obtained a tandem pump that is using the basal IQ technology.  This is helping her avoid hypoglycemia.  However, she is having higher values than we would like.  I agree that she should upgrade to the control IQ technology.  I will talk with Dodie Sheikh, one of our diabetes educators, to see what we have to do to make this happen.  I will also ask her to follow-up with Ruba on making this transition.  Based on her CGM record, I think all of her basals are set too low.  This probably reflects the fact that she has been essentially inactive for the last several months as she is recovering from her knee surgery.  I recommended she make the following changes in her pump settings:    Basal rate:  Midnight 0.4 (was 0.35)  8 AM 0.55 (was 0.5)  10 AM 0.9 (was 0.875)  4 PM 0.8 (was 0.775)  10 PM 0.4 (was 0.35)    2.diabetes complications.  She is up-to-date with her screening and has none.    3.CVD risk.  I encouraged her to check her blood pressure at home.  I told her I was glad she was starting on a statin.    F/u with Mikki Gilbert in 3 mo and f/u with me in 6 mo    Veronica Bills MD

## 2021-01-04 ENCOUNTER — VIRTUAL VISIT (OUTPATIENT)
Dept: ENDOCRINOLOGY | Facility: CLINIC | Age: 66
End: 2021-01-04
Payer: COMMERCIAL

## 2021-01-04 DIAGNOSIS — E10.9 TYPE 1 DIABETES MELLITUS WITHOUT COMPLICATION (H): Primary | ICD-10-CM

## 2021-01-04 PROCEDURE — 99214 OFFICE O/P EST MOD 30 MIN: CPT | Mod: GT | Performed by: INTERNAL MEDICINE

## 2021-01-04 NOTE — LETTER
"1/4/2021       RE: Ruba Major  [address]  [facility] [place] [address]     Dear Colleague,    Thank you for referring your patient, Ruba Major, to the [facility] [place] [facility]. Please see a copy of my visit note below.    Outcome for 12/31/20 12:27 PM: spoke with patient states that she will upload her dexcom and states that her tandem pump hasn't started the control IQ yet- soj  Outcome for 01/04/21 8:00 AM :sent mychart reminder to upload dexcom    Ruba Major is a 65 year old female who is being evaluated via a billable video visit.      The patient has been notified of following:     \"This video visit will be conducted via a call between you and your physician/provider. We have found that certain health care needs can be provided without the need for an in-person physical exam.  This service lets us provide the care you need with a video conversation.  If a prescription is necessary we can send it directly to your pharmacy.  If lab work is needed we can place an order for that and you can then stop by our lab to have the test done at a later time.    Video visits are billed at different rates depending on your insurance coverage.  Please reach out to your insurance provider with any questions.    If during the course of the call the physician/provider feels a video visit is not appropriate, you will not be charged for this service.\"    Patient has given verbal consent for Video visit? Yes  How would you like to obtain your AVS? MyChart  If you are dropped from the video visit, the video invite should be resent to: Text to cell phone: [phone]  Will anyone else be joining your video visit? No        Video-Visit Details    Type of service:  Video Visit    Video Start Time: 4:05  Video End Time: 4:30    Originating Location (pt. Location): Home    Distant Location (provider location):  [facility] ENDOCRINOLOG[facility] " [facility]     Platform used for Video Visit: Azael Bills MD       This 65 year-old woman was seen virtually for follow-up of her type 1 diabetes.  I made the diagnosis in 2008 when she presented with hyperglycemia in the absence of DKA.      She currently manages her diabetes with a tandem pump and the dexcom G6.  She planned to update the software to the control IQ product but was unable to do that before her access to the software was outdated.  She was concerned that if she was not completely familiar with how to use the new tandem pump, she would be able to manage the upgrade to the control IQ.  She has been using her tandem pump now for several weeks and feels much more comfortable with the settings.  She underwent revision of her knee replacement surgery in the fall and has had a lot of pain during her recovery.  She is much less active than she has been and has been feeling very stressed.  This is increased her blood sugars quite a bit and she is unhappy about that.  However, she has completed physical therapy and been told to return to slowly increasing amounts of exercise.  She is hopeful she will be able to do that and improve her glucose control.    She uploaded her pump to the cloud and I reviewed the data online.  Her current pump settings are:    Basal rate:  Midnight 0.35  8 AM 0.5  10 AM 0.875  4 PM 0.775  10 PM 0.35    Correction factor is 55    Carb ratio  Midnight 12  8 AM 10  10 PM 12    Target blood glucose  Midnight 140  8   10     During the last 2 weeks she wore her CGM 96% of the time.  Her average sensor glucose was 161 with a range from 5 7-344.  Her basal insulin comprises 57% of her daily dose, her food bolus is 31%, and her correction bolus is 12% of her daily dose.  38% of her blood sugars were between 90 and 140 and 58 were greater than 140.  4% were less than 90.  Looking at the individual days, I see that she is usually above target  "overnight and has been quite high at 3+ hours after meals.  She is also high overnight and gives corrections most nights.She is bolusing regularly for her food.  She does have hypoglycemia but the pump suspends for that.  She has not had any problems with managing her hypoglycemia.    She is established care with a new internist in [place].  At a recent visit she was told her blood pressure was too high.  She is never been told this before so she is getting a blood pressure cuff and will start using it.  This new internist also convinced her that she should start taking a statin, something we have talked about in the past.  She has started taking 1 and will take 1 dose a week and see how she tolerates it      She is up-to-date with her eye visits and has never been told she had retinopathy.  She has no foot concerns.  She denies having any neuropathic symptoms.     Current Outpatient Medications   Medication     amitriptyline (ELAVIL) 10 MG tablet     Continuous Blood Gluc Sensor (DEXCOM G6 SENSOR) MISC     Continuous Blood Gluc Transmit (DEXCOM G6 TRANSMITTER) MISC     golimumab (SIMPONI) 50 MG/0.5ML auto-injector pen     HUMALOG KWIKPEN 100 UNIT/ML soln     INSULIN INFUSION PUMP     insulin lispro (HUMALOG PEN) 100 UNIT/ML injection     insulin lispro (HUMALOG) 100 UNIT/ML injection     leflunomide (ARAVA) 20 MG tablet     melatonin 5 MG tablet     Pregabalin (LYRICA PO)     traMADol (ULTRAM) 50 MG tablet     zolpidem (AMBIEN) 10 MG tablet     FARRAH CONTOUR test strip     blood glucose (NO BRAND SPECIFIED) test strip     celecoxib (CELEBREX) 200 MG capsule     insulin glargine (BASAGLAR KWIKPEN) 100 UNIT/ML pen     insulin pen needle (B-D U/F) 31G X 5 MM miscellaneous     Insulin Pen Needle (PEN NEEDLES 3/16\") 31G X 5 MM MISC     omeprazole (PRILOSEC) 20 MG DR capsule     No current facility-administered medications for this visit.      On exam she is in no acute distress.  She seems a little bit anxious and " unhappy.        Recent Labs   Lab Test 09/15/20  1110 06/15/20  1001 11/05/19  0000 11/05/19 05/14/19  0000 05/14/19 05/17/18  0813 05/17/18  0813 01/23/18  1331 01/23/18  1331 06/11/15  1326 06/11/15  1326 05/23/13  1601 05/23/13  1601 12/10/12  1051 12/10/12  1051   A1C 7.2*  --   --   --   --   --   --   --   --   --   --   --   --  8.2*  --   --    HEMOGLOBINA1  --   --   --  7.1*  --  6.7*   < >  --    < >  --    < >  --   --   --   --   --    TSH  --   --   --   --   --   --   --   --   --   --   --  0.91  --   --   --  1.58   LDL 81  --   --   --   --   --   --  78  --   --   --   --   --   --   --   --    HDL 75  --   --   --   --   --   --  81  --   --   --   --   --   --   --   --    TRIG 34  --   --   --   --   --   --  37  --   --   --   --   --   --   --   --    CR 0.70 0.71   < >  --    < >  --    < > 0.68   < >  --    < > 0.64   < > 0.62   < > 0.67   MICROL <5  --   --   --   --   --   --   --   --  <5  --   --   --   --   --   --     < > = values in this interval not displayed.     Assessment and plan:    Problem #1.  Diabetes control.  Ruba has obtained a tandem pump that is using the basal IQ technology.  This is helping her avoid hypoglycemia.  However, she is having higher values than we would like.  I agree that she should upgrade to the control IQ technology.  I will talk with Dodie Sheikh, one of our diabetes educators, to see what we have to do to make this happen.  I will also ask her to follow-up with Ruba on making this transition.  Based on her CGM record, I think all of her basals are set too low.  This probably reflects the fact that she has been essentially inactive for the last several months as she is recovering from her knee surgery.  I recommended she make the following changes in her pump settings:    Basal rate:  Midnight 0.4 (was 0.35)  8 AM 0.55 (was 0.5)  10 AM 0.9 (was 0.875)  4 PM 0.8 (was 0.775)  10 PM 0.4 (was 0.35)    2.diabetes complications.  She is up-to-date with  her screening and has none.    3.CVD risk.  I encouraged her to check her blood pressure at home.  I told her I was glad she was starting on a statin.    F/u with Mikki Gilbert in 3 mo and f/u with me in 6 mo    Veronica Bills MD

## 2021-01-04 NOTE — Clinical Note
Kika and Jodie - Did I sign a new prescription for this patient to upgrade to the control IQ software?  I seem to remember I signed something last week.  If not, can you send me the form I need to sign for this to happen?    Dodie - can you make sure she is using the new software correctly after she upgrades?  Let me know if you need me to send you a new referral.  I know you have seen her before for this reason.    Thanks!    Laxmi

## 2021-01-29 ENCOUNTER — MYC MEDICAL ADVICE (OUTPATIENT)
Dept: RHEUMATOLOGY | Facility: CLINIC | Age: 66
End: 2021-01-29

## 2021-01-29 DIAGNOSIS — M05.741 RHEUMATOID ARTHRITIS INVOLVING BOTH HANDS WITH POSITIVE RHEUMATOID FACTOR (H): ICD-10-CM

## 2021-01-29 DIAGNOSIS — M05.742 RHEUMATOID ARTHRITIS INVOLVING BOTH HANDS WITH POSITIVE RHEUMATOID FACTOR (H): ICD-10-CM

## 2021-01-29 DIAGNOSIS — Z79.60 LONG-TERM USE OF IMMUNOSUPPRESSANT MEDICATION: Chronic | ICD-10-CM

## 2021-01-29 NOTE — TELEPHONE ENCOUNTER
leflunomide (ARAVA) 20 MG tablet      Last Written Prescription Date:  7-3-2020  Last Fill Quantity: 30,   # refills: 2  Last Office Visit: 7-3-2020  Future Office visit:  None  MANY OUTSIDE LABS DONE 12-  However LFT not found.    CBC RESULTS:   Recent Labs   Lab Test 09/15/20  1110   WBC 5.1   RBC 4.28   HGB 13.0   HCT 38.3   MCV 90   MCH 30.4   MCHC 33.9   RDW 13.0          Creatinine   Date Value Ref Range Status   09/15/2020 0.70 0.52 - 1.04 mg/dL Final   ]    Liver Function Studies -   Recent Labs   Lab Test 09/15/20  1110   PROTTOTAL 7.2   ALBUMIN 3.8   BILITOTAL 0.8   ALKPHOS 64   AST 24   ALT 30       Routing refill request to provider for review/approval because:  Not on protocol.  GAP IN TREATMENT?        Kathleen M Doege RN

## 2021-02-01 DIAGNOSIS — Z79.60 LONG-TERM USE OF IMMUNOSUPPRESSANT MEDICATION: ICD-10-CM

## 2021-02-01 DIAGNOSIS — M05.742 RHEUMATOID ARTHRITIS INVOLVING BOTH HANDS WITH POSITIVE RHEUMATOID FACTOR (H): Primary | ICD-10-CM

## 2021-02-01 DIAGNOSIS — M05.741 RHEUMATOID ARTHRITIS INVOLVING BOTH HANDS WITH POSITIVE RHEUMATOID FACTOR (H): Primary | ICD-10-CM

## 2021-02-01 NOTE — TELEPHONE ENCOUNTER
The absolute neutrophil count was low at the end of December.     Please hold the ARAVA and get laboratory testing done now before a refill:    CBC with platelets and diff  ROBERT  dsDNA

## 2021-02-01 NOTE — TELEPHONE ENCOUNTER
View Detailed Reports   View Condensed Results Report   Contains abnormal data CBC with Auto Differential  Order: [id number]  (suggestion)  Information displayed in this report will not trend or trigger automated decision support.    Ref Range & Units 1mo ago   WBC 3.8 - 10.8 K/uL 4.0    RBC 4.18 - 5.22 M/uL 4.13Low     Hemoglobin 12.0 - 16.0 g/dL 12.7    Hematocrit 36.0 - 48.0 % 37.9    MCV 80.0 - 99.0 fL 91.8    MCH 27.0 - 33.7 pg 30.8    MCHC 32.0 - 37.0 gm/dL 33.5    RDW 11.5 - 14.5 % 12.5    Platelet Count 150 - 450 K/uL 244.0    MPV 8.9 - 12.3 fL 10.7    nRBC % 0.0 - 0.0 % 0.0    IANC 1.9 - 10.8 K/uL 0.8Low     Comment: The IANC is a preliminary analyzer ANC that is subject to change based on further analysis that may include results from a manual differential.    Granulocyte % 51.0 - 89.0 % 19.8Low     Lymphocytes % 10.0 - 40.0 % 60.4High     Monocytes % 4.0 - 14.0 % 11.9    Eosinophil % 0.0 - 10.0 % 6.7    Basophil % 0.0 - 3.0 % 1.0    Immuture Granulocyte % 0.0 - 1.0 % 0.2    Granulocyte Absolute 1.6 - 7.8 K/uL 0.8Low     Lymphocytes Absolute 1.2 - 3.7 K/uL 2.4    Monocytes Absolute 0.2 - 1.0 K/uL 0.5    Eosinophils Absolute 0.0 - 0.7 K/uL 0.3    Basophils Absolute 0.0 - 0.3 K/uL 0.0    Immature Granulocyte Absolute 0.0 - 1.1 K/uL 0.0    NRBC Absolute 0.0 - 0.0 K/uL 0.0    Specimen Collected: 12/22/20 10:38 AM Last Resulted: 12/22/20  1:21 PM   Received From: [facility]  Result Received: 12/30/20 11:20 AM    Received Information   Contains abnormal data Comprehensive Metabolic Panel  Order: [id number]  (suggestion)  Information displayed in this report will not trend or trigger automated decision support.    Ref Range & Units 1mo ago   Sodium 136 - 145 mmol/L 138    Potassium 3.5 - 5.1 mmol/L 4.1    Chloride 98 - 107 mmol/L 101    CO2 23.0 - 29.0 mmol/L 29.2High     Anion Gap 3 - 11 mmol/L 8    Glucose 70 - 105 mg/dL 223High     BUN 7 - 25 mg/dL 19    Creatinine 0.6 - 1.2 mg/dL 0.7    BUN/Creatinine  Ratio  Ratio 27.1    Osmolality Calc  mOsm/kg 285    Total Protein 6.4 - 8.9 g/dL 6.7    Albumin 3.5 - 5.7 g/dL 4.7    Calcium 8.6 - 10.3 mg/dL 9.2    Total Bilirubin 0.3 - 1.0 mg/dL 0.7    Alkaline Phosphatase 34 - 104 IU/L 54    AST 13 - 39 U/L 18    ALT (SGPT) 7 - 52 IU/L 12    eGFR >60.00 mL/min/1.73m*2 91.21    Comment: eGFR results <60 mL/min/1.73m sq are below the reference range.   Please note: Results below the reference range do not flag in the lab computer system.   The equation has not been validated in patients older than age 70, but an MDRD-derived eGFR may still be useful tool for providers caring for patients older than 70.   Specimen Collected: 12/22/20 10:38 AM Last Resulted: 12/22/20  1:33 PM   Received From: [facility]  Result Received: 12/30/20 11:20 AM    Received Information   Microalbumin Random Urine  Order: [id number]  (suggestion)  Information displayed in this report will not trend or trigger automated decision support.    Ref Range & Units 1mo ago   Microalb, Ur  mg/dL <0.7    Microalb/Creat Ratio      Comment: Unable to provide a calculated result for Microalbumin/Creatinine Ratio; analyte is outside the analytical range.   Creatinine, Ur  mg/dL 130.8    Specimen Collected: 12/22/20 10:38 AM Last Resulted: 12/22/20  1:40 PM   Received From: [facility]  Result Received: 12/30/20 11:20 AM    Received Information   C-Reactive protein  Order: [id number]  (suggestion)  Information displayed in this report will not trend or trigger automated decision support.    Ref Range & Units 1mo ago   CRP <1.0 mg/dL 0.1    Specimen Collected: 12/22/20 10:38 AM Last Resulted: 12/22/20  1:33 PM   Received From: [facility]  Result Received: 12/30/20 11:20 AM    Received Information   Lipid Panel w/ Reflex LDL  Order: [id number]  (suggestion)  Information displayed in this report will not trend or trigger automated decision support.    Ref Range & Units 1mo ago   Cholesterol <200 mg/dL 151     Triglycerides <150 mg/dL 41    HDL 23 - 92 mg/dL 66    Cholesterol Total/HDL Ratio 0.00 - 4.99 Ratio 2.27    Non-HDL  mg/dL 85    VLDL Cholesterol Kishore 0 - 29 mg/dL 8    LDL Calculated  mg/dL 76    LDL HDL Ratio  Ratio 1    Narrative    HDL Cholesterol     HDL < 40 mg/dL is considered a major risk factor for heart disease     HDL > 60 mg/dL is considered protective against heart disease       LDL Calculated clinical ranges:     Optimal                     <100 mg/dL     Near optimal/Above optimal  100 - 129 mg/dL     Borderline High             130 - 159 mg/dL     High                        160 - 189 mg/dL     Very High                   >or = 190 mg/dL  Specimen Collected: 12/22/20 10:38 AM Last Resulted: 12/22/20  1:33 PM   Received From: [facility]  Result Received: 12/30/20 11:20 AM    Received Information   Sedimentation Rate, Automated  Order: [id number]  (suggestion)  Information displayed in this report will not trend or trigger automated decision support.    Ref Range & Units 1mo ago   Sed Rate, Westergren 0 - 25 mm/hour 6    Specimen Collected: 12/22/20 10:38 AM Last Resulted: 12/22/20  2:02 PM   Received From: [facility]  Result Received: 12/30/20 11:20 AM    Received Information   CK Total (CPK)  Order: [id number]  (suggestion)  Information displayed in this report will not trend or trigger automated decision support.    Ref Range & Units 1mo ago   Total CK 30.0 - 223.0 U/L 56.3    Specimen Collected: 12/22/20 10:38 AM Last Resulted: 12/22/20  1:33 PM   Received From: [facility]  Result Received: 12/30/20 11:20 AM    Received Information   Ferritin  Order: [id number]  (suggestion)  Information displayed in this report will not trend or trigger automated decision support.    Ref Range & Units 1mo ago   Ferritin 11.0 - 306.8 ng/mL 134.8    Specimen Collected: 12/22/20 10:38 AM Last Resulted: 12/22/20  2:02 PM   Received From: [facility]  Result Received: 12/30/20 11:20 AM     Received Information

## 2021-02-02 ENCOUNTER — VIRTUAL VISIT (OUTPATIENT)
Dept: EDUCATION SERVICES | Facility: CLINIC | Age: 66
End: 2021-02-02
Payer: COMMERCIAL

## 2021-02-02 DIAGNOSIS — E10.9 TYPE 1 DIABETES MELLITUS WITHOUT COMPLICATION (H): Primary | ICD-10-CM

## 2021-02-02 PROCEDURE — G0108 DIAB MANAGE TRN  PER INDIV: HCPCS | Mod: GT | Performed by: REGISTERED NURSE

## 2021-02-02 NOTE — TELEPHONE ENCOUNTER
Dr Aly's recommendations sent to pt. Advised her to hold the leflunomide, gets labs done now. Asked her to get back to me if we need to fax orders to a lab closer to her.    LITO TannerN, RN  Rheumatology Care Coordinator  [facility]

## 2021-02-02 NOTE — PROGRESS NOTES
Video-Visit Details    Type of service:  Video Visit  Patient consented to video visit  Video Start Time:  12:35  Video End Time:    13:06  Originating Location (pt. Location): Home  Distant Location (provider location):  [facility] DIABETES EDUCATION [place]   Platform used for Video Visit: Azael     DIABETES EDUCATOR NOTE:    Purpose of today's visit:  Follow up recent change to Control IQ software on her Tandem X2 pump.  She upgraded her insulin pump about 3 weeks ago.  She is currently living in their [place] home, outside [place], and has been there since November.  She had a revision of her knee replacement done at the beginning of October, and is still quite limited in what she can physically do.     Subjective/Objective:  States that she feels as if she understands the software of the insulin pump fairly well.  She has not been using sleep mode, and I encouraged her to do this today, to avoid auto-corrections overnight.  Discussed how to use exercise mode, which she doubts she will be doing any time soon.        Assessment/Plan:    She is rapidly increasing her TIR.  She is having some between meal mild lows, and we discussed whether to reduce her basal rate, but she did not want to make any other changes today.  Discussed that if she starts to have more frequent or more severe hypoglycemia, we should consider reducing her basal rates, especially if she is able to increase her level of activity.  She is pleased so far with how the pump is working.  Set up a sleep schedule to start using sleep mode.      Any diabetes medication dose changes were made via the CDE Protocol and Collaborative Practice Agreement. A copy of this encounter was provided to the patient's referring      Time spent in this visit: 31 minutes.

## 2021-02-03 RX ORDER — LEFLUNOMIDE 20 MG/1
20 TABLET ORAL DAILY
Qty: 30 TABLET | Refills: 3 | Status: SHIPPED | OUTPATIENT
Start: 2021-02-03 | End: 2021-06-10

## 2021-02-03 NOTE — TELEPHONE ENCOUNTER
The repeat CBC with differential is satisfactory, with modest leukopenia, not greatly different than seen previously and prior to leflunomide initiation. Question whether this relates to a Felty's syndrome? ROBERT/dsDNA are pending.    Restart the arava 20 mg daily.  Follow up with me immediately after she returns for [place]. Continue lab testing every  2 months.

## 2021-02-08 NOTE — CONFIDENTIAL NOTE
Relayed Dr Aly's recommendations regarding her lab results, restarting the Arava at 20 mg daily, continue lab testing every 2 months, and to see Dr Aly immediately after returning from [place]. Asked pt to let me know if she has any questions about these recommendations.    LITO TannerN, RN  Rheumatology Care Coordinator  [facility]

## 2021-03-20 ENCOUNTER — HEALTH MAINTENANCE LETTER (OUTPATIENT)
Age: 66
End: 2021-03-20

## 2021-03-30 DIAGNOSIS — E10.9 TYPE 1 DIABETES MELLITUS WITHOUT COMPLICATION (H): Primary | ICD-10-CM

## 2021-05-15 ENCOUNTER — HEALTH MAINTENANCE LETTER (OUTPATIENT)
Age: 66
End: 2021-05-15

## 2021-05-19 ENCOUNTER — MYC MEDICAL ADVICE (OUTPATIENT)
Dept: RHEUMATOLOGY | Facility: CLINIC | Age: 66
End: 2021-05-19

## 2021-05-19 DIAGNOSIS — Z79.60 LONG-TERM USE OF IMMUNOSUPPRESSANT MEDICATION: Chronic | ICD-10-CM

## 2021-05-19 DIAGNOSIS — M05.741 RHEUMATOID ARTHRITIS INVOLVING BOTH HANDS WITH POSITIVE RHEUMATOID FACTOR (H): Primary | ICD-10-CM

## 2021-05-19 DIAGNOSIS — M05.742 RHEUMATOID ARTHRITIS INVOLVING BOTH HANDS WITH POSITIVE RHEUMATOID FACTOR (H): Primary | ICD-10-CM

## 2021-05-19 NOTE — TELEPHONE ENCOUNTER
Reviewed and forwarded to Dr Aly to see if additional labs are wanted.    LITO TannerN, RN  Rheumatology Care Coordinator  [facility]

## 2021-05-20 NOTE — TELEPHONE ENCOUNTER
Received the following staff message from Dr Aly:    Asim Aly MD Anderson, Lee A, RN   Caller: Unspecified (Yesterday,  9:21 AM)   Phone Number: [phone]             Of course. She is missing most of her pending lab results.      We are able to see the results she had in Care Everywhere. Responded to pt that she is needing and asked that she have them done prior to her appointment. Standing orders were update.    APOORVA Tanner, RN  Rheumatology Care Coordinator  [facility]

## 2021-06-03 DIAGNOSIS — M05.741 RHEUMATOID ARTHRITIS INVOLVING BOTH HANDS WITH POSITIVE RHEUMATOID FACTOR (H): ICD-10-CM

## 2021-06-03 DIAGNOSIS — Z79.60 LONG-TERM USE OF IMMUNOSUPPRESSANT MEDICATION: Chronic | ICD-10-CM

## 2021-06-03 DIAGNOSIS — M05.742 RHEUMATOID ARTHRITIS INVOLVING BOTH HANDS WITH POSITIVE RHEUMATOID FACTOR (H): ICD-10-CM

## 2021-06-03 LAB
ALBUMIN SERPL-MCNC: 4 G/DL (ref 3.4–5)
ALBUMIN UR-MCNC: NEGATIVE MG/DL
ALT SERPL W P-5'-P-CCNC: 25 U/L (ref 0–50)
APPEARANCE UR: CLEAR
AST SERPL W P-5'-P-CCNC: 21 U/L (ref 0–45)
BASOPHILS # BLD AUTO: 0 10E9/L (ref 0–0.2)
BASOPHILS NFR BLD AUTO: 0.5 %
BILIRUB UR QL STRIP: NEGATIVE
COLOR UR AUTO: YELLOW
CREAT SERPL-MCNC: 0.7 MG/DL (ref 0.52–1.04)
CRP SERPL-MCNC: <2.9 MG/L (ref 0–8)
DIFFERENTIAL METHOD BLD: ABNORMAL
EOSINOPHIL # BLD AUTO: 0.2 10E9/L (ref 0–0.7)
EOSINOPHIL NFR BLD AUTO: 3.9 %
ERYTHROCYTE [DISTWIDTH] IN BLOOD BY AUTOMATED COUNT: 12.9 % (ref 10–15)
GFR SERPL CREATININE-BSD FRML MDRD: >90 ML/MIN/{1.73_M2}
GLUCOSE UR STRIP-MCNC: NEGATIVE MG/DL
HCT VFR BLD AUTO: 37.8 % (ref 35–47)
HGB BLD-MCNC: 12.9 G/DL (ref 11.7–15.7)
HGB UR QL STRIP: NEGATIVE
KETONES UR STRIP-MCNC: NEGATIVE MG/DL
LEUKOCYTE ESTERASE UR QL STRIP: NEGATIVE
LYMPHOCYTES # BLD AUTO: 1.7 10E9/L (ref 0.8–5.3)
LYMPHOCYTES NFR BLD AUTO: 44.9 %
MCH RBC QN AUTO: 30.4 PG (ref 26.5–33)
MCHC RBC AUTO-ENTMCNC: 34.1 G/DL (ref 31.5–36.5)
MCV RBC AUTO: 89 FL (ref 78–100)
MONOCYTES # BLD AUTO: 0.5 10E9/L (ref 0–1.3)
MONOCYTES NFR BLD AUTO: 12.7 %
NEUTROPHILS # BLD AUTO: 1.5 10E9/L (ref 1.6–8.3)
NEUTROPHILS NFR BLD AUTO: 38 %
NITRATE UR QL: NEGATIVE
PH UR STRIP: 7 PH (ref 5–7)
PLATELET # BLD AUTO: 241 10E9/L (ref 150–450)
RBC # BLD AUTO: 4.24 10E12/L (ref 3.8–5.2)
SOURCE: NORMAL
SP GR UR STRIP: 1.02 (ref 1–1.03)
UROBILINOGEN UR STRIP-ACNC: 0.2 EU/DL (ref 0.2–1)
WBC # BLD AUTO: 3.9 10E9/L (ref 4–11)

## 2021-06-03 PROCEDURE — 85025 COMPLETE CBC W/AUTO DIFF WBC: CPT | Performed by: INTERNAL MEDICINE

## 2021-06-03 PROCEDURE — 84450 TRANSFERASE (AST) (SGOT): CPT | Performed by: INTERNAL MEDICINE

## 2021-06-03 PROCEDURE — 36415 COLL VENOUS BLD VENIPUNCTURE: CPT | Performed by: INTERNAL MEDICINE

## 2021-06-03 PROCEDURE — 82040 ASSAY OF SERUM ALBUMIN: CPT | Performed by: INTERNAL MEDICINE

## 2021-06-03 PROCEDURE — 82565 ASSAY OF CREATININE: CPT | Performed by: INTERNAL MEDICINE

## 2021-06-03 PROCEDURE — 84460 ALANINE AMINO (ALT) (SGPT): CPT | Performed by: INTERNAL MEDICINE

## 2021-06-03 PROCEDURE — 81003 URINALYSIS AUTO W/O SCOPE: CPT | Performed by: INTERNAL MEDICINE

## 2021-06-03 PROCEDURE — 86140 C-REACTIVE PROTEIN: CPT | Performed by: INTERNAL MEDICINE

## 2021-06-05 VITALS — HEIGHT: 66 IN | BODY MASS INDEX: 21.4 KG/M2 | WEIGHT: 133.19 LBS

## 2021-06-07 DIAGNOSIS — E10.69 TYPE 1 DIABETES MELLITUS WITH OTHER SPECIFIED COMPLICATION (H): ICD-10-CM

## 2021-06-07 DIAGNOSIS — M05.741 RHEUMATOID ARTHRITIS INVOLVING BOTH HANDS WITH POSITIVE RHEUMATOID FACTOR (H): ICD-10-CM

## 2021-06-07 DIAGNOSIS — M05.742 RHEUMATOID ARTHRITIS INVOLVING BOTH HANDS WITH POSITIVE RHEUMATOID FACTOR (H): ICD-10-CM

## 2021-06-07 DIAGNOSIS — Z79.60 LONG-TERM USE OF IMMUNOSUPPRESSANT MEDICATION: Chronic | ICD-10-CM

## 2021-06-07 NOTE — TELEPHONE ENCOUNTER
insulin lispro (HUMALOG) 100 UNIT/ML injection   Last Written Prescription Date:  8/9/19  Last Fill Quantity: 70ml   # refills: 3  Last Office Visit :1/4/21  Future Office visit: 7/5/21    Routing refill request to provider for review/approval because: endo triage to fill

## 2021-06-08 DIAGNOSIS — E10.9 TYPE 1 DIABETES MELLITUS WITHOUT COMPLICATION (H): ICD-10-CM

## 2021-06-08 NOTE — TELEPHONE ENCOUNTER
leflunomide (ARAVA) 20 MG tablet      Last Written Prescription Date:  2-3-2021  Last Fill Quantity: 30,   # refills: 3  Last Office Visit: 9-  RTC 3 months  Future Office visit:  6-    CBC RESULTS:   Recent Labs   Lab Test 06/03/21  1323   WBC 3.9*   RBC 4.24   HGB 12.9   HCT 37.8   MCV 89   MCH 30.4   MCHC 34.1   RDW 12.9          Creatinine   Date Value Ref Range Status   06/03/2021 0.70 0.52 - 1.04 mg/dL Final   ]    Liver Function Studies -   Recent Labs   Lab Test 06/03/21  1323 09/15/20  1110   PROTTOTAL  --  7.2   ALBUMIN 4.0 3.8   BILITOTAL  --  0.8   ALKPHOS  --  64   AST 21 24   ALT 25 30       Routing refill request to provider for review/approval because:  Not on protocol.      Kathleen M Doege RN

## 2021-06-09 RX ORDER — PROCHLORPERAZINE 25 MG/1
1 SUPPOSITORY RECTAL
Qty: 9 EACH | Refills: 3 | Status: SHIPPED | OUTPATIENT
Start: 2021-06-09 | End: 2021-11-18

## 2021-06-10 RX ORDER — LEFLUNOMIDE 20 MG/1
TABLET ORAL
Qty: 90 TABLET | Refills: 1 | Status: SHIPPED | OUTPATIENT
Start: 2021-06-10 | End: 2021-06-11

## 2021-06-11 ENCOUNTER — VIRTUAL VISIT (OUTPATIENT)
Dept: RHEUMATOLOGY | Facility: CLINIC | Age: 66
End: 2021-06-11
Payer: COMMERCIAL

## 2021-06-11 DIAGNOSIS — M05.741 RHEUMATOID ARTHRITIS INVOLVING BOTH HANDS WITH POSITIVE RHEUMATOID FACTOR (H): ICD-10-CM

## 2021-06-11 DIAGNOSIS — Z79.60 LONG-TERM USE OF IMMUNOSUPPRESSANT MEDICATION: Chronic | ICD-10-CM

## 2021-06-11 DIAGNOSIS — M05.742 RHEUMATOID ARTHRITIS INVOLVING BOTH HANDS WITH POSITIVE RHEUMATOID FACTOR (H): ICD-10-CM

## 2021-06-11 PROCEDURE — 99214 OFFICE O/P EST MOD 30 MIN: CPT | Mod: GT | Performed by: INTERNAL MEDICINE

## 2021-06-11 RX ORDER — GOLIMUMAB 50 MG/.5ML
INJECTION, SOLUTION SUBCUTANEOUS
Qty: 0.5 ML | Refills: 11 | Status: SHIPPED | OUTPATIENT
Start: 2021-06-11 | End: 2022-05-06

## 2021-06-11 RX ORDER — LEFLUNOMIDE 20 MG/1
20 TABLET ORAL DAILY
Qty: 90 TABLET | Refills: 1 | Status: SHIPPED | OUTPATIENT
Start: 2021-06-11 | End: 2021-10-12

## 2021-06-11 RX ORDER — ROSUVASTATIN CALCIUM 10 MG/1
10 TABLET, COATED ORAL DAILY
COMMUNITY
Start: 2020-12-31

## 2021-06-11 NOTE — PATIENT INSTRUCTIONS
Follow up in November with me  Continue your current medications  Continue with laboratory testing every 2-3 months

## 2021-06-11 NOTE — PROGRESS NOTES
Ms. Major has seropositive rheumatoid arthritis affecting multiple joints. +CCP, -RF, -ROBERT. Failed Enbrel, Humira. No known erosions.     She is doing very well. She has been in [place] in her second home, but is now back in [place].    Her joints are doing well. She intermittently will have some days of flaring and she will use the Celebrex when that happens. Her Simponi will wear off in about the week for injection.    She is recovering from her second knee replacement. Her energy is improving. No AM stiffness. No new deformities or joint dysfunction. She is still troubled by some elbow bursitis swelling. She is walking and working out. She is strong.    Simponi is well tolerated and no infection. She is now COVID vaccinated. Leflunomide 20 mg once daily is well tolerated.  Celebrex use is only prn    PMI:  Medical-osteopenia (T = -2.8 ), type 1 diabetes, rheumatoid arthritis, osteoarthritis of the hands, PUD, trigeminal neuralgia, hyperlipidemia, mild CAD by cardiac CT, sciatica, GERD  Surgical-cervical spine fusion surgery, multiple bilateral arthroscopic surgeries, bilateral TKA, bilateral bunionectomy  Injuries-none    SH:  Retired . Lives in [facility] in the winter. 2 daughters. No tobacco, 1 EtOH daily.     FH:  Mother dead with spinal arthritis and DJD  Father dead with CAD and MI, AAA  Brother is fine  Daughter with celiac disease  Daughter is healthy    PMSF history personally obtained and updated by me this visit.    ROS:  +chronic neck pain  +intolerant of statins  Remainder of the 14 point ROS obtained and found negative    Physical Exam:  Constitutional: WD-WN-WG cooperative  Eyes: nl EOM, sclera  ENT: nl external ears, nose, hearing, lips  Neck: no visible thyroid enlargement  MS: All TMJ, neck, shoulder, elbow, wrist, hand, spine, hip, knee, ankle, and foot joints were examined and otherwise found normal. Normal  strength. No active synovitis or deformity. Full  ROM.  Skin: no alopecia, rash  Neuro: nl cranial nerves  Psych: nl judgement,  affect.    Laboratory:    Component      Latest Ref Rng & Units 6/3/2021   WBC      4.0 - 11.0 10e9/L 3.9 (L)   RBC Count      3.8 - 5.2 10e12/L 4.24   Hemoglobin      11.7 - 15.7 g/dL 12.9   Hematocrit      35.0 - 47.0 % 37.8   MCV      78 - 100 fl 89   MCH      26.5 - 33.0 pg 30.4   MCHC      31.5 - 36.5 g/dL 34.1   RDW      10.0 - 15.0 % 12.9   Platelet Count      150 - 450 10e9/L 241   % Neutrophils      % 38.0   % Lymphocytes      % 44.9   % Monocytes      % 12.7   % Eosinophils      % 3.9   % Basophils      % 0.5   Absolute Neutrophil      1.6 - 8.3 10e9/L 1.5 (L)   Absolute Lymphocytes      0.8 - 5.3 10e9/L 1.7   Absolute Monocytes      0.0 - 1.3 10e9/L 0.5   Absolute Eosinophils      0.0 - 0.7 10e9/L 0.2   Absolute Basophils      0.0 - 0.2 10e9/L 0.0   Diff Method       Automated Method   Color Urine       Yellow   Appearance Urine       Clear   Glucose Urine      NEG:Negative mg/dL Negative   Bilirubin Urine      NEG:Negative Negative   Ketones Urine      NEG:Negative mg/dL Negative   Specific Gravity Urine      1.003 - 1.035 1.025   Blood Urine      NEG:Negative Negative   pH Urine      5.0 - 7.0 pH 7.0   Protein Albumin Urine      NEG:Negative mg/dL Negative   Urobilinogen Urine      0.2 - 1.0 EU/dL 0.2   Nitrite Urine      NEG:Negative Negative   Leukocyte Esterase Urine      NEG:Negative Negative   Source       Midstream Urine   Creatinine      0.52 - 1.04 mg/dL 0.70   GFR Estimate      >60 mL/min/1.73:m2 >90   GFR Estimate If Black      >60 mL/min/1.73:m2 >90   CRP Inflammation      0.0 - 8.0 mg/L <2.9   Albumin      3.4 - 5.0 g/dL 4.0   ALT      0 - 50 U/L 25   AST      0 - 45 U/L 21     Impression:    Seropositive rheumatoid arthritis-with history of multiple joint replacements. Today she continues to be very well controlled with the combination of Simponi and leflunomide, with good tolerance, so we will continue the  regimen. Rare use of celebrex and I will allow this.    Osteoporosis-she is treated with twice yearly prolia.    GERD-not currently a problem and I will allow the occasional celebrex.    Long term use of immunosuppressive-with some modest neutropenia that I will continue to monitor.  We also agree that the benefits of continued immunosuppression outweigh the risks of COVID-19 infection.     Plan in 6 months with me.    A total of 31 minutes was spent in face to face patient interaction and chart review on the day of service.

## 2021-06-11 NOTE — PROGRESS NOTES
Ruba is a 66 year old who is being evaluated via a billable video visit.      Video visit - please text invite to: [phone] (H)    How would you like to obtain your AVS? MyChart  If the video visit is dropped, the invitation should be resent by: Text to cell phone: [phone] (H)  Will anyone else be joining your video visit? No       Luis Fernando Vernon LPN  Pulmonary Medicine:  [facility]  Phone: [phone] Fax: [phone]            Video Start Time: 10:44 AM  Video-Visit Details    Type of service:  Video Visit    Video End Time:11:08 AM    Originating Location (pt. Location): Home    Distant Location (provider location):  [facility]     Platform used for Video Visit: Azael

## 2021-06-12 NOTE — ANESTHESIA POSTPROCEDURE EVALUATION
Patient: Ruba Major  Procedure(s):  REVISION RIGHT TOTAL KNEE ARTHROPLASTY (Right)  Anesthesia type: spinal    Patient location: PACU  Last vitals:   Vitals Value Taken Time   /73 10/12/20 1510   Temp 36.7  C (98.1  F) 10/12/20 1510   Pulse 95 10/12/20 1510   Resp 16 10/12/20 1510   SpO2 97 % 10/12/20 1510     Post vital signs: stable  Level of consciousness: awake and responds to simple questions  Post-anesthesia pain: pain controlled  Post-anesthesia nausea and vomiting: no  Pulmonary: unassisted, return to baseline  Cardiovascular: stable and blood pressure at baseline  Hydration: adequate  Anesthetic events: no

## 2021-06-12 NOTE — ANESTHESIA CARE TRANSFER NOTE
Last vitals:   Vitals:    10/12/20 1210   BP: 118/59   Pulse: (!) 104   Resp: 16   Temp: 36.7  C (98  F)   SpO2: 96%     Patient's level of consciousness is awake  Spontaneous respirations: yes  Maintains airway independently: yes  Dentition unchanged: yes  Oropharynx: oropharynx clear of all foreign objects    QCDR Measures:  ASA# 20 - Surgical Safety Checklist: WHO surgical safety checklist completed prior to induction    PQRS# 430 - Adult PONV Prevention: 4558F - Pt received => 2 anti-emetic agents (different classes) preop & intraop  ASA# 8 - Peds PONV Prevention: NA - Not pediatric patient, not GA or 2 or more risk factors NOT present  PQRS# 424 - Lora-op Temp Management: 4559F - At least one body temp DOCUMENTED => 35.5C or 95.9F within required timeframe  PQRS# 426 - PACU Transfer Protocol: - Transfer of care checklist used  ASA# 14 - Acute Post-op Pain: ASA14B - Patient did NOT experience pain >= 7 out of 10

## 2021-06-12 NOTE — TELEPHONE ENCOUNTER
FYI - Status Update  Who is Calling:  for [facility]   Update: Caller stated I reached out to this patient as a nurse to assist with case management and patient declined my services.   Okay to leave a detailed message?:  Yes  Caller was informed via voice mail that this patient is not assigned as our primary care patient.

## 2021-06-12 NOTE — ANESTHESIA PROCEDURE NOTES
Spinal Block    Patient location during procedure: OR  Start time: 10/12/2020 9:57 AM  End time: 10/12/2020 9:59 AM  Reason for block: primary anesthetic    Staffing:  Performing  Anesthesiologist: Jose Yates MD    Preanesthetic Checklist  Completed: patient identified, risks, benefits, and alternatives discussed, timeout performed, consent obtained, airway assessed, oxygen available, suction available, emergency drugs available and hand hygiene performed  Spinal Block  Patient position: sitting  Prep: ChloraPrep  Patient monitoring: heart rate, cardiac monitor, continuous pulse ox and blood pressure  Approach: midline  Location: L3-4  Injection technique: single-shot  Needle type: pencil-tip   Needle gauge: 24 G      Additional Notes:  No paresthesia. No heme. Clear CSF. Patient tolerated well.

## 2021-06-12 NOTE — ANESTHESIA PREPROCEDURE EVALUATION
Anesthesia Evaluation      Patient summary reviewed   No history of anesthetic complications     Airway   Mallampati: II  Neck ROM: full   Pulmonary - negative ROS and normal exam    breath sounds clear to auscultation                         Cardiovascular - negative ROS and normal exam  ECG reviewed  Rhythm: regular  Rate: normal,         Neuro/Psych - negative ROS     Endo/Other - negative ROS   (+) diabetes mellitus type 1 well controlled using insulin, arthritis,      GI/Hepatic/Renal - negative ROS      Other findings: Hx of multiple knee surgeries with complications after TKA including paresthesia (numbness) and weakness (quad atrophy) along Femoral n. Distribution of right leg. She remembers painful paresthesia with prior block peripheral n.selvin. She does not recall any problems with the spinal she received. She is very active. I discussed options with her and her  after listening to her concerns and history. She would like to repeat the SAB and allow me to provide a saphenous nerve block. I will use a more dilute concentration (.375% vs usual .5%) and 10ml max. She and her  understand that another option would be to forego the block altogether But they are concerned about her hx of severe PONV with narcotics for pain. They also understand that her conditon of muscle weakness could be exacerbated/worsend. Plan discussed with Dr. Nye.      Dental - normal exam                        Anesthesia Plan  Planned anesthetic: spinal and peripheral nerve block  Adductor canal (saph.n.) block for POA as requested by surgeon-see note in history.  Scopolamine patch  Magnesium  ASA 2     Anesthetic plan and risks discussed with: patient and spouse  Anesthesia plan special considerations: antiemetics,   Post-op plan: routine recovery

## 2021-06-14 ENCOUNTER — TELEPHONE (OUTPATIENT)
Dept: RHEUMATOLOGY | Facility: CLINIC | Age: 66
End: 2021-06-14

## 2021-06-14 NOTE — TELEPHONE ENCOUNTER
FW: Dr. Aly follow up  Received: 3 days ago  Message Contents   Jeanne Locke, JEREMY  P Clinic Coordinators-Med-[facility]-          Previous Messages    ----- Message -----   From: EMILY WELLINGTON   Sent: 6/11/2021  11:18 AM CDT   To: Jayce Perez RN, [facility] Rheumatology Adult [facility]   Subject: Dr. Aly follow up                             [facility] patient was seen on Dr. Aly's [place] schedule 06/11. Please assist patient with setting up follow up apts at the [facility].     Follow up instructions per Dr. Aly:     Follow up in November with me   Continue your current medications   Continue with laboratory testing every 2-3 months

## 2021-06-30 NOTE — PROGRESS NOTES
Outcome for 06/30/21 2:56 PM :  states will uploud this weekend  Outcome for 07/02/21 3:38 PM :Glucose sent via Email    Ruba Major  is being evaluated via a billable video visit.        How would you like to obtain your AVS? Dorina  For the video visit, send the invitation by: Text to cell phone: [phone]  Will anyone else be joining your video visit? No  Will you be in the [facility] at time of visit? Yes       This 66 year-old woman was seen virtually for follow-up of her type 1 diabetes.  I made the diagnosis in 2008 when she presented with hyperglycemia in the absence of DKA.      She currently manages her diabetes with a tandem control IQ pump and the dexcom G6.    Her current pump settings are:    Basal rate:  Midnight 0.4  8 AM 0.55  10 AM 0.9  4 PM 0.8  10 PM 0.4    Correction factor is 55    Carb ratio  Midnight 12  8 AM 10  10 PM 12    Target blood glucose  Midnight 140  8   10     During the last 2 weeks she wore her CGM and used the control IQ software  97% of the time.  Her average sensor glucose was 146 with a range from .   Her basal insulin comprises 67% of her daily dose, her food bolus is 19%, and her correction bolus is 11% of her daily dose.  Control IQ auto boluses are 3% of daily dose.  She has the sleep mode set from midnight to 7 am and from 10:30 to midnight most days.  She is frustrated that this pump will not do temporary basals.  She forgot that she can set it to exercise target to avoid hypoglycemia during exercise.    She is up-to-date with her eye visits and has never been told she had retinopathy.  She has no foot concerns.  She denies having any neuropathic symptoms.     Current Outpatient Medications   Medication     amitriptyline (ELAVIL) 10 MG tablet     FARRAH CONTOUR test strip     blood glucose (NO BRAND SPECIFIED) test strip     celecoxib (CELEBREX) 200 MG capsule     Continuous Blood Gluc Sensor (DEXCOM G6 SENSOR) MISC     Continuous Blood  "Gluc Transmit (DEXCOM G6 TRANSMITTER) MISC     golimumab (SIMPONI) 50 MG/0.5ML auto-injector pen     HUMALOG KWIKPEN 100 UNIT/ML soln     insulin glargine (BASAGLAR KWIKPEN) 100 UNIT/ML pen     INSULIN INFUSION PUMP     insulin lispro (HUMALOG PEN) 100 UNIT/ML injection     insulin lispro (HUMALOG VIAL) 100 UNIT/ML vial     insulin pen needle (B-D U/F) 31G X 5 MM miscellaneous     Insulin Pen Needle (PEN NEEDLES 3/16\") 31G X 5 MM MISC     leflunomide (ARAVA) 20 MG tablet     melatonin 5 MG tablet     omeprazole (PRILOSEC) 20 MG DR capsule     Pregabalin (LYRICA PO)     rosuvastatin (CRESTOR) 10 MG tablet     traMADol (ULTRAM) 50 MG tablet     zolpidem (AMBIEN) 10 MG tablet     No current facility-administered medications for this visit.        Patient Active Problem List   Diagnosis     Encounter for long-term current use of medication     Muscle cramps     Diabetes mellitus (H)     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 factor (H)     Type 1 diabetes mellitus without complication (H)     Senile osteoporosis     Gastroesophageal reflux disease with esophagitis     Long-term use of immunosuppressant medication       Vitals from 6/2020 136/81    On exam she is in no acute distress.  Mood is upbeat.    Recent Labs   Lab Test 06/03/21  1323 09/15/20  1110 11/05/19  0000 11/05/19 05/14/19  0000 05/14/19 05/17/18  0813 05/17/18  0813 01/23/18  1331 01/23/18  1331 06/11/15  1326 06/11/15  1326 05/23/13  1601 05/23/13  1601   A1C  --  7.2*  --   --   --   --   --   --   --   --   --   --   --  8.2*   HEMOGLOBINA1  --   --   --  7.1*  --  6.7*   < >  --    < >  --    < >  --   --   --    TSH  --   --   --   --   --   --   --   --   --   --   --  0.91  --   --    LDL  --  81  --   --   --   --   --  78  --   --   --   --   --   --    HDL  --  75  --   --   --   --   --  81  --   --   --   --   --   --    TRIG  --  34  --   --   --   --   --  37  --   --   --   --   --   --    CR 0.70 0.70   < >  -- "    < >  --    < > 0.68   < >  --    < > 0.64   < > 0.62   MICROL  --  <5  --   --   --   --   --   --   --  <5  --   --   --   --     < > = values in this interval not displayed.       A1c 7.3, GFR 77,  5/13//21 12/20: LDL 72    Assessment and plan:    1.  Diabetes control.  Ruba's A1c is close to target.  Unfortunately she is frustrated with the use of her control IQ pump.  I think she needs some support and learning how to use the software better.  I noted that she was mostly set in sleep mode and we made that change during the visit.  She now will be in sleep mode from 10:30 PM until 7 AM.  I also showed her how to use the exercise setting for activity.  I will refer her to the nurse educator for comprehensive education on the use of this device.    2.  Diabetes complications. Up to date with screens and has none.    CVD risk.  She is on a statin with a reasonable LDL.  Her recent blood pressures have been okay.    Follow-up with me in November 2021.    I spent 25 minutes on the video visit with the patient today. Start time 4:30 and end time 4:55.   I spent an additional 25 minutes reviewing her chart, going to care everywhere and obtaining recent lab data, communicating with my nurse educator, and doing documentation.  Veronica Bills MD

## 2021-06-30 NOTE — PATIENT INSTRUCTIONS
We appreciate your assistance in coordinating your healthcare.     Please upload your insulin pump, blood sugar meter and/or continuous glucose monitor at home 1-2 days before your next diabetes-related appointment.   This will allow your provider to review your  data before your scheduled virtual visit.    To ask a question to your Endocrine care team, please send them a 117go message, or reach them by phone at [phone]     To expedite your medication refill(s), please contact your pharmacy and have them   fax a refill request to: [phone].  *Please allow 3 business days for routine medication refills.  *Please allow 5 business days for controlled substance medication refills.    For after-hours urgent Endocrine issues, that do not require 151, please dial [phone], and ask to speak with the Endocrinologist On-Call

## 2021-07-05 ENCOUNTER — VIRTUAL VISIT (OUTPATIENT)
Dept: ENDOCRINOLOGY | Facility: CLINIC | Age: 66
End: 2021-07-05
Payer: COMMERCIAL

## 2021-07-05 DIAGNOSIS — E10.9 TYPE 1 DIABETES MELLITUS WITHOUT COMPLICATION (H): Primary | ICD-10-CM

## 2021-07-05 PROCEDURE — 99215 OFFICE O/P EST HI 40 MIN: CPT | Mod: GT | Performed by: INTERNAL MEDICINE

## 2021-07-05 NOTE — Clinical Note
Margarito Stoll-I saw Ruba today.  She upgraded her pump but really has not read the manual and does not understand how to use the software.  I got her out of sleep mode for most of the day and taught her about using the exercise target but I know she needs more help in this.  Can you set up an appointment with her?  Thank you.  Laxmi

## 2021-07-05 NOTE — LETTER
7/5/2021       RE: Ruba Major  [address]  [place] [address]     Dear Colleague,    Thank you for referring your patient, Ruba Major, to the [facility] ENDOCRINOLOGY CLINIC [place] at [facility]. Please see a copy of my visit note below.    Outcome for 06/30/21 2:56 PM :  states will uploud this weekend  Outcome for 07/02/21 3:38 PM :Glucose sent via Email    Ruba Major  is being evaluated via a billable video visit.        How would you like to obtain your AVS? MyChart  For the video visit, send the invitation by: Text to cell phone: [phone]  Will anyone else be joining your video visit? No  Will you be in the [facility] at time of visit? Yes       This 66 year-old woman was seen virtually for follow-up of her type 1 diabetes.  I made the diagnosis in 2008 when she presented with hyperglycemia in the absence of DKA.      She currently manages her diabetes with a tandem control IQ pump and the dexcom G6.    Her current pump settings are:    Basal rate:  Midnight 0.4  8 AM 0.55  10 AM 0.9  4 PM 0.8  10 PM 0.4    Correction factor is 55    Carb ratio  Midnight 12  8 AM 10  10 PM 12    Target blood glucose  Midnight 140  8   10     During the last 2 weeks she wore her CGM and used the control IQ software  97% of the time.  Her average sensor glucose was 146 with a range from .   Her basal insulin comprises 67% of her daily dose, her food bolus is 19%, and her correction bolus is 11% of her daily dose.  Control IQ auto boluses are 3% of daily dose.  She has the sleep mode set from midnight to 7 am and from 10:30 to midnight most days.  She is frustrated that this pump will not do temporary basals.  She forgot that she can set it to exercise target to avoid hypoglycemia during exercise.    She is up-to-date with her eye visits and has never been told she had retinopathy.  She has no foot concerns.  She  "denies having any neuropathic symptoms.     Current Outpatient Medications   Medication     amitriptyline (ELAVIL) 10 MG tablet     FARRAH CONTOUR test strip     blood glucose (NO BRAND SPECIFIED) test strip     celecoxib (CELEBREX) 200 MG capsule     Continuous Blood Gluc Sensor (DEXCOM G6 SENSOR) MISC     Continuous Blood Gluc Transmit (DEXCOM G6 TRANSMITTER) MISC     golimumab (SIMPONI) 50 MG/0.5ML auto-injector pen     HUMALOG KWIKPEN 100 UNIT/ML soln     insulin glargine (BASAGLAR KWIKPEN) 100 UNIT/ML pen     INSULIN INFUSION PUMP     insulin lispro (HUMALOG PEN) 100 UNIT/ML injection     insulin lispro (HUMALOG VIAL) 100 UNIT/ML vial     insulin pen needle (B-D U/F) 31G X 5 MM miscellaneous     Insulin Pen Needle (PEN NEEDLES 3/16\") 31G X 5 MM MISC     leflunomide (ARAVA) 20 MG tablet     melatonin 5 MG tablet     omeprazole (PRILOSEC) 20 MG DR capsule     Pregabalin (LYRICA PO)     rosuvastatin (CRESTOR) 10 MG tablet     traMADol (ULTRAM) 50 MG tablet     zolpidem (AMBIEN) 10 MG tablet     No current facility-administered medications for this visit.        Patient Active Problem List   Diagnosis     Encounter for long-term current use of medication     Muscle cramps     Diabetes mellitus (H)     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 factor (H)     Type 1 diabetes mellitus without complication (H)     Senile osteoporosis     Gastroesophageal reflux disease with esophagitis     Long-term use of immunosuppressant medication       Vitals from 6/2020 136/81    On exam she is in no acute distress.  Mood is upbeat.    Recent Labs   Lab Test 06/03/21  1323 09/15/20  1110 11/05/19  0000 11/05/19 05/14/19  0000 05/14/19 05/17/18  0813 05/17/18  0813 01/23/18  1331 01/23/18  1331 06/11/15  1326 06/11/15  1326 05/23/13  1601 05/23/13  1601   A1C  --  7.2*  --   --   --   --   --   --   --   --   --   --   --  8.2*   HEMOGLOBINA1  --   --   --  7.1*  --  6.7*   < >  --    < >  --    < >  --   " --   --    TSH  --   --   --   --   --   --   --   --   --   --   --  0.91  --   --    LDL  --  81  --   --   --   --   --  78  --   --   --   --   --   --    HDL  --  75  --   --   --   --   --  81  --   --   --   --   --   --    TRIG  --  34  --   --   --   --   --  37  --   --   --   --   --   --    CR 0.70 0.70   < >  --    < >  --    < > 0.68   < >  --    < > 0.64   < > 0.62   MICROL  --  <5  --   --   --   --   --   --   --  <5  --   --   --   --     < > = values in this interval not displayed.       A1c 7.3, GFR 77,  5/13//21 12/20: LDL 72    Assessment and plan:    1.  Diabetes control.  Ruba's A1c is close to target.  Unfortunately she is frustrated with the use of her control IQ pump.  I think she needs some support and learning how to use the software better.  I noted that she was mostly set in sleep mode and we made that change during the visit.  She now will be in sleep mode from 10:30 PM until 7 AM.  I also showed her how to use the exercise setting for activity.  I will refer her to the nurse educator for comprehensive education on the use of this device.    2.  Diabetes complications. Up to date with screens and has none.    CVD risk.  She is on a statin with a reasonable LDL.  Her recent blood pressures have been okay.    Follow-up with me in November 2021.    I spent 25 minutes on the video visit with the patient today.  I spent an additional 25 minutes reviewing her chart, going to care everywhere and obtaining recent lab data, communicating with my nurse educator, and doing documentation.  Veronica Bills MD

## 2021-07-09 ENCOUNTER — TELEPHONE (OUTPATIENT)
Dept: RHEUMATOLOGY | Facility: CLINIC | Age: 66
End: 2021-07-09

## 2021-07-09 NOTE — TELEPHONE ENCOUNTER
[facility] Prior Authorization Team   Phone: [phone]  Fax: [phone]    PA Initiation    Medication: Simponi  Insurance Company: [email or web address] - Phone [phone] Fax [phone]  Pharmacy Filling the Rx: [facility] - [place], [place] - [address]  Filling Pharmacy Phone: [phone]  Filling Pharmacy Fax: [phone]  Start Date: 7/9/2021

## 2021-07-09 NOTE — TELEPHONE ENCOUNTER
Prior Authorization Approval    Authorization Effective Date: 7/1/2021  Authorization Expiration Date: 7/31/2022  Medication: Simponi  Approved Dose/Quantity: 1/28 days  Reference #: [id number]   Insurance Company: [employer] - Phone [phone] Fax [phone]  Expected CoPay: $0     CoPay Card Available: No    Foundation Assistance Needed:    Which Pharmacy is filling the prescription (Not needed for infusion/clinic administered): [facility] - [place] [address]  Pharmacy Notified: Yes  Patient Notified: Yes      Case ID# [id number]

## 2021-07-10 ENCOUNTER — HEALTH MAINTENANCE LETTER (OUTPATIENT)
Age: 66
End: 2021-07-10

## 2021-08-17 DIAGNOSIS — E10.9 TYPE 1 DIABETES MELLITUS WITHOUT COMPLICATION (H): ICD-10-CM

## 2021-08-17 RX ORDER — PROCHLORPERAZINE 25 MG/1
1 SUPPOSITORY RECTAL
Qty: 1 EACH | Refills: 3 | Status: SHIPPED | OUTPATIENT
Start: 2021-08-17 | End: 2021-11-18

## 2021-08-19 ENCOUNTER — VIRTUAL VISIT (OUTPATIENT)
Dept: EDUCATION SERVICES | Facility: CLINIC | Age: 66
End: 2021-08-19
Payer: COMMERCIAL

## 2021-08-19 DIAGNOSIS — E10.9 TYPE 1 DIABETES MELLITUS WITHOUT COMPLICATION (H): Primary | ICD-10-CM

## 2021-08-19 PROCEDURE — 99207 PR NO CHARGE LOS: CPT | Performed by: REGISTERED NURSE

## 2021-08-19 NOTE — PROGRESS NOTES
Video-Visit Details    Type of service:  Video Visit  Patient consented to video visit  Video Start Time:  1030  Video End Time:    1115  Originating Location (pt. Location): Home  Distant Location (provider location):  [facility] DIABETES EDUCATION [place]   Platform used for Video Visit: [employer]     Diabetes Self-Management Education & Support    Ruba Major presents today for education related to Type 1 diabetes    Patient is being treated with:  insulin pump  Tandem X2 with Control IQ  She is accompanied by self    Year of diagnosis: 2008 at age 53  Referring provider:  Veronica Bills MD  Living Situation: Lives with her .  Has 3 grown children.    PATIENT CONCERNS RELATED TO DIABETES SELF MANAGEMENT:     She's been somewhat frustrated with her insulin pump.  She had a visit with Dr. Bills at the beginning of July and some adjustments were made but she hasn't been able to use exercise mode since this visit.  She golfs three days a week, and is always going low when she golfs.       ASSESSMENT:    Taking Medication:     Current Diabetes Management per Patient:  Taking diabetes medications?   yes:     Diabetes Medication(s)     Insulin       HUMALOG KWIKPEN 100 UNIT/ML soln    1u:10 g carb + 1u:50mg/dl >150, approx 20 units daily.     insulin glargine (BASAGLAR KWIKPEN) 100 UNIT/ML pen    Inject 10 Units Subcutaneous daily In case of pump failure     insulin lispro (HUMALOG PEN) 100 UNIT/ML injection    Inject 1-8 Units Subcutaneous 4 times daily (before meals and nightly) As instructed (roughly 1 unit: 8 g carbohydrate) incase of pump failure.     insulin lispro (HUMALOG VIAL) 100 UNIT/ML vial    USE WITH INSULIN PUMP TO INJECT 65 UNITS SUBCUTANEOUSLY DAILY          Monitoring    Patient glucose self monitoring as follows: continuously using a continuous glucose monitor (CGM)  BG meter: Dexcom CGM     Patient's most recent   Lab Results   Component Value Date    A1C 7.2 10/13/2020     "A1C 7.2 09/15/2020      Patient's A1C goal: <7.0    Activity:   She used to be an avid , however in the past couple of years she has undergone a TKR, and then a revision of the TKR in October 2020.  She just had a cortisone injection in her neck to stop some symptoms resulting from degenerative changes in her cervical spine.  She also has a bursal cyst on her right elbow which she is going to have removed in a few weeks, which may limit her ability to golf for a while.  She is currently golfing three days a week, and states that she is going low every time.      Healthy Eating:   Not discussed today, but typically Ruba has a very healthy diet and counts carbohydrates accurately.      Problem Solving:      Patient is at risk of hypoglycemia?: YES, Frequency is one to two times per week and Usual treatment is juice  Hospitalizations for hyper or hypoglycemia: No    EDUCATION and INSTRUCTION PROVIDED AT THIS VISIT:       Went through the activity menu in her pump and discovered that when she created a sleep schedule during her appointment with Dr. Bills, she set the start time at 10:30am instead of 10:30pm, so her pump has been running in sleep mode continuously throughout the day, which precludes her from turning the exercise mode on.      Corrected this, and had her change the start time of sleep schedule to 10:30pm.  She is now able to turn on exercise mode.    Discussed how best to manage her golf game.    Suggested that she pare down the number of carbs entered into her pump at the meal prior to golfing by 50%, and turning on exercise mode a good 2 hours prior to her game, in order to give her glucose time to rise.  She estimates that she drops about 100 mg/dL over the time it takes her to finish her game.      Suggest that if this is not effective in preventing hypoglycemia, that we may want to set up a separate \"golf\" profile with lower basal rates, and a more relaxed correction factor, so the " auto correction she is getting from the pump will be less intense.     She states that she will give this a try, and will let me know if she is still having difficulty with lows while golfing.  She and her  will be leaving to go to their [place] home sometime in November.      Patient-stated goal written and given to Ruba Major.  Verbalized and demonstrated understanding of instructions.     PLAN:  See patient instructions  AVS printed and given to patient    FOLLOW-UP:      Ruba declined to set up a follow up appointment at this time, but will reach out if she needs assistance.   .      Any diabetes medication dose changes were made via the CDE Protocol and Collaborative Practice Agreement with [facility] and  [facility].  A copy of this encounter was provided to patient's referring provider.    Time spent with patient at today's visit was 45 minutes

## 2021-08-27 DIAGNOSIS — Z79.60 LONG-TERM USE OF IMMUNOSUPPRESSANT MEDICATION: Chronic | ICD-10-CM

## 2021-08-27 DIAGNOSIS — M05.742 RHEUMATOID ARTHRITIS INVOLVING BOTH HANDS WITH POSITIVE RHEUMATOID FACTOR (H): ICD-10-CM

## 2021-08-27 DIAGNOSIS — M05.741 RHEUMATOID ARTHRITIS INVOLVING BOTH HANDS WITH POSITIVE RHEUMATOID FACTOR (H): ICD-10-CM

## 2021-08-31 RX ORDER — LEFLUNOMIDE 20 MG/1
TABLET ORAL
Qty: 30 TABLET | OUTPATIENT
Start: 2021-08-31

## 2021-09-01 NOTE — TELEPHONE ENCOUNTER
LEFLUNOMIDE 20MG TABLETS   Take 1 tablet (20 mg) by mouth daily   Last Written Prescription Date:  6/11/21  Last Fill Quantity: 90,   # refills: 1  Last Office Visit: 6/11/21  Future Office visit:  10/12/21    CBC RESULTS: Recent Labs   Lab Test 06/03/21  1323   WBC 3.9*   RBC 4.24   HGB 12.9   HCT 37.8   MCV 89   MCH 30.4   MCHC 34.1   RDW 12.9          Creatinine   Date Value Ref Range Status   06/03/2021 0.70 0.52 - 1.04 mg/dL Final   ]    Liver Function Studies -   Recent Labs   Lab Test 06/03/21  1323 09/15/20  1110   PROTTOTAL  --  7.2   ALBUMIN 4.0 3.8   BILITOTAL  --  0.8   ALKPHOS  --  64   AST 21 24   ALT 25 30        Denied: too early for refill 6/11/21 #90/1 Rf to [phone] ( 180 days)

## 2021-09-04 ENCOUNTER — HEALTH MAINTENANCE LETTER (OUTPATIENT)
Age: 66
End: 2021-09-04

## 2021-09-07 ENCOUNTER — TELEPHONE (OUTPATIENT)
Dept: INFUSION THERAPY | Facility: CLINIC | Age: 66
End: 2021-09-07

## 2021-09-07 NOTE — TELEPHONE ENCOUNTER
Spoke to [facility] per [facility] PharmacistChasity. No diagnosis code on the order for Prolia. [facility] stated they would fax document with diagnosis code.

## 2021-09-08 ENCOUNTER — HOSPITAL ENCOUNTER (OUTPATIENT)
Facility: CLINIC | Age: 66
Discharge: HOME | End: 2021-09-08
Attending: OBSTETRICS & GYNECOLOGY | Admitting: OBSTETRICS & GYNECOLOGY
Payer: COMMERCIAL

## 2021-09-08 ENCOUNTER — LAB (OUTPATIENT)
Dept: INFUSION THERAPY | Facility: CLINIC | Age: 66
End: 2021-09-08
Attending: OBSTETRICS & GYNECOLOGY
Payer: COMMERCIAL

## 2021-09-08 DIAGNOSIS — M81.0 SENILE OSTEOPOROSIS: ICD-10-CM

## 2021-09-08 DIAGNOSIS — M85.88 OSTEOPENIA OF OTHER SITE: Primary | ICD-10-CM

## 2021-09-08 LAB
ALBUMIN SERPL-MCNC: 3.8 G/DL (ref 3.4–5)
CALCIUM SERPL-MCNC: 9.5 MG/DL (ref 8.5–10.1)
CREAT SERPL-MCNC: 0.74 MG/DL (ref 0.52–1.04)
GFR SERPL CREATININE-BSD FRML MDRD: 85 ML/MIN/1.73M2

## 2021-09-08 PROCEDURE — 36415 COLL VENOUS BLD VENIPUNCTURE: CPT | Performed by: OBSTETRICS & GYNECOLOGY

## 2021-09-08 PROCEDURE — 82310 ASSAY OF CALCIUM: CPT | Performed by: OBSTETRICS & GYNECOLOGY

## 2021-09-08 PROCEDURE — 82040 ASSAY OF SERUM ALBUMIN: CPT | Performed by: OBSTETRICS & GYNECOLOGY

## 2021-09-08 PROCEDURE — 82565 ASSAY OF CREATININE: CPT | Performed by: OBSTETRICS & GYNECOLOGY

## 2021-09-08 RX ORDER — DIPHENHYDRAMINE HYDROCHLORIDE 50 MG/ML
50 INJECTION INTRAMUSCULAR; INTRAVENOUS
Status: CANCELLED
Start: 2021-09-08

## 2021-09-08 RX ORDER — MEPERIDINE HYDROCHLORIDE 25 MG/ML
25 INJECTION INTRAMUSCULAR; INTRAVENOUS; SUBCUTANEOUS EVERY 30 MIN PRN
Status: CANCELLED | OUTPATIENT
Start: 2022-03-07

## 2021-09-08 RX ORDER — EPINEPHRINE 1 MG/ML
0.3 INJECTION, SOLUTION INTRAMUSCULAR; SUBCUTANEOUS EVERY 5 MIN PRN
Status: CANCELLED | OUTPATIENT
Start: 2021-09-08

## 2021-09-08 RX ORDER — ALBUTEROL SULFATE 0.83 MG/ML
2.5 SOLUTION RESPIRATORY (INHALATION)
Status: CANCELLED | OUTPATIENT
Start: 2021-09-08

## 2021-09-08 RX ORDER — NALOXONE HYDROCHLORIDE 0.4 MG/ML
0.2 INJECTION, SOLUTION INTRAMUSCULAR; INTRAVENOUS; SUBCUTANEOUS
Status: CANCELLED | OUTPATIENT
Start: 2021-09-08

## 2021-09-08 RX ORDER — ALBUTEROL SULFATE 90 UG/1
1-2 AEROSOL, METERED RESPIRATORY (INHALATION)
Status: CANCELLED
Start: 2022-03-07

## 2021-09-08 RX ORDER — METHYLPREDNISOLONE SODIUM SUCCINATE 125 MG/2ML
125 INJECTION, POWDER, LYOPHILIZED, FOR SOLUTION INTRAMUSCULAR; INTRAVENOUS
Status: CANCELLED
Start: 2022-03-07

## 2021-09-08 RX ORDER — MEPERIDINE HYDROCHLORIDE 25 MG/ML
25 INJECTION INTRAMUSCULAR; INTRAVENOUS; SUBCUTANEOUS EVERY 30 MIN PRN
Status: CANCELLED | OUTPATIENT
Start: 2021-09-08

## 2021-09-08 RX ORDER — METHYLPREDNISOLONE SODIUM SUCCINATE 125 MG/2ML
125 INJECTION, POWDER, LYOPHILIZED, FOR SOLUTION INTRAMUSCULAR; INTRAVENOUS
Status: CANCELLED
Start: 2021-09-08

## 2021-09-08 RX ORDER — ALBUTEROL SULFATE 0.83 MG/ML
2.5 SOLUTION RESPIRATORY (INHALATION)
Status: CANCELLED | OUTPATIENT
Start: 2022-03-07

## 2021-09-08 RX ORDER — EPINEPHRINE 1 MG/ML
0.3 INJECTION, SOLUTION INTRAMUSCULAR; SUBCUTANEOUS EVERY 5 MIN PRN
Status: CANCELLED | OUTPATIENT
Start: 2022-03-07

## 2021-09-08 RX ORDER — NALOXONE HYDROCHLORIDE 0.4 MG/ML
0.2 INJECTION, SOLUTION INTRAMUSCULAR; INTRAVENOUS; SUBCUTANEOUS
Status: CANCELLED | OUTPATIENT
Start: 2022-03-07

## 2021-09-08 RX ORDER — DIPHENHYDRAMINE HYDROCHLORIDE 50 MG/ML
50 INJECTION INTRAMUSCULAR; INTRAVENOUS
Status: CANCELLED
Start: 2022-03-07

## 2021-09-08 RX ORDER — ALBUTEROL SULFATE 90 UG/1
1-2 AEROSOL, METERED RESPIRATORY (INHALATION)
Status: CANCELLED
Start: 2021-09-08

## 2021-09-08 NOTE — PROGRESS NOTES
Medical Assistant Note:  Ruba Major presents today for blood draw.    Patient seen by provider today: No.   present during visit today: Not Applicable.    Concerns: No Concerns.    Procedure:  Lab draw site: lac, Needle type: bf, Gauge: 21.    Post Assessment:  Labs drawn without difficulty: Yes.    Discharge Plan:  Departure Mode: Ambulatory.    Face to Face Time: 5 min.    Sallie Sims, CMA

## 2021-09-09 ENCOUNTER — ALLIED HEALTH/NURSE VISIT (OUTPATIENT)
Dept: INFUSION THERAPY | Facility: CLINIC | Age: 66
End: 2021-09-09
Attending: OBSTETRICS & GYNECOLOGY
Payer: COMMERCIAL

## 2021-09-09 VITALS
HEART RATE: 78 BPM | RESPIRATION RATE: 18 BRPM | TEMPERATURE: 97.9 F | HEIGHT: 66 IN | WEIGHT: 136 LBS | SYSTOLIC BLOOD PRESSURE: 139 MMHG | DIASTOLIC BLOOD PRESSURE: 81 MMHG | BODY MASS INDEX: 21.86 KG/M2

## 2021-09-09 DIAGNOSIS — M81.0 SENILE OSTEOPOROSIS: ICD-10-CM

## 2021-09-09 DIAGNOSIS — M85.88 OSTEOPENIA OF OTHER SITE: Primary | ICD-10-CM

## 2021-09-09 PROCEDURE — 96372 THER/PROPH/DIAG INJ SC/IM: CPT | Performed by: OBSTETRICS & GYNECOLOGY

## 2021-09-09 PROCEDURE — 250N000011 HC RX IP 250 OP 636: Performed by: OBSTETRICS & GYNECOLOGY

## 2021-09-09 RX ORDER — EPINEPHRINE 1 MG/ML
0.3 INJECTION, SOLUTION INTRAMUSCULAR; SUBCUTANEOUS EVERY 5 MIN PRN
Status: CANCELLED | OUTPATIENT
Start: 2022-03-07

## 2021-09-09 RX ORDER — DIPHENHYDRAMINE HYDROCHLORIDE 50 MG/ML
50 INJECTION INTRAMUSCULAR; INTRAVENOUS
Status: CANCELLED
Start: 2022-03-07

## 2021-09-09 RX ORDER — ALBUTEROL SULFATE 0.83 MG/ML
2.5 SOLUTION RESPIRATORY (INHALATION)
Status: CANCELLED | OUTPATIENT
Start: 2022-03-07

## 2021-09-09 RX ORDER — MEPERIDINE HYDROCHLORIDE 25 MG/ML
25 INJECTION INTRAMUSCULAR; INTRAVENOUS; SUBCUTANEOUS EVERY 30 MIN PRN
Status: CANCELLED | OUTPATIENT
Start: 2022-03-07

## 2021-09-09 RX ORDER — NALOXONE HYDROCHLORIDE 0.4 MG/ML
0.2 INJECTION, SOLUTION INTRAMUSCULAR; INTRAVENOUS; SUBCUTANEOUS
Status: CANCELLED | OUTPATIENT
Start: 2022-03-07

## 2021-09-09 RX ORDER — ALBUTEROL SULFATE 90 UG/1
1-2 AEROSOL, METERED RESPIRATORY (INHALATION)
Status: CANCELLED
Start: 2022-03-07

## 2021-09-09 RX ORDER — METHYLPREDNISOLONE SODIUM SUCCINATE 125 MG/2ML
125 INJECTION, POWDER, LYOPHILIZED, FOR SOLUTION INTRAMUSCULAR; INTRAVENOUS
Status: CANCELLED
Start: 2022-03-07

## 2021-09-09 RX ADMIN — DENOSUMAB 60 MG: 60 INJECTION SUBCUTANEOUS at 12:49

## 2021-09-09 ASSESSMENT — MIFFLIN-ST. JEOR: SCORE: 1173.64

## 2021-09-09 ASSESSMENT — PAIN SCALES - GENERAL: PAINLEVEL: NO PAIN (0)

## 2021-09-09 NOTE — PROGRESS NOTES
Nursing Note:  Ruba Major presents today for prolia.    Patient seen by provider today: No   present during visit today: Not Applicable.    Note: N/A.    Intravenous Access:  No Intravenous access/labs at this visit.    Discharge Plan:   Patient was discharged     Chasity Lanza RN

## 2021-09-14 PROCEDURE — 88304 TISSUE EXAM BY PATHOLOGIST: CPT | Mod: TC,ORL | Performed by: ORTHOPAEDIC SURGERY

## 2021-09-15 ENCOUNTER — LAB REQUISITION (OUTPATIENT)
Dept: LAB | Facility: CLINIC | Age: 66
End: 2021-09-15
Payer: COMMERCIAL

## 2021-09-15 DIAGNOSIS — R22.31 LOCALIZED SWELLING, MASS AND LUMP, RIGHT UPPER LIMB: ICD-10-CM

## 2021-09-15 DIAGNOSIS — M70.21 OLECRANON BURSITIS, RIGHT ELBOW: ICD-10-CM

## 2021-09-17 LAB
PATH REPORT.COMMENTS IMP SPEC: NORMAL
PATH REPORT.COMMENTS IMP SPEC: NORMAL
PATH REPORT.FINAL DX SPEC: NORMAL
PATH REPORT.GROSS SPEC: NORMAL
PATH REPORT.MICROSCOPIC SPEC OTHER STN: NORMAL
PATH REPORT.RELEVANT HX SPEC: NORMAL
PHOTO IMAGE: NORMAL

## 2021-09-17 PROCEDURE — 88304 TISSUE EXAM BY PATHOLOGIST: CPT | Mod: 26 | Performed by: PATHOLOGY

## 2021-10-07 ENCOUNTER — TELEPHONE (OUTPATIENT)
Dept: RHEUMATOLOGY | Facility: CLINIC | Age: 66
End: 2021-10-07

## 2021-10-07 NOTE — TELEPHONE ENCOUNTER
Patient is calling after receiving a call to switch her 10/12 apt from In person to Video. She is wondering if any labs are needed prior to this visit.

## 2021-10-08 ENCOUNTER — LAB (OUTPATIENT)
Dept: LAB | Facility: CLINIC | Age: 66
End: 2021-10-08
Payer: COMMERCIAL

## 2021-10-08 DIAGNOSIS — M05.741 RHEUMATOID ARTHRITIS INVOLVING BOTH HANDS WITH POSITIVE RHEUMATOID FACTOR (H): ICD-10-CM

## 2021-10-08 DIAGNOSIS — Z79.60 LONG-TERM USE OF IMMUNOSUPPRESSANT MEDICATION: ICD-10-CM

## 2021-10-08 DIAGNOSIS — M05.742 RHEUMATOID ARTHRITIS INVOLVING BOTH HANDS WITH POSITIVE RHEUMATOID FACTOR (H): ICD-10-CM

## 2021-10-08 DIAGNOSIS — E10.9 TYPE 1 DIABETES MELLITUS WITHOUT COMPLICATION (H): ICD-10-CM

## 2021-10-08 LAB
ALBUMIN UR-MCNC: NEGATIVE MG/DL
APPEARANCE UR: CLEAR
BACTERIA #/AREA URNS HPF: ABNORMAL /HPF
BASOPHILS # BLD AUTO: 0 10E3/UL (ref 0–0.2)
BASOPHILS NFR BLD AUTO: 1 %
BILIRUB UR QL STRIP: NEGATIVE
COLOR UR AUTO: YELLOW
CRP SERPL-MCNC: <2.9 MG/L (ref 0–8)
EOSINOPHIL # BLD AUTO: 0.4 10E3/UL (ref 0–0.7)
EOSINOPHIL NFR BLD AUTO: 10 %
ERYTHROCYTE [DISTWIDTH] IN BLOOD BY AUTOMATED COUNT: 13.3 % (ref 10–15)
GLUCOSE UR STRIP-MCNC: 500 MG/DL
HBA1C MFR BLD: 6.8 % (ref 0–5.6)
HCT VFR BLD AUTO: 39.4 % (ref 35–47)
HGB BLD-MCNC: 13.3 G/DL (ref 11.7–15.7)
HGB UR QL STRIP: NEGATIVE
KETONES UR STRIP-MCNC: NEGATIVE MG/DL
LEUKOCYTE ESTERASE UR QL STRIP: NEGATIVE
LYMPHOCYTES # BLD AUTO: 2 10E3/UL (ref 0.8–5.3)
LYMPHOCYTES NFR BLD AUTO: 49 %
MCH RBC QN AUTO: 30.7 PG (ref 26.5–33)
MCHC RBC AUTO-ENTMCNC: 33.8 G/DL (ref 31.5–36.5)
MCV RBC AUTO: 91 FL (ref 78–100)
MONOCYTES # BLD AUTO: 0.5 10E3/UL (ref 0–1.3)
MONOCYTES NFR BLD AUTO: 11 %
NEUTROPHILS # BLD AUTO: 1.2 10E3/UL (ref 1.6–8.3)
NEUTROPHILS NFR BLD AUTO: 30 %
NITRATE UR QL: NEGATIVE
PH UR STRIP: 5.5 [PH] (ref 5–7)
PLATELET # BLD AUTO: 257 10E3/UL (ref 150–450)
RBC # BLD AUTO: 4.33 10E6/UL (ref 3.8–5.2)
RBC #/AREA URNS AUTO: ABNORMAL /HPF
SP GR UR STRIP: 1.02 (ref 1–1.03)
SQUAMOUS #/AREA URNS AUTO: ABNORMAL /LPF
UROBILINOGEN UR STRIP-ACNC: 0.2 E.U./DL
WBC # BLD AUTO: 4.1 10E3/UL (ref 4–11)
WBC #/AREA URNS AUTO: ABNORMAL /HPF

## 2021-10-08 PROCEDURE — 86140 C-REACTIVE PROTEIN: CPT

## 2021-10-08 PROCEDURE — 36415 COLL VENOUS BLD VENIPUNCTURE: CPT

## 2021-10-08 PROCEDURE — 85025 COMPLETE CBC W/AUTO DIFF WBC: CPT

## 2021-10-08 PROCEDURE — 82040 ASSAY OF SERUM ALBUMIN: CPT

## 2021-10-08 PROCEDURE — 84460 ALANINE AMINO (ALT) (SGPT): CPT

## 2021-10-08 PROCEDURE — 81001 URINALYSIS AUTO W/SCOPE: CPT

## 2021-10-08 PROCEDURE — 84450 TRANSFERASE (AST) (SGOT): CPT

## 2021-10-08 PROCEDURE — 82565 ASSAY OF CREATININE: CPT

## 2021-10-08 PROCEDURE — 83036 HEMOGLOBIN GLYCOSYLATED A1C: CPT

## 2021-10-08 NOTE — TELEPHONE ENCOUNTER
Spoke with patient and let her know that she is due for labs. Patient stated she will get the labs done at [facility].    Jeanne Locke CMA   10/8/2021 10:23 AM

## 2021-10-09 LAB
ALBUMIN SERPL-MCNC: 4 G/DL (ref 3.4–5)
ALT SERPL W P-5'-P-CCNC: 33 U/L (ref 0–50)
AST SERPL W P-5'-P-CCNC: 24 U/L (ref 0–45)
CREAT SERPL-MCNC: 0.71 MG/DL (ref 0.52–1.04)
GFR SERPL CREATININE-BSD FRML MDRD: 89 ML/MIN/1.73M2

## 2021-10-12 ENCOUNTER — VIRTUAL VISIT (OUTPATIENT)
Dept: RHEUMATOLOGY | Facility: CLINIC | Age: 66
End: 2021-10-12
Attending: INTERNAL MEDICINE
Payer: COMMERCIAL

## 2021-10-12 DIAGNOSIS — M05.741 RHEUMATOID ARTHRITIS INVOLVING BOTH HANDS WITH POSITIVE RHEUMATOID FACTOR (H): Primary | ICD-10-CM

## 2021-10-12 DIAGNOSIS — Z79.60 LONG-TERM USE OF IMMUNOSUPPRESSANT MEDICATION: Chronic | ICD-10-CM

## 2021-10-12 DIAGNOSIS — M81.0 SENILE OSTEOPOROSIS: ICD-10-CM

## 2021-10-12 DIAGNOSIS — K21.9 GASTROESOPHAGEAL REFLUX DISEASE WITHOUT ESOPHAGITIS: ICD-10-CM

## 2021-10-12 DIAGNOSIS — K21.00 GASTROESOPHAGEAL REFLUX DISEASE WITH ESOPHAGITIS, UNSPECIFIED WHETHER HEMORRHAGE: ICD-10-CM

## 2021-10-12 DIAGNOSIS — M05.742 RHEUMATOID ARTHRITIS INVOLVING BOTH HANDS WITH POSITIVE RHEUMATOID FACTOR (H): Primary | ICD-10-CM

## 2021-10-12 PROCEDURE — 99214 OFFICE O/P EST MOD 30 MIN: CPT | Mod: GT | Performed by: INTERNAL MEDICINE

## 2021-10-12 RX ORDER — AMLODIPINE BESYLATE 5 MG/1
5 TABLET ORAL DAILY
COMMUNITY
Start: 2021-09-22 | End: 2021-12-03

## 2021-10-12 RX ORDER — LEFLUNOMIDE 20 MG/1
20 TABLET ORAL DAILY
Qty: 90 TABLET | Refills: 1 | Status: SHIPPED | OUTPATIENT
Start: 2021-10-12 | End: 2021-12-07

## 2021-10-12 RX ORDER — CELECOXIB 200 MG/1
200 CAPSULE ORAL DAILY PRN
Qty: 90 CAPSULE | Refills: 3 | Status: SHIPPED | OUTPATIENT
Start: 2021-10-12 | End: 2022-09-30

## 2021-10-12 ASSESSMENT — PAIN SCALES - GENERAL: PAINLEVEL: MILD PAIN (3)

## 2021-10-12 NOTE — LETTER
10/12/2021       RE: Martinez Major  [address]  [place] [address]     Dear Colleague,    Thank you for referring your patient, Martinez Major, to the [facility] RHEUMATOLOGY CLINIC [place] at [facility]. Please see a copy of my visit note below.    martinez is a 66 year old who is being evaluated via a billable video visit.      How would you like to obtain your AVS? MyChart  If the video visit is dropped, the invitation should be resent by: Send to e-mail at: [email or web address]  Will anyone else be joining your video visit? No      Video Start Time: 9:38 AM    Ms. Major has seropositive rheumatoid arthritis affecting multiple joints. +CCP, -RF, -ROBERT. Failed Enbrel, Humira. No known erosions.     She had a cyst removed from her right elbow and cyst from the right wrist. The styloid remains swollen on the right. No new deformities or dysfunction.     Otherwise her joints are pretty stable and no evidence of worsening arthritis elsewhere. She is using her arm and no functional problems. Energy is good and no AM stiffness. No other joint swelling, redness warmth or dysfunction.    Simponi is well tolerated and no infection. She is now COVID vaccinated. Leflunomide 20 mg once daily is well tolerated.  Celebrex use is only prn Prolia twice yearly for her bones. She has stopped the lyrica.    PMI:  Medical-osteopenia (T = -2.8 ), type 1 diabetes, rheumatoid arthritis, osteoarthritis of the hands, PUD, trigeminal neuralgia, hyperlipidemia, mild CAD by cardiac CT, sciatica, GERD   Surgical-cervical spine fusion surgery, multiple bilateral arthroscopic surgeries, bilateral TKA, bilateral bunionectomy  Injuries-none    SH:  Retired . Lives in [place] in the winter. 2 daughters. No tobacco, 1 EtOH daily.     FH:  Mother dead with spinal arthritis and DJD  Father dead with CAD and MI, AAA  Brother is fine  Daughter with  celiac disease  Daughter is healthy    PMSF history personally obtained and updated by me this visit.    ROS:  +intolerant of statins  Remainder of the 14 point ROS obtained and found negative    Physical Exam:  Constitutional: WD-WN-WG cooperative  Eyes: nl EOM, sclera  ENT: nl external ears, nose, hearing, lips  Neck: no visible thyroid enlargement  MS: +Right wrist extension limited to 10 degrees with 1+ styloid swelling; minor bilateral elbow olecranon swelling. All other neck, shoulder, elbow, wrist, hand joints were examined and otherwise found normal. Normal  strength. No active synovitis or deformity. Normal prayer and tuck.  Skin: no alopecia, rash  Neuro: nl cranial nerves  Psych: nl judgement,  affect    Laboratory:    Component      Latest Ref Rng & Units 10/8/2021   WBC      4.0 - 11.0 10e3/uL 4.1   RBC Count      3.80 - 5.20 10e6/uL 4.33   Hemoglobin      11.7 - 15.7 g/dL 13.3   Hematocrit      35.0 - 47.0 % 39.4   MCV      78 - 100 fL 91   MCH      26.5 - 33.0 pg 30.7   MCHC      31.5 - 36.5 g/dL 33.8   RDW      10.0 - 15.0 % 13.3   Platelet Count      150 - 450 10e3/uL 257   % Neutrophils      % 30   % Lymphocytes      % 49   % Monocytes      % 11   % Eosinophils      % 10   % Basophils      % 1   Absolute Neutrophils      1.6 - 8.3 10e3/uL 1.2 (L)   Absolute Lymphocytes      0.8 - 5.3 10e3/uL 2.0   Absolute Monocytes      0.0 - 1.3 10e3/uL 0.5   Absolute Eosinophils      0.0 - 0.7 10e3/uL 0.4   Absolute Basophils      0.0 - 0.2 10e3/uL 0.0   Color Urine      Colorless, Straw, Light Yellow, Yellow Yellow   Appearance Urine      Clear Clear   Glucose Urine      Negative mg/dL 500 (A)   Bilirubin Urine      Negative Negative   Ketones Urine      Negative mg/dL Negative   Specific Gravity Urine      1.003 - 1.035 1.025   Blood Urine      Negative Negative   pH Urine      5.0 - 7.0 5.5   Protein Albumin Urine      Negative mg/dL Negative   Urobilinogen Urine      0.2, 1.0 E.U./dL 0.2   Nitrite  Urine      Negative Negative   Leukocyte Esterase Urine      Negative Negative   Bacteria Urine      None Seen /HPF Few (A)   RBC Urine      0-2 /HPF /HPF 0-2   WBC Urine      0-5 /HPF /HPF 0-5   Squamous Epithelial /LPF Urine      None Seen /LPF Few (A)   Creatinine      0.52 - 1.04 mg/dL 0.71   GFR Estimate      >60 mL/min/1.73m2 89   CRP Inflammation      0.0 - 8.0 mg/L <2.9   Albumin      3.4 - 5.0 g/dL 4.0   ALT      0 - 50 U/L 33   AST      0 - 45 U/L 24     Impression:    Seropositive rheumatoid arthritis-with history of multiple joint replacements. Today she is treated to target with the ombination of Simponi and leflunomide, with good tolerance, so we will continue the regimen. Recent synovectomy of the right wrist and elbow, and these will continue to heal. More frequent use of celebrex because of this. Get inflammatory marker.    Osteoporosis-she is treated with twice yearly prolia.    GERD-I will add back scheduled omeprazole 20 mg daily as she is using celebrex more frequently.    Long term use of immunosuppressive-with stable lab testing. Occasional modest neutropenia that I will continue to monitor.  We also agree that the benefits of continued immunosuppression outweigh the risks of COVID-19 infection. She is COVID vaccinated.    Plan in 6 months with me.    A total of 37 minutes was spent in face to face patient interaction and chart review on the day of service.    Video-Visit Details    Type of service:  Video Visit    Video End Time:10:11 AM    Originating Location (pt. Location): Home    Distant Location (provider location):  [facility] RHEUMATOLOGY CLINIC [place]     Platform used for Video Visit: Azael Aly MD

## 2021-10-12 NOTE — PATIENT INSTRUCTIONS
Follow up in 4-6 months in person  Continue your current medications but add back the omeprazole 20 mg daily  Continue with laboratory testing every 2-3 months

## 2021-10-12 NOTE — LETTER
10/12/2021      RE: Martinez Major  [address]  [facility] [address]     martinez is a 66 year old who is being evaluated via a billable video visit.      How would you like to obtain your AVS? MyChart  If the video visit is dropped, the invitation should be resent by: Send to e-mail at: [email or web address]  Will anyone else be joining your video visit? No      Video Start Time: 9:38 AM    Ms. Major has seropositive rheumatoid arthritis affecting multiple joints. +CCP, -RF, -ROBERT. Failed Enbrel, Humira. No known erosions.     She had a cyst removed from her right elbow and cyst from the right wrist. The styloid remains swollen on the right. No new deformities or dysfunction.     Otherwise her joints are pretty stable and no evidence of worsening arthritis elsewhere. She is using her arm and no functional problems. Energy is good and no AM stiffness. No other joint swelling, redness warmth or dysfunction.    Simponi is well tolerated and no infection. She is now COVID vaccinated. Leflunomide 20 mg once daily is well tolerated.  Celebrex use is only prn Prolia twice yearly for her bones. She has stopped the lyrica.    PMI:  Medical-osteopenia (T = -2.8 ), type 1 diabetes, rheumatoid arthritis, osteoarthritis of the hands, PUD, trigeminal neuralgia, hyperlipidemia, mild CAD by cardiac CT, sciatica, GERD   Surgical-cervical spine fusion surgery, multiple bilateral arthroscopic surgeries, bilateral TKA, bilateral bunionectomy  Injuries-none    SH:  Retired . Lives in [facility] [place] the [place]. 2 daughters. No tobacco, 1 EtOH daily.     FH:  Mother dead with spinal arthritis and DJD  Father dead with CAD and MI, AAA  Brother is fine  Daughter with celiac disease  Daughter is healthy    PMSF history personally obtained and updated by me this visit.    ROS:  +intolerant of statins  Remainder of the 14 point ROS obtained and found negative    Physical Exam:  Constitutional: WD-WN-WG  cooperative  Eyes: nl EOM, sclera  ENT: nl external ears, nose, hearing, lips  Neck: no visible thyroid enlargement  MS: +Right wrist extension limited to 10 degrees with 1+ styloid swelling; minor bilateral elbow olecranon swelling. All other neck, shoulder, elbow, wrist, hand joints were examined and otherwise found normal. Normal  strength. No active synovitis or deformity. Normal prayer and tuck.  Skin: no alopecia, rash  Neuro: nl cranial nerves  Psych: nl judgement,  affect    Laboratory:    Component      Latest Ref Rng & Units 10/8/2021   WBC      4.0 - 11.0 10e3/uL 4.1   RBC Count      3.80 - 5.20 10e6/uL 4.33   Hemoglobin      11.7 - 15.7 g/dL 13.3   Hematocrit      35.0 - 47.0 % 39.4   MCV      78 - 100 fL 91   MCH      26.5 - 33.0 pg 30.7   MCHC      31.5 - 36.5 g/dL 33.8   RDW      10.0 - 15.0 % 13.3   Platelet Count      150 - 450 10e3/uL 257   % Neutrophils      % 30   % Lymphocytes      % 49   % Monocytes      % 11   % Eosinophils      % 10   % Basophils      % 1   Absolute Neutrophils      1.6 - 8.3 10e3/uL 1.2 (L)   Absolute Lymphocytes      0.8 - 5.3 10e3/uL 2.0   Absolute Monocytes      0.0 - 1.3 10e3/uL 0.5   Absolute Eosinophils      0.0 - 0.7 10e3/uL 0.4   Absolute Basophils      0.0 - 0.2 10e3/uL 0.0   Color Urine      Colorless, Straw, Light Yellow, Yellow Yellow   Appearance Urine      Clear Clear   Glucose Urine      Negative mg/dL 500 (A)   Bilirubin Urine      Negative Negative   Ketones Urine      Negative mg/dL Negative   Specific Gravity Urine      1.003 - 1.035 1.025   Blood Urine      Negative Negative   pH Urine      5.0 - 7.0 5.5   Protein Albumin Urine      Negative mg/dL Negative   Urobilinogen Urine      0.2, 1.0 E.U./dL 0.2   Nitrite Urine      Negative Negative   Leukocyte Esterase Urine      Negative Negative   Bacteria Urine      None Seen /HPF Few (A)   RBC Urine      0-2 /HPF /HPF 0-2   WBC Urine      0-5 /HPF /HPF 0-5   Squamous Epithelial /LPF Urine      None Seen  /LPF Few (A)   Creatinine      0.52 - 1.04 mg/dL 0.71   GFR Estimate      >60 mL/min/1.73m2 89   CRP Inflammation      0.0 - 8.0 mg/L <2.9   Albumin      3.4 - 5.0 g/dL 4.0   ALT      0 - 50 U/L 33   AST      0 - 45 U/L 24     Impression:    Seropositive rheumatoid arthritis-with history of multiple joint replacements. Today she is treated to target with the ombination of Simponi and leflunomide, with good tolerance, so we will continue the regimen. Recent synovectomy of the right wrist and elbow, and these will continue to heal. More frequent use of celebrex because of this. Get inflammatory marker.    Osteoporosis-she is treated with twice yearly prolia.    GERD-I will add back scheduled omeprazole 20 mg daily as she is using celebrex more frequently.    Long term use of immunosuppressive-with stable lab testing. Occasional modest neutropenia that I will continue to monitor.  We also agree that the benefits of continued immunosuppression outweigh the risks of COVID-19 infection. She is COVID vaccinated.    Plan in 6 months with me.    A total of 37 minutes was spent in face to face patient interaction and chart review on the day of service.    Video-Visit Details    Type of service:  Video Visit    Video End Time:10:11 AM    Originating Location (pt. Location): Home    Distant Location (provider location):  [facility] [place]     Platform used for Video Visit: Azael Aly MD

## 2021-10-12 NOTE — PROGRESS NOTES
martinez is a 66 year old who is being evaluated via a billable video visit.      How would you like to obtain your AVS? MyChart  If the video visit is dropped, the invitation should be resent by: Send to e-mail at: [email or web address]  Will anyone else be joining your video visit? No      Video Start Time: 9:38 AM    Ms. Major has seropositive rheumatoid arthritis affecting multiple joints. +CCP, -RF, -ROBERT. Failed Enbrel, Humira. No known erosions.     She had a cyst removed from her right elbow and cyst from the right wrist. The styloid remains swollen on the right. No new deformities or dysfunction.     Otherwise her joints are pretty stable and no evidence of worsening arthritis elsewhere. She is using her arm and no functional problems. Energy is good and no AM stiffness. No other joint swelling, redness warmth or dysfunction.    Simponi is well tolerated and no infection. She is now COVID vaccinated. Leflunomide 20 mg once daily is well tolerated.  Celebrex use is only prn Prolia twice yearly for her bones. She has stopped the lyrica.    PMI:  Medical-osteopenia (T = -2.8 ), type 1 diabetes, rheumatoid arthritis, osteoarthritis of the hands, PUD, trigeminal neuralgia, hyperlipidemia, mild CAD by cardiac CT, sciatica, GERD   Surgical-cervical spine fusion surgery, multiple bilateral arthroscopic surgeries, bilateral TKA, bilateral bunionectomy  Injuries-none    SH:  Retired . Lives in [facility] in the [place]. 2 daughters. No tobacco, 1 EtOH daily.     FH:  Mother dead with spinal arthritis and DJD  Father dead with CAD and MI, AAA  Brother is fine  Daughter with celiac disease  Daughter is healthy    PMSF history personally obtained and updated by me this visit.    ROS:  +intolerant of statins  Remainder of the 14 point ROS obtained and found negative    Physical Exam:  Constitutional: WD-WN-WG cooperative  Eyes: nl EOM, sclera  ENT: nl external ears, nose, hearing, lips  Neck: no  visible thyroid enlargement  MS: +Right wrist extension limited to 10 degrees with 1+ styloid swelling; minor bilateral elbow olecranon swelling. All other neck, shoulder, elbow, wrist, hand joints were examined and otherwise found normal. Normal  strength. No active synovitis or deformity. Normal prayer and tuck.  Skin: no alopecia, rash  Neuro: nl cranial nerves  Psych: nl judgement,  affect    Laboratory:    Component      Latest Ref Rng & Units 10/8/2021   WBC      4.0 - 11.0 10e3/uL 4.1   RBC Count      3.80 - 5.20 10e6/uL 4.33   Hemoglobin      11.7 - 15.7 g/dL 13.3   Hematocrit      35.0 - 47.0 % 39.4   MCV      78 - 100 fL 91   MCH      26.5 - 33.0 pg 30.7   MCHC      31.5 - 36.5 g/dL 33.8   RDW      10.0 - 15.0 % 13.3   Platelet Count      150 - 450 10e3/uL 257   % Neutrophils      % 30   % Lymphocytes      % 49   % Monocytes      % 11   % Eosinophils      % 10   % Basophils      % 1   Absolute Neutrophils      1.6 - 8.3 10e3/uL 1.2 (L)   Absolute Lymphocytes      0.8 - 5.3 10e3/uL 2.0   Absolute Monocytes      0.0 - 1.3 10e3/uL 0.5   Absolute Eosinophils      0.0 - 0.7 10e3/uL 0.4   Absolute Basophils      0.0 - 0.2 10e3/uL 0.0   Color Urine      Colorless, Straw, Light Yellow, Yellow Yellow   Appearance Urine      Clear Clear   Glucose Urine      Negative mg/dL 500 (A)   Bilirubin Urine      Negative Negative   Ketones Urine      Negative mg/dL Negative   Specific Gravity Urine      1.003 - 1.035 1.025   Blood Urine      Negative Negative   pH Urine      5.0 - 7.0 5.5   Protein Albumin Urine      Negative mg/dL Negative   Urobilinogen Urine      0.2, 1.0 E.U./dL 0.2   Nitrite Urine      Negative Negative   Leukocyte Esterase Urine      Negative Negative   Bacteria Urine      None Seen /HPF Few (A)   RBC Urine      0-2 /HPF /HPF 0-2   WBC Urine      0-5 /HPF /HPF 0-5   Squamous Epithelial /LPF Urine      None Seen /LPF Few (A)   Creatinine      0.52 - 1.04 mg/dL 0.71   GFR Estimate      >60  mL/min/1.73m2 89   CRP Inflammation      0.0 - 8.0 mg/L <2.9   Albumin      3.4 - 5.0 g/dL 4.0   ALT      0 - 50 U/L 33   AST      0 - 45 U/L 24     Impression:    Seropositive rheumatoid arthritis-with history of multiple joint replacements. Today she is treated to target with the ombination of Simponi and leflunomide, with good tolerance, so we will continue the regimen. Recent synovectomy of the right wrist and elbow, and these will continue to heal. More frequent use of celebrex because of this. Get inflammatory marker.    Osteoporosis-she is treated with twice yearly prolia.    GERD-I will add back scheduled omeprazole 20 mg daily as she is using celebrex more frequently.    Long term use of immunosuppressive-with stable lab testing. Occasional modest neutropenia that I will continue to monitor.  We also agree that the benefits of continued immunosuppression outweigh the risks of COVID-19 infection. She is COVID vaccinated.    Plan in 6 months with me.    A total of 37 minutes was spent in face to face patient interaction and chart review on the day of service.    Video-Visit Details    Type of service:  Video Visit    Video End Time:10:11 AM    Originating Location (pt. Location): Home    Distant Location (provider location):  [facility] RHEUMATOLOGY [facility]IN[facility] [place]     Platform used for Video Visit: [employer]

## 2021-10-26 ENCOUNTER — TRANSFERRED RECORDS (OUTPATIENT)
Dept: HEALTH INFORMATION MANAGEMENT | Facility: CLINIC | Age: 66
End: 2021-10-26

## 2021-10-26 LAB
HPV ABSTRACT: NORMAL
PAP-ABSTRACT: NORMAL

## 2021-10-30 ENCOUNTER — HEALTH MAINTENANCE LETTER (OUTPATIENT)
Age: 66
End: 2021-10-30

## 2021-11-18 ENCOUNTER — TELEPHONE (OUTPATIENT)
Dept: ENDOCRINOLOGY | Facility: CLINIC | Age: 66
End: 2021-11-18
Payer: COMMERCIAL

## 2021-11-18 ENCOUNTER — MYC MEDICAL ADVICE (OUTPATIENT)
Dept: ENDOCRINOLOGY | Facility: CLINIC | Age: 66
End: 2021-11-18
Payer: COMMERCIAL

## 2021-11-18 DIAGNOSIS — E10.9 TYPE 1 DIABETES MELLITUS WITHOUT COMPLICATION (H): ICD-10-CM

## 2021-11-18 RX ORDER — PROCHLORPERAZINE 25 MG/1
1 SUPPOSITORY RECTAL
Qty: 9 EACH | Refills: 1 | Status: SHIPPED | OUTPATIENT
Start: 2021-11-18 | End: 2022-05-06

## 2021-11-18 RX ORDER — PROCHLORPERAZINE 25 MG/1
1 SUPPOSITORY RECTAL
Qty: 9 EACH | Refills: 3 | OUTPATIENT
Start: 2021-11-18

## 2021-11-18 RX ORDER — PROCHLORPERAZINE 25 MG/1
1 SUPPOSITORY RECTAL
Qty: 1 EACH | Refills: 1 | Status: SHIPPED | OUTPATIENT
Start: 2021-11-18 | End: 2022-02-22

## 2021-11-18 NOTE — TELEPHONE ENCOUNTER
[facility] Call Center    Phone Message    May a detailed message be left on voicemail: yes     Reason for Call: Medication Refill Request    Has the patient contacted the pharmacy for the refill? Yes   Name of medication being requested: Dexcom sensor AND transmitter  Provider who prescribed the medication: Negar  Pharm[facility]: [facility]  [phone]      Date medication is needed:ASAP- pt stated ann has sent two requests.      Action Taken: Message routed to:  [facility] ([facility] endo    Travel Screening: Not Applicable

## 2021-11-18 NOTE — TELEPHONE ENCOUNTER
Continuous Blood Gluc Sensor (DEXCOM G6 SENSOR) MISC     1 each every 10 days Use as directed for continuous glucose monitoring every 10 days. LAB DRAW needed prior to visit. Call [phone] to schedule. - Does not apply   Last Written Prescription Date:  6/9/21  Last Fill Quantity: 9 ea,   # refills: 3  Last Office Visit : 7/5/21  Future Office visit: 12/3/21    Duplicate. Requests is from  # [id number] in [place], [place].    *Will notify pharmacy upon opening. [place] is 2 hrs ahead of [place].     Duplicate: Message left on [facility] # [id number] .

## 2021-11-19 NOTE — TELEPHONE ENCOUNTER
Dexcom sensor and transmitter: RF available        Continuous Blood Gluc Sensor (DEXCOM G6 SENSOR) MISC   9 each, w/ 1RF, 11/18/2021    Continuous Blood Gluc Transmit (DEXCOM G6 TRANSMITTER) MISC   1 each w/ 1 RF ,  11/18/2021

## 2021-12-02 NOTE — PROGRESS NOTES
martinez is a 66 year old who is being evaluated via a billable video visit.      How would you like to obtain your AVS? MyChart  If the video visit is dropped, the invitation should be resent by: Text to cell phone: [phone]   Will anyone else be joining your video visit? No    Outcome for 12/02/21 9:15 AM: Data emailed to provider

## 2021-12-03 ENCOUNTER — VIRTUAL VISIT (OUTPATIENT)
Dept: ENDOCRINOLOGY | Facility: CLINIC | Age: 66
End: 2021-12-03
Payer: COMMERCIAL

## 2021-12-03 DIAGNOSIS — K21.00 GASTROESOPHAGEAL REFLUX DISEASE WITH ESOPHAGITIS, UNSPECIFIED WHETHER HEMORRHAGE: ICD-10-CM

## 2021-12-03 DIAGNOSIS — M05.741 RHEUMATOID ARTHRITIS INVOLVING BOTH HANDS WITH POSITIVE RHEUMATOID FACTOR (H): ICD-10-CM

## 2021-12-03 DIAGNOSIS — E10.9 TYPE 1 DIABETES MELLITUS WITHOUT COMPLICATION (H): Primary | ICD-10-CM

## 2021-12-03 DIAGNOSIS — M05.742 RHEUMATOID ARTHRITIS INVOLVING BOTH HANDS WITH POSITIVE RHEUMATOID FACTOR (H): ICD-10-CM

## 2021-12-03 DIAGNOSIS — K21.9 GASTROESOPHAGEAL REFLUX DISEASE WITHOUT ESOPHAGITIS: ICD-10-CM

## 2021-12-03 DIAGNOSIS — M81.0 SENILE OSTEOPOROSIS: ICD-10-CM

## 2021-12-03 DIAGNOSIS — Z79.60 LONG-TERM USE OF IMMUNOSUPPRESSANT MEDICATION: Chronic | ICD-10-CM

## 2021-12-03 PROCEDURE — 99215 OFFICE O/P EST HI 40 MIN: CPT | Mod: GT | Performed by: PHYSICIAN ASSISTANT

## 2021-12-03 RX ORDER — GLUCAGON 3 MG/1
3 POWDER NASAL SEE ADMIN INSTRUCTIONS
Qty: 1 EACH | Refills: 3 | Status: SHIPPED | OUTPATIENT
Start: 2021-12-03 | End: 2022-06-01

## 2021-12-03 RX ORDER — INSULIN GLARGINE 100 [IU]/ML
10 INJECTION, SOLUTION SUBCUTANEOUS DAILY
Qty: 15 ML | Refills: 1 | COMMUNITY
Start: 2021-12-03 | End: 2022-10-26

## 2021-12-03 ASSESSMENT — ENCOUNTER SYMPTOMS: PALPITATIONS: 1

## 2021-12-03 NOTE — LETTER
12/3/2021       RE: Martinez Major  [address]  [facility] [address]     Dear Colleague,    Thank you for referring your patient, Martinez Major, to the [facility] [place] [facility]. Please see a copy of my visit note below.    martinez is a 66 year old who is being evaluated via a billable video visit.      How would you like to obtain your AVS? MyChart  If the video visit is dropped, the invitation should be resent by: Text to cell phone: [phone]   Will anyone else be joining your video visit? No    Outcome for 12/02/21 9:15 AM: Data emailed to provider    Due to the COVID 19 pandemic this visit was converted to a video visit in order to help prevent spread of infection in this patient and the general population.    Time of start: 2:30 pm  Time of end: 2:54 pm  Total duration of video visit: 24 minutes.    HPI  Martinez Major is a 66 year old female with type 1 diabetes mellitus. Video visit for diabetes follow up.  Pt had a virtual visit with Dr. Bills in 7/2021.  Martinez was dx having type 1 diabetes in 2008.  She also has a hx of RA and is followed here by Rheumatology staff.  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  She denies hx of retinopathy, nephropathy or neuropathy.  Pt is currently using a Tandem insulin pump- control IQ and DexcomG6 sensor.  Her current basal insulin rates are below:  Midnight= 0.4 units/hr.  8 am = 0.55 units/hr.  10 am = 0.9 units/hr.  4:00 pm = 0.8 units/hr.  10 pm = 0.4 units/hr.  Pt's I/C ratio is below:  Midnight = 1:12  8 am = 1:10  10 am = 1:10  4  pm = 1:10  10 pm = 1:12  Sensitivity is 55.  Most recent A1C was 6.8 % on 10/8/2021.  I reviewed and scanned her insulin pump download data below in her note.  Her average glucose was 146 with SD 43.  Her glucose is in target 79 % of the time, above target 19 % of the time and below target 1 % of the time.  On ROS today, she  reports doing well.  She denies blurred vision, n/v, SOB at rest, cough, fever or chills.  No chest pain, abd pain, diarrhea, dysuria or hematuria.  She denies sx of neuropathy or foot ulcers at this time.  Ruba received the COVID vaccines and COVID booster.    Diabetes Care  Retinopathy: none; pt seen by Oph this year.  Nephropathy:none; urine microalbuminuria negative in Dec 2020.  Neuropathy:none.  Foot Exam: no exam today.  Taking aspirin: no.  Lipids: LDL 97 in 8/2021. Taking Crestor 3 x per week.  CAD: no.  Mental health: denies depression. Past hx of eating disorder > 20 years ago in remission.  Insulin: Tandem insulin pump - control IQ.  Testing: DexcomG6 sensor.  Hypoglycemia treatment: RX for Baqsimi sent to pharmacy to use in case of severe hypoglycemia.  Back up insulin:Rx for basal insulin sent to pharmacy to use in case of insulin pump failure.                  ROS  Please see under HPI.    Allergies  No Known Allergies    Medications  Current Outpatient Medications   Medication Sig Dispense Refill     Glucagon (BAQSIMI ONE PACK) 3 MG/DOSE POWD Spray 3 mg in nostril See Admin Instructions USE ONLY FOR SEVERE HYPOGLYCEMIA. 1 each 3     insulin glargine (BASAGLAR KWIKPEN) 100 UNIT/ML pen Inject 10 Units Subcutaneous daily Inject 14 units subcutaneous daily ONLY IF INSULIN PUMP FAILS. 15 mL 1     amitriptyline (ELAVIL) 10 MG tablet Take 20 mg by mouth At Bedtime        FARRAH CONTOUR test strip Use to test blood sugar 9 times daily or as directed. 900 strip 1     blood glucose (NO BRAND SPECIFIED) test strip FARRAH CONTOUR. Use to test blood sugar 9 times daily or as directed. 900 strip 2     celecoxib (CELEBREX) 200 MG capsule Take 1 capsule (200 mg) by mouth daily as needed for pain 90 capsule 3     Continuous Blood Gluc Sensor (DEXCOM G6 SENSOR) MISC 1 each every 10 days Use as directed for continuous glucose monitoring every 10 days. LAB DRAW needed prior to visit. Call [address] to schedule. 9 each  "1     Continuous Blood Gluc Transmit (DEXCOM G6 TRANSMITTER) MISC 1 each every 3 months Change every 90 days 1 each 1     golimumab (SIMPONI) 50 MG/0.5ML auto-injector pen INJECT THE CONTENTS OF ONE PEN (50MG) UNDER THE SKIN ONCE EVERY 4 WEEKS 0.5 mL 11     HUMALOG KWIKPEN 100 UNIT/ML soln 1u:10 g carb + 1u:50mg/dl >150, approx 20 units daily. 30 mL 1     INSULIN INFUSION PUMP        insulin lispro (HUMALOG PEN) 100 UNIT/ML injection Inject 1-8 Units Subcutaneous 4 times daily (before meals and nightly) As instructed (roughly 1 unit: 8 g carbohydrate) incase of pump failure.       insulin lispro (HUMALOG VIAL) 100 UNIT/ML vial USE WITH INSULIN PUMP TO INJECT 65 UNITS SUBCUTANEOUSLY DAILY 70 mL 3     insulin pen needle (B-D U/F) 31G X 5 MM miscellaneous Inject 1 Units Subcutaneous 4 times daily (before meals and nightly) 100 each 1     Insulin Pen Needle (PEN NEEDLES 3/16\") 31G X 5 MM MISC 1 Units 5 times daily 150 each 1     leflunomide (ARAVA) 20 MG tablet Take 1 tablet (20 mg) by mouth daily 90 tablet 1     melatonin 5 MG tablet Take 5 mg by mouth nightly as needed for sleep       rosuvastatin (CRESTOR) 10 MG tablet TAKE 1 TABLET BY MOUTH 3X WEEK       zolpidem (AMBIEN) 10 MG tablet Take 10 mg by mouth nightly as needed.         Family History  family history is not on file.    Social History   reports that she has never smoked. She has never used smokeless tobacco. She reports current alcohol use. She reports that she does not use drugs.     Past Medical History  Past Medical History:   Diagnosis Date     Arthritis      Diabetes mellitus (H)     insulin pump     PONV (postoperative nausea and vomiting)      Rheumatoid arthritis (H)      Rheumatoid arthritis(714.0)      Type II or unspecified type diabetes mellitus without mention of complication, not stated as uncontrolled        Past Surgical History:   Procedure Laterality Date     ARTHROPLASTY REVISION KNEE Right 10/12/2020    Procedure: REVISION RIGHT TOTAL " KNEE ARTHROPLASTY;  Surgeon: Eulalio Nye MD;  Location: [facility];  Service: Orthopedics     ARTHROSCOPY KNEE       CERVICAL SPINE SURGERY      fusion     FOOT SURGERY  1998, 10/2010    Bunionectomy     JOINT REPLACEMENT Right     TKA       Physical Exam    No exam today.      RESULTS  Creatinine   Date Value Ref Range Status   10/08/2021 0.71 0.52 - 1.04 mg/dL Final   06/03/2021 0.70 0.52 - 1.04 mg/dL Final     GFR Estimate   Date Value Ref Range Status   10/08/2021 89 >60 mL/min/1.73m2 Final     Comment:     As of July 11, 2021, eGFR is calculated by the CKD-EPI creatinine equation, without race adjustment. eGFR can be influenced by muscle mass, exercise, and diet. The reported eGFR is an estimation only and is only applicable if the renal function is stable.   06/03/2021 >90 >60 mL/min/[1.73_m2] Final     Comment:     Non  GFR Calc  Starting 12/18/2018, serum creatinine based estimated GFR (eGFR) will be   calculated using the Chronic Kidney Disease Epidemiology Collaboration   (CKD-EPI) equation.       Hemoglobin A1C   Date Value Ref Range Status   10/08/2021 6.8 (H) 0.0 - 5.6 % Final     Comment:     Normal <5.7%   Prediabetes 5.7-6.4%    Diabetes 6.5% or higher     Note: Adopted from ADA consensus guidelines.   09/15/2020 7.2 (H) 0 - 5.6 % Final     Comment:     Normal <5.7% Prediabetes 5.7-6.4%  Diabetes 6.5% or higher - adopted from ADA   consensus guidelines.       Potassium   Date Value Ref Range Status   10/13/2020 4.8 3.5 - 5.0 mmol/L Final   09/15/2020 3.8 3.4 - 5.3 mmol/L Final     ALT   Date Value Ref Range Status   10/08/2021 33 0 - 50 U/L Final   06/03/2021 25 0 - 50 U/L Final     AST   Date Value Ref Range Status   10/08/2021 24 0 - 45 U/L Final   06/03/2021 21 0 - 45 U/L Final     TSH   Date Value Ref Range Status   06/11/2015 0.91 0.40 - 4.00 mU/L Final     T4 Free   Date Value Ref Range Status   09/12/2008 1.18 0.70 - 1.85 ng/dL Final       Cholesterol   Date  Value Ref Range Status   09/15/2020 163 <200 mg/dL Final   05/17/2018 167 <200 mg/dL Final     HDL Cholesterol   Date Value Ref Range Status   09/15/2020 75 >49 mg/dL Final   05/17/2018 81 >49 mg/dL Final     LDL Cholesterol Calculated   Date Value Ref Range Status   09/15/2020 81 <100 mg/dL Final     Comment:     Desirable:       <100 mg/dl   05/17/2018 78 <100 mg/dL Final     Comment:     Desirable:       <100 mg/dl     Triglycerides   Date Value Ref Range Status   09/15/2020 34 <150 mg/dL Final   05/17/2018 37 <150 mg/dL Final       ASSESSMENT/PLAN:     1.  TYPE 1 DIABETES MELLITUS:  Pt's blood sugar control is good at this time without frequent hypoglycemia.  No change in insulin pump settings.  Pt wears her DexocmG6 sensor full time.  I sent a RX to her pharmacy for Baqsimi to use in case of severe hypoglycemia and also sent a RX for basal insulin in case her insulin pump fails.  Pt was seen by Oph this year. No visual complaints.  Her urine microalbuminuria negative 912/2020 with normal creat and GFR in Nov 2021.  She denies sx of neuropathy or foot ulcers/sores at this time.  Ruba received the COVID vaccines and COVID booster.  She also received the flu vaccine this Fall.    2.  LIPIDS: LDL 97 in Aug 2021. She is taking Crestor 3 days per week.    3.  RA: Followed here by Rheumatology staff.    4.  FOLLOW UP: with Dr. Bills in 6 months.    Time spent reviewing chart, labs and Tandem pump download data today = 8 minutes.  Time for video visit today = 24 minutes.  Time for documentation today= 15 minutes.    TOTAL TIME FOR VISIT TODAY = 47 minutes.    Gloria Gilbert PA-C      Again, thank you for allowing me to participate in the care of your patient.      Sincerely,    Gloria Gilbert PA-C

## 2021-12-06 DIAGNOSIS — M05.742 RHEUMATOID ARTHRITIS INVOLVING BOTH HANDS WITH POSITIVE RHEUMATOID FACTOR (H): ICD-10-CM

## 2021-12-06 DIAGNOSIS — M05.741 RHEUMATOID ARTHRITIS INVOLVING BOTH HANDS WITH POSITIVE RHEUMATOID FACTOR (H): ICD-10-CM

## 2021-12-06 DIAGNOSIS — Z79.60 LONG-TERM USE OF IMMUNOSUPPRESSANT MEDICATION: Chronic | ICD-10-CM

## 2021-12-06 DIAGNOSIS — M81.0 SENILE OSTEOPOROSIS: ICD-10-CM

## 2021-12-06 DIAGNOSIS — K21.00 GASTROESOPHAGEAL REFLUX DISEASE WITH ESOPHAGITIS, UNSPECIFIED WHETHER HEMORRHAGE: ICD-10-CM

## 2021-12-06 DIAGNOSIS — K21.9 GASTROESOPHAGEAL REFLUX DISEASE WITHOUT ESOPHAGITIS: ICD-10-CM

## 2021-12-06 NOTE — PROGRESS NOTES
Due to the COVID 19 pandemic this visit was converted to a video visit in order to help prevent spread of infection in this patient and the general population.    Time of start: 2:30 pm  Time of end: 2:54 pm  Total duration of video visit: 24 minutes.    HPI  Ruba Major is a 66 year old female with type 1 diabetes mellitus. Video visit for diabetes follow up.  Pt had a virtual visit with Dr. Bills in 7/2021.  Ruba was dx having type 1 diabetes in 2008.  She also has a hx of RA and is followed here by Rheumatology staff.  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  She denies hx of retinopathy, nephropathy or neuropathy.  Pt is currently using a Tandem insulin pump- control IQ and DexcomG6 sensor.  Her current basal insulin rates are below:  Midnight= 0.4 units/hr.  8 am = 0.55 units/hr.  10 am = 0.9 units/hr.  4:00 pm = 0.8 units/hr.  10 pm = 0.4 units/hr.  Pt's I/C ratio is below:  Midnight = 1:12  8 am = 1:10  10 am = 1:10  4  pm = 1:10  10 pm = 1:12  Sensitivity is 55.  Most recent A1C was 6.8 % on 10/8/2021.  I reviewed and scanned her insulin pump download data below in her note.  Her average glucose was 146 with SD 43.  Her glucose is in target 79 % of the time, above target 19 % of the time and below target 1 % of the time.  On ROS today, she reports doing well.  She denies blurred vision, n/v, SOB at rest, cough, fever or chills.  No chest pain, abd pain, diarrhea, dysuria or hematuria.  She denies sx of neuropathy or foot ulcers at this time.  Ruba received the COVID vaccines and COVID booster.    Diabetes Care  Retinopathy: none; pt seen by Oph this year.  Nephropathy:none; urine microalbuminuria negative in Dec 2020.  Neuropathy:none.  Foot Exam: no exam today.  Taking aspirin: no.  Lipids: LDL 97 in 8/2021. Taking Crestor 3 x per week.  CAD: no.  Mental health: denies depression. Past hx of eating disorder > 20 years ago in remission.  Insulin: Tandem insulin pump - control IQ.  Testing:  DexcomG6 sensor.  Hypoglycemia treatment: RX for Baqsimi sent to pharmacy to use in case of severe hypoglycemia.  Back up insulin:Rx for basal insulin sent to pharmacy to use in case of insulin pump failure.                  ROS  Please see under HPI.    Allergies  No Known Allergies    Medications  Current Outpatient Medications   Medication Sig Dispense Refill     Glucagon (BAQSIMI ONE PACK) 3 MG/DOSE POWD Spray 3 mg in nostril See Admin Instructions USE ONLY FOR SEVERE HYPOGLYCEMIA. 1 each 3     insulin glargine (BASAGLAR KWIKPEN) 100 UNIT/ML pen Inject 10 Units Subcutaneous daily Inject 14 units subcutaneous daily ONLY IF INSULIN PUMP FAILS. 15 mL 1     amitriptyline (ELAVIL) 10 MG tablet Take 20 mg by mouth At Bedtime        FARRAH CONTOUR test strip Use to test blood sugar 9 times daily or as directed. 900 strip 1     blood glucose (NO BRAND SPECIFIED) test strip FARRAH CONTOUR. Use to test blood sugar 9 times daily or as directed. 900 strip 2     celecoxib (CELEBREX) 200 MG capsule Take 1 capsule (200 mg) by mouth daily as needed for pain 90 capsule 3     Continuous Blood Gluc Sensor (DEXCOM G6 SENSOR) MISC 1 each every 10 days Use as directed for continuous glucose monitoring every 10 days. LAB DRAW needed prior to visit. Call [phone] to schedule. 9 each 1     Continuous Blood Gluc Transmit (DEXCOM G6 TRANSMITTER) MISC 1 each every 3 months Change every 90 days 1 each 1     golimumab (SIMPONI) 50 MG/0.5ML auto-injector pen INJECT THE CONTENTS OF ONE PEN (50MG) UNDER THE SKIN ONCE EVERY 4 WEEKS 0.5 mL 11     HUMALOG KWIKPEN 100 UNIT/ML soln 1u:10 g carb + 1u:50mg/dl >150, approx 20 units daily. 30 mL 1     INSULIN INFUSION PUMP        insulin lispro (HUMALOG PEN) 100 UNIT/ML injection Inject 1-8 Units Subcutaneous 4 times daily (before meals and nightly) As instructed (roughly 1 unit: 8 g carbohydrate) incase of pump failure.       insulin lispro (HUMALOG VIAL) 100 UNIT/ML vial USE WITH INSULIN PUMP TO  "INJECT 65 UNITS SUBCUTANEOUSLY DAILY 70 mL 3     insulin pen needle (B-D U/F) 31G X 5 MM miscellaneous Inject 1 Units Subcutaneous 4 times daily (before meals and nightly) 100 each 1     Insulin Pen Needle (PEN NEEDLES 3/16\") 31G X 5 MM MISC 1 Units 5 times daily 150 each 1     leflunomide (ARAVA) 20 MG tablet Take 1 tablet (20 mg) by mouth daily 90 tablet 1     melatonin 5 MG tablet Take 5 mg by mouth nightly as needed for sleep       rosuvastatin (CRESTOR) 10 MG tablet TAKE 1 TABLET BY MOUTH 3X WEEK       zolpidem (AMBIEN) 10 MG tablet Take 10 mg by mouth nightly as needed.         Family History  family history is not on file.    Social History   reports that she has never smoked. She has never used smokeless tobacco. She reports current alcohol use. She reports that she does not use drugs.     Past Medical History  Past Medical History:   Diagnosis Date     Arthritis      Diabetes mellitus (H)     insulin pump     PONV (postoperative nausea and vomiting)      Rheumatoid arthritis (H)      Rheumatoid arthritis(714.0)      Type II or unspecified type diabetes mellitus without mention of complication, not stated as uncontrolled        Past Surgical History:   Procedure Laterality Date     ARTHROPLASTY REVISION KNEE Right 10/12/2020    Procedure: REVISION RIGHT TOTAL KNEE ARTHROPLASTY;  Surgeon: Eulalio Nye MD;  Location: [facility];  Service: Orthopedics     ARTHROSCOPY KNEE       CERVICAL SPINE SURGERY      fusion     FOOT SURGERY  1998, 10/2010    Bunionectomy     JOINT REPLACEMENT Right     TKA       Physical Exam    No exam today.      RESULTS  Creatinine   Date Value Ref Range Status   10/08/2021 0.71 0.52 - 1.04 mg/dL Final   06/03/2021 0.70 0.52 - 1.04 mg/dL Final     GFR Estimate   Date Value Ref Range Status   10/08/2021 89 >60 mL/min/1.73m2 Final     Comment:     As of July 11, 2021, eGFR is calculated by the CKD-EPI creatinine equation, without race adjustment. eGFR can be influenced by " muscle mass, exercise, and diet. The reported eGFR is an estimation only and is only applicable if the renal function is stable.   06/03/2021 >90 >60 mL/min/[1.73_m2] Final     Comment:     Non  GFR Calc  Starting 12/18/2018, serum creatinine based estimated GFR (eGFR) will be   calculated using the Chronic Kidney Disease Epidemiology Collaboration   (CKD-EPI) equation.       Hemoglobin A1C   Date Value Ref Range Status   10/08/2021 6.8 (H) 0.0 - 5.6 % Final     Comment:     Normal <5.7%   Prediabetes 5.7-6.4%    Diabetes 6.5% or higher     Note: Adopted from ADA consensus guidelines.   09/15/2020 7.2 (H) 0 - 5.6 % Final     Comment:     Normal <5.7% Prediabetes 5.7-6.4%  Diabetes 6.5% or higher - adopted from ADA   consensus guidelines.       Potassium   Date Value Ref Range Status   10/13/2020 4.8 3.5 - 5.0 mmol/L Final   09/15/2020 3.8 3.4 - 5.3 mmol/L Final     ALT   Date Value Ref Range Status   10/08/2021 33 0 - 50 U/L Final   06/03/2021 25 0 - 50 U/L Final     AST   Date Value Ref Range Status   10/08/2021 24 0 - 45 U/L Final   06/03/2021 21 0 - 45 U/L Final     TSH   Date Value Ref Range Status   06/11/2015 0.91 0.40 - 4.00 mU/L Final     T4 Free   Date Value Ref Range Status   09/12/2008 1.18 0.70 - 1.85 ng/dL Final       Cholesterol   Date Value Ref Range Status   09/15/2020 163 <200 mg/dL Final   05/17/2018 167 <200 mg/dL Final     HDL Cholesterol   Date Value Ref Range Status   09/15/2020 75 >49 mg/dL Final   05/17/2018 81 >49 mg/dL Final     LDL Cholesterol Calculated   Date Value Ref Range Status   09/15/2020 81 <100 mg/dL Final     Comment:     Desirable:       <100 mg/dl   05/17/2018 78 <100 mg/dL Final     Comment:     Desirable:       <100 mg/dl     Triglycerides   Date Value Ref Range Status   09/15/2020 34 <150 mg/dL Final   05/17/2018 37 <150 mg/dL Final       ASSESSMENT/PLAN:     1.  TYPE 1 DIABETES MELLITUS:  Pt's blood sugar control is good at this time without frequent  hypoglycemia.  No change in insulin pump settings.  Pt wears her DexocmG6 sensor full time.  I sent a RX to her pharmacy for Baqsimi to use in case of severe hypoglycemia and also sent a RX for basal insulin in case her insulin pump fails.  Pt was seen by Oph this year. No visual complaints.  Her urine microalbuminuria negative 912/2020 with normal creat and GFR in Nov 2021.  She denies sx of neuropathy or foot ulcers/sores at this time.  Ruba received the COVID vaccines and COVID booster.  She also received the flu vaccine this Fall.    2.  LIPIDS: LDL 97 in Aug 2021. She is taking Crestor 3 days per week.    3.  RA: Followed here by Rheumatology staff.    4.  FOLLOW UP: with Dr. Bills in 6 months.    Time spent reviewing chart, labs and Tandem pump download data today = 8 minutes.  Time for video visit today = 24 minutes.  Time for documentation today= 15 minutes.    TOTAL TIME FOR VISIT TODAY = 47 minutes.    Gloria Gilbert PA-C

## 2021-12-06 NOTE — TELEPHONE ENCOUNTER
leflunomide (ARAVA) 20 MG tablet  Last Written Prescription Date:  10/12/2021  Last Fill Quantity: 90,   # refills: 1  Last Office Visit: 10/12/2021  Future Office visit:  None    CBC RESULTS: Recent Labs   Lab Test 10/08/21  1318   WBC 4.1   RBC 4.33   HGB 13.3   HCT 39.4   MCV 91   MCH 30.7   MCHC 33.8   RDW 13.3          Creatinine   Date Value Ref Range Status   10/08/2021 0.71 0.52 - 1.04 mg/dL Final   06/03/2021 0.70 0.52 - 1.04 mg/dL Final   ]    Liver Function Studies -   Recent Labs   Lab Test 10/08/21  1318 06/03/21  1323 09/15/20  1110   PROTTOTAL  --   --  7.2   ALBUMIN 4.0   < > 3.8   BILITOTAL  --   --  0.8   ALKPHOS  --   --  64   AST 24   < > 24   ALT 33   < > 30    < > = values in this interval not displayed.       Routing refill request to provider for review/approval because:  Drug not on the [facility], P or [facility] refill protocol or controlled substance      Zaina Cartagena RN  Central Triage Red Flags/Med Refills  ]

## 2021-12-06 NOTE — TELEPHONE ENCOUNTER
[facility] Call Center    Phone Message    May a detailed message be left on voicemail: yes     Reason for Call: Medication Refill Request    Has the patient contacted the pharmacy for the refill? Yes   Name of medication being requested: Leflunomide  Provider who prescribed the medication: Jermain  Pharmacy: [facility] in [place], [place], phone [phone]      Action Taken: Message routed to:  Adult Clinics: Rheumatology p [phone]    Travel Screening: Not Applicable

## 2021-12-07 RX ORDER — LEFLUNOMIDE 20 MG/1
20 TABLET ORAL DAILY
Qty: 90 TABLET | OUTPATIENT
Start: 2021-12-07

## 2021-12-07 RX ORDER — LEFLUNOMIDE 20 MG/1
20 TABLET ORAL DAILY
Qty: 90 TABLET | Refills: 1 | Status: SHIPPED | OUTPATIENT
Start: 2021-12-07 | End: 2022-07-19

## 2021-12-07 NOTE — TELEPHONE ENCOUNTER
LEFLUNOMIDE 20MG TABLETS  Last Written Prescription Date:  10/12/2021  Last Fill Quantity: 90,   # refills: 1  Last Office Visit:  10/12/2021  Future Office visit:  None    CBC RESULTS: Recent Labs   Lab Test 10/08/21  1318   WBC 4.1   RBC 4.33   HGB 13.3   HCT 39.4   MCV 91   MCH 30.7   MCHC 33.8   RDW 13.3          Creatinine   Date Value Ref Range Status   10/08/2021 0.71 0.52 - 1.04 mg/dL Final   06/03/2021 0.70 0.52 - 1.04 mg/dL Final   ]    Liver Function Studies -   Recent Labs   Lab Test 10/08/21  1318 06/03/21  1323 09/15/20  1110   PROTTOTAL  --   --  7.2   ALBUMIN 4.0   < > 3.8   BILITOTAL  --   --  0.8   ALKPHOS  --   --  64   AST 24   < > 24   ALT 33   < > 30    < > = values in this interval not displayed.       Routing refill request to provider for review/approval because:  New alternative/pharmacy.    Please send new order  Drug not on the [facility], P or [facility] refill protocol or controlled substance    Zaina Cartagena RN  Central Triage Red Flags/Med Refills

## 2021-12-08 ENCOUNTER — TELEPHONE (OUTPATIENT)
Dept: ENDOCRINOLOGY | Facility: CLINIC | Age: 66
End: 2021-12-08
Payer: COMMERCIAL

## 2021-12-08 NOTE — TELEPHONE ENCOUNTER
Attempted to reach patient to schedule follow up in the Endocrinology Clinic. No answer, LM on VM to call office back.    Schedule with Veronica Bills MD in 6 months around 6/3/21.  Claudine Javed on 12/8/2021 at 1:55 PM

## 2021-12-08 NOTE — TELEPHONE ENCOUNTER
----- Message from Gloria Gilbert PA-C sent at 12/3/2021  4:21 PM CST -----  Regarding: RE: follow up  Hi:  She should see Dr. Bills in 6 months.  Thank you.  Mikki  ----- Message -----  From: Claudine Javed  Sent: 12/3/2021   3:43 PM CST  To: Gloira Gilbert PA-C  Subject: follow up                                        Hi there!    Working on check outs. When would you like this patient to follow up?    Thanks!    Claudine, VICK

## 2022-02-22 DIAGNOSIS — E10.9 TYPE 1 DIABETES MELLITUS WITHOUT COMPLICATION (H): ICD-10-CM

## 2022-02-22 RX ORDER — PROCHLORPERAZINE 25 MG/1
1 SUPPOSITORY RECTAL
Qty: 1 EACH | Refills: 2 | Status: SHIPPED | OUTPATIENT
Start: 2022-02-22 | End: 2022-08-29

## 2022-02-22 NOTE — TELEPHONE ENCOUNTER
Continuous Blood Gluc Transmit (DEXCOM G6 TRANSMITTER) MISC  Last Written Prescription Date:   11/18/2021  Last Fill Quantity: 1,   # refills: 1  Last Office Visit :  12/3/2021  Future Office visit:   6/1/2022  1 Each, 2 Refills sent to pharm 2/22/2022      Zaina Cartagena RN  Central Triage Red Flags/Med Refills

## 2022-02-22 NOTE — TELEPHONE ENCOUNTER
[facility] Call Center    Phone Message    May a detailed message be left on voicemail: yes     Reason for Call: Medication Refill Request    Has the patient contacted the pharmacy for the refill? Yes   Name of medication being requested: Continuous Blood Gluc Transmit (DEXCOM G6 TRANSMITTER) MISC  Provider who prescribed the medication: Negar  Pharmacy: [id number] - [place] - [address]  Date medication is needed: ASAP     Pt states that pharmacy is stating that she no longer has refills and needs to get a new prescription at this time.       Action Taken: Other: Endo    Travel Screening: Not Applicable

## 2022-02-28 ENCOUNTER — MEDICAL CORRESPONDENCE (OUTPATIENT)
Dept: HEALTH INFORMATION MANAGEMENT | Facility: CLINIC | Age: 67
End: 2022-02-28
Payer: COMMERCIAL

## 2022-03-01 ENCOUNTER — DOCUMENTATION ONLY (OUTPATIENT)
Dept: ENDOCRINOLOGY | Facility: CLINIC | Age: 67
End: 2022-03-01
Payer: COMMERCIAL

## 2022-04-16 ENCOUNTER — HEALTH MAINTENANCE LETTER (OUTPATIENT)
Age: 67
End: 2022-04-16

## 2022-05-05 DIAGNOSIS — E10.9 TYPE 1 DIABETES MELLITUS WITHOUT COMPLICATION (H): ICD-10-CM

## 2022-05-06 ENCOUNTER — MYC MEDICAL ADVICE (OUTPATIENT)
Dept: RHEUMATOLOGY | Facility: CLINIC | Age: 67
End: 2022-05-06
Payer: COMMERCIAL

## 2022-05-06 DIAGNOSIS — M05.742 RHEUMATOID ARTHRITIS INVOLVING BOTH HANDS WITH POSITIVE RHEUMATOID FACTOR (H): ICD-10-CM

## 2022-05-06 DIAGNOSIS — M05.741 RHEUMATOID ARTHRITIS INVOLVING BOTH HANDS WITH POSITIVE RHEUMATOID FACTOR (H): ICD-10-CM

## 2022-05-06 DIAGNOSIS — Z79.60 LONG-TERM USE OF IMMUNOSUPPRESSANT MEDICATION: Chronic | ICD-10-CM

## 2022-05-06 RX ORDER — GOLIMUMAB 50 MG/.5ML
INJECTION, SOLUTION SUBCUTANEOUS
Qty: 0.5 ML | Refills: 11 | Status: SHIPPED | OUTPATIENT
Start: 2022-05-06 | End: 2022-09-30 | Stop reason: ALTCHOICE

## 2022-05-06 RX ORDER — PROCHLORPERAZINE 25 MG/1
1 SUPPOSITORY RECTAL
Qty: 9 EACH | Refills: 1 | Status: SHIPPED | OUTPATIENT
Start: 2022-05-06 | End: 2022-07-19

## 2022-05-06 NOTE — TELEPHONE ENCOUNTER
Continuous Blood Gluc Sensor (DEXCOM G6 SENSOR) [facility]    12/3/2021  [facility] [place]     Gloria Gilbert PA-C    Endocrinology, Diabetes, and Metabolism     Nv: 6/1/22

## 2022-05-06 NOTE — TELEPHONE ENCOUNTER
golimumab (SIMPONI) 50 MG/0.5ML auto-injector pen : INJECT THE CONTENTS OF ONE PEN (50MG) UNDER THE SKIN ONCE EVERY 4 WEEKS  Last Written Prescription Date:  6/11/2021  Last Fill Quantity: 0.5 ml ,   # refills: 11  Last Office Visit : 10/12/2021   Future Office visit:  None. 6 months    Refilled per protocol. Scheduling has been notified to contact the pt for appointment.

## 2022-05-25 NOTE — PROGRESS NOTES
Outcome for 05/25/22 8:47 AM: [employer] message sent  VICK Restrepo  Outcome for 05/31/22 10:55 AM: Tandem emailed to provider  VICK Restrepo

## 2022-05-29 ASSESSMENT — ENCOUNTER SYMPTOMS: BACK PAIN: 1

## 2022-06-01 ENCOUNTER — TELEPHONE (OUTPATIENT)
Dept: ENDOCRINOLOGY | Facility: CLINIC | Age: 67
End: 2022-06-01

## 2022-06-01 ENCOUNTER — VIRTUAL VISIT (OUTPATIENT)
Dept: ENDOCRINOLOGY | Facility: CLINIC | Age: 67
End: 2022-06-01
Payer: COMMERCIAL

## 2022-06-01 DIAGNOSIS — E10.9 TYPE 1 DIABETES MELLITUS WITHOUT COMPLICATION (H): ICD-10-CM

## 2022-06-01 PROCEDURE — 99215 OFFICE O/P EST HI 40 MIN: CPT | Mod: GT | Performed by: PHYSICIAN ASSISTANT

## 2022-06-01 RX ORDER — GLUCAGON 3 MG/1
3 POWDER NASAL SEE ADMIN INSTRUCTIONS
Qty: 1 EACH | Refills: 3 | Status: SHIPPED | OUTPATIENT
Start: 2022-06-01

## 2022-06-01 NOTE — PROGRESS NOTES
Ruba is a 67 year old who is being evaluated via a billable video visit.      How would you like to obtain your AVS? MyChart  If the video visit is dropped, the invitation should be resent by: Text to cell phone: [phone]   Will anyone else be joining your video visit? No

## 2022-06-01 NOTE — TELEPHONE ENCOUNTER
Spoke with patient to let her know Baqsimi was covered at [facility] and asked if she would like us to fill it with us. Patient declined

## 2022-06-01 NOTE — PROGRESS NOTES
Due to the COVID 19 pandemic this visit was converted to a video visit in order to help prevent spread of infection in this patient and the general population.    Time of start: 9:00 am  Time of end: 9:24 am  Total duration of video visit: 24 minutes.    HPI  Ruba Major is a 67 year old female with type 1 diabetes mellitus. Video visit for diabetes follow up.  Ruba was dx having type 1 diabetes in 2008.  She also has a hx of RA and is followed here by Rheumatology staff.  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  She denies hx of retinopathy, nephropathy or neuropathy.  Pt is currently using a Tandem insulin pump- control IQ and DexcomG6 sensor.  Her current basal insulin rates are below:  Midnight= 0.4 units/hr.  8 am = 0.55 units/hr.  10 am = 0.9 units/hr.  4:00 pm = 0.8 units/hr.  10 pm = 0.4 units/hr.  Pt's I/C ratio is below:  Midnight = 1:12  8 am = 1:10  10 am = 1:10  4  pm = 1:10  10 pm = 1:12  Sensitivity is 55.  Most recent A1C was 6.8 % on 10/8/2021.  I reviewed and scanned her insulin pump download data below in her note.  Her average glucose was 148 with SD 46.  Her glucose is in target 76 % of the time, above target 24 % of the time and below target 1 % of the time.  Pt's blood sugars are high 8 am - noon. She states once she gets out of bed her blood sugar continues to increase.  On ROS today, she states she has a steroid injection last week for lower back pain.  She denies blurred vision, n/v, SOB at rest, cough, fever or chills.  No chest pain, abd pain, diarrhea, dysuria or hematuria.  She denies sx of neuropathy or foot ulcers at this time.    Diabetes Care  Retinopathy: none; pt seen by Oph in 3/2022.  Nephropathy:none; urine microalbuminuria negative in Dec 2020.  Neuropathy:none.  Foot Exam: no exam today.  Taking aspirin: no.  Lipids: LDL 97 in 8/2021. Taking Crestor 3 x per week.  CAD: no.  Mental health: denies depression. Past hx of eating disorder > 20 years ago in  remission.  Insulin: Tandem insulin pump - control IQ.  Testing: DexcomG6 sensor.  Hypoglycemia treatment: RX for Baqsimi sent to pharmacy to use in case of severe hypoglycemia.  Back up insulin:Rx for basal insulin sent to pharmacy to use in case of insulin pump failure.                 ROS  Please see under HPI.    Allergies  No Known Allergies    Medications  Current Outpatient Medications   Medication Sig Dispense Refill     amitriptyline (ELAVIL) 10 MG tablet Take 20 mg by mouth At Bedtime        FARRAH CONTOUR test strip Use to test blood sugar 9 times daily or as directed. 900 strip 1     blood glucose (NO BRAND SPECIFIED) test strip FARRAH CONTOUR. Use to test blood sugar 9 times daily or as directed. 900 strip 2     celecoxib (CELEBREX) 200 MG capsule Take 1 capsule (200 mg) by mouth daily as needed for pain 90 capsule 3     Continuous Blood Gluc Sensor (DEXCOM G6 SENSOR) MISC 1 each every 10 days Use as directed for continuous glucose monitoring every 10 days. 9 each 1     Continuous Blood Gluc Transmit (DEXCOM G6 TRANSMITTER) MISC 1 each every 3 months Change every 90 days 1 each 2     Glucagon (BAQSIMI ONE PACK) 3 MG/DOSE POWD Spray 3 mg in nostril See Admin Instructions USE ONLY FOR SEVERE HYPOGLYCEMIA. 1 each 3     golimumab (SIMPONI) 50 MG/0.5ML auto-injector pen INJECT THE CONTENTS OF ONE PEN (50MG) UNDER THE SKIN ONCE EVERY 4 WEEKS 0.5 mL 11     HUMALOG KWIKPEN 100 UNIT/ML soln 1u:10 g carb + 1u:50mg/dl >150, approx 20 units daily. 30 mL 1     insulin glargine (BASAGLAR KWIKPEN) 100 UNIT/ML pen Inject 10 Units Subcutaneous daily Inject 14 units subcutaneous daily ONLY IF INSULIN PUMP FAILS. 15 mL 1     INSULIN INFUSION PUMP        insulin lispro (HUMALOG PEN) 100 UNIT/ML injection Inject 1-8 Units Subcutaneous 4 times daily (before meals and nightly) As instructed (roughly 1 unit: 8 g carbohydrate) incase of pump failure.       insulin lispro (HUMALOG VIAL) 100 UNIT/ML vial USE WITH INSULIN PUMP TO  "INJECT 65 UNITS SUBCUTANEOUSLY DAILY 70 mL 3     insulin pen needle (B-D U/F) 31G X 5 MM miscellaneous Inject 1 Units Subcutaneous 4 times daily (before meals and nightly) 100 each 1     Insulin Pen Needle (PEN NEEDLES 3/16\") 31G X 5 MM MISC 1 Units 5 times daily 150 each 1     leflunomide (ARAVA) 20 MG tablet Take 1 tablet (20 mg) by mouth daily 90 tablet 1     melatonin 5 MG tablet Take 5 mg by mouth nightly as needed for sleep       rosuvastatin (CRESTOR) 10 MG tablet TAKE 1 TABLET BY MOUTH 3X WEEK       zolpidem (AMBIEN) 10 MG tablet Take 10 mg by mouth nightly as needed.         Family History  family history is not on file.    Social History   reports that she has never smoked. She has never used smokeless tobacco. She reports current alcohol use. She reports that she does not use drugs.     Past Medical History  Past Medical History:   Diagnosis Date     Arthritis      Diabetes mellitus (H)     insulin pump     PONV (postoperative nausea and vomiting)      Rheumatoid arthritis (H)      Rheumatoid arthritis(714.0)      Type II or unspecified type diabetes mellitus without mention of complication, not stated as uncontrolled        Past Surgical History:   Procedure Laterality Date     ARTHROPLASTY REVISION KNEE Right 10/12/2020    Procedure: REVISION RIGHT TOTAL KNEE ARTHROPLASTY;  Surgeon: Eulalio Nye MD;  Location: [facility];  Service: Orthopedics     ARTHROSCOPY KNEE       CERVICAL SPINE SURGERY      fusion     FOOT SURGERY  1998, 10/2010    Bunionectomy     JOINT REPLACEMENT Right     TKA       Physical Exam    No exam today.      RESULTS  Creatinine   Date Value Ref Range Status   10/08/2021 0.71 0.52 - 1.04 mg/dL Final   06/03/2021 0.70 0.52 - 1.04 mg/dL Final     GFR Estimate   Date Value Ref Range Status   10/08/2021 89 >60 mL/min/1.73m2 Final     Comment:     As of July 11, 2021, eGFR is calculated by the CKD-EPI creatinine equation, without race adjustment. eGFR can be influenced by " muscle mass, exercise, and diet. The reported eGFR is an estimation only and is only applicable if the renal function is stable.   06/03/2021 >90 >60 mL/min/[1.73_m2] Final     Comment:     Non  GFR Calc  Starting 12/18/2018, serum creatinine based estimated GFR (eGFR) will be   calculated using the Chronic Kidney Disease Epidemiology Collaboration   (CKD-EPI) equation.       Hemoglobin A1C POCT   Date Value Ref Range Status   09/15/2020 7.2 (H) 0 - 5.6 % Final     Comment:     Normal <5.7% Prediabetes 5.7-6.4%  Diabetes 6.5% or higher - adopted from ADA   consensus guidelines.       Hemoglobin A1C   Date Value Ref Range Status   10/08/2021 6.8 (H) 0.0 - 5.6 % Final     Comment:     Normal <5.7%   Prediabetes 5.7-6.4%    Diabetes 6.5% or higher     Note: Adopted from ADA consensus guidelines.     Potassium   Date Value Ref Range Status   10/13/2020 4.8 3.5 - 5.0 mmol/L Final   09/15/2020 3.8 3.4 - 5.3 mmol/L Final     ALT   Date Value Ref Range Status   10/08/2021 33 0 - 50 U/L Final   06/03/2021 25 0 - 50 U/L Final     AST   Date Value Ref Range Status   10/08/2021 24 0 - 45 U/L Final   06/03/2021 21 0 - 45 U/L Final     TSH   Date Value Ref Range Status   06/11/2015 0.91 0.40 - 4.00 mU/L Final     T4 Free   Date Value Ref Range Status   09/12/2008 1.18 0.70 - 1.85 ng/dL Final       Cholesterol   Date Value Ref Range Status   09/15/2020 163 <200 mg/dL Final   05/17/2018 167 <200 mg/dL Final     HDL Cholesterol   Date Value Ref Range Status   09/15/2020 75 >49 mg/dL Final   05/17/2018 81 >49 mg/dL Final     LDL Cholesterol Calculated   Date Value Ref Range Status   09/15/2020 81 <100 mg/dL Final     Comment:     Desirable:       <100 mg/dl   05/17/2018 78 <100 mg/dL Final     Comment:     Desirable:       <100 mg/dl     Triglycerides   Date Value Ref Range Status   09/15/2020 34 <150 mg/dL Final   05/17/2018 37 <150 mg/dL Final       ASSESSMENT/PLAN:     1.  TYPE 1 DIABETES MELLITUS:  High blood  sugars from 8 am - noon.  Will add a higher basal insulin rate at 7 am 0.6 units/hr and delete 8 am basal insulin rate which is 0.55 units/hr.  Pt states she has had issues with her Dexcom falling off. I suggest she try using Masticol.  I sent a RX to her pharmacy for Baqsimi to use in case of severe hypoglycemia and also sent a RX for basal insulin in case her insulin pump fails.  Pt was seen by Oph in March 2022 without retinopathy.  Her urine microalbuminuria negative 912/2020 with normal creat and GFR in Jan 2022.  She denies sx of neuropathy or foot ulcers/sores at this time.    2.  LIPIDS: LDL 97 in Aug 2021. She is taking Crestor 3 days per week.    3.  RA: Followed here by Rheumatology staff.    4.  FOLLOW UP: with Dr. Bills in Oct 2022.  A1C and urine microalbuminuria ordered today.    Time spent reviewing chart, labs and Tandem pump download data today = 8 minutes.  Time for video visit today = 24  minutes.  Time for documentation today= 15 minutes.    TOTAL TIME FOR VISIT TODAY = 47 minutes.    Gloria Gilbert PA-C  Answers for HPI/ROS submitted by the patient on 5/29/2022  General Symptoms: No  Skin Symptoms: No  HENT Symptoms: No  EYE SYMPTOMS: No  HEART SYMPTOMS: No  LUNG SYMPTOMS: No  INTESTINAL SYMPTOMS: No  URINARY SYMPTOMS: No  GYNECOLOGIC SYMPTOMS: No  BREAST SYMPTOMS: No  SKELETAL SYMPTOMS: Yes  BLOOD SYMPTOMS: No  NERVOUS SYSTEM SYMPTOMS: No  MENTAL HEALTH SYMPTOMS: No  Back pain: Yes

## 2022-06-01 NOTE — LETTER
6/1/2022       RE: Ruba Major  [address]  [facility] [address]     Dear Colleague,    Thank you for referring your patient, Ruba Major, to the [facility] ENDOCRINOLOGY CLINIC [place] at [facility]. Please see a copy of my visit note below.    Outcome for 05/25/22 8:47 AM: [employer] message sent  VICK Restrepo  Outcome for 05/31/22 10:55 AM: Tandem emailed to provider  VICK Restrepo          Due to the COVID 19 pandemic this visit was converted to a video visit in order to help prevent spread of infection in this patient and the general population.    Time of start: 9:00 am  Time of end: 9:24 am  Total duration of video visit: 24 minutes.    HPI  Ruba Major is a 67 year old female with type 1 diabetes mellitus. Video visit for diabetes follow up.  Ruba was dx having type 1 diabetes in 2008.  She also has a hx of RA and is followed here by Rheumatology staff.  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  She denies hx of retinopathy, nephropathy or neuropathy.  Pt is currently using a Tandem insulin pump- control IQ and DexcomG6 sensor.  Her current basal insulin rates are below:  Midnight= 0.4 units/hr.  8 am = 0.55 units/hr.  10 am = 0.9 units/hr.  4:00 pm = 0.8 units/hr.  10 pm = 0.4 units/hr.  Pt's I/C ratio is below:  Midnight = 1:12  8 am = 1:10  10 am = 1:10  4  pm = 1:10  10 pm = 1:12  Sensitivity is 55.  Most recent A1C was 6.8 % on 10/8/2021.  I reviewed and scanned her insulin pump download data below in her note.  Her average glucose was 148 with SD 46.  Her glucose is in target 76 % of the time, above target 24 % of the time and below target 1 % of the time.  Pt's blood sugars are high 8 am - noon. She states once she gets out of bed her blood sugar continues to increase.  On ROS today, she states she has a steroid injection last week for lower back pain.  She denies blurred vision, n/v, SOB at rest, cough,  fever or chills.  No chest pain, abd pain, diarrhea, dysuria or hematuria.  She denies sx of neuropathy or foot ulcers at this time.    Diabetes Care  Retinopathy: none; pt seen by Oph in 3/2022.  Nephropathy:none; urine microalbuminuria negative in Dec 2020.  Neuropathy:none.  Foot Exam: no exam today.  Taking aspirin: no.  Lipids: LDL 97 in 8/2021. Taking Crestor 3 x per week.  CAD: no.  Mental health: denies depression. Past hx of eating disorder > 20 years ago in remission.  Insulin: Tandem insulin pump - control IQ.  Testing: DexcomG6 sensor.  Hypoglycemia treatment: RX for Baqsimi sent to pharmacy to use in case of severe hypoglycemia.  Back up insulin:Rx for basal insulin sent to pharmacy to use in case of insulin pump failure.                 ROS  Please see under HPI.    Allergies  No Known Allergies    Medications  Current Outpatient Medications   Medication Sig Dispense Refill     amitriptyline (ELAVIL) 10 MG tablet Take 20 mg by mouth At Bedtime        FARRHA CONTOUR test strip Use to test blood sugar 9 times daily or as directed. 900 strip 1     blood glucose (NO BRAND SPECIFIED) test strip FARRAH CONTOUR. Use to test blood sugar 9 times daily or as directed. 900 strip 2     celecoxib (CELEBREX) 200 MG capsule Take 1 capsule (200 mg) by mouth daily as needed for pain 90 capsule 3     Continuous Blood Gluc Sensor (DEXCOM G6 SENSOR) MISC 1 each every 10 days Use as directed for continuous glucose monitoring every 10 days. 9 each 1     Continuous Blood Gluc Transmit (DEXCOM G6 TRANSMITTER) MISC 1 each every 3 months Change every 90 days 1 each 2     Glucagon (BAQSIMI ONE PACK) 3 MG/DOSE POWD Spray 3 mg in nostril See Admin Instructions USE ONLY FOR SEVERE HYPOGLYCEMIA. 1 each 3     golimumab (SIMPONI) 50 MG/0.5ML auto-injector pen INJECT THE CONTENTS OF ONE PEN (50MG) UNDER THE SKIN ONCE EVERY 4 WEEKS 0.5 mL 11     HUMALOG KWIKPEN 100 UNIT/ML soln 1u:10 g carb + 1u:50mg/dl >150, approx 20 units daily. 30 mL  "1     insulin glargine (BASAGLAR KWIKPEN) 100 UNIT/ML pen Inject 10 Units Subcutaneous daily Inject 14 units subcutaneous daily ONLY IF INSULIN PUMP FAILS. 15 mL 1     INSULIN INFUSION PUMP        insulin lispro (HUMALOG PEN) 100 UNIT/ML injection Inject 1-8 Units Subcutaneous 4 times daily (before meals and nightly) As instructed (roughly 1 unit: 8 g carbohydrate) incase of pump failure.       insulin lispro (HUMALOG VIAL) 100 UNIT/ML vial USE WITH INSULIN PUMP TO INJECT 65 UNITS SUBCUTANEOUSLY DAILY 70 mL 3     insulin pen needle (B-D U/F) 31G X 5 MM miscellaneous Inject 1 Units Subcutaneous 4 times daily (before meals and nightly) 100 each 1     Insulin Pen Needle (PEN NEEDLES 3/16\") 31G X 5 MM MISC 1 Units 5 times daily 150 each 1     leflunomide (ARAVA) 20 MG tablet Take 1 tablet (20 mg) by mouth daily 90 tablet 1     melatonin 5 MG tablet Take 5 mg by mouth nightly as needed for sleep       rosuvastatin (CRESTOR) 10 MG tablet TAKE 1 TABLET BY MOUTH 3X WEEK       zolpidem (AMBIEN) 10 MG tablet Take 10 mg by mouth nightly as needed.         Family History  family history is not on file.    Social History   reports that she has never smoked. She has never used smokeless tobacco. She reports current alcohol use. She reports that she does not use drugs.     Past Medical History  Past Medical History:   Diagnosis Date     Arthritis      Diabetes mellitus (H)     insulin pump     PONV (postoperative nausea and vomiting)      Rheumatoid arthritis (H)      Rheumatoid arthritis(714.0)      Type II or unspecified type diabetes mellitus without mention of complication, not stated as uncontrolled        Past Surgical History:   Procedure Laterality Date     ARTHROPLASTY REVISION KNEE Right 10/12/2020    Procedure: REVISION RIGHT TOTAL KNEE ARTHROPLASTY;  Surgeon: Eulalio Nye MD;  Location: [facility];  Service: Orthopedics     ARTHROSCOPY KNEE       CERVICAL SPINE SURGERY      fusion     FOOT SURGERY  " 1998, 10/2010    Bunionectomy     JOINT REPLACEMENT Right     TKA       Physical Exam    No exam today.      RESULTS  Creatinine   Date Value Ref Range Status   10/08/2021 0.71 0.52 - 1.04 mg/dL Final   06/03/2021 0.70 0.52 - 1.04 mg/dL Final     GFR Estimate   Date Value Ref Range Status   10/08/2021 89 >60 mL/min/1.73m2 Final     Comment:     As of July 11, 2021, eGFR is calculated by the CKD-EPI creatinine equation, without race adjustment. eGFR can be influenced by muscle mass, exercise, and diet. The reported eGFR is an estimation only and is only applicable if the renal function is stable.   06/03/2021 >90 >60 mL/min/[1.73_m2] Final     Comment:     Non  GFR Calc  Starting 12/18/2018, serum creatinine based estimated GFR (eGFR) will be   calculated using the Chronic Kidney Disease Epidemiology Collaboration   (CKD-EPI) equation.       Hemoglobin A1C POCT   Date Value Ref Range Status   09/15/2020 7.2 (H) 0 - 5.6 % Final     Comment:     Normal <5.7% Prediabetes 5.7-6.4%  Diabetes 6.5% or higher - adopted from ADA   consensus guidelines.       Hemoglobin A1C   Date Value Ref Range Status   10/08/2021 6.8 (H) 0.0 - 5.6 % Final     Comment:     Normal <5.7%   Prediabetes 5.7-6.4%    Diabetes 6.5% or higher     Note: Adopted from ADA consensus guidelines.     Potassium   Date Value Ref Range Status   10/13/2020 4.8 3.5 - 5.0 mmol/L Final   09/15/2020 3.8 3.4 - 5.3 mmol/L Final     ALT   Date Value Ref Range Status   10/08/2021 33 0 - 50 U/L Final   06/03/2021 25 0 - 50 U/L Final     AST   Date Value Ref Range Status   10/08/2021 24 0 - 45 U/L Final   06/03/2021 21 0 - 45 U/L Final     TSH   Date Value Ref Range Status   06/11/2015 0.91 0.40 - 4.00 mU/L Final     T4 Free   Date Value Ref Range Status   09/12/2008 1.18 0.70 - 1.85 ng/dL Final       Cholesterol   Date Value Ref Range Status   09/15/2020 163 <200 mg/dL Final   05/17/2018 167 <200 mg/dL Final     HDL Cholesterol   Date Value Ref Range  Status   09/15/2020 75 >49 mg/dL Final   05/17/2018 81 >49 mg/dL Final     LDL Cholesterol Calculated   Date Value Ref Range Status   09/15/2020 81 <100 mg/dL Final     Comment:     Desirable:       <100 mg/dl   05/17/2018 78 <100 mg/dL Final     Comment:     Desirable:       <100 mg/dl     Triglycerides   Date Value Ref Range Status   09/15/2020 34 <150 mg/dL Final   05/17/2018 37 <150 mg/dL Final       ASSESSMENT/PLAN:     1.  TYPE 1 DIABETES MELLITUS:  High blood sugars from 8 am - noon.  Will add a higher basal insulin rate at 7 am 0.6 units/hr and delete 8 am basal insulin rate which is 0.55 units/hr.  Pt states she has had issues with her Dexcom falling off. I suggest she try using Masticol.  I sent a RX to her pharmacy for Baqsimi to use in case of severe hypoglycemia and also sent a RX for basal insulin in case her insulin pump fails.  Pt was seen by Oph in March 2022 without retinopathy.  Her urine microalbuminuria negative 912/2020 with normal creat and GFR in Jan 2022.  She denies sx of neuropathy or foot ulcers/sores at this time.    2.  LIPIDS: LDL 97 in Aug 2021. She is taking Crestor 3 days per week.    3.  RA: Followed here by Rheumatology staff.    4.  FOLLOW UP: with Dr. Bills in Oct 2022.  A1C and urine microalbuminuria ordered today.    Time spent reviewing chart, labs and Tandem pump download data today = 8 minutes.  Time for video visit today = 24  minutes.  Time for documentation today= 15 minutes.    TOTAL TIME FOR VISIT TODAY = 47 minutes.    Gloria Gilbert PA-C  Answers for HPI/ROS submitted by the patient on 5/29/2022  General Symptoms: No  Skin Symptoms: No  HENT Symptoms: No  EYE SYMPTOMS: No  HEART SYMPTOMS: No  LUNG SYMPTOMS: No  INTESTINAL SYMPTOMS: No  URINARY SYMPTOMS: No  GYNECOLOGIC SYMPTOMS: No  BREAST SYMPTOMS: No  SKELETAL SYMPTOMS: Yes  BLOOD SYMPTOMS: No  NERVOUS SYSTEM SYMPTOMS: No  MENTAL HEALTH SYMPTOMS: No  Back pain: Yes        Ruba is a 67 year old who is being  evaluated via a billable video visit.      How would you like to obtain your AVS? [employer]  If the video visit is dropped, the invitation should be resent by: Text to cell phone: [phone]   Will anyone else be joining your video visit? No

## 2022-06-02 ENCOUNTER — TELEPHONE (OUTPATIENT)
Dept: RHEUMATOLOGY | Facility: CLINIC | Age: 67
End: 2022-06-02
Payer: COMMERCIAL

## 2022-06-02 DIAGNOSIS — Z79.60 LONG-TERM USE OF IMMUNOSUPPRESSANT MEDICATION: Primary | ICD-10-CM

## 2022-06-02 NOTE — TELEPHONE ENCOUNTER
Orders updated. Patient notified via [employer].     Do Cheng, MSN, RN   Rheumatology RN Care Coordinator   [facility]   [phone]

## 2022-06-02 NOTE — TELEPHONE ENCOUNTER
[facility] Call Center    Phone Message    May a detailed message be left on voicemail: no     Reason for Call: Order(s): Other:   Reason for requested: Need routine orders placed (current orders look like they may be , were placed 2021)   Date needed: Has lab appt scheduled 22  Provider name: Dr. Asim Aly      Action Taken: Message routed to:  [facility] ([facility] RHEUMATOLOGY ADULT [facility]    Travel Screening: Not Applicable

## 2022-06-09 ENCOUNTER — LAB (OUTPATIENT)
Dept: LAB | Facility: CLINIC | Age: 67
End: 2022-06-09
Payer: COMMERCIAL

## 2022-06-09 ENCOUNTER — MYC MEDICAL ADVICE (OUTPATIENT)
Dept: EDUCATION SERVICES | Facility: CLINIC | Age: 67
End: 2022-06-09

## 2022-06-09 DIAGNOSIS — Z79.60 LONG-TERM USE OF IMMUNOSUPPRESSANT MEDICATION: ICD-10-CM

## 2022-06-09 DIAGNOSIS — E10.9 TYPE 1 DIABETES MELLITUS WITHOUT COMPLICATION (H): ICD-10-CM

## 2022-06-09 LAB
CRP SERPL-MCNC: <2.9 MG/L (ref 0–8)
ERYTHROCYTE [DISTWIDTH] IN BLOOD BY AUTOMATED COUNT: 13.4 % (ref 10–15)
ERYTHROCYTE [SEDIMENTATION RATE] IN BLOOD BY WESTERGREN METHOD: 18 MM/HR (ref 0–30)
HBA1C MFR BLD: 6.9 % (ref 0–5.6)
HCT VFR BLD AUTO: 38 % (ref 35–47)
HGB BLD-MCNC: 12.1 G/DL (ref 11.7–15.7)
MCH RBC QN AUTO: 29.4 PG (ref 26.5–33)
MCHC RBC AUTO-ENTMCNC: 31.8 G/DL (ref 31.5–36.5)
MCV RBC AUTO: 92 FL (ref 78–100)
PLATELET # BLD AUTO: 251 10E3/UL (ref 150–450)
RBC # BLD AUTO: 4.12 10E6/UL (ref 3.8–5.2)
WBC # BLD AUTO: 4.8 10E3/UL (ref 4–11)

## 2022-06-09 PROCEDURE — 84460 ALANINE AMINO (ALT) (SGPT): CPT

## 2022-06-09 PROCEDURE — 85652 RBC SED RATE AUTOMATED: CPT

## 2022-06-09 PROCEDURE — 86140 C-REACTIVE PROTEIN: CPT

## 2022-06-09 PROCEDURE — 36415 COLL VENOUS BLD VENIPUNCTURE: CPT

## 2022-06-09 PROCEDURE — 83036 HEMOGLOBIN GLYCOSYLATED A1C: CPT

## 2022-06-09 PROCEDURE — 85027 COMPLETE CBC AUTOMATED: CPT

## 2022-06-09 PROCEDURE — 82043 UR ALBUMIN QUANTITATIVE: CPT

## 2022-06-09 PROCEDURE — 82565 ASSAY OF CREATININE: CPT

## 2022-06-09 PROCEDURE — 84450 TRANSFERASE (AST) (SGOT): CPT

## 2022-06-09 PROCEDURE — 82040 ASSAY OF SERUM ALBUMIN: CPT

## 2022-06-10 LAB
ALBUMIN SERPL-MCNC: 3.8 G/DL (ref 3.4–5)
ALT SERPL W P-5'-P-CCNC: 21 U/L (ref 0–50)
AST SERPL W P-5'-P-CCNC: 24 U/L (ref 0–45)
CREAT SERPL-MCNC: 0.68 MG/DL (ref 0.52–1.04)
CREAT UR-MCNC: 100 MG/DL
GFR SERPL CREATININE-BSD FRML MDRD: >90 ML/MIN/1.73M2
MICROALBUMIN UR-MCNC: 8 MG/L
MICROALBUMIN/CREAT UR: 8 MG/G CR (ref 0–25)

## 2022-06-10 NOTE — TELEPHONE ENCOUNTER
Sent My Chart Message to Ruba regarding making the adjustments in her settings that Mikki had wanted.  Awaiting response.

## 2022-06-11 ENCOUNTER — HEALTH MAINTENANCE LETTER (OUTPATIENT)
Age: 67
End: 2022-06-11

## 2022-06-14 ENCOUNTER — MYC MEDICAL ADVICE (OUTPATIENT)
Dept: RHEUMATOLOGY | Facility: CLINIC | Age: 67
End: 2022-06-14
Payer: COMMERCIAL

## 2022-06-16 ENCOUNTER — VIRTUAL VISIT (OUTPATIENT)
Dept: EDUCATION SERVICES | Facility: CLINIC | Age: 67
End: 2022-06-16
Payer: COMMERCIAL

## 2022-06-16 DIAGNOSIS — E10.9 TYPE 1 DIABETES MELLITUS WITHOUT COMPLICATION (H): Primary | ICD-10-CM

## 2022-06-16 PROCEDURE — G0108 DIAB MANAGE TRN  PER INDIV: HCPCS | Mod: GT | Performed by: REGISTERED NURSE

## 2022-06-16 NOTE — PROGRESS NOTES
Video-Visit Details    Type of service:  Video Visit  Patient consented to video visit  Video Start Time:  1030  Video End Time:   1100  Originating Location (pt. Location): Home  Distant Location (provider location):   [facility] [place] DIABETES EDUCATION [place]   Platform used for Video Visit: [employer]    Diabetes Self-Management Education & Support  Visit    Ruba Major contacted today for education related to Type 1 diabetes    Patient is being treated with:  insulin pump-Tandem X2 insulin pump with CIQ software and Dexcom G6 CGM.    Year of diagnosis: 2008 at age 53  Referring provider:  Veronica Bills MD and Mikki Gilbert PA-C  Living Situation: Lives with her  part time in [place] and part time in [place], [place].   Has 3 grown children.    Purpose of today's visit:  Change pump settings.     Subjective/Objective:  Doing well.  They returned from [place] at the end of May.    She is golfing on a regular basis, but has stopped playing tennis, as she has been dealing with some sciatica.    Overall feels like her pump/sensor combo is doing a good job.    She is still having a spike in her glucose immediately upon rising and Mikki Gilbert had wanted to make a basal change, but for some reason they were not able to get this completed at last visit.     Her pump data is not available today.  It looks as if her pump and her phone liset are no longer paired.  We did not address this today.       Assessment/Plan:  The following changes were made in her pump settings today:   Started a 07:00am basal rate of 0.6 to run until 10am to compensate for the spike she experiences on waking.    Sent her samples of Sure-Prep, Mastisol and Unisolve to assist with adherence of her Dexcom CGM.      Any diabetes medication dose changes were made via the CDE Protocol and Collaborative Practice Agreement. A copy of this encounter was provided to the patient's referring provider.      Time spent in this visit:  30 minutes.

## 2022-07-19 ENCOUNTER — TELEPHONE (OUTPATIENT)
Dept: RHEUMATOLOGY | Facility: CLINIC | Age: 67
End: 2022-07-19

## 2022-07-19 ENCOUNTER — VIRTUAL VISIT (OUTPATIENT)
Dept: RHEUMATOLOGY | Facility: CLINIC | Age: 67
End: 2022-07-19
Attending: INTERNAL MEDICINE
Payer: COMMERCIAL

## 2022-07-19 DIAGNOSIS — M81.0 SENILE OSTEOPOROSIS: ICD-10-CM

## 2022-07-19 DIAGNOSIS — K21.00 GASTROESOPHAGEAL REFLUX DISEASE WITH ESOPHAGITIS, UNSPECIFIED WHETHER HEMORRHAGE: ICD-10-CM

## 2022-07-19 DIAGNOSIS — M85.88 OSTEOPENIA OF OTHER SITE: ICD-10-CM

## 2022-07-19 DIAGNOSIS — E10.9 TYPE 1 DIABETES MELLITUS WITHOUT COMPLICATION (H): ICD-10-CM

## 2022-07-19 DIAGNOSIS — M05.742 RHEUMATOID ARTHRITIS INVOLVING BOTH HANDS WITH POSITIVE RHEUMATOID FACTOR (H): ICD-10-CM

## 2022-07-19 DIAGNOSIS — M05.741 RHEUMATOID ARTHRITIS INVOLVING BOTH HANDS WITH POSITIVE RHEUMATOID FACTOR (H): ICD-10-CM

## 2022-07-19 DIAGNOSIS — K21.9 GASTROESOPHAGEAL REFLUX DISEASE WITHOUT ESOPHAGITIS: Primary | ICD-10-CM

## 2022-07-19 DIAGNOSIS — Z79.60 LONG-TERM USE OF IMMUNOSUPPRESSANT MEDICATION: Chronic | ICD-10-CM

## 2022-07-19 PROCEDURE — G0463 HOSPITAL OUTPT CLINIC VISIT: HCPCS | Mod: PN,RTG | Performed by: INTERNAL MEDICINE

## 2022-07-19 PROCEDURE — 99214 OFFICE O/P EST MOD 30 MIN: CPT | Mod: GT | Performed by: INTERNAL MEDICINE

## 2022-07-19 RX ORDER — UPADACITINIB 15 MG/1
15 TABLET, EXTENDED RELEASE ORAL DAILY
Qty: 30 TABLET | Refills: 11 | Status: SHIPPED | OUTPATIENT
Start: 2022-07-19 | End: 2022-09-30

## 2022-07-19 RX ORDER — LEFLUNOMIDE 20 MG/1
20 TABLET ORAL DAILY
Qty: 90 TABLET | Refills: 1 | Status: SHIPPED | OUTPATIENT
Start: 2022-07-19 | End: 2022-09-30

## 2022-07-19 RX ORDER — PROCHLORPERAZINE 25 MG/1
1 SUPPOSITORY RECTAL
Qty: 9 EACH | Refills: 1 | Status: SHIPPED | OUTPATIENT
Start: 2022-07-19 | End: 2022-10-18

## 2022-07-19 NOTE — LETTER
7/19/2022       RE: Ruba Major  [address]  [facility] [address]     Dear Colleague,    Thank you for referring your patient, Ruba Major, to the [facility] RHEUMATOLOGY CLINIC [place] at [facility]. Please see a copy of my visit note below.    Ruba is a 67 year old who is being evaluated via a billable video visit.      How would you like to obtain your AVS? MyChart  If the video visit is dropped, the invitation should be resent by: Text to cell phone: [phone]  Will anyone else be joining your video visit? No      Ms. Major has seropositive rheumatoid arthritis affecting multiple joints. +CCP, -RF, -ROBERT. Failed Enbrel, Humira. No known erosions.     She feels that she is having more pain the past few months. She wonders if leflunomide is bridging sufficiently. She wants to consider another agent. She notes a persistent right wrist pain and swelling even after surgery. She has some right wrist weakness and stiffness. No new deformities to report. No other swelling, redness or warmth. Energy is good. AM stiffness is 15-20 minutes.    Simponi loses some efficacy after 3 weeks but otherwise tolerated. Leflunomide 20 mg once daily is well tolerated. Celebrex use is only rare/prn with some stomach pain issues. Prolia twice yearly for her bones.    PMI:  Medical-osteopenia (T = -2.8 ), type 1 diabetes, rheumatoid arthritis, osteoarthritis of the hands, PUD, trigeminal neuralgia, hyperlipidemia, mild CAD by cardiac CT, sciatica, GERD   Surgical-cervical spine fusion surgery, multiple bilateral arthroscopic surgeries, bilateral TKA, bilateral bunionectomy  Injuries-none    SH:  Retired . Lives in [facility] in the winter. 2 daughters. No tobacco, 1 EtOH daily.     FH:  Mother dead with spinal arthritis and DJD  Father dead with CAD and MI, AAA  Brother is fine  Daughter with celiac disease  Daughter is  healthy    PMSF history personally obtained and updated by me this visit.    ROS:  +sciatica pain  +intolerant of statins  Remainder of the 14 point ROS obtained and found negative    Physical Exam:  Constitutional: WD-WN-WG cooperative  Eyes: nl EOM, sclera  ENT: nl external ears, nose, hearing, lips  Neck: no visible thyroid enlargement  MS: +Right wrist extension limited to 30 degrees, flexion to 45 degrees with 1+ styloid swelling; +reduced neck flexion and extension and neck rotation limited to 45 degrees bilaterally. All other neck, shoulder, elbow, wrist, hand joints were examined and otherwise found normal. Normal  strength.  Normal prayer and tuck.  Skin: no alopecia, rash  Neuro: nl cranial nerve  Psych: nl judgement,  affect    Laboratory:    Component      Latest Ref Rng & Units 6/9/2022   WBC      4.0 - 11.0 10e3/uL 4.8   RBC Count      3.80 - 5.20 10e6/uL 4.12   Hemoglobin      11.7 - 15.7 g/dL 12.1   Hematocrit      35.0 - 47.0 % 38.0   MCV      78 - 100 fL 92   MCH      26.5 - 33.0 pg 29.4   MCHC      31.5 - 36.5 g/dL 31.8   RDW      10.0 - 15.0 % 13.4   Platelet Count      150 - 450 10e3/uL 251   Creatinine      0.52 - 1.04 mg/dL 0.68   GFR Estimate      >60 mL/min/1.73m2 >90   AST      0 - 45 U/L 24   ALT      0 - 50 U/L 21   Albumin      3.4 - 5.0 g/dL 3.8   CRP Inflammation      0.0 - 8.0 mg/L <2.9   Sed Rate      0 - 30 mm/hr 18     Impression:    Seropositive rheumatoid arthritis-with history of multiple joint replacements. She now reports recurring right wrist synovitis/swelling, and increasing hand pains in the last week of her Simponi treatment. At this time it is my impression that she is no longer treated to target with the current Simponi dosing regimen. Based on this we will have discusssed risks (infection, CV disease) and benefits of Rinvoq 15 mg daily and she agrees to its use. Good tolerance of the leflunomide and we will continue this during the loading period. Repeat the  inflammatory markers and get hand/wrist Xrays.    Osteoporosis-she is treated with twice yearly prolia.    Long term use of immunosuppressive-with increased risk for neutropenia. Plan to get lab testing every 2 months. We also agree that the benefits of continued immunosuppression outweigh the risks of COVID-19 infection. She is COVID vaccinated.    Plan in 6 months with me.    A total of 36 minutes was spent in face to face patient interaction and chart review on the day of service.      Video-Visit Details    Video Start Time: 7:31 AM  Type of service:  Video Visit  Video End Time:8:02 AM  Originating Location (pt. Location): Home  Distant Location (provider location):  [facility] RHEUMATOLOGY CLINIC [place]   Platform used for Video Visit: Azael Aly MD

## 2022-07-19 NOTE — TELEPHONE ENCOUNTER
LVM for patient to schedule follow up with Dr. Aly in 4 months as well as make and lab and x-ray apt

## 2022-07-19 NOTE — PROGRESS NOTES
Ruba is a 67 year old who is being evaluated via a billable video visit.      How would you like to obtain your AVS? MyChart  If the video visit is dropped, the invitation should be resent by: Text to cell phone: [phone]  Will anyone else be joining your video visit? No            Ms. Major has seropositive rheumatoid arthritis affecting multiple joints. +CCP, -RF, -ROBERT. Failed Enbrel, Humira. No known erosions.     She feels that she is having more pain the past few months. She wonders if leflunomide is bridging sufficiently. She wants to consider another agent. She notes a persistent right wrist pain and swelling even after surgery. She has some right wrist weakness and stiffness. No new deformities to report. No other swelling, redness or warmth. Energy is good. AM stiffness is 15-20 minutes.    Simponi loses some efficacy after 3 weeks but otherwise tolerated. Leflunomide 20 mg once daily is well tolerated. Celebrex use is only rare/prn with some stomach pain issues. Prolia twice yearly for her bones.    PMI:  Medical-osteopenia (T = -2.8 ), type 1 diabetes, rheumatoid arthritis, osteoarthritis of the hands, PUD, trigeminal neuralgia, hyperlipidemia, mild CAD by cardiac CT, sciatica, GERD   Surgical-cervical spine fusion surgery, multiple bilateral arthroscopic surgeries, bilateral TKA, bilateral bunionectomy  Injuries-none    SH:  Retired . Lives in [facility] in the winter. 2 daughters. No tobacco, 1 EtOH daily.     FH:  Mother dead with spinal arthritis and DJD  Father dead with CAD and MI, AAA  Brother is fine  Daughter with celiac disease  Daughter is healthy    PMSF history personally obtained and updated by me this visit.    ROS:  +sciatica pain  +intolerant of statins  Remainder of the 14 point ROS obtained and found negative    Physical Exam:  Constitutional: WD-WN-WG cooperative  Eyes: nl EOM, sclera  ENT: nl external ears, nose, hearing, lips  Neck: no visible thyroid  enlargement  MS: +Right wrist extension limited to 30 degrees, flexion to 45 degrees with 1+ styloid swelling; +reduced neck flexion and extension and neck rotation limited to 45 degrees bilaterally. All other neck, shoulder, elbow, wrist, hand joints were examined and otherwise found normal. Normal  strength.  Normal prayer and tuck.  Skin: no alopecia, rash  Neuro: nl cranial nerve  Psych: nl judgement,  affect    Laboratory:    Component      Latest Ref Rng & Units 6/9/2022   WBC      4.0 - 11.0 10e3/uL 4.8   RBC Count      3.80 - 5.20 10e6/uL 4.12   Hemoglobin      11.7 - 15.7 g/dL 12.1   Hematocrit      35.0 - 47.0 % 38.0   MCV      78 - 100 fL 92   MCH      26.5 - 33.0 pg 29.4   MCHC      31.5 - 36.5 g/dL 31.8   RDW      10.0 - 15.0 % 13.4   Platelet Count      150 - 450 10e3/uL 251   Creatinine      0.52 - 1.04 mg/dL 0.68   GFR Estimate      >60 mL/min/1.73m2 >90   AST      0 - 45 U/L 24   ALT      0 - 50 U/L 21   Albumin      3.4 - 5.0 g/dL 3.8   CRP Inflammation      0.0 - 8.0 mg/L <2.9   Sed Rate      0 - 30 mm/hr 18     Impression:    Seropositive rheumatoid arthritis-with history of multiple joint replacements. She now reports recurring right wrist synovitis/swelling, and increasing hand pains in the last week of her Simponi treatment. At this time it is my impression that she is no longer treated to target with the current Simponi dosing regimen. Based on this we will have discusssed risks (infection, CV disease) and benefits of Rinvoq 15 mg daily and she agrees to its use. Good tolerance of the leflunomide and we will continue this during the loading period. Repeat the inflammatory markers and get hand/wrist Xrays.    Osteoporosis-she is treated with twice yearly prolia.    Long term use of immunosuppressive-with increased risk for neutropenia. Plan to get lab testing every 2 months. We also agree that the benefits of continued immunosuppression outweigh the risks of COVID-19 infection. She is  COVID vaccinated.    Plan in 6 months with me.    A total of 36 minutes was spent in face to face patient interaction and chart review on the day of service.      Video-Visit Details    Video Start Time: 7:31 AM    Type of service:  Video Visit    Video End Time:8:02 AM    Originating Location (pt. Location): Home    Distant Location (provider location):  [facility] RHEUMATOLOGY CLINIC [place]     Platform used for Video Visit: [employer]

## 2022-07-19 NOTE — PATIENT INSTRUCTIONS
Start Rinvoq 15 mg once daily  Once the Rinvoq is available to you, don't take any more Simponi  Continue the leflunomide  Get lab testing in about 1 month and then every 2 months  Get Xrays of the hands and wrists  Follow up with me in 4 months

## 2022-07-22 ENCOUNTER — TELEPHONE (OUTPATIENT)
Dept: RHEUMATOLOGY | Facility: CLINIC | Age: 67
End: 2022-07-22

## 2022-07-22 NOTE — TELEPHONE ENCOUNTER
PA Initiation    Medication: Rinvoq pending  Insurance Company: [employer] - Phone [phone] Fax [phone]  Pharmacy Filling the Rx:    Filling Pharmacy Phone:    Filling Pharmacy Fax:    Start Date: 7/22/2022

## 2022-07-25 NOTE — TELEPHONE ENCOUNTER
Prior Authorization Approval    Authorization Effective Date: 6/22/2022  Authorization Expiration Date: 7/21/2024  Medication: Rinvoq approved  Approved Dose/Quantity: 30 per 30  Reference #: Key: [id number]   Insurance Company: [employer] - Phone [phone] Fax [phone]  Expected CoPay: $0     CoPay Card Available:      Foundation Assistance Needed:    Which Pharmacy is filling the prescription (Not needed for infusion/clinic administered): [facility] - [place], [place] - [address]  Pharmacy Notified: Yes  Patient Notified: Yes

## 2022-08-27 DIAGNOSIS — E10.9 TYPE 1 DIABETES MELLITUS WITHOUT COMPLICATION (H): ICD-10-CM

## 2022-08-29 ENCOUNTER — TELEPHONE (OUTPATIENT)
Dept: ENDOCRINOLOGY | Facility: CLINIC | Age: 67
End: 2022-08-29

## 2022-08-29 DIAGNOSIS — E10.9 TYPE 1 DIABETES MELLITUS WITHOUT COMPLICATION (H): ICD-10-CM

## 2022-08-29 RX ORDER — PROCHLORPERAZINE 25 MG/1
SUPPOSITORY RECTAL
OUTPATIENT
Start: 2022-08-29

## 2022-08-29 RX ORDER — PROCHLORPERAZINE 25 MG/1
1 SUPPOSITORY RECTAL
Qty: 1 EACH | Refills: 2 | Status: SHIPPED | OUTPATIENT
Start: 2022-08-29 | End: 2022-10-18

## 2022-08-29 NOTE — TELEPHONE ENCOUNTER
Ruba will need to make the request in 2-3 months when needed. Donna Pak RN on 8/29/2022 at 3:21 PM

## 2022-08-29 NOTE — TELEPHONE ENCOUNTER
Hello,    I spoke to the patient earlier today and Ruba would like her Dexcom G6 Transmitter next refill sent to [facility]. Her next refill will be in about October. I released the current prescription to [facility] in [place] as patient requested because she needs it immediately. Please send a new prescription to [facility] for her next refill in about 2-3 months.    If there's any questions feel free to contact me.    Thank You!    Rafi Cavazos [facility] Liaison  [facility]  [email or web address]  Phone: [phone]  Fax: [phone]

## 2022-09-28 ENCOUNTER — LAB (OUTPATIENT)
Dept: LAB | Facility: CLINIC | Age: 67
End: 2022-09-28
Payer: COMMERCIAL

## 2022-09-28 DIAGNOSIS — M05.741 RHEUMATOID ARTHRITIS INVOLVING BOTH HANDS WITH POSITIVE RHEUMATOID FACTOR (H): ICD-10-CM

## 2022-09-28 DIAGNOSIS — K21.9 GASTROESOPHAGEAL REFLUX DISEASE WITHOUT ESOPHAGITIS: ICD-10-CM

## 2022-09-28 DIAGNOSIS — Z79.60 LONG-TERM USE OF IMMUNOSUPPRESSANT MEDICATION: ICD-10-CM

## 2022-09-28 DIAGNOSIS — M81.0 SENILE OSTEOPOROSIS: ICD-10-CM

## 2022-09-28 DIAGNOSIS — M05.742 RHEUMATOID ARTHRITIS INVOLVING BOTH HANDS WITH POSITIVE RHEUMATOID FACTOR (H): ICD-10-CM

## 2022-09-28 DIAGNOSIS — K21.00 GASTROESOPHAGEAL REFLUX DISEASE WITH ESOPHAGITIS, UNSPECIFIED WHETHER HEMORRHAGE: ICD-10-CM

## 2022-09-28 DIAGNOSIS — M85.88 OSTEOPENIA OF OTHER SITE: ICD-10-CM

## 2022-09-28 LAB
ALBUMIN SERPL-MCNC: 4 G/DL (ref 3.4–5)
ALT SERPL W P-5'-P-CCNC: 29 U/L (ref 0–50)
AST SERPL W P-5'-P-CCNC: 28 U/L (ref 0–45)
BASOPHILS # BLD AUTO: 0 10E3/UL (ref 0–0.2)
BASOPHILS NFR BLD AUTO: 1 %
CHOLEST SERPL-MCNC: 174 MG/DL
CREAT SERPL-MCNC: 0.74 MG/DL (ref 0.52–1.04)
CRP SERPL-MCNC: 3.44 MG/L
EOSINOPHIL # BLD AUTO: 0.1 10E3/UL (ref 0–0.7)
EOSINOPHIL NFR BLD AUTO: 4 %
ERYTHROCYTE [DISTWIDTH] IN BLOOD BY AUTOMATED COUNT: 14.9 % (ref 10–15)
ERYTHROCYTE [SEDIMENTATION RATE] IN BLOOD BY WESTERGREN METHOD: 41 MM/HR (ref 0–30)
FASTING STATUS PATIENT QL REPORTED: YES
GFR SERPL CREATININE-BSD FRML MDRD: 88 ML/MIN/1.73M2
HCT VFR BLD AUTO: 33.2 % (ref 35–47)
HDLC SERPL-MCNC: 90 MG/DL
HGB BLD-MCNC: 11 G/DL (ref 11.7–15.7)
IMM GRANULOCYTES # BLD: 0 10E3/UL
IMM GRANULOCYTES NFR BLD: 0 %
LDLC SERPL CALC-MCNC: 73 MG/DL
LYMPHOCYTES # BLD AUTO: 0.9 10E3/UL (ref 0.8–5.3)
LYMPHOCYTES NFR BLD AUTO: 22 %
MCH RBC QN AUTO: 30.7 PG (ref 26.5–33)
MCHC RBC AUTO-ENTMCNC: 33.1 G/DL (ref 31.5–36.5)
MCV RBC AUTO: 93 FL (ref 78–100)
MONOCYTES # BLD AUTO: 0.7 10E3/UL (ref 0–1.3)
MONOCYTES NFR BLD AUTO: 16 %
NEUTROPHILS # BLD AUTO: 2.3 10E3/UL (ref 1.6–8.3)
NEUTROPHILS NFR BLD AUTO: 57 %
NONHDLC SERPL-MCNC: 84 MG/DL
PLATELET # BLD AUTO: 278 10E3/UL (ref 150–450)
RBC # BLD AUTO: 3.58 10E6/UL (ref 3.8–5.2)
TRIGL SERPL-MCNC: 56 MG/DL
WBC # BLD AUTO: 4 10E3/UL (ref 4–11)

## 2022-09-28 PROCEDURE — 82040 ASSAY OF SERUM ALBUMIN: CPT

## 2022-09-28 PROCEDURE — 80061 LIPID PANEL: CPT

## 2022-09-28 PROCEDURE — 85652 RBC SED RATE AUTOMATED: CPT

## 2022-09-28 PROCEDURE — 84450 TRANSFERASE (AST) (SGOT): CPT

## 2022-09-28 PROCEDURE — 86140 C-REACTIVE PROTEIN: CPT

## 2022-09-28 PROCEDURE — 36415 COLL VENOUS BLD VENIPUNCTURE: CPT

## 2022-09-28 PROCEDURE — 84460 ALANINE AMINO (ALT) (SGPT): CPT

## 2022-09-28 PROCEDURE — 85025 COMPLETE CBC W/AUTO DIFF WBC: CPT

## 2022-09-28 PROCEDURE — 82565 ASSAY OF CREATININE: CPT

## 2022-09-28 PROCEDURE — 86481 TB AG RESPONSE T-CELL SUSP: CPT

## 2022-09-28 NOTE — PROGRESS NOTES
Ruba is a 67 year old who is being evaluated via a billable video visit.      How would you like to obtain your AVS? MyChart  If the video visit is dropped, the invitation should be resent by: Text to cell phone: [phone]  Will anyone else be joining your video visit? No        Ms. Major has seropositive rheumatoid arthritis affecting multiple joints. +CCP, -RF, -ORBERT. Failed Enbrel, Humira. No known erosions.     She recently stopped the Rinvoq recently due to a sense of weakness in her hands and feet, together with tiredness. This has been going on for several weeks. She can still do all of her strength training but she she still feels exhausted after sessions. She has difficulty holding her golf clubs and brushes. She has reduced right hand coordination. She is concerned about a neurological side effect with Rinvoq. She denies numbness or paresthesias. She has a history of vertebral compression and sciatica, and this seem worse now. No CNS symptoms to report. No neck symptoms. She admits to clumsy walking.    She denies fatigue today, and feels better since stopping the Rinvoq. No AM stiffness. Joint inflammation is well controlled and no swelling, redness or warmth. No joint dysfunction or deformity.    Rinvoq 15 mg for the past 2 months currently held for the past 4 days.   Leflunomide 20 mg once daily is well tolerated.   Celebrex 200 mg is only occasionally used.  Prolia twice yearly    PMI:  Medical-osteopenia (T = -2.8 ), type 1 diabetes, rheumatoid arthritis, osteoarthritis of the hands, PUD, trigeminal neuralgia, hyperlipidemia, mild CAD by cardiac CT, sciatica, GERD   Surgical-cervical spine fusion surgery, multiple bilateral arthroscopic surgeries, bilateral TKA, bilateral bunionectomy  Injuries-none    SH:  Retired . Lives in [place] in the winter. 2 daughters. No tobacco, 1 EtOH daily.     FH:  Mother dead with spinal arthritis and DJD  Father dead with CAD and MI,  AAA  Brother is fine  Daughter with celiac disease  Daughter is healthy    PMSF history personally obtained and updated by me this visit.    ROS:  +as above  +intolerant of statins  Remainder of the 14 point ROS obtained and found negative    Physical Exam:  Constitutional: WD-WN-[id number] cooperative  Eyes: nl EOM, sclera  ENT: nl external ears, nose, hearing, lips  Neck: no visible thyroid enlargement  MS: +Right wrist extension limited to 30 degrees, flexion to 45 degrees with 1+ styloid swelling; +reduced neck flexion and extension and neck rotation limited to 45 degrees bilaterally. All other neck, shoulder, elbow, wrist, hand joints were examined and otherwise found normal. Normal  strength.  Normal prayer and tuck.  Skin: no alopecia, rash  Neuro: nl cranial nerve  Psych: nl judgement,  affect    Laboratory:    Component      Latest Ref Rng & Units 9/28/2022   WBC      4.0 - 11.0 10e3/uL 4.0   RBC Count      3.80 - 5.20 10e6/uL 3.58 (L)   Hemoglobin      11.7 - 15.7 g/dL 11.0 (L)   Hematocrit      35.0 - 47.0 % 33.2 (L)   MCV      78 - 100 fL 93   MCH      26.5 - 33.0 pg 30.7   MCHC      31.5 - 36.5 g/dL 33.1   RDW      10.0 - 15.0 % 14.9   Platelet Count      150 - 450 10e3/uL 278   % Neutrophils      % 57   % Lymphocytes      % 22   % Monocytes      % 16   % Eosinophils      % 4   % Basophils      % 1   % Immature Granulocytes      % 0   Absolute Neutrophils      1.6 - 8.3 10e3/uL 2.3   Absolute Lymphocytes      0.8 - 5.3 10e3/uL 0.9   Absolute Monocytes      0.0 - 1.3 10e3/uL 0.7   Absolute Eosinophils      0.0 - 0.7 10e3/uL 0.1   Absolute Basophils      0.0 - 0.2 10e3/uL 0.0   Absolute Immature Granulocytes      <=0.4 10e3/uL 0.0   Cholesterol      <200 mg/dL 174   Triglycerides      <150 mg/dL 56   HDL Cholesterol      >=50 mg/dL 90   LDL Cholesterol Calculated      <=100 mg/dL 73   Non HDL Cholesterol      <130 mg/dL 84   Patient Fasting > 8hrs?       Yes   Creatinine      0.52 - 1.04 mg/dL 0.74   GFR  Estimate      >60 mL/min/1.73m2 88   AST      0 - 45 U/L 28   ALT      0 - 50 U/L 29   Albumin      3.4 - 5.0 g/dL 4.0   CRP Inflammation      <5.00 mg/L 3.44   Sed Rate      0 - 30 mm/hr 41 (H)   Quantiferon Nil Tube      IU/mL 0.66   Quantiferon TB1 Tube      IU/mL 0.68   Quantiferon TB2 Tube       0.72   QUANTIFERON MITOGEN      IU/mL 8.15     Study Result    Narrative & Impression   XR HAND BILATERAL G/E 3 VIEWS 9/28/2022 10:07 AM      HISTORY: Gastroesophageal reflux disease without esophagitis;  Osteopenia of other site; Rheumatoid arthritis involving both hands  with positive rheumatoid factor (H); Rheumatoid arthritis involving  both hands with positive rheumatoid factor (H); Long-term use of  immunosuppressant medication; Gastroesophageal reflux disease with  esophagitis, unspecified whether hemorrhage; Senile osteoporosis     COMPARISON: 1/14/2019                                                                      IMPRESSION: No erosions. Normal mineralization. Advanced  osteoarthritis in the first CMC joints bilaterally. Mild  osteoarthritis in the STT joints and multiple bilateral IP joints.  Findings are stable.     QUINTIN DAY MD        Impression:    Seropositive rheumatoid arthritis-with history of multiple joint replacements. Today I appreciate not active synovitis but the elevated ESR raises concern for uncontrolled systemic inflammation. Furthermore, there is now concern that the onset of neurological symptoms corresponds to institution of rinvoq. Regardless I will now consider Rinvoq and treatment failure and will stop it. I do have concern that chronic leflunomide use can lead to peripheral neuropathy, and at this time I will discontinue this medication as well, while an neurological evaluation is underway. Based on the elevated ESR, I have concern that the patient will flare arthritis off of her medications. Based on this we have discussed the risks and benefits of orencia 125 mg  injection weekly and she agrees to its use. Plan to repeat the laboratory testing in about 2 weeks as baseline to Orencia use.     Neurologic symptoms-with history of TNF inhibitor use, currently on leflunomide and upadacitonib. No previous RA vasculitis, and this doesn't seem to have the quality of a mononeuritis multiplex. The differential diagnosis includes drug induced vasculitis/lupus/demyelinating disease seen with TNF inhibition (not known with upadacitinib) vs. leflunomide toxicity and peripheral neuropathy vs radiculopathy/nerve entrapment vs. RA vasculitis. We will plan for evaluation in neurology at the beginning of November to address this, and I will get testing for SLE/Vasculitis and vitamin deficiency. Head MRI EMG/NCV seems important diagnostically in this case and should be considered.    Osteoporosis-she is treated with twice yearly prolia.    Long term use of immunosuppressive-with question of nerve toxicity associated with therapy. Plan to stop these medication and get baseline testing for use of orencia. We also agree that the benefits of continued immunosuppression outweigh the risks of COVID-19 infection. She is COVID vaccinated.    Plan in 2 months with me.    A total of 50 minutes was spent in face to face patient interaction and chart review on the day of service.          Video-Visit Details    Video Start Time: 8:15 AM    Type of service:  Video Visit    Video End Time:8:45 AM    Originating Location (pt. Location): Home    Distant Location (provider location):  [facility]     Platform used for Video Visit: Azael Armstrong [facility]

## 2022-09-29 ENCOUNTER — MEDICAL CORRESPONDENCE (OUTPATIENT)
Dept: HEALTH INFORMATION MANAGEMENT | Facility: CLINIC | Age: 67
End: 2022-09-29

## 2022-09-29 LAB
QUANTIFERON MITOGEN: 8.15 IU/ML
QUANTIFERON NIL TUBE: 0.66 IU/ML
QUANTIFERON TB1 TUBE: 0.68 IU/ML
QUANTIFERON TB2 TUBE: 0.72

## 2022-09-30 ENCOUNTER — VIRTUAL VISIT (OUTPATIENT)
Dept: RHEUMATOLOGY | Facility: CLINIC | Age: 67
End: 2022-09-30
Payer: COMMERCIAL

## 2022-09-30 DIAGNOSIS — M05.742 RHEUMATOID ARTHRITIS INVOLVING BOTH HANDS WITH POSITIVE RHEUMATOID FACTOR (H): ICD-10-CM

## 2022-09-30 DIAGNOSIS — Z79.60 LONG-TERM USE OF IMMUNOSUPPRESSANT MEDICATION: Chronic | ICD-10-CM

## 2022-09-30 DIAGNOSIS — M05.741 RHEUMATOID ARTHRITIS INVOLVING BOTH HANDS WITH POSITIVE RHEUMATOID FACTOR (H): ICD-10-CM

## 2022-09-30 DIAGNOSIS — K21.9 GASTROESOPHAGEAL REFLUX DISEASE WITHOUT ESOPHAGITIS: ICD-10-CM

## 2022-09-30 DIAGNOSIS — M81.0 SENILE OSTEOPOROSIS: ICD-10-CM

## 2022-09-30 DIAGNOSIS — M85.88 OSTEOPENIA OF OTHER SITE: ICD-10-CM

## 2022-09-30 DIAGNOSIS — K21.00 GASTROESOPHAGEAL REFLUX DISEASE WITH ESOPHAGITIS, UNSPECIFIED WHETHER HEMORRHAGE: ICD-10-CM

## 2022-09-30 LAB
GAMMA INTERFERON BACKGROUND BLD IA-ACNC: 0.66 IU/ML
M TB IFN-G BLD-IMP: NEGATIVE
M TB IFN-G CD4+ BCKGRND COR BLD-ACNC: 7.49 IU/ML
MITOGEN IGNF BCKGRD COR BLD-ACNC: 0.02 IU/ML
MITOGEN IGNF BCKGRD COR BLD-ACNC: 0.06 IU/ML

## 2022-09-30 PROCEDURE — 99215 OFFICE O/P EST HI 40 MIN: CPT | Mod: GT | Performed by: INTERNAL MEDICINE

## 2022-09-30 RX ORDER — CELECOXIB 200 MG/1
200 CAPSULE ORAL DAILY PRN
Qty: 90 CAPSULE | Refills: 3 | Status: SHIPPED | OUTPATIENT
Start: 2022-09-30 | End: 2023-06-27

## 2022-09-30 RX ORDER — LEFLUNOMIDE 20 MG/1
20 TABLET ORAL DAILY
Qty: 90 TABLET | Refills: 1 | Status: CANCELLED | OUTPATIENT
Start: 2022-09-30

## 2022-09-30 NOTE — PATIENT INSTRUCTIONS
Stop the leflunomide and Rinvoq  Start orencia 125 mg injections once weekly  Continue the celecoxib as needed  Get laboratory testing in 2 weeks for me and every 2 months thereafter  See neurology in early November, where consideration should be given to an EMG/NCV study and or head MRI  Follow up with me in 2 months

## 2022-10-12 ENCOUNTER — LAB (OUTPATIENT)
Dept: LAB | Facility: CLINIC | Age: 67
End: 2022-10-12
Payer: COMMERCIAL

## 2022-10-12 DIAGNOSIS — M85.88 OSTEOPENIA OF OTHER SITE: ICD-10-CM

## 2022-10-12 DIAGNOSIS — M81.0 SENILE OSTEOPOROSIS: ICD-10-CM

## 2022-10-12 DIAGNOSIS — M62.9 DISORDER OF MUSCLE: ICD-10-CM

## 2022-10-12 DIAGNOSIS — Z79.60 LONG-TERM USE OF IMMUNOSUPPRESSANT MEDICATION: ICD-10-CM

## 2022-10-12 DIAGNOSIS — M05.741 RHEUMATOID ARTHRITIS INVOLVING BOTH HANDS WITH POSITIVE RHEUMATOID FACTOR (H): ICD-10-CM

## 2022-10-12 DIAGNOSIS — G50.0 TRIGEMINAL NEURALGIA: ICD-10-CM

## 2022-10-12 DIAGNOSIS — R53.83 FATIGUE: ICD-10-CM

## 2022-10-12 DIAGNOSIS — M54.2 CERVICALGIA: ICD-10-CM

## 2022-10-12 DIAGNOSIS — K21.00 GASTROESOPHAGEAL REFLUX DISEASE WITH ESOPHAGITIS, UNSPECIFIED WHETHER HEMORRHAGE: ICD-10-CM

## 2022-10-12 DIAGNOSIS — R29.2 ABNORMAL REFLEX: ICD-10-CM

## 2022-10-12 DIAGNOSIS — K21.9 GASTROESOPHAGEAL REFLUX DISEASE WITHOUT ESOPHAGITIS: ICD-10-CM

## 2022-10-12 DIAGNOSIS — M05.742 RHEUMATOID ARTHRITIS INVOLVING BOTH HANDS WITH POSITIVE RHEUMATOID FACTOR (H): ICD-10-CM

## 2022-10-12 DIAGNOSIS — R20.2 PARESTHESIA: Primary | ICD-10-CM

## 2022-10-12 LAB
BASOPHILS # BLD AUTO: 0 10E3/UL (ref 0–0.2)
BASOPHILS NFR BLD AUTO: 1 %
CK SERPL-CCNC: 106 U/L (ref 30–225)
CRP SERPL-MCNC: <3 MG/L
EOSINOPHIL # BLD AUTO: 0.2 10E3/UL (ref 0–0.7)
EOSINOPHIL NFR BLD AUTO: 5 %
ERYTHROCYTE [DISTWIDTH] IN BLOOD BY AUTOMATED COUNT: 15 % (ref 10–15)
ERYTHROCYTE [SEDIMENTATION RATE] IN BLOOD BY WESTERGREN METHOD: 37 MM/HR (ref 0–30)
FOLATE SERPL-MCNC: 9.4 NG/ML (ref 4.6–34.8)
HBV CORE AB SERPL QL IA: NONREACTIVE
HBV SURFACE AG SERPL QL IA: NONREACTIVE
HCT VFR BLD AUTO: 35.7 % (ref 35–47)
HCV AB SERPL QL IA: NONREACTIVE
HGB BLD-MCNC: 11.6 G/DL (ref 11.7–15.7)
IMM GRANULOCYTES # BLD: 0 10E3/UL
IMM GRANULOCYTES NFR BLD: 0 %
LYMPHOCYTES # BLD AUTO: 1.5 10E3/UL (ref 0.8–5.3)
LYMPHOCYTES NFR BLD AUTO: 38 %
MCH RBC QN AUTO: 30.2 PG (ref 26.5–33)
MCHC RBC AUTO-ENTMCNC: 32.5 G/DL (ref 31.5–36.5)
MCV RBC AUTO: 93 FL (ref 78–100)
MONOCYTES # BLD AUTO: 0.6 10E3/UL (ref 0–1.3)
MONOCYTES NFR BLD AUTO: 15 %
NEUTROPHILS # BLD AUTO: 1.7 10E3/UL (ref 1.6–8.3)
NEUTROPHILS NFR BLD AUTO: 42 %
PLATELET # BLD AUTO: 279 10E3/UL (ref 150–450)
RBC # BLD AUTO: 3.84 10E6/UL (ref 3.8–5.2)
VIT B12 SERPL-MCNC: 619 PG/ML (ref 232–1245)
WBC # BLD AUTO: 4 10E3/UL (ref 4–11)

## 2022-10-12 PROCEDURE — 86225 DNA ANTIBODY NATIVE: CPT

## 2022-10-12 PROCEDURE — 86334 IMMUNOFIX E-PHORESIS SERUM: CPT

## 2022-10-12 PROCEDURE — 86039 ANTINUCLEAR ANTIBODIES (ANA): CPT

## 2022-10-12 PROCEDURE — 83921 ORGANIC ACID SINGLE QUANT: CPT

## 2022-10-12 PROCEDURE — 86160 COMPLEMENT ANTIGEN: CPT | Mod: 59

## 2022-10-12 PROCEDURE — 82595 ASSAY OF CRYOGLOBULIN: CPT

## 2022-10-12 PROCEDURE — 86038 ANTINUCLEAR ANTIBODIES: CPT

## 2022-10-12 PROCEDURE — 86140 C-REACTIVE PROTEIN: CPT

## 2022-10-12 PROCEDURE — 86235 NUCLEAR ANTIGEN ANTIBODY: CPT

## 2022-10-12 PROCEDURE — 85025 COMPLETE CBC W/AUTO DIFF WBC: CPT

## 2022-10-12 PROCEDURE — 87340 HEPATITIS B SURFACE AG IA: CPT

## 2022-10-12 PROCEDURE — 82550 ASSAY OF CK (CPK): CPT

## 2022-10-12 PROCEDURE — 86431 RHEUMATOID FACTOR QUANT: CPT

## 2022-10-12 PROCEDURE — 82607 VITAMIN B-12: CPT

## 2022-10-12 PROCEDURE — 85652 RBC SED RATE AUTOMATED: CPT

## 2022-10-12 PROCEDURE — 36415 COLL VENOUS BLD VENIPUNCTURE: CPT

## 2022-10-12 PROCEDURE — 86160 COMPLEMENT ANTIGEN: CPT

## 2022-10-12 PROCEDURE — 83516 IMMUNOASSAY NONANTIBODY: CPT

## 2022-10-12 PROCEDURE — 82746 ASSAY OF FOLIC ACID SERUM: CPT

## 2022-10-12 PROCEDURE — 86036 ANCA SCREEN EACH ANTIBODY: CPT

## 2022-10-12 PROCEDURE — 86704 HEP B CORE ANTIBODY TOTAL: CPT

## 2022-10-12 PROCEDURE — 82784 ASSAY IGA/IGD/IGG/IGM EACH: CPT

## 2022-10-12 PROCEDURE — 83876 ASSAY MYELOPEROXIDASE: CPT

## 2022-10-12 PROCEDURE — 86803 HEPATITIS C AB TEST: CPT

## 2022-10-12 PROCEDURE — 86256 FLUORESCENT ANTIBODY TITER: CPT

## 2022-10-13 LAB
ANA PAT SER IF-IMP: ABNORMAL
ANA SER QL IF: ABNORMAL
ANA TITR SER IF: ABNORMAL {TITER}
ANCA AB PATTERN SER IF-IMP: NORMAL
C-ANCA TITR SER IF: NORMAL {TITER}
C3 SERPL-MCNC: 120 MG/DL (ref 81–157)
C4 SERPL-MCNC: 39 MG/DL (ref 13–39)
DSDNA AB SER-ACNC: 1.6 IU/ML
ENA SM IGG SER IA-ACNC: <1.6 U/ML
ENA SM IGG SER IA-ACNC: NEGATIVE
ENA SS-A AB SER IA-ACNC: <0.5 U/ML
ENA SS-A AB SER IA-ACNC: NEGATIVE
ENA SS-B IGG SER IA-ACNC: <0.6 U/ML
ENA SS-B IGG SER IA-ACNC: NEGATIVE
IGA SERPL-MCNC: 170 MG/DL (ref 84–499)
IGG SERPL-MCNC: 753 MG/DL (ref 610–1616)
IGM SERPL-MCNC: 137 MG/DL (ref 35–242)
PROT PATTERN SERPL IFE-IMP: NORMAL
RHEUMATOID FACT SER NEPH-ACNC: <6 IU/ML
U1 SNRNP IGG SER IA-ACNC: 1.4 U/ML
U1 SNRNP IGG SER IA-ACNC: NEGATIVE

## 2022-10-14 LAB
MYELOPEROXIDASE AB SER IA-ACNC: <0.3 U/ML
MYELOPEROXIDASE AB SER IA-ACNC: NEGATIVE
PROTEINASE3 AB SER IA-ACNC: <1 U/ML
PROTEINASE3 AB SER IA-ACNC: NEGATIVE

## 2022-10-18 ENCOUNTER — MEDICAL CORRESPONDENCE (OUTPATIENT)
Dept: HEALTH INFORMATION MANAGEMENT | Facility: CLINIC | Age: 67
End: 2022-10-18

## 2022-10-18 ENCOUNTER — VIRTUAL VISIT (OUTPATIENT)
Dept: ENDOCRINOLOGY | Facility: CLINIC | Age: 67
End: 2022-10-18
Payer: COMMERCIAL

## 2022-10-18 ENCOUNTER — TRANSFERRED RECORDS (OUTPATIENT)
Dept: HEALTH INFORMATION MANAGEMENT | Facility: CLINIC | Age: 67
End: 2022-10-18

## 2022-10-18 DIAGNOSIS — E10.9 TYPE 1 DIABETES MELLITUS WITHOUT COMPLICATION (H): Primary | ICD-10-CM

## 2022-10-18 DIAGNOSIS — E10.69 TYPE 1 DIABETES MELLITUS WITH OTHER SPECIFIED COMPLICATION (H): ICD-10-CM

## 2022-10-18 LAB
CRYOGLOB SER QL: NEGATIVE
METHYLMALONATE SERPL-SCNC: 0.1 UMOL/L (ref 0–0.4)

## 2022-10-18 PROCEDURE — 99213 OFFICE O/P EST LOW 20 MIN: CPT | Mod: GT | Performed by: INTERNAL MEDICINE

## 2022-10-18 RX ORDER — PROCHLORPERAZINE 25 MG/1
1 SUPPOSITORY RECTAL
Qty: 9 EACH | Refills: 1 | Status: SHIPPED | OUTPATIENT
Start: 2022-10-18 | End: 2023-04-26

## 2022-10-18 RX ORDER — PROCHLORPERAZINE 25 MG/1
1 SUPPOSITORY RECTAL
Qty: 1 EACH | Refills: 2 | Status: SHIPPED | OUTPATIENT
Start: 2022-10-18 | End: 2023-04-26

## 2022-10-18 NOTE — PROGRESS NOTES
Taking orencia, injectable weekly.     Ruba Major  is being evaluated via a billable video visit.      How would you like to obtain your AVS?       Outcome for 10/18/22 9:33 AM: Tandem emailed to provider  VICK Corey    This 67 year-old woman was seen virtually for follow-up of her type 1 diabetes.  I made the diagnosis in 2008 when she presented with hyperglycemia in the absence of DKA.      She currently manages her diabetes with a tandem control IQ pump and the dexcom G6.  The data are below.  Overall she thinks things are going very well.  She has been having more symptoms related to arthritic her rheumatoid arthritis and recently had her medicines changed.  She is also been having some problems with her spine that has led to some sciatica.  This is been limiting her activity.  Finally, she has had some weakness in her hands and legs and is undergoing an evaluation by a neurologist at [facility].  He thinks there might be some compression in her neck that is contributing to the symptoms.  She has had some challenges changing her infusion sets and sensors because of the loss of dexterity in her fingers.    She is up-to-date with her eye visits and has no problems.  She has no foot ulcers.  She denies chest pain or shortness of breath today.  She stopped taking the Crestor prescribed for her by her internist.  Cholesterol values listed below from September, 2022 were obtained on the drug.  Her primary doctor did put her on losartan and she has been taking it regularly.  It brought her blood pressure to target.                            Current Outpatient Medications   Medication     amitriptyline (ELAVIL) 10 MG tablet     FARRAH CONTOUR test strip     blood glucose (NO BRAND SPECIFIED) test strip     celecoxib (CELEBREX) 200 MG capsule     Continuous Blood Gluc Sensor (DEXCOM G6 SENSOR) MISC     Continuous Blood Gluc Transmit (DEXCOM G6 TRANSMITTER) MISC     Glucagon (BAQSIMI ONE PACK) 3 MG/DOSE POWD      "HUMALOG KWIKPEN 100 UNIT/ML soln     insulin glargine (BASAGLAR KWIKPEN) 100 UNIT/ML pen     INSULIN INFUSION PUMP     insulin lispro (HUMALOG PEN) 100 UNIT/ML injection     insulin lispro (HUMALOG VIAL) 100 UNIT/ML vial     insulin pen needle (B-D U/F) 31G X 5 MM miscellaneous     Insulin Pen Needle (PEN NEEDLES 3/16\") 31G X 5 MM MISC     melatonin 5 MG tablet     rosuvastatin (CRESTOR) 10 MG tablet     zolpidem (AMBIEN) 10 MG tablet     Abatacept (ORENCIA) 125 MG/ML SOAJ auto-injector     No current facility-administered medications for this visit.       On exam she is in no acute distress.  Cranial nerves are grossly intact.  Mood is upbeat.      Recent Labs   Lab Test 09/28/22  0925 06/09/22  1337 10/08/21  1318 10/13/20  0603 09/15/20  1110 12/05/19  1129 11/05/19  0000 09/10/19  1106 05/14/19  0000 01/28/16  1603 06/11/15  1326   A1C  --  6.9* 6.8*   < > 7.2*  --   --   --   --   --   --    HEMOGLOBINA1  --   --   --   --   --   --  7.1*  --  6.7*   < >  --    TSH  --   --   --   --   --   --   --   --   --   --  0.91   LDL 73  --   --   --  81  --   --   --   --    < >  --    HDL 90  --   --   --  75  --   --   --   --    < >  --    TRIG 56  --   --   --  34  --   --   --   --    < >  --    CR 0.74 0.68 0.71   < > 0.70   < >  --    < >  --    < > 0.64   MICROL  --  8  --   --  <5  --   --   --   --    < >  --     < > = values in this interval not displayed.   uae 8 mg/gm cr    Assessment and plan:    This 67-year-old woman is having some challenges related to her rheumatoid arthritis and new neurological symptoms that are being evaluated but from a diabetes standpoint she is doing very well.      1.  Diabetes control.  She is in target more than 80% of the time which means her A1c will be in target.  I made no changes in her pump settings.    2.  Diabetes complications.  She has been screened and has none.    3.CVD risk.  Given her age and the fact that she has diabetes and hypertension, I think she would do " better on a statin but she has chosen to stop it.  She will discuss this with her internist.  She is now taking losartan and reports good blood pressure control.    Follow-up in 6 months with Mikki Gilbert and me in 12 months.    I spent 15 minutes on the video visit with the patient (start time 2: 00 and end time 2: 1 5).  On the day of visit I spent an additional 10 minutes reviewing and interpreting her CGM and lab data, and doing documentation.  The video visit was done away from the clinic at my office.  Veronica Bills MD

## 2022-10-18 NOTE — LETTER
10/18/2022       RE: Ruba Major  [id number]  [place] [id number]     Dear Colleague,    Thank you for referring your patient, Ruba Major, to the [facility] [place] [facility]. Please see a copy of my visit note below.    Taking orencia, injectable weekly.     Ruba Major  is being evaluated via a billable video visit.      How would you like to obtain your AVS?       Outcome for 10/18/22 9:33 AM: Tandem emailed to provider  VICK Corey    This 67 year-old woman was seen virtually for follow-up of her type 1 diabetes.  I made the diagnosis in 2008 when she presented with hyperglycemia in the absence of DKA.      She currently manages her diabetes with a tandem control IQ pump and the dexcom G6.  The data are below.  Overall she thinks things are going very well.  She has been having more symptoms related to arthritic her rheumatoid arthritis and recently had her medicines changed.  She is also been having some problems with her spine that has led to some sciatica.  This is been limiting her activity.  Finally, she has had some weakness in her hands and legs and is undergoing an evaluation by a neurologist at [facility].  He thinks there might be some compression in her neck that is contributing to the symptoms.  She has had some challenges changing her infusion sets and sensors because of the loss of dexterity in her fingers.    She is up-to-date with her eye visits and has no problems.  She has no foot ulcers.  She denies chest pain or shortness of breath today.  She stopped taking the Crestor prescribed for her by her internist.  Cholesterol values listed below from September, 2022 were obtained on the drug.  Her primary doctor did put her on losartan and she has been taking it regularly.  It brought her blood pressure to target.                            Current Outpatient Medications   Medication      "amitriptyline (ELAVIL) 10 MG tablet     FARRAH CONTOUR test strip     blood glucose (NO BRAND SPECIFIED) test strip     celecoxib (CELEBREX) 200 MG capsule     Continuous Blood Gluc Sensor (DEXCOM G6 SENSOR) MISC     Continuous Blood Gluc Transmit (DEXCOM G6 TRANSMITTER) MISC     Glucagon (BAQSIMI ONE PACK) 3 MG/DOSE POWD     HUMALOG KWIKPEN 100 UNIT/ML soln     insulin glargine (BASAGLAR KWIKPEN) 100 UNIT/ML pen     INSULIN INFUSION PUMP     insulin lispro (HUMALOG PEN) 100 UNIT/ML injection     insulin lispro (HUMALOG VIAL) 100 UNIT/ML vial     insulin pen needle (B-D U/F) 31G X 5 MM miscellaneous     Insulin Pen Needle (PEN NEEDLES 3/16\") 31G X 5 MM MISC     melatonin 5 MG tablet     rosuvastatin (CRESTOR) 10 MG tablet     zolpidem (AMBIEN) 10 MG tablet     Abatacept (ORENCIA) 125 MG/ML SOAJ auto-injector     No current facility-administered medications for this visit.       On exam she is in no acute distress.  Cranial nerves are grossly intact.  Mood is upbeat.      Recent Labs   Lab Test 09/28/22  0925 06/09/22  1337 10/08/21  1318 10/13/20  0603 09/15/20  1110 12/05/19  1129 11/05/19  0000 09/10/19  1106 05/14/19  0000 01/28/16  1603 06/11/15  1326   A1C  --  6.9* 6.8*   < > 7.2*  --   --   --   --   --   --    HEMOGLOBINA1  --   --   --   --   --   --  7.1*  --  6.7*   < >  --    TSH  --   --   --   --   --   --   --   --   --   --  0.91   LDL 73  --   --   --  81  --   --   --   --    < >  --    HDL 90  --   --   --  75  --   --   --   --    < >  --    TRIG 56  --   --   --  34  --   --   --   --    < >  --    CR 0.74 0.68 0.71   < > 0.70   < >  --    < >  --    < > 0.64   MICROL  --  8  --   --  <5  --   --   --   --    < >  --     < > = values in this interval not displayed.   uae 8 mg/gm cr    Assessment and plan:    This 67-year-old woman is having some challenges related to her rheumatoid arthritis and new neurological symptoms that are being evaluated but from a diabetes standpoint she is doing very " well.      1.  Diabetes control.  She is in target more than 80% of the time which means her A1c will be in target.  I made no changes in her pump settings.    2.  Diabetes complications.  She has been screened and has none.    3.CVD risk.  Given her age and the fact that she has diabetes and hypertension, I think she would do better on a statin but she has chosen to stop it.  She will discuss this with her internist.  She is now taking losartan and reports good blood pressure control.    Follow-up in 6 months with Mikki Gilbert and me in 12 months.    I spent 15 minutes on the video visit with the patient (start time 2: 00 and end time 2: 1 5).  On the day of visit I spent an additional 10 minutes reviewing and interpreting her CGM and lab data, and doing documentation.  The video visit was done away from the clinic at my office.  Veronica Bills MD

## 2022-10-25 ENCOUNTER — TRANSCRIBE ORDERS (OUTPATIENT)
Dept: ONCOLOGY | Facility: CLINIC | Age: 67
End: 2022-10-25

## 2022-10-25 DIAGNOSIS — M81.0 SENILE OSTEOPOROSIS: ICD-10-CM

## 2022-10-25 DIAGNOSIS — M85.88 OSTEOPENIA OF OTHER SITE: Primary | ICD-10-CM

## 2022-10-25 RX ORDER — ALBUTEROL SULFATE 0.83 MG/ML
2.5 SOLUTION RESPIRATORY (INHALATION)
Status: CANCELLED | OUTPATIENT
Start: 2022-10-28

## 2022-10-25 RX ORDER — DIPHENHYDRAMINE HYDROCHLORIDE 50 MG/ML
50 INJECTION INTRAMUSCULAR; INTRAVENOUS
Status: CANCELLED
Start: 2022-10-28

## 2022-10-25 RX ORDER — MEPERIDINE HYDROCHLORIDE 25 MG/ML
25 INJECTION INTRAMUSCULAR; INTRAVENOUS; SUBCUTANEOUS EVERY 30 MIN PRN
Status: CANCELLED | OUTPATIENT
Start: 2022-10-28

## 2022-10-25 RX ORDER — METHYLPREDNISOLONE SODIUM SUCCINATE 125 MG/2ML
125 INJECTION, POWDER, LYOPHILIZED, FOR SOLUTION INTRAMUSCULAR; INTRAVENOUS
Status: CANCELLED
Start: 2022-10-28

## 2022-10-25 RX ORDER — ALBUTEROL SULFATE 90 UG/1
1-2 AEROSOL, METERED RESPIRATORY (INHALATION)
Status: CANCELLED
Start: 2022-10-28

## 2022-10-25 RX ORDER — EPINEPHRINE 1 MG/ML
0.3 INJECTION, SOLUTION INTRAMUSCULAR; SUBCUTANEOUS EVERY 5 MIN PRN
Status: CANCELLED | OUTPATIENT
Start: 2022-10-28

## 2022-10-26 ENCOUNTER — LAB (OUTPATIENT)
Dept: INFUSION THERAPY | Facility: CLINIC | Age: 67
End: 2022-10-26
Payer: COMMERCIAL

## 2022-10-26 ENCOUNTER — MYC MEDICAL ADVICE (OUTPATIENT)
Dept: ENDOCRINOLOGY | Facility: CLINIC | Age: 67
End: 2022-10-26

## 2022-10-26 VITALS
SYSTOLIC BLOOD PRESSURE: 122 MMHG | TEMPERATURE: 98.2 F | HEART RATE: 81 BPM | RESPIRATION RATE: 16 BRPM | DIASTOLIC BLOOD PRESSURE: 72 MMHG

## 2022-10-26 DIAGNOSIS — M85.88 OSTEOPENIA OF OTHER SITE: Primary | ICD-10-CM

## 2022-10-26 DIAGNOSIS — M81.0 SENILE OSTEOPOROSIS: ICD-10-CM

## 2022-10-26 DIAGNOSIS — E10.9 TYPE 1 DIABETES MELLITUS WITHOUT COMPLICATION (H): ICD-10-CM

## 2022-10-26 PROCEDURE — 96372 THER/PROPH/DIAG INJ SC/IM: CPT | Performed by: OBSTETRICS & GYNECOLOGY

## 2022-10-26 PROCEDURE — 250N000011 HC RX IP 250 OP 636: Performed by: OBSTETRICS & GYNECOLOGY

## 2022-10-26 RX ORDER — DIPHENHYDRAMINE HYDROCHLORIDE 50 MG/ML
50 INJECTION INTRAMUSCULAR; INTRAVENOUS
Status: CANCELLED
Start: 2023-04-24

## 2022-10-26 RX ORDER — INSULIN GLARGINE 100 [IU]/ML
INJECTION, SOLUTION SUBCUTANEOUS
Qty: 15 ML | Refills: 1 | Status: SHIPPED | OUTPATIENT
Start: 2022-10-26 | End: 2024-06-11

## 2022-10-26 RX ORDER — ALBUTEROL SULFATE 0.83 MG/ML
2.5 SOLUTION RESPIRATORY (INHALATION)
Status: CANCELLED | OUTPATIENT
Start: 2023-04-24

## 2022-10-26 RX ORDER — ALBUTEROL SULFATE 90 UG/1
1-2 AEROSOL, METERED RESPIRATORY (INHALATION)
Status: CANCELLED
Start: 2023-04-24

## 2022-10-26 RX ORDER — EPINEPHRINE 1 MG/ML
0.3 INJECTION, SOLUTION INTRAMUSCULAR; SUBCUTANEOUS EVERY 5 MIN PRN
Status: CANCELLED | OUTPATIENT
Start: 2023-04-24

## 2022-10-26 RX ORDER — METHYLPREDNISOLONE SODIUM SUCCINATE 125 MG/2ML
125 INJECTION, POWDER, LYOPHILIZED, FOR SOLUTION INTRAMUSCULAR; INTRAVENOUS
Status: CANCELLED
Start: 2023-04-24

## 2022-10-26 RX ORDER — MEPERIDINE HYDROCHLORIDE 25 MG/ML
25 INJECTION INTRAMUSCULAR; INTRAVENOUS; SUBCUTANEOUS EVERY 30 MIN PRN
Status: CANCELLED | OUTPATIENT
Start: 2023-04-24

## 2022-10-26 RX ADMIN — DENOSUMAB 60 MG: 60 INJECTION SUBCUTANEOUS at 13:30

## 2022-10-26 ASSESSMENT — PAIN SCALES - GENERAL: PAINLEVEL: NO PAIN (0)

## 2022-10-26 NOTE — PROGRESS NOTES
Infusion Nursing Note:  Ruba Major presents today for prolia.    Patient seen by provider today: No   present during visit today: Not Applicable.    Note: pt states that she is taking calcium and Vit D at home..    Intravenous Access:  No Intravenous access/labs at this visit.    Treatment Conditions:  Lab Results   Component Value Date     (L) 10/13/2020    POTASSIUM 4.8 10/13/2020    MAG 1.9 12/10/2012    CR 0.74 09/28/2022    CELSO 9.5 09/08/2021    BILITOTAL 0.8 09/15/2020    ALBUMIN 4.0 09/28/2022    ALT 29 09/28/2022    AST 28 09/28/2022     Results reviewed, labs MET treatment parameters, ok to proceed with treatment.    Post Infusion Assessment:  Patient tolerated injection without incident.     Discharge Plan:   Patient and/or family verbalized understanding of discharge instructions and all questions answered.  AVS to patient via [employer].  Patient will return in 6 months for next appointment.   Patient discharged in stable condition accompanied by: self.  Departure Mode: Ambulatory.      Eloisa Avilez RN

## 2022-12-02 ENCOUNTER — MYC MEDICAL ADVICE (OUTPATIENT)
Dept: RHEUMATOLOGY | Facility: CLINIC | Age: 67
End: 2022-12-02

## 2022-12-02 DIAGNOSIS — M05.742 RHEUMATOID ARTHRITIS INVOLVING BOTH HANDS WITH POSITIVE RHEUMATOID FACTOR (H): Primary | ICD-10-CM

## 2022-12-02 DIAGNOSIS — M05.741 RHEUMATOID ARTHRITIS INVOLVING BOTH HANDS WITH POSITIVE RHEUMATOID FACTOR (H): Primary | ICD-10-CM

## 2023-02-28 ENCOUNTER — VIRTUAL VISIT (OUTPATIENT)
Dept: RHEUMATOLOGY | Facility: CLINIC | Age: 68
End: 2023-02-28
Attending: INTERNAL MEDICINE
Payer: COMMERCIAL

## 2023-02-28 ENCOUNTER — TELEPHONE (OUTPATIENT)
Dept: INFECTIOUS DISEASES | Facility: CLINIC | Age: 68
End: 2023-02-28

## 2023-02-28 DIAGNOSIS — Z79.60 LONG-TERM USE OF IMMUNOSUPPRESSANT MEDICATION: Chronic | ICD-10-CM

## 2023-02-28 DIAGNOSIS — M05.741 RHEUMATOID ARTHRITIS INVOLVING BOTH HANDS WITH POSITIVE RHEUMATOID FACTOR (H): ICD-10-CM

## 2023-02-28 DIAGNOSIS — M85.88 OSTEOPENIA OF OTHER SITE: Primary | ICD-10-CM

## 2023-02-28 DIAGNOSIS — M05.742 RHEUMATOID ARTHRITIS INVOLVING BOTH HANDS WITH POSITIVE RHEUMATOID FACTOR (H): ICD-10-CM

## 2023-02-28 PROCEDURE — 99215 OFFICE O/P EST HI 40 MIN: CPT | Mod: VID | Performed by: INTERNAL MEDICINE

## 2023-02-28 RX ORDER — LEFLUNOMIDE 20 MG/1
20 TABLET ORAL DAILY
Qty: 90 TABLET | Refills: 1 | Status: SHIPPED | OUTPATIENT
Start: 2023-02-28 | End: 2023-06-27

## 2023-02-28 RX ORDER — UPADACITINIB 15 MG/1
15 TABLET, EXTENDED RELEASE ORAL DAILY
Qty: 30 TABLET | Refills: 11 | Status: SHIPPED | OUTPATIENT
Start: 2023-02-28 | End: 2023-05-15 | Stop reason: SINTOL

## 2023-02-28 NOTE — LETTER
"2/28/2023       RE: Ruba Major  [address]  [place] [address]     Dear Colleague,    Thank you for referring your patient, Ruba Major, to the [facility] RHEUMATOLOGY CLINIC [place] at [facility]. Please see a copy of my visit note below.    Ruba is a 68 year old who is being evaluated via a billable video visit.      How would you like to obtain your AVS? MyChart  If the video visit is dropped, the invitation should be resent by: Text to cell phone: [phone]  Will anyone else be joining your video visit? No        Ms. Major has seropositive rheumatoid arthritis affecting multiple joints. +CCP, -RF, -ROBERT. Failed Enbrel, Humira. No known erosions. Previous use of Rinvoq halted due to neurological concerns, later shown to be cervical myelopathy. Treatment with orencia since October 2022 with 2 month interruption for cervical spine fusion.    She complains of worsened joint symptoms. She had been off the orencia for a period of 4 months due to a cervical laminectomy and fusion. She has been back on the medication for the past month (3 injections). She reports that she is \"sick\" after each injection for a period of 24 hours. She has held the medication for the past 2 weeks due to these adverse events. Her knees are throbbing and aching at night. She only recently took a celebrex 200 mg     She discusses her previous use of Rinvoq, and reports that this had worked well for her. She now regrets stopping the medicine due to her neurological symptoms. Of note, we also stopped leflunomide at the same.     She is exercising a bit, but it is difficult. Her energy is a bit reduced. AM stiffness is 1 - 1.5 hours. She feels her knees are stiff and swollen. She complains of wrist swelling and pain.     Cervical spine surgery was complicated by wound infection, but this is now healed and her hand strength is improving. She is in physical " therapy. No neck pain.    Celebrex 200 mg is well tolerated with occasional use, but has GERD problems with regular use. No recent use of omeprazole.  Prolia twice yearly    PMI:  Medical-osteopenia (T = -2.8 ), type 1 diabetes, rheumatoid arthritis, osteoarthritis of the hands, PUD, trigeminal neuralgia, hyperlipidemia, mild CAD by cardiac CT, sciatica, GERD, posterior neck surgical incision infection  Surgical-cervical spine fusion surgery, multiple bilateral arthroscopic surgeries, bilateral TKA, bilateral bunionectomy, anterior cervical discectomy and C7-11 fusion, posterior decompressive cervical laminectomy  Injuries-none    SH:  Retired . Lives in [facility] in the winter. 2 daughters. No tobacco, 1 EtOH daily.     FH:  Mother dead with spinal arthritis and DJD  Father dead with CAD and MI, AAA  Brother is fine  Daughter with celiac disease  Daughter has long COVID    PMSF history personally obtained and updated by me this visit.    ROS:  +intolerant of statins  Remainder of the 14 point ROS obtained and found negative    Physical Exam:  Constitutional: WD-WN-WG cooperative  Eyes: nl EOM, sclera  ENT: nl external ears, nose, hearing, lips  Neck: no visible thyroid enlargement  MS: +Right wrist extension limited to 30 degrees, flexion to 45 degrees with 1+ styloid swelling; +reduced neck flexion and extension and neck rotation limited to 30 degrees bilaterally for rotation and flex/ext 45 degrees total. All other neck, shoulder, elbow, wrist, hand joints were examined and otherwise found normal. Normal  strength.  Normal prayer and tuck.  Skin: no alopecia, rash  Neuro: nl cranial nerve  Psych: nl judgement,  affect    Laboratory:    No recent lab testing.    Study Result    Narrative & Impression   XR HAND BILATERAL G/E 3 VIEWS 9/28/2022 10:07 AM      HISTORY: Gastroesophageal reflux disease without esophagitis;  Osteopenia of other site; Rheumatoid arthritis involving both  hands  with positive rheumatoid factor (H); Rheumatoid arthritis involving  both hands with positive rheumatoid factor (H); Long-term use of  immunosuppressant medication; Gastroesophageal reflux disease with  esophagitis, unspecified whether hemorrhage; Senile osteoporosis     COMPARISON: 1/14/2019                                                                      IMPRESSION: No erosions. Normal mineralization. Advanced  osteoarthritis in the first CMC joints bilaterally. Mild  osteoarthritis in the STT joints and multiple bilateral IP joints.  Findings are stable.     QUINTIN DAY MD        Impression:    Seropositive rheumatoid arthritis-with history of multiple joint replacements. Now with flaring while on holiday from immunosuppression. Recent failure of abatacept due to adverse events. We will now discontinue the abatacept. Previous excellent response to the combination of Rinvoq 15 mg daily and leflunomide 20 mg daily. We have discussed the risks and benefits of restarting these agents and she agrees to the regimen. Plan to continue prn celebrex but add omeprazole 20 mg daily during regular use. Plan to get inflammation marker.     Neurologic symptoms-now improved post cervical spine laminectomy. I now have no concern about drug induced neuropathy or RA vascultis.    Osteoporosis-she is treated with twice yearly prolia since . No recent DEXA and I have recommended this.    Long term use of immunosuppressive-with baseline lab testing needed now. Plan to repeat the blood work every 2 months. Monitor for GERD symptoms on Celebrex. We also agree that the benefits of continued immunosuppression outweigh the risks of COVID-19 infection. She is COVID vaccinated.    Plan in 4 months with me.    A total of 50 minutes was spent in face to face patient interaction and chart review on the day of service.              Video-Visit Details    Video Start Time: 8:00 AM    Type of service:  Video Visit    Video  End Time:8:25 AM    Originating Location (pt. Location): Home    Distant Location (provider location):  Off-Site    Platform used for Video Visit: Azael     Sincerely,    Asim Aly MD

## 2023-02-28 NOTE — PROGRESS NOTES
"Ruba is a 68 year old who is being evaluated via a billable video visit.      How would you like to obtain your AVS? MyChart  If the video visit is dropped, the invitation should be resent by: Text to cell phone: [phone]  Will anyone else be joining your video visit? No        Ms. Major has seropositive rheumatoid arthritis affecting multiple joints. +CCP, -RF, -ROBERT. Failed Enbrel, Humira. No known erosions. Previous use of Rinvoq halted due to neurological concerns, later shown to be cervical myelopathy. Treatment with orencia since October 2022 with 2 month interruption for cervical spine fusion.    She complains of worsened joint symptoms. She had been off the orencia for a period of 4 months due to a cervical laminectomy and fusion. She has been back on the medication for the past month (3 injections). She reports that she is \"sick\" after each injection for a period of 24 hours. She has held the medication for the past 2 weeks due to these adverse events. Her knees are throbbing and aching at night. She only recently took a celebrex 200 mg     She discusses her previous use of Rinvoq, and reports that this had worked well for her. She now regrets stopping the medicine due to her neurological symptoms. Of note, we also stopped leflunomide at the same.     She is exercising a bit, but it is difficult. Her energy is a bit reduced. AM stiffness is 1 - 1.5 hours. She feels her knees are stiff and swollen. She complains of wrist swelling and pain.     Cervical spine surgery was complicated by wound infection, but this is now healed and her hand strength is improving. She is in physical therapy. No neck pain.    Celebrex 200 mg is well tolerated with occasional use, but has GERD problems with regular use. No recent use of omeprazole.  Prolia twice yearly    PMI:  Medical-osteopenia (T = -2.8 ), type 1 diabetes, rheumatoid arthritis, osteoarthritis of the hands, PUD, trigeminal neuralgia, hyperlipidemia, " mild CAD by cardiac CT, sciatica, GERD, posterior neck surgical incision infection  Surgical-cervical spine fusion surgery, multiple bilateral arthroscopic surgeries, bilateral TKA, bilateral bunionectomy, anterior cervical discectomy and C7-11 fusion, posterior decompressive cervical laminectomy  Injuries-none    SH:  Retired . Lives in [facility] in the winter. 2 daughters. No tobacco, 1 EtOH daily.     FH:  Mother dead with spinal arthritis and DJD  Father dead with CAD and MI, AAA  Brother is fine  Daughter with celiac disease  Daughter has long COVID    PMSF history personally obtained and updated by me this visit.    ROS:  +intolerant of statins  Remainder of the 14 point ROS obtained and found negative    Physical Exam:  Constitutional: WD-WN-WG cooperative  Eyes: nl EOM, sclera  ENT: nl external ears, nose, hearing, lips  Neck: no visible thyroid enlargement  MS: +Right wrist extension limited to 30 degrees, flexion to 45 degrees with 1+ styloid swelling; +reduced neck flexion and extension and neck rotation limited to 30 degrees bilaterally for rotation and flex/ext 45 degrees total. All other neck, shoulder, elbow, wrist, hand joints were examined and otherwise found normal. Normal  strength.  Normal prayer and tuck.  Skin: no alopecia, rash  Neuro: nl cranial nerve  Psych: nl judgement,  affect    Laboratory:    No recent lab testing.    Study Result    Narrative & Impression   XR HAND BILATERAL G/E 3 VIEWS 9/28/2022 10:07 AM      HISTORY: Gastroesophageal reflux disease without esophagitis;  Osteopenia of other site; Rheumatoid arthritis involving both hands  with positive rheumatoid factor (H); Rheumatoid arthritis involving  both hands with positive rheumatoid factor (H); Long-term use of  immunosuppressant medication; Gastroesophageal reflux disease with  esophagitis, unspecified whether hemorrhage; Senile osteoporosis     COMPARISON: 1/14/2019                                                                       IMPRESSION: No erosions. Normal mineralization. Advanced  osteoarthritis in the first CMC joints bilaterally. Mild  osteoarthritis in the STT joints and multiple bilateral IP joints.  Findings are stable.     QUINTIN DAY MD        Impression:    Seropositive rheumatoid arthritis-with history of multiple joint replacements. Now with flaring while on holiday from immunosuppression. Recent failure of abatacept due to adverse events. We will now discontinue the abatacept. Previous excellent response to the combination of Rinvoq 15 mg daily and leflunomide 20 mg daily. We have discussed the risks and benefits of restarting these agents and she agrees to the regimen. Plan to continue prn celebrex but add omeprazole 20 mg daily during regular use. Plan to get inflammation marker.     Neurologic symptoms-now improved post cervical spine laminectomy. I now have no concern about drug induced neuropathy or RA vascultis.    Osteoporosis-she is treated with twice yearly prolia since . No recent DEXA and I have recommended this.    Long term use of immunosuppressive-with baseline lab testing needed now. Plan to repeat the blood work every 2 months. Monitor for GERD symptoms on Celebrex. We also agree that the benefits of continued immunosuppression outweigh the risks of COVID-19 infection. She is COVID vaccinated.    Plan in 4 months with me.    A total of 50 minutes was spent in face to face patient interaction and chart review on the day of service.              Video-Visit Details    Video Start Time: 8:00 AM    Type of service:  Video Visit    Video End Time:8:25 AM    Originating Location (pt. Location): Home    Distant Location (provider location):  Off-Site    Platform used for Video Visit: Azael

## 2023-02-28 NOTE — NURSING NOTE
Is the patient currently in the state of [place]? YES    Visit mode:VIDEO    If the visit is dropped, the patient can be reconnected by: VIDEO VISIT: Text to cell phone: [phone]    Will anyone else be joining the visit? NO      How would you like to obtain your AVS? MyChart    Are changes needed to the allergy or medication list? NO    Reason for visit: follow up    Pt. Has no comments or concerns for VF to relay to provider.     Susan Wharton on 2/28/2023 at 7:45 AM

## 2023-02-28 NOTE — PATIENT INSTRUCTIONS
Stop the orencia  Restart leflunomide 1 tablet daily and Rinvoq t tablet daily  May continue use celebrex 1 tablet daily as needed for pain, but also start omeprazole 1 capsule daily for the next month or two while you are using the celebrex regularly  Get lab testing done now and every 2 months  Get a DEXA scan this summer  Follow up with me in 4 months

## 2023-04-19 NOTE — PROGRESS NOTES
Outcome for 04/19/23 10:18 AM: Data uploaded on [employer]  Jodi Zavaleta LPN   Outcome for 04/24/23 11:20 AM: Data obtained via [employer] website  Jodi Zavaleta LPN           Device Settings  Ruba Profile Active at the time of upload  Start Time Basal Rate Correction Factor Carb Ratio Target BG  Midnight 0.400 u/hr 1u:55 mg/dL 1u:12.0 g 110 mg/dL  7:00 AM 0.600 u/hr 1u:55 mg/dL 1u:10.0 g 110 mg/dL  10:00 AM 0.900 u/hr 1u:55 mg/dL 1u:10.0 g 110 mg/dL  4:00 PM 0.800 u/hr 1u:55 mg/dL 1u:10.0 g 110 mg/dL  10:00 PM 0.400 u/hr 1u:55 mg/dL 1u:12.0 g 110 mg/dL  Calculated Total Daily Basal 15.6 units  Duration of Insulin: 5:00 hours  Carbohydrates: On

## 2023-04-26 ENCOUNTER — TRANSFERRED RECORDS (OUTPATIENT)
Dept: HEALTH INFORMATION MANAGEMENT | Facility: CLINIC | Age: 68
End: 2023-04-26

## 2023-04-26 ENCOUNTER — VIRTUAL VISIT (OUTPATIENT)
Dept: ENDOCRINOLOGY | Facility: CLINIC | Age: 68
End: 2023-04-26
Payer: COMMERCIAL

## 2023-04-26 DIAGNOSIS — E10.69 TYPE 1 DIABETES MELLITUS WITH OTHER SPECIFIED COMPLICATION (H): ICD-10-CM

## 2023-04-26 DIAGNOSIS — E10.9 TYPE 1 DIABETES MELLITUS WITHOUT COMPLICATION (H): ICD-10-CM

## 2023-04-26 PROCEDURE — 99214 OFFICE O/P EST MOD 30 MIN: CPT | Mod: VID | Performed by: PHYSICIAN ASSISTANT

## 2023-04-26 RX ORDER — PROCHLORPERAZINE 25 MG/1
1 SUPPOSITORY RECTAL
Qty: 1 EACH | Refills: 11 | Status: SHIPPED | OUTPATIENT
Start: 2023-04-26 | End: 2024-09-10

## 2023-04-26 RX ORDER — PROCHLORPERAZINE 25 MG/1
1 SUPPOSITORY RECTAL
Qty: 9 EACH | Refills: 4 | Status: SHIPPED | OUTPATIENT
Start: 2023-04-26 | End: 2024-09-10

## 2023-04-26 ASSESSMENT — ENCOUNTER SYMPTOMS
NECK PAIN: 1
LOSS OF CONSCIOUSNESS: 0
TREMORS: 0
JOINT SWELLING: 0
MYALGIAS: 0
NUMBNESS: 0
TINGLING: 1
PARALYSIS: 0
MUSCLE WEAKNESS: 0
ARTHRALGIAS: 0
STIFFNESS: 0
BACK PAIN: 0
DISTURBANCES IN COORDINATION: 0
HEADACHES: 0
SEIZURES: 0
MUSCLE CRAMPS: 0
DIZZINESS: 0
SPEECH CHANGE: 0
WEAKNESS: 1
MEMORY LOSS: 0

## 2023-04-26 NOTE — LETTER
4/26/2023       RE: Ruba Major  [address]  [place] [address]     Dear Colleague,    Thank you for referring your patient, Ruba Major, to the [facility] ENDOCRINOLOGY CLINIC [place] at [facility]. Please see a copy of my visit note below.    Outcome for 04/19/23 10:18 AM: Data uploaded on Tandem  Jodi Zavaleta LPN   Outcome for 04/24/23 11:20 AM: Data obtained via [employer] website  Jodi Zavaleta LPN           Device Settings  Ruba Profile Active at the time of upload  Start Time Basal Rate Correction Factor Carb Ratio Target BG  Midnight 0.400 u/hr 1u:55 mg/dL 1u:12.0 g 110 mg/dL  7:00 AM 0.600 u/hr 1u:55 mg/dL 1u:10.0 g 110 mg/dL  10:00 AM 0.900 u/hr 1u:55 mg/dL 1u:10.0 g 110 mg/dL  4:00 PM 0.800 u/hr 1u:55 mg/dL 1u:10.0 g 110 mg/dL  10:00 PM 0.400 u/hr 1u:55 mg/dL 1u:12.0 g 110 mg/dL  Calculated Total Daily Basal 15.6 units  Duration of Insulin: 5:00 hours  Carbohydrates: On        Due to the COVID 19 pandemic this visit was converted to a video visit in order to help prevent spread of infection in this patient and the general population.    Time of start: 1:07 pm  Time of end: 1:26 pm  Total duration of video visit: 19 minutes.  Providers location: offsite.  Patients location: [place].    [facility]  Ruba Major is a 68 year old female with type 1 diabetes mellitus. Video visit for diabetes follow up.  Ruba was dx having type 1 diabetes in 2008.  She also has a hx of RA.  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  She denies hx of retinopathy, nephropathy or neuropathy.  Pt is currently using a Tandem insulin pump- control IQ and DexcomG6 sensor.  Her current basal insulin rates are below:    Most recent A1C was 7.4 % on 4/4/2023.  I reviewed and scanned her insulin pump download data below in her note.  Her overnight blood sugar values are good.  Some spikes in blood sugars postprandial. She plans to work on taking her bolus insulin  10-15 minutes prior to eating.  She reports increase arthritic pain.  On ROS today, she is struggling with arthritic pain.  She denies blurred vision, n/v, SOB at rest, cough, fever or chills.  No chest pain, abd pain, diarrhea, dysuria or hematuria.  She denies sx of neuropathy or foot ulcers at this time.    Diabetes Care  Retinopathy: none; pt seen by Oph in Oct 2022.  Nephropathy:none; urine microalbuminuria negative in 4/2023.  Neuropathy:none.  Foot Exam: no exam today.  Taking aspirin: no.  Lipids: LDL 41 in 4/2023.  Taking Crestor 3 x per week.  CAD: no.  Mental health: denies depression. Past hx of eating disorder > 20 years ago in remission.  Insulin: Tandem insulin pump - control IQ.  Testing: DexcomG6 sensor.  Hypoglycemia treatment: RX for Baqsimi sent to pharmacy to use in case of severe hypoglycemia.  Back up insulin:Rx for basal insulin sent to pharmacy to use in case of insulin pump failure.                 ROS  Please see under HPI.    Allergies  No Known Allergies    Medications  Current Outpatient Medications   Medication Sig Dispense Refill    amitriptyline (ELAVIL) 10 MG tablet Take 20 mg by mouth At Bedtime       FARRAH CONTOUR test strip Use to test blood sugar 9 times daily or as directed. 900 strip 1    blood glucose (NO BRAND SPECIFIED) test strip FARRAH CONTOUR. Use to test blood sugar 9 times daily or as directed. 900 strip 2    celecoxib (CELEBREX) 200 MG capsule Take 1 capsule (200 mg) by mouth daily as needed for pain 90 capsule 3    Continuous Blood Gluc Sensor (DEXCOM G6 SENSOR) MISC 1 each every 10 days Use as directed for continuous glucose monitoring every 10 days. 9 each 4    Continuous Blood Gluc Transmit (DEXCOM G6 TRANSMITTER) MISC 1 each every 3 months Change every 90 days 1 each 11    Glucagon (BAQSIMI ONE PACK) 3 MG/DOSE POWD Spray 3 mg in nostril See Admin Instructions USE ONLY FOR SEVERE HYPOGLYCEMIA. 1 each 3    HUMALOG KWIKPEN 100 UNIT/ML soln 1u:10 g carb + 1u:50mg/dl  ">150, approx 20 units daily. 30 mL 1    insulin glargine (BASAGLAR KWIKPEN) 100 UNIT/ML pen Inject 14 units subcutaneous daily ONLY IF INSULIN PUMP FAILS. 15 mL 1    INSULIN INFUSION PUMP       insulin lispro (HUMALOG PEN) 100 UNIT/ML injection Inject 1-8 Units Subcutaneous 4 times daily (before meals and nightly) As instructed (roughly 1 unit: 8 g carbohydrate) incase of pump failure.      insulin lispro (HUMALOG VIAL) 100 UNIT/ML vial USE WITH INSULIN PUMP TO INJECT 65 UNITS SUBCUTANEOUSLY DAILY 70 mL 3    insulin pen needle (B-D U/F) 31G X 5 MM miscellaneous Inject 1 Units Subcutaneous 4 times daily (before meals and nightly) 100 each 1    Insulin Pen Needle (PEN NEEDLES 3/16\") 31G X 5 MM MISC 1 Units 5 times daily 150 each 1    insulin syringes, disposable, U-100 0.3 ML MISC Use for insulin ONLY IF INSULIN PUMP FAILS. 25 each 1    melatonin 5 MG tablet Take 5 mg by mouth nightly as needed for sleep      omeprazole (PRILOSEC) 20 MG DR capsule Take 1 capsule (20 mg) by mouth daily 90 capsule 3    rosuvastatin (CRESTOR) 10 MG tablet       zolpidem (AMBIEN) 10 MG tablet Take 10 mg by mouth nightly as needed.      leflunomide (ARAVA) 20 MG tablet Take 1 tablet (20 mg) by mouth daily Labs every 8-12 weeks (Patient not taking: Reported on 4/26/2023) 90 tablet 1    Upadacitinib ER (RINVOQ) 15 MG TB24 Take 15 mg by mouth daily (Patient not taking: Reported on 4/26/2023) 30 tablet 11       Family History  family history is not on file.    Social History   reports that she has never smoked. She has never used smokeless tobacco. She reports current alcohol use. She reports that she does not use drugs.     Past Medical History  Past Medical History:   Diagnosis Date    Arthritis     Diabetes mellitus (H)     insulin pump    PONV (postoperative nausea and vomiting)     Rheumatoid arthritis (H)     Rheumatoid arthritis(714.0)     Type II or unspecified type diabetes mellitus without mention of complication, not stated as " uncontrolled        Past Surgical History:   Procedure Laterality Date    ARTHROPLASTY REVISION KNEE Right 10/12/2020    Procedure: REVISION RIGHT TOTAL KNEE ARTHROPLASTY;  Surgeon: Eulalio Nye MD;  Location: [facility];  Service: Orthopedics    ARTHROSCOPY KNEE      CERVICAL SPINE SURGERY      fusion    FOOT SURGERY  1998, 10/2010    Bunionectomy    JOINT REPLACEMENT Right     TKA       Physical Exam    No exam today.      RESULTS  Creatinine   Date Value Ref Range Status   09/28/2022 0.74 0.52 - 1.04 mg/dL Final   06/03/2021 0.70 0.52 - 1.04 mg/dL Final     GFR Estimate   Date Value Ref Range Status   09/28/2022 88 >60 mL/min/1.73m2 Final     Comment:     Effective December 21, 2021 eGFRcr in adults is calculated using the 2021 CKD-EPI creatinine equation which includes age and gender (Isaiah et al., NEJ, DOI: 10.1056/TVWUxv6832331)   06/03/2021 >90 >60 mL/min/[1.73_m2] Final     Comment:     Non  GFR Calc  Starting 12/18/2018, serum creatinine based estimated GFR (eGFR) will be   calculated using the Chronic Kidney Disease Epidemiology Collaboration   (CKD-EPI) equation.       Hemoglobin A1C   Date Value Ref Range Status   06/09/2022 6.9 (H) 0.0 - 5.6 % Final     Comment:     Normal <5.7%   Prediabetes 5.7-6.4%    Diabetes 6.5% or higher     Note: Adopted from ADA consensus guidelines.   09/15/2020 7.2 (H) 0 - 5.6 % Final     Comment:     Normal <5.7% Prediabetes 5.7-6.4%  Diabetes 6.5% or higher - adopted from ADA   consensus guidelines.       Potassium   Date Value Ref Range Status   10/13/2020 4.8 3.5 - 5.0 mmol/L Final   09/15/2020 3.8 3.4 - 5.3 mmol/L Final     ALT   Date Value Ref Range Status   09/28/2022 29 0 - 50 U/L Final   06/03/2021 25 0 - 50 U/L Final     AST   Date Value Ref Range Status   09/28/2022 28 0 - 45 U/L Final   06/03/2021 21 0 - 45 U/L Final     TSH   Date Value Ref Range Status   06/11/2015 0.91 0.40 - 4.00 mU/L Final     T4 Free   Date Value Ref Range  Status   09/12/2008 1.18 0.70 - 1.85 ng/dL Final       Cholesterol   Date Value Ref Range Status   09/28/2022 174 <200 mg/dL Final   09/15/2020 163 <200 mg/dL Final   05/17/2018 167 <200 mg/dL Final     HDL Cholesterol   Date Value Ref Range Status   09/15/2020 75 >49 mg/dL Final   05/17/2018 81 >49 mg/dL Final     Direct Measure HDL   Date Value Ref Range Status   09/28/2022 90 >=50 mg/dL Final     LDL Cholesterol Calculated   Date Value Ref Range Status   09/28/2022 73 <=100 mg/dL Final   09/15/2020 81 <100 mg/dL Final     Comment:     Desirable:       <100 mg/dl   05/17/2018 78 <100 mg/dL Final     Comment:     Desirable:       <100 mg/dl     Triglycerides   Date Value Ref Range Status   09/28/2022 56 <150 mg/dL Final   09/15/2020 34 <150 mg/dL Final   05/17/2018 37 <150 mg/dL Final       ASSESSMENT/PLAN:     1.  TYPE 1 DIABETES MELLITUS:  Pt will work on taking her bolus insulin 10-15 minutes prior to eating.  No change in pump settings today.  Pt was seen by Oph in Oct  2022 without retinopathy.  Her urine microalbuminuria negative in 4/2023. Creat/GFR have been normal.  She denies sx of neuropathy or foot ulcers/sores at this time.    2.  LIPIDS: LDL 41 in 4/2023. She is taking Crestor 3 days per week.    3.  RA: Followed here by Rheumatology staff.    4.  FOLLOW UP: with Dr. Bills in Oct 2023.  Refills for insulin and DexcomG6 sensors placed today.    Time spent reviewing chart, labs and Tandem pump download data today = 5 minutes.  Time for video visit today = 19  minutes.  Time for documentation today= 10 minutes.    TOTAL TIME FOR VISIT TODAY = 34 minutes.    Gloria Gilbert PA-C

## 2023-04-26 NOTE — NURSING NOTE
Is the patient currently in the state of [place]? YES    Visit mode:VIDEO    If the visit is dropped, the patient can be reconnected by: TELEPHONE VISIT: Phone number: [phone]    Will anyone else be joining the visit? NO    How would you like to obtain your AVS? MyChart    Are changes needed to the allergy or medication list? NO    Reason for visit:   Chief Complaint   Patient presents with     Video Visit     Type 1 Diabetes       Jazmyne Arriaga MA

## 2023-04-28 NOTE — PROGRESS NOTES
Due to the COVID 19 pandemic this visit was converted to a video visit in order to help prevent spread of infection in this patient and the general population.    Time of start: 1:07 pm  Time of end: 1:26 pm  Total duration of video visit: 19 minutes.  Providers location: offsite.  Patients location: [place].    [facility] [place]  Ruba Major is a 68 year old female with type 1 diabetes mellitus. Video visit for diabetes follow up.  Ruba was dx having type 1 diabetes in 2008.  She also has a hx of RA.  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  She denies hx of retinopathy, nephropathy or neuropathy.  Pt is currently using a Tandem insulin pump- control IQ and DexcomG6 sensor.  Her current basal insulin rates are below:    Most recent A1C was 7.4 % on 4/4/2023.  I reviewed and scanned her insulin pump download data below in her note.  Her overnight blood sugar values are good.  Some spikes in blood sugars postprandial. She plans to work on taking her bolus insulin 10-15 minutes prior to eating.  She reports increase arthritic pain.  On ROS today, she is struggling with arthritic pain.  She denies blurred vision, n/v, SOB at rest, cough, fever or chills.  No chest pain, abd pain, diarrhea, dysuria or hematuria.  She denies sx of neuropathy or foot ulcers at this time.    Diabetes Care  Retinopathy: none; pt seen by Oph in Oct 2022.  Nephropathy:none; urine microalbuminuria negative in 4/2023.  Neuropathy:none.  Foot Exam: no exam today.  Taking aspirin: no.  Lipids: LDL 41 in 4/2023.  Taking Crestor 3 x per week.  CAD: no.  Mental health: denies depression. Past hx of eating disorder > 20 years ago in remission.  Insulin: Tandem insulin pump - control IQ.  Testing: DexcomG6 sensor.  Hypoglycemia treatment: RX for Baqsimi sent to pharmacy to use in case of severe hypoglycemia.  Back up insulin:Rx for basal insulin sent to pharmacy to use in case of insulin pump failure.                 ROS  Please see under  "HPI.    Allergies  No Known Allergies    Medications  Current Outpatient Medications   Medication Sig Dispense Refill     amitriptyline (ELAVIL) 10 MG tablet Take 20 mg by mouth At Bedtime        FARRAH CONTOUR test strip Use to test blood sugar 9 times daily or as directed. 900 strip 1     blood glucose (NO BRAND SPECIFIED) test strip FARRAH CONTOUR. Use to test blood sugar 9 times daily or as directed. 900 strip 2     celecoxib (CELEBREX) 200 MG capsule Take 1 capsule (200 mg) by mouth daily as needed for pain 90 capsule 3     Continuous Blood Gluc Sensor (DEXCOM G6 SENSOR) MISC 1 each every 10 days Use as directed for continuous glucose monitoring every 10 days. 9 each 4     Continuous Blood Gluc Transmit (DEXCOM G6 TRANSMITTER) MISC 1 each every 3 months Change every 90 days 1 each 11     Glucagon (BAQSIMI ONE PACK) 3 MG/DOSE POWD Spray 3 mg in nostril See Admin Instructions USE ONLY FOR SEVERE HYPOGLYCEMIA. 1 each 3     HUMALOG KWIKPEN 100 UNIT/ML soln 1u:10 g carb + 1u:50mg/dl >150, approx 20 units daily. 30 mL 1     insulin glargine (BASAGLAR KWIKPEN) 100 UNIT/ML pen Inject 14 units subcutaneous daily ONLY IF INSULIN PUMP FAILS. 15 mL 1     INSULIN INFUSION PUMP        insulin lispro (HUMALOG PEN) 100 UNIT/ML injection Inject 1-8 Units Subcutaneous 4 times daily (before meals and nightly) As instructed (roughly 1 unit: 8 g carbohydrate) incase of pump failure.       insulin lispro (HUMALOG VIAL) 100 UNIT/ML vial USE WITH INSULIN PUMP TO INJECT 65 UNITS SUBCUTANEOUSLY DAILY 70 mL 3     insulin pen needle (B-D U/F) 31G X 5 MM miscellaneous Inject 1 Units Subcutaneous 4 times daily (before meals and nightly) 100 each 1     Insulin Pen Needle (PEN NEEDLES 3/16\") 31G X 5 MM MISC 1 Units 5 times daily 150 each 1     insulin syringes, disposable, U-100 0.3 ML MISC Use for insulin ONLY IF INSULIN PUMP FAILS. 25 each 1     melatonin 5 MG tablet Take 5 mg by mouth nightly as needed for sleep       omeprazole (PRILOSEC) " 20 MG DR capsule Take 1 capsule (20 mg) by mouth daily 90 capsule 3     rosuvastatin (CRESTOR) 10 MG tablet        zolpidem (AMBIEN) 10 MG tablet Take 10 mg by mouth nightly as needed.       leflunomide (ARAVA) 20 MG tablet Take 1 tablet (20 mg) by mouth daily Labs every 8-12 weeks (Patient not taking: Reported on 4/26/2023) 90 tablet 1     Upadacitinib ER (RINVOQ) 15 MG TB24 Take 15 mg by mouth daily (Patient not taking: Reported on 4/26/2023) 30 tablet 11       Family History  family history is not on file.    Social History   reports that she has never smoked. She has never used smokeless tobacco. She reports current alcohol use. She reports that she does not use drugs.     Past Medical History  Past Medical History:   Diagnosis Date     Arthritis      Diabetes mellitus (H)     insulin pump     PONV (postoperative nausea and vomiting)      Rheumatoid arthritis (H)      Rheumatoid arthritis(714.0)      Type II or unspecified type diabetes mellitus without mention of complication, not stated as uncontrolled        Past Surgical History:   Procedure Laterality Date     ARTHROPLASTY REVISION KNEE Right 10/12/2020    Procedure: REVISION RIGHT TOTAL KNEE ARTHROPLASTY;  Surgeon: Eulalio Nye MD;  Location: [facility];  Service: Orthopedics     ARTHROSCOPY KNEE       CERVICAL SPINE SURGERY      fusion     FOOT SURGERY  1998, 10/2010    Bunionectomy     JOINT REPLACEMENT Right     TKA       Physical Exam    No exam today.      RESULTS  Creatinine   Date Value Ref Range Status   09/28/2022 0.74 0.52 - 1.04 mg/dL Final   06/03/2021 0.70 0.52 - 1.04 mg/dL Final     GFR Estimate   Date Value Ref Range Status   09/28/2022 88 >60 mL/min/1.73m2 Final     Comment:     Effective December 21, 2021 eGFRcr in adults is calculated using the 2021 CKD-EPI creatinine equation which includes age and gender (Isaiah hebert al., NEJM, DOI: 10.1056/YAZGtq4455244)   06/03/2021 >90 >60 mL/min/[1.73_m2] Final     Comment:     Non   GFR Calc  Starting 12/18/2018, serum creatinine based estimated GFR (eGFR) will be   calculated using the Chronic Kidney Disease Epidemiology Collaboration   (CKD-EPI) equation.       Hemoglobin A1C   Date Value Ref Range Status   06/09/2022 6.9 (H) 0.0 - 5.6 % Final     Comment:     Normal <5.7%   Prediabetes 5.7-6.4%    Diabetes 6.5% or higher     Note: Adopted from ADA consensus guidelines.   09/15/2020 7.2 (H) 0 - 5.6 % Final     Comment:     Normal <5.7% Prediabetes 5.7-6.4%  Diabetes 6.5% or higher - adopted from ADA   consensus guidelines.       Potassium   Date Value Ref Range Status   10/13/2020 4.8 3.5 - 5.0 mmol/L Final   09/15/2020 3.8 3.4 - 5.3 mmol/L Final     ALT   Date Value Ref Range Status   09/28/2022 29 0 - 50 U/L Final   06/03/2021 25 0 - 50 U/L Final     AST   Date Value Ref Range Status   09/28/2022 28 0 - 45 U/L Final   06/03/2021 21 0 - 45 U/L Final     TSH   Date Value Ref Range Status   06/11/2015 0.91 0.40 - 4.00 mU/L Final     T4 Free   Date Value Ref Range Status   09/12/2008 1.18 0.70 - 1.85 ng/dL Final       Cholesterol   Date Value Ref Range Status   09/28/2022 174 <200 mg/dL Final   09/15/2020 163 <200 mg/dL Final   05/17/2018 167 <200 mg/dL Final     HDL Cholesterol   Date Value Ref Range Status   09/15/2020 75 >49 mg/dL Final   05/17/2018 81 >49 mg/dL Final     Direct Measure HDL   Date Value Ref Range Status   09/28/2022 90 >=50 mg/dL Final     LDL Cholesterol Calculated   Date Value Ref Range Status   09/28/2022 73 <=100 mg/dL Final   09/15/2020 81 <100 mg/dL Final     Comment:     Desirable:       <100 mg/dl   05/17/2018 78 <100 mg/dL Final     Comment:     Desirable:       <100 mg/dl     Triglycerides   Date Value Ref Range Status   09/28/2022 56 <150 mg/dL Final   09/15/2020 34 <150 mg/dL Final   05/17/2018 37 <150 mg/dL Final       ASSESSMENT/PLAN:     1.  TYPE 1 DIABETES MELLITUS:  Pt will work on taking her bolus insulin 10-15 minutes prior to eating.  No  change in pump settings today.  Pt was seen by Oph in Oct  2022 without retinopathy.  Her urine microalbuminuria negative in 4/2023. Creat/GFR have been normal.  She denies sx of neuropathy or foot ulcers/sores at this time.    2.  LIPIDS: LDL 41 in 4/2023. She is taking Crestor 3 days per week.    3.  RA: Followed here by Rheumatology staff.    4.  FOLLOW UP: with Dr. Bills in Oct 2023.  Refills for insulin and DexcomG6 sensors placed today.    Time spent reviewing chart, labs and Tandem pump download data today = 5 minutes.  Time for video visit today = 19  minutes.  Time for documentation today= 10 minutes.    TOTAL TIME FOR VISIT TODAY = 34 minutes.    Gloria Gilbert PA-C

## 2023-05-02 ENCOUNTER — MYC MEDICAL ADVICE (OUTPATIENT)
Dept: RHEUMATOLOGY | Facility: CLINIC | Age: 68
End: 2023-05-02
Payer: COMMERCIAL

## 2023-05-02 NOTE — LETTER
Patient:  Ruba Major  :   1955  MRN:     [id number]        Ms.Marci RASHAAD Major  [address]  [place] [address]        May 3, 2023    Dear ,    We are writing to inform you of your test results. The CBC continues to improve.    These results do not require a change.  Please discuss at your next visit.  It was a pleasure to see you at your recent visit. Please let me know if you have any questions or concerns.    Asim Aly MD  Professor of Medicine  Director, Division of Rheumatic and Autoimmune Diseases      CBC with Auto Differential  Order: [id number]   Ref Range & Units 2 d ago   WBC 3.8 - 10.8 K/uL 4.8    RBC 4.18 - 5.22 M/uL 4.16 Low     Hemoglobin 12.0 - 16.0 g/dL 12.3    Hematocrit 36.0 - 48.0 % 37.9    MCV 80.0 - 99.0 fL 91.1    MCH 27.0 - 33.7 pg 29.6    MCHC 32.0 - 37.0 gm/dL 32.5    RDW 11.5 - 14.5 % 14.3    Platelet Count 150.0 - 450.0 K/uL 331.0    MPV 8.9 - 12.3 fL 10.4    nRBC % 0.0 - 0.0 % 0.0    IANC 1.9 - 10.8 K/uL 1.8 Low     Comment: The IANC is a preliminary analyzer ANC that is subject to change based on further analysis that may include results from a manual differential.    Neutrophils % 51.0 - 89.0 % 36.7 Low     Lymphocytes % 10.0 - 40.0 % 51.6 High     Monocytes % 4.0 - 14.0 % 8.9    Eosinophil % 0.0 - 10.0 % 1.2    Basophil % 0.0 - 3.0 % 1.2    Immature Granulocyte % 0.0 - 1.0 % 0.4    Neutrophils Absolute 1.6 - 7.8 K/uL 1.8    Lymphocytes Absolute 1.2 - 3.7 K/uL 2.5    Monocytes Absolute 0.2 - 1.0 K/uL 0.4    Eosinophils Absolute 0.0 - 0.7 K/uL 0.1    Basophils Absolute 0.0 - 0.3 K/uL 0.1    Immature Granulocyte Absolute 0.0 - 1.1 K/uL 0.0    NRBC Absolute 0.0 - 0.0 K/uL 0.0          [id number]  1955

## 2023-05-03 NOTE — TELEPHONE ENCOUNTER
Shineon message sent to patient informing her letter was sent to her with results.      NICHOLAS Upton  Rheumatology/Infectious disease  [facility]   Rheumatology ph:[phone]  Infectious Disease ph:[phone]

## 2023-05-13 ENCOUNTER — MYC MEDICAL ADVICE (OUTPATIENT)
Dept: RHEUMATOLOGY | Facility: CLINIC | Age: 68
End: 2023-05-13
Payer: COMMERCIAL

## 2023-05-13 NOTE — LETTER
May 15, 2023      Ruba Major  [address]  [place] [address]        Dear ,    We are writing to inform you of your test results.    Reintroduction of rinvoq has again led to neutropenia with ANC of 0.8. Based on this I will now permanently discontinue the rinvoq.     Plan to continue the weekly CBC testing. Once the ANC is reliably greater than 1.0 (2 weeks in a row), I will plan to reintroduce the leflunomide. I will also consider biological DMARD therapies once I determine the tolerance and safety of leflunomide.    Asim Aly MD  Professor of Medicine  Director, Division of Rheumatic and Autoimmune Diseases          important suggestion  View Detailed Reports      important suggestion  View Condensed Results Report     Lipid Panel w/ Reflex LDL  Order: [id number]   suggestion  Information displayed in this report may not trend or trigger automated decision support.      Ref Range & Units 3 d ago   Cholesterol <200 mg/dL 136    Triglycerides <150 mg/dL 27    HDL 23 - 92 mg/dL 79    Cholesterol Total/HDL Ratio 0.00 - 4.99 Ratio 1.72    Non-HDL mg/dL 57    VLDL Cholesterol Kishore 0 - 29 mg/dL 5    LDL Calculated mg/dL 52    LDL HDL Ratio Ratio 1    Narrative    HDL Cholesterol     HDL < 40 mg/dL is considered a major risk factor for heart disease     HDL > 60 mg/dL is considered protective against heart disease       LDL Calculated clinical ranges:     Optimal                     <100 mg/dL     Near optimal/Above optimal  100 - 129 mg/dL     Borderline High             130 - 159 mg/dL     High                        160 - 189 mg/dL     Very High                   >or = 190 mg/dL  Specimen Collected: 05/12/23 11:16 AM Last Resulted: 05/12/23  2:41 PM   Received From: [facility] [employer]  Result Received: 05/15/23  7:36 AM     Received Information  Albumin  Order: [id number]   suggestion  Information displayed in this report may not trend or trigger automated decision support.      Ref  Range & Units 3 d ago   Albumin 3.5 - 5.7 g/dL 4.4    Specimen Collected: 05/12/23 11:16 AM Last Resulted: 05/12/23  2:41 PM   Received From: [facility]  Result Received: 05/15/23  7:36 AM     Received Information  SGPT (ALT)  Order: [id number]   suggestion  Information displayed in this report may not trend or trigger automated decision support.      Ref Range & Units 3 d ago   ALT (SGPT) 7 - 52 IU/L 21    Specimen Collected: 05/12/23 11:16 AM Last Resulted: 05/12/23  2:41 PM   Received From: [facility]  Result Received: 05/15/23  7:36 AM     Received Information  SGOT (AST)  Order: [id number]   suggestion  Information displayed in this report may not trend or trigger automated decision support.      Ref Range & Units 3 d ago   AST 13 - 39 U/L 30    Specimen Collected: 05/12/23 11:16 AM Last Resulted: 05/12/23  2:41 PM   Received From: [facility]  Result Received: 05/15/23  7:36 AM     Received Information  Creatinine Blood  Order: [id number]   suggestion  Information displayed in this report may not trend or trigger automated decision support.      Ref Range & Units 3 d ago   Creatinine 0.6 - 1.2 mg/dL 0.8    eGFR >60.00 mL/min/1.73m*2 76.00    Comment: eGFR results <60 mL/min/1.73m sq are below the reference range.   Please note: Results below the reference range do not flag in the lab computer system.   The equation has not been validated in patients older than age 70, but an MDRD-derived eGFR may still be useful tool for providers caring for patients older than 70.   Specimen Collected: 05/12/23 11:16 AM Last Resulted: 05/12/23  2:41 PM   Received From: [facility]  Result Received: 05/15/23  7:36 AM     Received Information  C-Reactive protein  Order: [id number]   suggestion  Information displayed in this report may not trend or trigger automated decision support.      Ref Range & Units 3 d ago   CRP <1.0 mg/dL <0.1    Specimen Collected: 05/12/23 11:16 AM Last Resulted: 05/12/23  2:41  PM   Received From: [employer] [facility]  Result Received: 05/15/23  7:36 AM     Received Information   Contains abnormal data CBC with Auto Differential  Order: [id number]   suggestion  Information displayed in this report may not trend or trigger automated decision support.      Ref Range & Units 3 d ago   WBC 3.8 - 10.8 K/uL 3.4 Low     RBC 4.18 - 5.22 M/uL 4.02 Low     Hemoglobin 12.0 - 16.0 g/dL 11.8 Low     Hematocrit 36.0 - 48.0 % 37.6    MCV 80.0 - 99.0 fL 93.5    MCH 27.0 - 33.7 pg 29.4    MCHC 32.0 - 37.0 gm/dL 31.4 Low     RDW 11.5 - 14.5 % 13.8    Platelet Count 150.0 - 450.0 K/uL 249.0    MPV 8.9 - 12.3 fL 11.2    nRBC % 0.0 - 0.0 % 0.0    IANC 1.9 - 10.8 K/uL 0.8 Low     Comment: The IANC is a preliminary analyzer ANC that is subject to change based on further analysis that may include results from a manual differential.    Neutrophils % 51.0 - 89.0 % 24.4 Low     Lymphocytes % 10.0 - 40.0 % 63.0 High     Monocytes % 4.0 - 14.0 % 9.6    Eosinophil % 0.0 - 10.0 % 1.5    Basophil % 0.0 - 3.0 % 1.2    Immature Granulocyte % 0.0 - 1.0 % 0.3    Neutrophils Absolute 1.6 - 7.8 K/uL 0.8 Low     Lymphocytes Absolute 1.2 - 3.7 K/uL 2.1    Monocytes Absolute 0.2 - 1.0 K/uL 0.3    Eosinophils Absolute 0.0 - 0.7 K/uL 0.1    Basophils Absolute 0.0 - 0.3 K/uL 0.0    Immature Granulocyte Absolute 0.0 - 1.1 K/uL 0.0    NRBC Absolute 0.0 - 0.0 K/uL 0.0    Specimen Collected: 05/12/23 11:16 AM Last Resulted: 05/12/23  2:33 PM   Received From: [facility] [facility]  Result Received: 05/15/23  7:36 AM     Received Information    If you have any questions or concerns, please call the clinic at the number listed above.       Sincerely,

## 2023-05-15 NOTE — TELEPHONE ENCOUNTER
Provider recommendations sent to patient via [employer].      NICHOLAS Upton  Rheumatology/Infectious disease  [facility]   Rheumatology ph:[phone]  Infectious Disease ph:[phone]

## 2023-05-15 NOTE — TELEPHONE ENCOUNTER
Reintroduction of rinvoq has again led to neutropenia with ANC of 0.8. Based on this I will now permanently discontinue the rinvoq.     Plan to continue the weekly CBC testing. Once the ANC is reliably greater than 1.0 (2 weeks in a row), I will plan to reintroduce the leflunomide. I will also consider biological DMARD therapies once I determine the tolerance and safety of leflunomide.

## 2023-06-01 ENCOUNTER — HEALTH MAINTENANCE LETTER (OUTPATIENT)
Age: 68
End: 2023-06-01

## 2023-06-01 ENCOUNTER — LAB (OUTPATIENT)
Dept: LAB | Facility: CLINIC | Age: 68
End: 2023-06-01
Payer: COMMERCIAL

## 2023-06-01 DIAGNOSIS — Z79.60 LONG-TERM USE OF IMMUNOSUPPRESSANT MEDICATION: ICD-10-CM

## 2023-06-01 DIAGNOSIS — M05.742 RHEUMATOID ARTHRITIS INVOLVING BOTH HANDS WITH POSITIVE RHEUMATOID FACTOR (H): ICD-10-CM

## 2023-06-01 DIAGNOSIS — M05.741 RHEUMATOID ARTHRITIS INVOLVING BOTH HANDS WITH POSITIVE RHEUMATOID FACTOR (H): ICD-10-CM

## 2023-06-01 LAB
BASOPHILS # BLD AUTO: 0 10E3/UL (ref 0–0.2)
BASOPHILS NFR BLD AUTO: 0 %
EOSINOPHIL # BLD AUTO: 0.1 10E3/UL (ref 0–0.7)
EOSINOPHIL NFR BLD AUTO: 2 %
ERYTHROCYTE [DISTWIDTH] IN BLOOD BY AUTOMATED COUNT: 13.9 % (ref 10–15)
HCT VFR BLD AUTO: 36.8 % (ref 35–47)
HGB BLD-MCNC: 12.1 G/DL (ref 11.7–15.7)
IMM GRANULOCYTES # BLD: 0 10E3/UL
IMM GRANULOCYTES NFR BLD: 0 %
LYMPHOCYTES # BLD AUTO: 1.3 10E3/UL (ref 0.8–5.3)
LYMPHOCYTES NFR BLD AUTO: 29 %
MCH RBC QN AUTO: 30.3 PG (ref 26.5–33)
MCHC RBC AUTO-ENTMCNC: 32.9 G/DL (ref 31.5–36.5)
MCV RBC AUTO: 92 FL (ref 78–100)
MONOCYTES # BLD AUTO: 0.5 10E3/UL (ref 0–1.3)
MONOCYTES NFR BLD AUTO: 10 %
NEUTROPHILS # BLD AUTO: 2.7 10E3/UL (ref 1.6–8.3)
NEUTROPHILS NFR BLD AUTO: 59 %
PLATELET # BLD AUTO: 240 10E3/UL (ref 150–450)
RBC # BLD AUTO: 4 10E6/UL (ref 3.8–5.2)
WBC # BLD AUTO: 4.6 10E3/UL (ref 4–11)

## 2023-06-01 PROCEDURE — 85025 COMPLETE CBC W/AUTO DIFF WBC: CPT

## 2023-06-01 PROCEDURE — 36415 COLL VENOUS BLD VENIPUNCTURE: CPT

## 2023-06-05 ENCOUNTER — TRANSFERRED RECORDS (OUTPATIENT)
Dept: HEALTH INFORMATION MANAGEMENT | Facility: CLINIC | Age: 68
End: 2023-06-05
Payer: COMMERCIAL

## 2023-06-15 DIAGNOSIS — E10.9 TYPE 1 DIABETES MELLITUS WITHOUT COMPLICATION (H): Primary | ICD-10-CM

## 2023-06-16 RX ORDER — INSULIN LISPRO 100 [IU]/ML
INJECTION, SOLUTION INTRAVENOUS; SUBCUTANEOUS
Qty: 3 ML | Refills: 3 | Status: SHIPPED | OUTPATIENT
Start: 2023-06-16 | End: 2024-06-11

## 2023-06-23 ENCOUNTER — LAB (OUTPATIENT)
Dept: LAB | Facility: CLINIC | Age: 68
End: 2023-06-23
Payer: COMMERCIAL

## 2023-06-23 DIAGNOSIS — M05.741 RHEUMATOID ARTHRITIS INVOLVING BOTH HANDS WITH POSITIVE RHEUMATOID FACTOR (H): ICD-10-CM

## 2023-06-23 DIAGNOSIS — M85.88 OSTEOPENIA OF OTHER SITE: ICD-10-CM

## 2023-06-23 DIAGNOSIS — M05.742 RHEUMATOID ARTHRITIS INVOLVING BOTH HANDS WITH POSITIVE RHEUMATOID FACTOR (H): ICD-10-CM

## 2023-06-23 DIAGNOSIS — M85.88 OSTEOPENIA OF OTHER SITE: Primary | ICD-10-CM

## 2023-06-23 DIAGNOSIS — Z79.60 LONG-TERM USE OF IMMUNOSUPPRESSANT MEDICATION: ICD-10-CM

## 2023-06-23 LAB
CALCIUM SERPL-MCNC: 9.1 MG/DL (ref 8.8–10.2)
CHOLEST SERPL-MCNC: 123 MG/DL
HDLC SERPL-MCNC: 67 MG/DL
LDLC SERPL CALC-MCNC: 43 MG/DL
NONHDLC SERPL-MCNC: 56 MG/DL
TRIGL SERPL-MCNC: 64 MG/DL

## 2023-06-23 PROCEDURE — 82310 ASSAY OF CALCIUM: CPT

## 2023-06-23 PROCEDURE — 36415 COLL VENOUS BLD VENIPUNCTURE: CPT

## 2023-06-23 PROCEDURE — 80061 LIPID PANEL: CPT

## 2023-06-26 ENCOUNTER — INFUSION THERAPY VISIT (OUTPATIENT)
Dept: INFUSION THERAPY | Facility: CLINIC | Age: 68
End: 2023-06-26
Payer: COMMERCIAL

## 2023-06-26 ENCOUNTER — LAB (OUTPATIENT)
Dept: LAB | Facility: CLINIC | Age: 68
End: 2023-06-26
Payer: COMMERCIAL

## 2023-06-26 VITALS
RESPIRATION RATE: 16 BRPM | TEMPERATURE: 97.4 F | SYSTOLIC BLOOD PRESSURE: 146 MMHG | OXYGEN SATURATION: 96 % | DIASTOLIC BLOOD PRESSURE: 83 MMHG | HEART RATE: 85 BPM

## 2023-06-26 DIAGNOSIS — M85.88 OSTEOPENIA OF OTHER SITE: ICD-10-CM

## 2023-06-26 DIAGNOSIS — Z79.60 LONG-TERM USE OF IMMUNOSUPPRESSANT MEDICATION: ICD-10-CM

## 2023-06-26 DIAGNOSIS — M05.742 RHEUMATOID ARTHRITIS INVOLVING BOTH HANDS WITH POSITIVE RHEUMATOID FACTOR (H): ICD-10-CM

## 2023-06-26 DIAGNOSIS — M05.741 RHEUMATOID ARTHRITIS INVOLVING BOTH HANDS WITH POSITIVE RHEUMATOID FACTOR (H): ICD-10-CM

## 2023-06-26 DIAGNOSIS — M81.0 SENILE OSTEOPOROSIS: Primary | ICD-10-CM

## 2023-06-26 LAB
BASOPHILS # BLD AUTO: 0 10E3/UL (ref 0–0.2)
BASOPHILS NFR BLD AUTO: 1 %
EOSINOPHIL # BLD AUTO: 0.2 10E3/UL (ref 0–0.7)
EOSINOPHIL NFR BLD AUTO: 4 %
ERYTHROCYTE [DISTWIDTH] IN BLOOD BY AUTOMATED COUNT: 13.2 % (ref 10–15)
HCT VFR BLD AUTO: 39.7 % (ref 35–47)
HGB BLD-MCNC: 12.7 G/DL (ref 11.7–15.7)
IMM GRANULOCYTES # BLD: 0 10E3/UL
IMM GRANULOCYTES NFR BLD: 0 %
LYMPHOCYTES # BLD AUTO: 1.3 10E3/UL (ref 0.8–5.3)
LYMPHOCYTES NFR BLD AUTO: 29 %
MCH RBC QN AUTO: 29.3 PG (ref 26.5–33)
MCHC RBC AUTO-ENTMCNC: 32 G/DL (ref 31.5–36.5)
MCV RBC AUTO: 92 FL (ref 78–100)
MONOCYTES # BLD AUTO: 0.5 10E3/UL (ref 0–1.3)
MONOCYTES NFR BLD AUTO: 12 %
NEUTROPHILS # BLD AUTO: 2.4 10E3/UL (ref 1.6–8.3)
NEUTROPHILS NFR BLD AUTO: 54 %
NRBC # BLD AUTO: 0 10E3/UL
NRBC BLD AUTO-RTO: 0 /100
PLATELET # BLD AUTO: 258 10E3/UL (ref 150–450)
RBC # BLD AUTO: 4.33 10E6/UL (ref 3.8–5.2)
WBC # BLD AUTO: 4.4 10E3/UL (ref 4–11)

## 2023-06-26 PROCEDURE — 85025 COMPLETE CBC W/AUTO DIFF WBC: CPT

## 2023-06-26 PROCEDURE — 36415 COLL VENOUS BLD VENIPUNCTURE: CPT

## 2023-06-26 PROCEDURE — 96372 THER/PROPH/DIAG INJ SC/IM: CPT | Performed by: NURSE PRACTITIONER

## 2023-06-26 PROCEDURE — 99207 PR NO CHARGE LOS: CPT

## 2023-06-26 RX ORDER — ALBUTEROL SULFATE 90 UG/1
1-2 AEROSOL, METERED RESPIRATORY (INHALATION)
Status: CANCELLED
Start: 2023-10-21

## 2023-06-26 RX ORDER — METHYLPREDNISOLONE SODIUM SUCCINATE 125 MG/2ML
125 INJECTION, POWDER, LYOPHILIZED, FOR SOLUTION INTRAMUSCULAR; INTRAVENOUS
Status: CANCELLED
Start: 2023-10-21

## 2023-06-26 RX ORDER — EPINEPHRINE 1 MG/ML
0.3 INJECTION, SOLUTION INTRAMUSCULAR; SUBCUTANEOUS EVERY 5 MIN PRN
Status: CANCELLED | OUTPATIENT
Start: 2023-10-21

## 2023-06-26 RX ORDER — ALBUTEROL SULFATE 0.83 MG/ML
2.5 SOLUTION RESPIRATORY (INHALATION)
Status: CANCELLED | OUTPATIENT
Start: 2023-10-21

## 2023-06-26 RX ORDER — MEPERIDINE HYDROCHLORIDE 25 MG/ML
25 INJECTION INTRAMUSCULAR; INTRAVENOUS; SUBCUTANEOUS EVERY 30 MIN PRN
Status: CANCELLED | OUTPATIENT
Start: 2023-10-21

## 2023-06-26 RX ORDER — DIPHENHYDRAMINE HYDROCHLORIDE 50 MG/ML
50 INJECTION INTRAMUSCULAR; INTRAVENOUS
Status: CANCELLED
Start: 2023-10-21

## 2023-06-26 ASSESSMENT — PAIN SCALES - GENERAL: PAINLEVEL: NO PAIN (0)

## 2023-06-26 NOTE — PROGRESS NOTES
Infusion Nursing Note:  Ruba Major presents today for Prolia.    Patient seen by provider today: No   present during visit today: Not Applicable.    Note: Assessment performed by Dawn TRAVIS RN prior to injection today. Patient denies symptoms/concerns following previous injection.    Intravenous Access:  No Intravenous access/labs at this visit.    Treatment Conditions:  Lab Results   Component Value Date     (L) 10/13/2020    POTASSIUM 4.8 10/13/2020    MAG 1.9 12/10/2012    CR 0.74 09/28/2022    CELSO 9.1 06/23/2023    BILITOTAL 0.8 09/15/2020    ALBUMIN 4.0 09/28/2022    ALT 29 09/28/2022    AST 28 09/28/2022     Results reviewed, labs MET treatment parameters, ok to proceed with treatment.      Post Infusion Assessment:  Patient tolerated injection without incident.  Site patent and intact, free from redness, edema or discomfort.       Discharge Plan:   Patient discharged in stable condition accompanied by: self.  Departure Mode: Ambulatory.      Tami Chandra LPN

## 2023-06-27 ENCOUNTER — VIRTUAL VISIT (OUTPATIENT)
Dept: RHEUMATOLOGY | Facility: CLINIC | Age: 68
End: 2023-06-27
Attending: INTERNAL MEDICINE
Payer: COMMERCIAL

## 2023-06-27 VITALS — BODY MASS INDEX: 20.89 KG/M2 | HEIGHT: 66 IN | WEIGHT: 130 LBS

## 2023-06-27 DIAGNOSIS — K21.00 GASTROESOPHAGEAL REFLUX DISEASE WITH ESOPHAGITIS, UNSPECIFIED WHETHER HEMORRHAGE: ICD-10-CM

## 2023-06-27 DIAGNOSIS — M05.742 RHEUMATOID ARTHRITIS INVOLVING BOTH HANDS WITH POSITIVE RHEUMATOID FACTOR (H): ICD-10-CM

## 2023-06-27 DIAGNOSIS — M85.88 OSTEOPENIA OF OTHER SITE: ICD-10-CM

## 2023-06-27 DIAGNOSIS — M05.741 RHEUMATOID ARTHRITIS INVOLVING BOTH HANDS WITH POSITIVE RHEUMATOID FACTOR (H): ICD-10-CM

## 2023-06-27 DIAGNOSIS — K21.9 GASTROESOPHAGEAL REFLUX DISEASE WITHOUT ESOPHAGITIS: ICD-10-CM

## 2023-06-27 DIAGNOSIS — M81.0 SENILE OSTEOPOROSIS: ICD-10-CM

## 2023-06-27 DIAGNOSIS — Z79.60 LONG-TERM USE OF IMMUNOSUPPRESSANT MEDICATION: Chronic | ICD-10-CM

## 2023-06-27 PROCEDURE — 99214 OFFICE O/P EST MOD 30 MIN: CPT | Mod: VID | Performed by: INTERNAL MEDICINE

## 2023-06-27 RX ORDER — CELECOXIB 200 MG/1
200 CAPSULE ORAL DAILY PRN
Qty: 90 CAPSULE | Refills: 3 | Status: SHIPPED | OUTPATIENT
Start: 2023-06-27 | End: 2024-06-07

## 2023-06-27 RX ORDER — LEFLUNOMIDE 20 MG/1
20 TABLET ORAL DAILY
Qty: 90 TABLET | Refills: 1 | Status: SHIPPED | OUTPATIENT
Start: 2023-06-27 | End: 2023-10-31

## 2023-06-27 ASSESSMENT — PAIN SCALES - GENERAL: PAINLEVEL: MILD PAIN (2)

## 2023-06-27 NOTE — PATIENT INSTRUCTIONS
Continue the leflunomide daily and permanently discontinue the Rinvoq  Continue the celebrex 200 mg daily--I do recommend that you use omeprazole daily to protect he lining of your stomach.  Get laboratory testing in about 1 month, and then every 2 months thereafter  Follow up with me in October in person when we can discuss the use of rituximab

## 2023-06-27 NOTE — PROGRESS NOTES
Virtual Visit Details    Type of service:  Video Visit   Video Start Time: 8:15 AM  Video End Time: 8:30 AM    Originating Location (pt. Location): Home    Distant Location (provider location):  Off-site  Platform used for Video Visit: Azael Yeh is a 68 year old who is being evaluated via a billable video visit.      How would you like to obtain your AVS? MyChart  If the video visit is dropped, the invitation should be resent by: Text to cell phone: [phone]  Will anyone else be joining your video visit? No        Ms. Major has seropositive rheumatoid arthritis affecting multiple joints. +CCP, -RF, -ROBERT. No known erosions.  Failed Enbrel, Humira and Orencia. Previous use of Rinvoq halted due to neurological concerns, later shown to be cervical myelopathy. Now Upadacitinib complicated by leukopenia.    She is taking celebrex 200 mg daily with good tolerance but needs occasional omeprazole to protect against heartburn. She also continues the leflunomide, but wonders how well this can control her joint disease? She has never been on leflunomide monotherapy. She tolerates this medication well. She is generally pretty well controlled today and remains active. No new deformities on last fall's hand xrays.    Upadacitinib was recently discontinued due to recurring leukopenia. Good tolerance of the leflunomide and no loose stool. Prolia twice yearly for osteoporosis.    Her brother recently was found with severe CAD requiring CABG. Her father  with CAD and AAA. Ruba has diabetes and hyperlipidemia, and is very concerned about her cardiovascular health. She is followed in the cardiovascular risk clinic, and evaluations about a year and a half ago were unremarkable. She recently requested evaluation of her lipoprotein A level.    PMI:  Medical-osteopenia (T = -2.8 ), type 1 diabetes, rheumatoid arthritis, osteoarthritis of the hands, PUD, trigeminal neuralgia, hyperlipidemia, mild CAD by cardiac CT,  sciatica, GERD, posterior neck surgical incision infection  Surgical-cervical spine fusion surgery, multiple bilateral arthroscopic surgeries, bilateral TKA, bilateral bunionectomy, anterior cervical discectomy and C7-11 fusion, posterior decompressive cervical laminectomy  Injuries-none    SH:  Retired . Lives in [place] the [place]. 2 daughters. No tobacco, 1 EtOH daily.     FH:  Mother dead with spinal arthritis and DJD  Father dead with CAD and MI, AAA  Brother with CAD and early CABG  Daughter with celiac disease  Daughter has long COVID    PMSF history personally obtained and updated by me this visit.    ROS:  +cervical radiculopathy with left arm paresthesia and weakness  +intolerant of statins  Remainder of the 14 point ROS obtained and found negative    Physical Exam:  Constitutional: WD-[id number] cooperative  Eyes: nl EOM, sclera  ENT: nl external ears, nose, hearing, lips  Neck: no visible thyroid enlargement  MS: +Right wrist extension limited to 30 degrees, flexion to 45 degrees with 1+ styloid swelling  Skin: no alopecia, rash  Neuro: nl cranial nerve  Psych: nl judgement,  affect    Laboratory:    Component      Latest Ref [facility] 6/26/2023  11:04 AM   WBC      4.0 - 11.0 10e3/uL 4.4    RBC Count      3.80 - 5.20 10e6/uL 4.33    Hemoglobin      11.7 - 15.7 g/dL 12.7    Hematocrit      35.0 - 47.0 % 39.7    MCV      78 - 100 fL 92    MCH      26.5 - 33.0 pg 29.3    MCHC      31.5 - 36.5 g/dL 32.0    RDW      10.0 - 15.0 % 13.2    Platelet Count      150 - 450 10e3/uL 258    % Neutrophils      % 54    % Lymphocytes      % 29    % Monocytes      % 12    % Eosinophils      % 4    % Basophils      % 1    % Immature Granulocytes      % 0    NRBCs per 100 WBC      <1 /100 0    Absolute Neutrophils      1.6 - 8.3 10e3/uL 2.4    Absolute Lymphocytes      0.8 - 5.3 10e3/uL 1.3    Absolute Monocytes      0.0 - 1.3 10e3/uL 0.5    Absolute Eosinophils      0.0 - 0.7 10e3/uL 0.2    Absolute  Basophils      0.0 - 0.2 10e3/uL 0.0    Absolute Immature Granulocytes      <=0.4 10e3/uL 0.0    Absolute NRBCs      10e3/uL 0.0          Impression:    Seropositive rheumatoid arthritis-with history of multiple joint replacements and hand deformities but no erosive disease by xray. Recent failure of bDMARDS including orencia and upadacitinib, with recent leukopenia. Good tolerance of leflunomide and reasonable efficacy. Good tolerance of the leflunomide and I will continue this monotherapy for the time being to ensure stable WBC counts. I expect that this will not be sufficient to control symptoms, and consideration will be given to use of rituximab. Monitor the CRP    Osteoporosis-she is treated with twice yearly prolia since . Recent DEXA in 4-2023 but results are unavailable to me.    Long term use of immunosuppressive-with increased risk for leukopenia and GERD. Continue with every 2 month blood testing. We also agree that the benefits of continued immunosuppression outweigh the risks of COVID-19 infection. She is COVID vaccinated.    Plan:  Continue the leflunomide daily and discontinue the Rinvoq  Continue the celebrex 200 mg daily--take omeprazole daily if this bothers your stomach  Get laboratory testing in about 1 month, and then every 2 months thereafter  Follow up within October in person    A total of 30 minutes was spent in face to face patient interaction and chart review on the day of service.

## 2023-06-27 NOTE — NURSING NOTE
Is the patient currently in the state of [place]? YES    Visit mode:VIDEO    If the visit is dropped, the patient can be reconnected by: VIDEO VISIT: Text to cell phone: [phone]    Will anyone else be joining the visit? NO    How would you like to obtain your AVS? MyChart    Are changes needed to the allergy or medication list? NO    Reason for visit: RECHECK    Medication and allergies have been reviewed.     Toni Walsh, VF

## 2023-06-27 NOTE — LETTER
2023       RE: Ruba Major  [address]  [facility] [address]     Dear Colleague,    Thank you for referring your patient, Ruba Major, to the [facility] RHEUMATOLOGY CLINIC [place] at [facility]. Please see a copy of my visit note below.    Virtual Visit Details    Type of service:  Video Visit   Video Start Time: 8:15 AM  Video End Time: 8:30 AM    Originating Location (pt. Location): Home    Distant Location (provider location):  Off-site  Platform used for Video Visit: Azael Yeh is a 68 year old who is being evaluated via a billable video visit.      How would you like to obtain your AVS? MyChart  If the video visit is dropped, the invitation should be resent by: Text to cell phone: [phone]  Will anyone else be joining your video visit? No        Ms. Major has seropositive rheumatoid arthritis affecting multiple joints. +CCP, -RF, -ROBERT. No known erosions.  Failed Enbrel, Humira and Orencia. Previous use of Rinvoq halted due to neurological concerns, later shown to be cervical myelopathy. Now Upadacitinib complicated by leukopenia.    She is taking celebrex 200 mg daily with good tolerance but needs occasional omeprazole to protect against heartburn. She also continues the leflunomide, but wonders how well this can control her joint disease? She has never been on leflunomide monotherapy. She tolerates this medication well. She is generally pretty well controlled today and remains active. No new deformities on last fall's hand xrays.    Upadacitinib was recently discontinued due to recurring leukopenia. Good tolerance of the leflunomide and no loose stool. Prolia twice yearly for osteoporosis.    Her brother recently was found with severe CAD requiring CABG. Her father  with CAD and AAA. Ruba has diabetes and hyperlipidemia, and is very concerned about her cardiovascular health. She is followed in the cardiovascular  risk clinic, and evaluations about a year and a half ago were unremarkable. She recently requested evaluation of her lipoprotein A level.    PMI:  Medical-osteopenia (T = -2.8 ), type 1 diabetes, rheumatoid arthritis, osteoarthritis of the hands, PUD, trigeminal neuralgia, hyperlipidemia, mild CAD by cardiac CT, sciatica, GERD, posterior neck surgical incision infection  Surgical-cervical spine fusion surgery, multiple bilateral arthroscopic surgeries, bilateral TKA, bilateral bunionectomy, anterior cervical discectomy and C7-11 fusion, posterior decompressive cervical laminectomy  Injuries-none    SH:  Retired . Lives in [place] in the winter. 2 daughters. No tobacco, 1 EtOH daily.     FH:  Mother dead with spinal arthritis and DJD  Father dead with CAD and MI, AAA  Brother with CAD and early CABG  Daughter with celiac disease  Daughter has long COVID    PMSF history personally obtained and updated by me this visit.    ROS:  +cervical radiculopathy with left arm paresthesia and weakness  +intolerant of statins  Remainder of the 14 point ROS obtained and found negative    Physical Exam:  Constitutional: WD-[id number] cooperative  Eyes: nl EOM, sclera  ENT: nl external ears, nose, hearing, lips  Neck: no visible thyroid enlargement  MS: +Right wrist extension limited to 30 degrees, flexion to 45 degrees with 1+ styloid swelling  Skin: no alopecia, rash  Neuro: nl cranial nerve  Psych: nl judgement,  affect    Laboratory:    Component      Latest Ref Rng 6/26/2023  11:04 AM   WBC      4.0 - 11.0 10e3/uL 4.4    RBC Count      3.80 - 5.20 10e6/uL 4.33    Hemoglobin      11.7 - 15.7 g/dL 12.7    Hematocrit      35.0 - 47.0 % 39.7    MCV      78 - 100 fL 92    MCH      26.5 - 33.0 pg 29.3    MCHC      31.5 - 36.5 g/dL 32.0    RDW      10.0 - 15.0 % 13.2    Platelet Count      150 - 450 10e3/uL 258    % Neutrophils      % 54    % Lymphocytes      % 29    % Monocytes      % 12    % Eosinophils       % 4    % Basophils      % 1    % Immature Granulocytes      % 0    NRBCs per 100 WBC      <1 /100 0    Absolute Neutrophils      1.6 - 8.3 10e3/uL 2.4    Absolute Lymphocytes      0.8 - 5.3 10e3/uL 1.3    Absolute Monocytes      0.0 - 1.3 10e3/uL 0.5    Absolute Eosinophils      0.0 - 0.7 10e3/uL 0.2    Absolute Basophils      0.0 - 0.2 10e3/uL 0.0    Absolute Immature Granulocytes      <=0.4 10e3/uL 0.0    Absolute NRBCs      10e3/uL 0.0          Impression:    Seropositive rheumatoid arthritis-with history of multiple joint replacements and hand deformities but no erosive disease by xray. Recent failure of bDMARDS including orencia and upadacitinib, with recent leukopenia. Good tolerance of leflunomide and reasonable efficacy. Good tolerance of the leflunomide and I will continue this monotherapy for the time being to ensure stable WBC counts. I expect that this will not be sufficient to control symptoms, and consideration will be given to use of rituximab. Monitor the CRP    Osteoporosis-she is treated with twice yearly prolia since . Recent DEXA in 4-2023 but results are unavailable to me.    Long term use of immunosuppressive-with increased risk for leukopenia and GERD. Continue with every 2 month blood testing. We also agree that the benefits of continued immunosuppression outweigh the risks of COVID-19 infection. She is COVID vaccinated.    Plan:  Continue the leflunomide daily and discontinue the Rinvoq  Continue the celebrex 200 mg daily--take omeprazole daily if this bothers your stomach  Get laboratory testing in about 1 month, and then every 2 months thereafter  Follow up within October in person    A total of 30 minutes was spent in face to face patient interaction and chart review on the day of service.      Asim Aly MD

## 2023-06-29 ENCOUNTER — TELEPHONE (OUTPATIENT)
Dept: RHEUMATOLOGY | Facility: CLINIC | Age: 68
End: 2023-06-29
Payer: COMMERCIAL

## 2023-06-29 NOTE — TELEPHONE ENCOUNTER
Called pt to scheduled follow up and lab appt.   Follow up scheduled for 10/31/2023. Pt states she will schedule lab appts later.

## 2023-08-16 ENCOUNTER — LAB (OUTPATIENT)
Dept: LAB | Facility: CLINIC | Age: 68
End: 2023-08-16
Payer: COMMERCIAL

## 2023-08-16 DIAGNOSIS — Z79.60 LONG-TERM USE OF IMMUNOSUPPRESSANT MEDICATION: ICD-10-CM

## 2023-08-16 DIAGNOSIS — M85.88 OSTEOPENIA OF OTHER SITE: ICD-10-CM

## 2023-08-16 DIAGNOSIS — M05.741 RHEUMATOID ARTHRITIS INVOLVING BOTH HANDS WITH POSITIVE RHEUMATOID FACTOR (H): ICD-10-CM

## 2023-08-16 DIAGNOSIS — K21.00 GASTROESOPHAGEAL REFLUX DISEASE WITH ESOPHAGITIS, UNSPECIFIED WHETHER HEMORRHAGE: ICD-10-CM

## 2023-08-16 DIAGNOSIS — K21.9 GASTROESOPHAGEAL REFLUX DISEASE WITHOUT ESOPHAGITIS: ICD-10-CM

## 2023-08-16 DIAGNOSIS — M81.0 SENILE OSTEOPOROSIS: ICD-10-CM

## 2023-08-16 DIAGNOSIS — M05.742 RHEUMATOID ARTHRITIS INVOLVING BOTH HANDS WITH POSITIVE RHEUMATOID FACTOR (H): ICD-10-CM

## 2023-08-16 LAB
ALBUMIN SERPL BCG-MCNC: 4.6 G/DL (ref 3.5–5.2)
ALT SERPL W P-5'-P-CCNC: 15 U/L (ref 0–50)
APO A-I SERPL-MCNC: <6 MG/DL
AST SERPL W P-5'-P-CCNC: 26 U/L (ref 0–45)
BASOPHILS # BLD AUTO: 0 10E3/UL (ref 0–0.2)
BASOPHILS NFR BLD AUTO: 0 %
CREAT SERPL-MCNC: 0.67 MG/DL (ref 0.51–0.95)
CRP SERPL-MCNC: <3 MG/L
EOSINOPHIL # BLD AUTO: 0.2 10E3/UL (ref 0–0.7)
EOSINOPHIL NFR BLD AUTO: 4 %
ERYTHROCYTE [DISTWIDTH] IN BLOOD BY AUTOMATED COUNT: 12.8 % (ref 10–15)
GFR SERPL CREATININE-BSD FRML MDRD: >90 ML/MIN/1.73M2
HCT VFR BLD AUTO: 40.7 % (ref 35–47)
HGB BLD-MCNC: 12.9 G/DL (ref 11.7–15.7)
IMM GRANULOCYTES # BLD: 0 10E3/UL
IMM GRANULOCYTES NFR BLD: 0 %
LYMPHOCYTES # BLD AUTO: 1.6 10E3/UL (ref 0.8–5.3)
LYMPHOCYTES NFR BLD AUTO: 31 %
MCH RBC QN AUTO: 29.1 PG (ref 26.5–33)
MCHC RBC AUTO-ENTMCNC: 31.7 G/DL (ref 31.5–36.5)
MCV RBC AUTO: 92 FL (ref 78–100)
MONOCYTES # BLD AUTO: 0.5 10E3/UL (ref 0–1.3)
MONOCYTES NFR BLD AUTO: 10 %
NEUTROPHILS # BLD AUTO: 2.8 10E3/UL (ref 1.6–8.3)
NEUTROPHILS NFR BLD AUTO: 54 %
PLATELET # BLD AUTO: 234 10E3/UL (ref 150–450)
RBC # BLD AUTO: 4.43 10E6/UL (ref 3.8–5.2)
WBC # BLD AUTO: 5.1 10E3/UL (ref 4–11)

## 2023-08-16 PROCEDURE — 84450 TRANSFERASE (AST) (SGOT): CPT

## 2023-08-16 PROCEDURE — 84460 ALANINE AMINO (ALT) (SGPT): CPT

## 2023-08-16 PROCEDURE — 83695 ASSAY OF LIPOPROTEIN(A): CPT

## 2023-08-16 PROCEDURE — 82040 ASSAY OF SERUM ALBUMIN: CPT

## 2023-08-16 PROCEDURE — 86140 C-REACTIVE PROTEIN: CPT

## 2023-08-16 PROCEDURE — 36415 COLL VENOUS BLD VENIPUNCTURE: CPT

## 2023-08-16 PROCEDURE — 85025 COMPLETE CBC W/AUTO DIFF WBC: CPT

## 2023-08-16 PROCEDURE — 82565 ASSAY OF CREATININE: CPT

## 2023-09-20 ENCOUNTER — TELEPHONE (OUTPATIENT)
Dept: PHARMACY | Facility: OTHER | Age: 68
End: 2023-09-20
Payer: COMMERCIAL

## 2023-10-06 ENCOUNTER — TELEPHONE (OUTPATIENT)
Dept: PHARMACY | Facility: OTHER | Age: 68
End: 2023-10-06
Payer: COMMERCIAL

## 2023-10-06 NOTE — TELEPHONE ENCOUNTER
Patient was referred for an MTM appointment by their insurance plan.  Called patient to offer MTM appointment and she declined. Stated that she is getting the support she needs from her providers and no concerns. She asked out opt out of all MTM services.    Stefany Elizondo, Pharm.D.  Medication Therapy Management Pharmacist  [facility]

## 2023-10-09 NOTE — PROGRESS NOTES
"Patient is showing 4/5 MNCM met. BP out range  Outcome for 10/09/23 11:36 AM: Data obtained via Dexcom and Tandem website  Trudi Ba CMA    This 68  year-old woman was seen for follow-up of her type 1 diabetes.  I made the diagnosis in 2008 when she presented with hyperglycemia in the absence of DKA.  She was seen and examined by me with med student Leola Juárez.  Please see her note of the same day for full details.    She currently manages her diabetes with a tandem control IQ pump and the dexcom G6.     She sees Walt Aly for her RA and he is managing her prolia treatment for osteoporosis.                           Current Outpatient Medications   Medication    amitriptyline (ELAVIL) 10 MG tablet    blood glucose (NO BRAND SPECIFIED) test strip    celecoxib (CELEBREX) 200 MG capsule    Continuous Blood Gluc Sensor (DEXCOM G6 SENSOR) MISC    Continuous Blood Gluc Transmit (DEXCOM G6 TRANSMITTER) MISC    Glucagon (BAQSIMI ONE PACK) 3 MG/DOSE POWD    HUMALOG KWIKPEN 100 UNIT/ML soln    insulin glargine (BASAGLAR KWIKPEN) 100 UNIT/ML pen    INSULIN INFUSION PUMP    insulin lispro (HUMALOG KWIKPEN) 100 UNIT/ML (1 unit dial) KWIKPEN    insulin lispro (HUMALOG VIAL) 100 UNIT/ML vial    insulin pen needle (B-D U/F) 31G X 5 MM miscellaneous    Insulin Pen Needle (PEN NEEDLES 3/16\") 31G X 5 MM MISC    insulin syringes, disposable, U-100 0.3 ML MISC    leflunomide (ARAVA) 20 MG tablet    melatonin 5 MG tablet    rosuvastatin (CRESTOR) 10 MG tablet    zolpidem (AMBIEN) 10 MG tablet    FARRAH CONTOUR test strip    omeprazole (PRILOSEC) 20 MG DR capsule     No current facility-administered medications for this visit.        /70 (BP Location: Right arm, Patient Position: Sitting, Cuff Size: Adult Regular)   Pulse 68   Ht 1.676 m (5' 6\")   Wt 54.4 kg (120 lb)   BMI 19.37 kg/m       VSS  NAD  Eyes - no periorbital edema, conjunctival injection, scleral icterus  Neck - no thyromegaly  CV - RRR.  Normal pulses " in feet.  No edema  Neuro - sensation intact to monofilament on soles of feet.  DTR 2/4 biceps  Skin - normal texture   Feet - no ulcers     Recent Labs   Lab Test 10/10/23  1540 10/10/23  1439 08/16/23  0948 06/23/23  1031 09/28/22  0925 06/09/22  1337 10/08/21  1318 10/13/20  0603 09/15/20  1110 12/05/19  1129 11/05/19  0000   A1C  --   --   --   --   --  6.9* 6.8*   < > 7.2*  --   --    HEMOGLOBINA1  --  7.2  --   --   --   --   --   --   --   --  7.1*   TSH 1.97  --   --   --   --   --   --   --   --   --   --    LDL  --   --   --  43 73  --   --   --  81  --   --    HDL  --   --   --  67 90  --   --   --  75  --   --    TRIG  --   --   --  64 56  --   --   --  34  --   --    CR 0.66  --  0.67  --  0.74 0.68 0.71   < > 0.70   < >  --    MICROL  --   --   --   --   --  8  --   --  <5  --   --     < > = values in this interval not displayed.       Assessment and plan:    1.  Diabetes control.  Doing very well.  Reminded her to be sure she is not in exercise mode at sleep.    2.  Diabetes complications. Will get a UAE today.  Eyes, feet, Cr are OK.    3.  Thyroid disease.  Because she feels more fatigued, will check her TFTs.    4.  CVD.  On statin.  BP OK.    Follow up in June 2024 (in [place] all winter.)    Veronica Bills MD

## 2023-10-10 ENCOUNTER — OFFICE VISIT (OUTPATIENT)
Dept: ENDOCRINOLOGY | Facility: CLINIC | Age: 68
End: 2023-10-10
Payer: COMMERCIAL

## 2023-10-10 ENCOUNTER — LAB (OUTPATIENT)
Dept: LAB | Facility: CLINIC | Age: 68
End: 2023-10-10
Payer: COMMERCIAL

## 2023-10-10 VITALS
DIASTOLIC BLOOD PRESSURE: 70 MMHG | SYSTOLIC BLOOD PRESSURE: 118 MMHG | HEART RATE: 68 BPM | WEIGHT: 120 LBS | BODY MASS INDEX: 19.29 KG/M2 | HEIGHT: 66 IN

## 2023-10-10 DIAGNOSIS — E10.9 TYPE 1 DIABETES MELLITUS WITHOUT COMPLICATION (H): Primary | ICD-10-CM

## 2023-10-10 DIAGNOSIS — E10.9 TYPE 1 DIABETES MELLITUS WITHOUT COMPLICATION (H): ICD-10-CM

## 2023-10-10 DIAGNOSIS — M85.88 OSTEOPENIA OF OTHER SITE: ICD-10-CM

## 2023-10-10 DIAGNOSIS — M05.741 RHEUMATOID ARTHRITIS INVOLVING BOTH HANDS WITH POSITIVE RHEUMATOID FACTOR (H): ICD-10-CM

## 2023-10-10 DIAGNOSIS — Z79.60 LONG-TERM USE OF IMMUNOSUPPRESSANT MEDICATION: ICD-10-CM

## 2023-10-10 DIAGNOSIS — M05.742 RHEUMATOID ARTHRITIS INVOLVING BOTH HANDS WITH POSITIVE RHEUMATOID FACTOR (H): ICD-10-CM

## 2023-10-10 LAB
ALBUMIN SERPL BCG-MCNC: 4.5 G/DL (ref 3.5–5.2)
ALBUMIN UR-MCNC: NEGATIVE MG/DL
ALT SERPL W P-5'-P-CCNC: 11 U/L (ref 0–50)
APPEARANCE UR: CLEAR
AST SERPL W P-5'-P-CCNC: 17 U/L (ref 0–45)
BASO+EOS+MONOS # BLD AUTO: NORMAL 10*3/UL
BASO+EOS+MONOS NFR BLD AUTO: NORMAL %
BASOPHILS # BLD AUTO: 0 10E3/UL (ref 0–0.2)
BASOPHILS NFR BLD AUTO: 1 %
BILIRUB UR QL STRIP: NEGATIVE
COLOR UR AUTO: ABNORMAL
CREAT SERPL-MCNC: 0.66 MG/DL (ref 0.51–0.95)
CREAT UR-MCNC: 89.1 MG/DL
CRP SERPL-MCNC: <3 MG/L
EGFRCR SERPLBLD CKD-EPI 2021: >90 ML/MIN/1.73M2
EOSINOPHIL # BLD AUTO: 0.2 10E3/UL (ref 0–0.7)
EOSINOPHIL NFR BLD AUTO: 5 %
ERYTHROCYTE [DISTWIDTH] IN BLOOD BY AUTOMATED COUNT: 13.4 % (ref 10–15)
GLUCOSE UR STRIP-MCNC: NEGATIVE MG/DL
HBA1C MFR BLD: 7.2 % (ref 4.3–?)
HCT VFR BLD AUTO: 38.7 % (ref 35–47)
HGB BLD-MCNC: 12.4 G/DL (ref 11.7–15.7)
HGB UR QL STRIP: NEGATIVE
IMM GRANULOCYTES # BLD: 0 10E3/UL
IMM GRANULOCYTES NFR BLD: 0 %
KETONES UR STRIP-MCNC: NEGATIVE MG/DL
LEUKOCYTE ESTERASE UR QL STRIP: NEGATIVE
LYMPHOCYTES # BLD AUTO: 1.6 10E3/UL (ref 0.8–5.3)
LYMPHOCYTES NFR BLD AUTO: 39 %
MCH RBC QN AUTO: 28.6 PG (ref 26.5–33)
MCHC RBC AUTO-ENTMCNC: 32 G/DL (ref 31.5–36.5)
MCV RBC AUTO: 89 FL (ref 78–100)
MICROALBUMIN UR-MCNC: <12 MG/L
MICROALBUMIN/CREAT UR: NORMAL MG/G{CREAT}
MONOCYTES # BLD AUTO: 0.4 10E3/UL (ref 0–1.3)
MONOCYTES NFR BLD AUTO: 10 %
MUCOUS THREADS #/AREA URNS LPF: PRESENT /LPF
NEUTROPHILS # BLD AUTO: 1.9 10E3/UL (ref 1.6–8.3)
NEUTROPHILS NFR BLD AUTO: 45 %
NITRATE UR QL: NEGATIVE
NRBC # BLD AUTO: 0 10E3/UL
NRBC BLD AUTO-RTO: 0 /100
PH UR STRIP: 6.5 [PH] (ref 5–7)
PLATELET # BLD AUTO: 230 10E3/UL (ref 150–450)
RBC # BLD AUTO: 4.34 10E6/UL (ref 3.8–5.2)
RBC URINE: 2 /HPF
SP GR UR STRIP: 1.02 (ref 1–1.03)
SQUAMOUS EPITHELIAL: 1 /HPF
TSH SERPL DL<=0.005 MIU/L-ACNC: 1.97 UIU/ML (ref 0.3–4.2)
UROBILINOGEN UR STRIP-MCNC: NORMAL MG/DL
WBC # BLD AUTO: 4.2 10E3/UL (ref 4–11)
WBC URINE: 1 /HPF

## 2023-10-10 PROCEDURE — 81001 URINALYSIS AUTO W/SCOPE: CPT | Performed by: PATHOLOGY

## 2023-10-10 PROCEDURE — 99000 SPECIMEN HANDLING OFFICE-LAB: CPT | Performed by: PATHOLOGY

## 2023-10-10 PROCEDURE — 84450 TRANSFERASE (AST) (SGOT): CPT | Performed by: PATHOLOGY

## 2023-10-10 PROCEDURE — 82570 ASSAY OF URINE CREATININE: CPT | Performed by: INTERNAL MEDICINE

## 2023-10-10 PROCEDURE — 82565 ASSAY OF CREATININE: CPT | Performed by: PATHOLOGY

## 2023-10-10 PROCEDURE — 86140 C-REACTIVE PROTEIN: CPT | Performed by: PATHOLOGY

## 2023-10-10 PROCEDURE — 83036 HEMOGLOBIN GLYCOSYLATED A1C: CPT | Performed by: INTERNAL MEDICINE

## 2023-10-10 PROCEDURE — 84460 ALANINE AMINO (ALT) (SGPT): CPT | Performed by: PATHOLOGY

## 2023-10-10 PROCEDURE — 82040 ASSAY OF SERUM ALBUMIN: CPT | Performed by: PATHOLOGY

## 2023-10-10 PROCEDURE — 99214 OFFICE O/P EST MOD 30 MIN: CPT | Performed by: INTERNAL MEDICINE

## 2023-10-10 PROCEDURE — 36415 COLL VENOUS BLD VENIPUNCTURE: CPT | Performed by: PATHOLOGY

## 2023-10-10 PROCEDURE — 84443 ASSAY THYROID STIM HORMONE: CPT | Performed by: PATHOLOGY

## 2023-10-10 PROCEDURE — 85025 COMPLETE CBC W/AUTO DIFF WBC: CPT | Performed by: PATHOLOGY

## 2023-10-10 ASSESSMENT — PAIN SCALES - GENERAL: PAINLEVEL: NO PAIN (0)

## 2023-10-10 NOTE — NURSING NOTE
"Chief Complaint   Patient presents with    Diabetes     Vital signs:      BP: 118/70 Pulse: 68           Height: 167.6 cm (5' 6\") Weight: 54.4 kg (120 lb) (patient stated)  Estimated body mass index is 19.37 kg/m  as calculated from the following:    Height as of this encounter: 1.676 m (5' 6\").    Weight as of this encounter: 54.4 kg (120 lb).        "

## 2023-10-10 NOTE — LETTER
"10/10/2023       RE: Ruba Major  [address]  [facility] [address]     Dear Colleague,    Thank you for referring your patient, Ruba Major, to the [facility] ENDOCRINOLOGY CLINIC [place] at [facility]. Please see a copy of my visit note below.    Patient is showing 4/5 MNCM met. BP out range  Outcome for 10/09/23 11:36 AM: Data obtained via Dexcom and Tandem website  Trudi Ba CMA    This 68  year-old woman was seen for follow-up of her type 1 diabetes.  I made the diagnosis in 2008 when she presented with hyperglycemia in the absence of DKA.  She was seen and examined by me with med student Leola Juárez.  Please see her note of the same day for full details.    She currently manages her diabetes with a tandem control IQ pump and the dexcom G6.     She sees Walt Aly for her RA and he is managing her prolia treatment for osteoporosis.                           Current Outpatient Medications   Medication    amitriptyline (ELAVIL) 10 MG tablet    blood glucose (NO BRAND SPECIFIED) test strip    celecoxib (CELEBREX) 200 MG capsule    Continuous Blood Gluc Sensor (DEXCOM G6 SENSOR) MISC    Continuous Blood Gluc Transmit (DEXCOM G6 TRANSMITTER) MISC    Glucagon (BAQSIMI ONE PACK) 3 MG/DOSE POWD    HUMALOG KWIKPEN 100 UNIT/ML soln    insulin glargine (BASAGLAR KWIKPEN) 100 UNIT/ML pen    INSULIN INFUSION PUMP    insulin lispro (HUMALOG KWIKPEN) 100 UNIT/ML (1 unit dial) KWIKPEN    insulin lispro (HUMALOG VIAL) 100 UNIT/ML vial    insulin pen needle (B-D U/F) 31G X 5 MM miscellaneous    Insulin Pen Needle (PEN NEEDLES 3/16\") 31G X 5 MM MISC    insulin syringes, disposable, U-100 0.3 ML MISC    leflunomide (ARAVA) 20 MG tablet    melatonin 5 MG tablet    rosuvastatin (CRESTOR) 10 MG tablet    zolpidem (AMBIEN) 10 MG tablet    FARRAH CONTOUR test strip    omeprazole (PRILOSEC) 20 MG DR capsule     No current facility-administered " "medications for this visit.        /70 (BP Location: Right arm, Patient Position: Sitting, Cuff Size: Adult Regular)   Pulse 68   Ht 1.676 m (5' 6\")   Wt 54.4 kg (120 lb)   BMI 19.37 kg/m       VSS  NAD  Eyes - no periorbital edema, conjunctival injection, scleral icterus  Neck - no thyromegaly  CV - RRR.  Normal pulses in feet.  No edema  Neuro - sensation intact to monofilament on soles of feet.  DTR 2/4 biceps  Skin - normal texture   Feet - no ulcers     Recent Labs   Lab Test 10/10/23  1540 10/10/23  1439 08/16/23  0948 06/23/23  1031 09/28/22  0925 06/09/22  1337 10/08/21  1318 10/13/20  0603 09/15/20  1110 12/05/19  1129 11/05/19  0000   A1C  --   --   --   --   --  6.9* 6.8*   < > 7.2*  --   --    HEMOGLOBINA1  --  7.2  --   --   --   --   --   --   --   --  7.1*   TSH 1.97  --   --   --   --   --   --   --   --   --   --    LDL  --   --   --  43 73  --   --   --  81  --   --    HDL  --   --   --  67 90  --   --   --  75  --   --    TRIG  --   --   --  64 56  --   --   --  34  --   --    CR 0.66  --  0.67  --  0.74 0.68 0.71   < > 0.70   < >  --    MICROL  --   --   --   --   --  8  --   --  <5  --   --     < > = values in this interval not displayed.       Assessment and plan:    1.  Diabetes control.  Doing very well.  Reminded her to be sure she is not in exercise mode at sleep.    2.  Diabetes complications. Will get a UAE today.  Eyes, feet, Cr are OK.    3.  Thyroid disease.  Because she feels more fatigued, will check her TFTs.    4.  CVD.  On statin.  BP OK.    Follow up in June 2024 (in [place] all winter.)    Veronica Bills MD          [facility]    Subjective:   Ruba Major is a 68 year old female who was seen today for follow up of type 1 diabetes mellitus. Past medical history is significant for rheumatoid arthritis.     On interview today, patient shares that blood glucose control has overall been good but has had to navigate managing high sugars in " the setting of steroid injections for her neck the last few months. She also notes that she is not as sensitive to knowing when she is low (before she could tell she felt low at 80 but now she doesn't feel low until 60). She notes that she has struggled with some lower blood glucose readings when she is playing golf even when she is in exercise mode. Patient shares that she likes to stay active with golfing and walking. She is looking forward to upgrading to the Dexcom G7 when it is available.    She shares that she has had a difficult past year due to a cervical spine surgery and skin rashes after rosanne COVID. With the rashes, she shared some potential concerns about her thyroid that could be added to the labs today as well.       Type 1 diabetes was diagnosed     Diabetes Care:  Eyes: Sees optometrist, reports eyes improving  Kidneys: On most recent labs, creatine was good. Need follow up microalbumin  Nerves: denies numbness and tingling in legs  Smoking: no  Blood Pressure: 118/70 today, on losartan  Lipids: on Crestor      Current treatment strategy: Tandem with control IQ and Dexcom G6      Current pump settings:   TIME BASAL RATES (units/hour) CORRECTION  (1 unit:_ mg/dl) CARB RATIO  (1 unit: _ g CHO) TARGET  (mg/dl)   midnight 0.400 55 12 110   7:00 AM 0.600 55 10 110   10:00 AM 0.900 55 10 110   4:00 PM 0.800 55 10 110   10:00 PM 0.400 55 12 110          TOTAL   25.32 units        Active insulin time: 5 hours   Average total daily insulin:  25.32 units         Blood Glucose Monitoring: Dexcom G6  CGMS Data: Last 14 days  Overall Average: 151 mg/dL  Standard Deviation: 44 mg/dL  Time in Range: 80 %  Above Range: 20 %  Hypoglycemia: 0 %  Overall Pattern: Some higher sugars overnight due to still being in exercise mode from earlier in the day    Meter downloaded and reviewed:  Average B with range 63.2   AM fasting BG average: 133   Pre-lunch average: 156   Pre-dinner average: 169   Pre-bed  average: 146  Hypoglycemia: None    There have been no recent symptomatic hypoglycemia.         Exercise: Golf and walks            11/2/2019    12:25 PM   including   1. Since your last visit to our clinic (or if this your first visit, since you last saw your primary care provider), have you experienced any of the following symptoms that may be related to low blood sugars? No, I have not experienced any of these symptoms   2. Since your last visit to our clinic (or if this your first visit, since you last saw your primary care provider), have you experienced any of the following symptoms that may be related to prolonged high blood sugars? No, I have not experienced any of these symptoms   4. Do you have any female family members who have had heart attacks or strokes before age 60 or male relatives who have had heart attacks or strokes before age 50? Yes   5. Do you have any family members who have had complications from diabetes such as kidney disease, heart disease or strokes, retinopathy (a vision problem), or amputations? No   6. Who do you live with?  spouse   Little interest or pleasure in doing things? Not at all   Feeling down, depressed, or hopeless? Not at all   10.  Are you considering a pregnancy or interested in discussing pregnancy prevention today? No          Medications:   Current Outpatient Medications   Medication Sig Dispense Refill    amitriptyline (ELAVIL) 10 MG tablet Take 20 mg by mouth At Bedtime       blood glucose (NO BRAND SPECIFIED) test strip FARRAH CONTOUR. Use to test blood sugar 9 times daily or as directed. 900 strip 2    celecoxib (CELEBREX) 200 MG capsule Take 1 capsule (200 mg) by mouth daily as needed for pain 90 capsule 3    Continuous Blood Gluc Sensor (DEXCOM G6 SENSOR) MISC 1 each every 10 days Use as directed for continuous glucose monitoring every 10 days. 9 each 4    Continuous Blood Gluc Transmit (DEXCOM G6 TRANSMITTER) MISC 1 each every 3 months Change every 90 days 1  "each 11    Glucagon (BAQSIMI ONE PACK) 3 MG/DOSE POWD Spray 3 mg in nostril See Admin Instructions USE ONLY FOR SEVERE HYPOGLYCEMIA. 1 each 3    HUMALOG KWIKPEN 100 UNIT/ML soln 1u:10 g carb + 1u:50mg/dl >150, approx 20 units daily. 30 mL 1    insulin glargine (BASAGLAR KWIKPEN) 100 UNIT/ML pen Inject 14 units subcutaneous daily ONLY IF INSULIN PUMP FAILS. 15 mL 1    INSULIN INFUSION PUMP       insulin lispro (HUMALOG KWIKPEN) 100 UNIT/ML (1 unit dial) KWIKPEN Inject 1-8 Units Subcutaneous 4 times daily (before meals and nightly) As instructed (roughly 1 unit: 8 g carbohydrate) incase of pump failure. 3 mL 3    insulin lispro (HUMALOG VIAL) 100 UNIT/ML vial USE WITH INSULIN PUMP TO INJECT 65 UNITS SUBCUTANEOUSLY DAILY 70 mL 3    insulin pen needle (B-D U/F) 31G X 5 MM miscellaneous Inject 1 Units Subcutaneous 4 times daily (before meals and nightly) 100 each 1    Insulin Pen Needle (PEN NEEDLES 3/16\") 31G X 5 MM MISC 1 Units 5 times daily 150 each 1    insulin syringes, disposable, U-100 0.3 ML MISC Use for insulin ONLY IF INSULIN PUMP FAILS. 25 each 1    leflunomide (ARAVA) 20 MG tablet Take 1 tablet (20 mg) by mouth daily Labs every 8-12 weeks 90 tablet 1    melatonin 5 MG tablet Take 5 mg by mouth nightly as needed for sleep      rosuvastatin (CRESTOR) 10 MG tablet Take 10 mg by mouth daily      zolpidem (AMBIEN) 10 MG tablet Take 10 mg by mouth nightly as needed.      FARRAH CONTOUR test strip Use to test blood sugar 9 times daily or as directed. (Patient not taking: Reported on 6/27/2023) 900 strip 1    omeprazole (PRILOSEC) 20 MG DR capsule Take 1 capsule (20 mg) by mouth daily (Patient not taking: Reported on 6/27/2023) 90 capsule 3       Family history - 1st cousin and 1st cousin's daughter both also have type 1 diabetes    Social History:  Going to [place] for the winter  Social History     Tobacco Use    Smoking status: Never    Smokeless tobacco: Never   Substance Use Topics    Alcohol use: Yes     " "Comment: Alcoholic Drinks/day: 1 beverage/day       Review of Systems:   A comprehensive ROS was negative except as noted in HPI.     Physical Examination:  Blood pressure 118/70, pulse 68, height 1.676 m (5' 6\"), weight 54.4 kg (120 lb).      Body mass index is 19.37 kg/m .    Wt Readings from Last 4 Encounters:   10/10/23 54.4 kg (120 lb)   06/27/23 59 kg (130 lb)   09/09/21 61.7 kg (136 lb)   10/12/20 60.4 kg (133 lb 3 oz)       BP Readings from Last 3 Encounters:   10/10/23 118/70   06/26/23 (!) 146/83   10/26/22 122/72       General: Well appearing and in no distress.   Eyes: Sclerae and conjunctivae are clear.    HENT: No thyromegaly or mass.    Lymphatic: No cervical lymphadenopathy.  Cardiovascular: 2+ PT and DP pulses, equal bilaterally.   Extremities: No peripheral edema. Feet are warm and well perfused. No lesions or ulcers.   Neurologic: Intact sensation to monofilament in the feet bilaterally.     Labs and Studies:   Lab Results   Component Value Date    A1C 6.9 (H) 06/09/2022    A1C 6.8 (H) 10/08/2021    A1C 7.2 (H) 10/13/2020    A1C 7.2 (H) 09/15/2020    A1C 8.2 (H) 05/23/2013    A1C 9.9 (H) 08/28/2008    HEMOGLOBINA1 7.2 10/10/2023    HEMOGLOBINA1 7.1 (A) 11/05/2019    HEMOGLOBINA1 6.7 (A) 05/14/2019    HEMOGLOBINA1 7.0 (A) 10/30/2018    HEMOGLOBINA1 8.0 (A) 05/07/2018       Creatinine   Date Value Ref Range Status   08/16/2023 0.67 0.51 - 0.95 mg/dL Final   06/03/2021 0.70 0.52 - 1.04 mg/dL Final       Ruba Major is a 68 year old female who was seen today for follow up of type 1 diabetes mellitus. Past medical history is significant for rheumatoid arthritis.     Assessment:  1. Type 1 diabetes control - Patient's blood sugars are well controlled despite working against the highs caused by steroid injections with her A1C at 7.2 and being in blood glucose target range 80% of the time. For further management to avoid lows when she is exercising, we discussed setting the pump to exercise mode 60 min " before she exercises. Discussed continuing to be conscious that the pump is not at exercise mode at bedtime so that she does not run high overnight and to trust the pump to keep her in target overnight.     2. Diabetes complications - Screening complete, there are none. Will do follow-up microalbumin lab today to access kidneys    3. CVD risk - Currently on Crestor and losartan with good blood pressure control    4. Thyroid concerns - Patient shared concerns about her thyroid due to the rash she had after having COVID. Upon physical exam today, no thyromegaly or other concerns were noted. A follow-up TSH and T4 has been ordered to further evaluate any concerns.       Plan:   - Follow-up in June 2024  - TSH with T4 reflux for labs  - Albumin lab   - Exercise mode settings - set 60 min before being active and make sure to turn off before bedtime    Joy Juárez, MS3  Medical Student     Veronica Bills MD

## 2023-10-10 NOTE — PATIENT INSTRUCTIONS
Set the pump to exercise mode 60 min before you go golfing.    Trust that the pump will keep you at target overnight.  I don't think you need to have a sugar > 120 when you go to bed.    Be sure you are not in exercise mode at bedtime.

## 2023-10-10 NOTE — PROGRESS NOTES
[facility]    Subjective:   Ruba Major is a 68 year old female who was seen today for follow up of type 1 diabetes mellitus. Past medical history is significant for rheumatoid arthritis.     On interview today, patient shares that blood glucose control has overall been good but has had to navigate managing high sugars in the setting of steroid injections for her neck the last few months. She also notes that she is not as sensitive to knowing when she is low (before she could tell she felt low at 80 but now she doesn't feel low until 60). She notes that she has struggled with some lower blood glucose readings when she is playing golf even when she is in exercise mode. Patient shares that she likes to stay active with golfing and walking. She is looking forward to upgrading to the Dexcom G7 when it is available.    She shares that she has had a difficult past year due to a cervical spine surgery and skin rashes after rosanne COVID. With the rashes, she shared some potential concerns about her thyroid that could be added to the labs today as well.       Type 1 diabetes was diagnosed 2008    Diabetes Care:  Eyes: Sees optometrist, reports eyes improving  Kidneys: On most recent labs, creatine was good. Need follow up microalbumin  Nerves: denies numbness and tingling in legs  Smoking: no  Blood Pressure: 118/70 today, on losartan  Lipids: on Crestor      Current treatment strategy: Tandem with control IQ and Dexcom G6      Current pump settings:   TIME BASAL RATES (units/hour) CORRECTION  (1 unit:_ mg/dl) CARB RATIO  (1 unit: _ g CHO) TARGET  (mg/dl)   midnight 0.400 55 12 110   7:00 AM 0.600 55 10 110   10:00 AM 0.900 55 10 110   4:00 PM 0.800 55 10 110   10:00 PM 0.400 55 12 110          TOTAL   25.32 units        Active insulin time: 5 hours   Average total daily insulin:  25.32 units         Blood Glucose Monitoring: Dexcom G6  CGMS Data: Last 14 days  Overall Average: 151  mg/dL  Standard Deviation: 44 mg/dL  Time in Range: 80 %  Above Range: 20 %  Hypoglycemia: 0 %  Overall Pattern: Some higher sugars overnight due to still being in exercise mode from earlier in the day    Meter downloaded and reviewed:  Average B with range 63.2   AM fasting BG average: 133   Pre-lunch average: 156   Pre-dinner average: 169   Pre-bed average: 146  Hypoglycemia: None    There have been no recent symptomatic hypoglycemia.         Exercise: Golf and walks            2019    12:25 PM   including   1. Since your last visit to our clinic (or if this your first visit, since you last saw your primary care provider), have you experienced any of the following symptoms that may be related to low blood sugars? No, I have not experienced any of these symptoms   2. Since your last visit to our clinic (or if this your first visit, since you last saw your primary care provider), have you experienced any of the following symptoms that may be related to prolonged high blood sugars? No, I have not experienced any of these symptoms   4. Do you have any female family members who have had heart attacks or strokes before age 60 or male relatives who have had heart attacks or strokes before age 50? Yes   5. Do you have any family members who have had complications from diabetes such as kidney disease, heart disease or strokes, retinopathy (a vision problem), or amputations? No   6. Who do you live with?  spouse   Little interest or pleasure in doing things? Not at all   Feeling down, depressed, or hopeless? Not at all   10.  Are you considering a pregnancy or interested in discussing pregnancy prevention today? No          Medications:   Current Outpatient Medications   Medication Sig Dispense Refill    amitriptyline (ELAVIL) 10 MG tablet Take 20 mg by mouth At Bedtime       blood glucose (NO BRAND SPECIFIED) test strip FARRAH CONTOUR. Use to test blood sugar 9 times daily or as directed. 900 strip 2    celecoxib  "(CELEBREX) 200 MG capsule Take 1 capsule (200 mg) by mouth daily as needed for pain 90 capsule 3    Continuous Blood Gluc Sensor (DEXCOM G6 SENSOR) MISC 1 each every 10 days Use as directed for continuous glucose monitoring every 10 days. 9 each 4    Continuous Blood Gluc Transmit (DEXCOM G6 TRANSMITTER) MISC 1 each every 3 months Change every 90 days 1 each 11    Glucagon (BAQSIMI ONE PACK) 3 MG/DOSE POWD Spray 3 mg in nostril See Admin Instructions USE ONLY FOR SEVERE HYPOGLYCEMIA. 1 each 3    HUMALOG KWIKPEN 100 UNIT/ML soln 1u:10 g carb + 1u:50mg/dl >150, approx 20 units daily. 30 mL 1    insulin glargine (BASAGLAR KWIKPEN) 100 UNIT/ML pen Inject 14 units subcutaneous daily ONLY IF INSULIN PUMP FAILS. 15 mL 1    INSULIN INFUSION PUMP       insulin lispro (HUMALOG KWIKPEN) 100 UNIT/ML (1 unit dial) KWIKPEN Inject 1-8 Units Subcutaneous 4 times daily (before meals and nightly) As instructed (roughly 1 unit: 8 g carbohydrate) incase of pump failure. 3 mL 3    insulin lispro (HUMALOG VIAL) 100 UNIT/ML vial USE WITH INSULIN PUMP TO INJECT 65 UNITS SUBCUTANEOUSLY DAILY 70 mL 3    insulin pen needle (B-D U/F) 31G X 5 MM miscellaneous Inject 1 Units Subcutaneous 4 times daily (before meals and nightly) 100 each 1    Insulin Pen Needle (PEN NEEDLES 3/16\") 31G X 5 MM MISC 1 Units 5 times daily 150 each 1    insulin syringes, disposable, U-100 0.3 ML MISC Use for insulin ONLY IF INSULIN PUMP FAILS. 25 each 1    leflunomide (ARAVA) 20 MG tablet Take 1 tablet (20 mg) by mouth daily Labs every 8-12 weeks 90 tablet 1    melatonin 5 MG tablet Take 5 mg by mouth nightly as needed for sleep      rosuvastatin (CRESTOR) 10 MG tablet Take 10 mg by mouth daily      zolpidem (AMBIEN) 10 MG tablet Take 10 mg by mouth nightly as needed.      FARRAH CONTOUR test strip Use to test blood sugar 9 times daily or as directed. (Patient not taking: Reported on 6/27/2023) 900 strip 1    omeprazole (PRILOSEC) 20 MG DR capsule Take 1 capsule (20 " "mg) by mouth daily (Patient not taking: Reported on 6/27/2023) 90 capsule 3       Family history - 1st cousin and 1st cousin's daughter both also have type 1 diabetes    Social History:  Going to [place] for the winter  Social History     Tobacco Use    Smoking status: Never    Smokeless tobacco: Never   Substance Use Topics    Alcohol use: Yes     Comment: Alcoholic Drinks/day: 1 beverage/day       Review of Systems:   A comprehensive ROS was negative except as noted in HPI.     Physical Examination:  Blood pressure 118/70, pulse 68, height 1.676 m (5' 6\"), weight 54.4 kg (120 lb).      Body mass index is 19.37 kg/m .    Wt Readings from Last 4 Encounters:   10/10/23 54.4 kg (120 lb)   06/27/23 59 kg (130 lb)   09/09/21 61.7 kg (136 lb)   10/12/20 60.4 kg (133 lb 3 oz)       BP Readings from Last 3 Encounters:   10/10/23 118/70   06/26/23 (!) 146/83   10/26/22 122/72       General: Well appearing and in no distress.   Eyes: Sclerae and conjunctivae are clear.    HENT: No thyromegaly or mass.    Lymphatic: No cervical lymphadenopathy.  Cardiovascular: 2+ PT and DP pulses, equal bilaterally.   Extremities: No peripheral edema. Feet are warm and well perfused. No lesions or ulcers.   Neurologic: Intact sensation to monofilament in the feet bilaterally.     Labs and Studies:   Lab Results   Component Value Date    A1C 6.9 (H) 06/09/2022    A1C 6.8 (H) 10/08/2021    A1C 7.2 (H) 10/13/2020    A1C 7.2 (H) 09/15/2020    A1C 8.2 (H) 05/23/2013    A1C 9.9 (H) 08/28/2008    HEMOGLOBINA1 7.2 10/10/2023    HEMOGLOBINA1 7.1 (A) 11/05/2019    HEMOGLOBINA1 6.7 (A) 05/14/2019    HEMOGLOBINA1 7.0 (A) 10/30/2018    HEMOGLOBINA1 8.0 (A) 05/07/2018       Creatinine   Date Value Ref Range Status   08/16/2023 0.67 0.51 - 0.95 mg/dL Final   06/03/2021 0.70 0.52 - 1.04 mg/dL Final       Ruba Major is a 68 year old female who was seen today for follow up of type 1 diabetes mellitus. Past medical history is significant for rheumatoid " arthritis.     Assessment:  1. Type 1 diabetes control - Patient's blood sugars are well controlled despite working against the highs caused by steroid injections with her A1C at 7.2 and being in blood glucose target range 80% of the time. For further management to avoid lows when she is exercising, we discussed setting the pump to exercise mode 60 min before she exercises. Discussed continuing to be conscious that the pump is not at exercise mode at bedtime so that she does not run high overnight and to trust the pump to keep her in target overnight.     2. Diabetes complications - Screening complete, there are none. Will do follow-up microalbumin lab today to access kidneys    3. CVD risk - Currently on Crestor and losartan with good blood pressure control    4. Thyroid concerns - Patient shared concerns about her thyroid due to the rash she had after having COVID. Upon physical exam today, no thyromegaly or other concerns were noted. A follow-up TSH and T4 has been ordered to further evaluate any concerns.       Plan:   - Follow-up in June 2024  - TSH with T4 reflux for labs  - Albumin lab   - Exercise mode settings - set 60 min before being active and make sure to turn off before bedtime    Joy Juárez, MS3  Medical Student

## 2023-10-30 ENCOUNTER — TELEPHONE (OUTPATIENT)
Dept: RHEUMATOLOGY | Facility: CLINIC | Age: 68
End: 2023-10-30
Payer: COMMERCIAL

## 2023-10-30 NOTE — TELEPHONE ENCOUNTER
"Writer called to inform patient her appointment tomorrow was scheduled incorrectly and will need to be switched to a video visit. Patient was very upset that this visit would have to be switched, she noted at her last virtual visit she informed provider she wanted to have a follow-up in clinic. She has not seen provider in clinic for 4 years now and does not want to \"put her hands up close to the camera\" for provider to look at her hands. After next week patient will be out of the state for the next 5 months and was hoping to be seen in clinic. She noted she has seen all of her other providers but unable to get in with her rheumatologist. Patient is super upset and might seek for a new provider. Writer apologized and informed her that she was scheduled incorrectly, provider will not be in clinic. She did not want to reschedule since she will have to wait another 5-6 months to be seen again. Patient repeated multiple times she is very upset, \"my  helps fund for the [facility] and this is not good care at all\". Appointment for tomorrow has been switched to a video visit and she will keep appointment.      NICHOLAS Upton  Rheumatology/Infectious disease  [facility]   Rheumatology ph:[phone]  Infectious Disease ph:[phone]    "

## 2023-10-30 NOTE — TELEPHONE ENCOUNTER
----- Message from Asim Aly MD sent at 10/30/2023 10:45 AM CDT -----  Regarding: Patient scheduling mistake  Monica,    I see that this patient is scheduled for me in person tomorrow, but I will not be in clinic--it is a remote day. Can someone call her to make this a virtual visit (or reschedule)?    Walt

## 2023-10-31 ENCOUNTER — VIRTUAL VISIT (OUTPATIENT)
Dept: RHEUMATOLOGY | Facility: CLINIC | Age: 68
End: 2023-10-31
Attending: INTERNAL MEDICINE
Payer: COMMERCIAL

## 2023-10-31 VITALS — WEIGHT: 130 LBS | BODY MASS INDEX: 20.89 KG/M2 | HEIGHT: 66 IN

## 2023-10-31 DIAGNOSIS — M85.88 OSTEOPENIA OF OTHER SITE: ICD-10-CM

## 2023-10-31 DIAGNOSIS — M81.0 SENILE OSTEOPOROSIS: ICD-10-CM

## 2023-10-31 DIAGNOSIS — M05.742 RHEUMATOID ARTHRITIS INVOLVING BOTH HANDS WITH POSITIVE RHEUMATOID FACTOR (H): ICD-10-CM

## 2023-10-31 DIAGNOSIS — K21.9 GASTROESOPHAGEAL REFLUX DISEASE WITHOUT ESOPHAGITIS: ICD-10-CM

## 2023-10-31 DIAGNOSIS — M05.741 RHEUMATOID ARTHRITIS INVOLVING BOTH HANDS WITH POSITIVE RHEUMATOID FACTOR (H): ICD-10-CM

## 2023-10-31 DIAGNOSIS — Z79.60 LONG-TERM USE OF IMMUNOSUPPRESSANT MEDICATION: Chronic | ICD-10-CM

## 2023-10-31 DIAGNOSIS — K21.00 GASTROESOPHAGEAL REFLUX DISEASE WITH ESOPHAGITIS, UNSPECIFIED WHETHER HEMORRHAGE: ICD-10-CM

## 2023-10-31 PROCEDURE — 99215 OFFICE O/P EST HI 40 MIN: CPT | Mod: VID | Performed by: INTERNAL MEDICINE

## 2023-10-31 RX ORDER — LEFLUNOMIDE 20 MG/1
20 TABLET ORAL DAILY
Qty: 90 TABLET | Refills: 1 | Status: SHIPPED | OUTPATIENT
Start: 2023-10-31 | End: 2024-05-17

## 2023-10-31 ASSESSMENT — PAIN SCALES - GENERAL: PAINLEVEL: SEVERE PAIN (6)

## 2023-10-31 NOTE — NURSING NOTE
Patient reviewed medications and allergies in Mychart during e-check in and said everything looked correct.      Is the patient currently in the state of [place]? YES    Visit mode:VIDEO    If the visit is dropped, the patient can be reconnected by: VIDEO VISIT: Text to cell phone:   Telephone Information:   Mobile [phone]       Will anyone else be joining the visit? NO  (If patient encounters technical issues they should call [phone]    How would you like to obtain your AVS? MyChart    Are changes needed to the allergy or medication list? Pt stated no med changes    Reason for visit: RECHECK (Follow up, RA medications)      Jazmyne AMANDA

## 2023-10-31 NOTE — PATIENT INSTRUCTIONS
Continue the leflunomide medication  Increase the night time pregabalin to as much as 75 mg  Continue the occasional celebrex use and omeprazole as needed  Get lab testing every 2 months  Follow up with me in May in person

## 2023-10-31 NOTE — PROGRESS NOTES
Virtual Visit Details    Type of service:  Video Visit   Video Start Time: 10:00 AM  Video End Time:10:25 AM    Originating Location (pt. Location): Home    Distant Location (provider location):  Off-site  Platform used for Video Visit: Azael          Ms. Major has seropositive rheumatoid arthritis affecting multiple joints. +CCP, -RF, -ROBERT. No known erosions.  Failed Enbrel, Humira, Simponi, Orencia. Upadacitinib failed due to leukopenia. Prolia since .    Ruba reports she is stable with very similar joint pains. No reported swelling, redness or warmth of joints or other signs of local inflammation. She has chronic neck pain with arms radicular symptoms and right hand dysfunction. Hand/thumb deformities are stable and no other dysfunction. She has stable energy.     Pregabalen 25 mg is used intermittently through the day but this is complicated by somnolence. Celebrex 200 mg daily prn but regular use is limited by stomach upset. Leflunomide 20 mg daily is well tolerated and no stomach upset. Recent COVID infection complicated by new and persisting rash of uncertain etiology.     PMI:  Medical-osteoporosis (T = -2.4 4-2023), type 1 diabetes, rheumatoid arthritis, osteoarthritis of the hands, PUD, trigeminal neuralgia, hyperlipidemia, mild CAD by cardiac CT, sciatica, GERD, posterior neck surgical incision infection  Surgical-cervical spine fusion surgery, multiple bilateral arthroscopic surgeries, bilateral TKA, bilateral bunionectomy, anterior cervical discectomy and C7-11 fusion, posterior decompressive cervical laminectomy  Injuries-none    SH:  Retired . Lives in [facility] [place] the [place]. 2 daughters. No tobacco, 1 EtOH daily.     FH:  Mother dead with spinal arthritis and DJD  Father dead with CAD and MI, AAA  Brother with CAD and early CABG  Daughter with celiac disease  Daughter has long COVID    PMSF history personally obtained and updated by me this visit.    ROS:  +right hand  muscle wasting and pain since neck surgery  +left hand radicular pain  +long COVID with itching rash on stomach, chest and back  +intolerant of statins  Remainder of the 14 point ROS obtained and found negative    Physical Exam:  Constitutional: [id number] cooperative  Eyes: nl EOM, sclera  ENT: nl external ears, nose, hearing, lips  Neck: no visible thyroid enlargement  Pulm: nl effort  MS: +Right wrist extension limited to 30 degrees, flexion to 45 degrees with trace styloid swelling; left neck rotation 30 degrees, right to 60 degrees, minimal neck flexion; normal shoulder, elbow, and hand ROM. Normal tuck and prayer  Skin: no alopecia or visible rash  Neuro: nl cranial nerve  Psych: nl judgement, affect    Laboratory:    Component      Latest Ref Rn 10/10/2023  3:42 PM   Color Urine      Colorless, Straw, Light Yellow, Yellow  Light Yellow    Appearance Urine      Clear  Clear    Glucose Urine      Negative mg/dL Negative    Bilirubin Urine      Negative  Negative    Ketones Urine      Negative mg/dL Negative    Specific Gravity Urine      1.003 - 1.035  1.018    Blood Urine      Negative  Negative    pH Urine      5.0 - 7.0  6.5    Protein Albumin Urine      Negative mg/dL Negative    Urobilinogen mg/dL      Normal, 2.0 mg/dL Normal    Nitrite Urine      Negative  Negative    Leukocyte Esterase Urine      Negative  Negative    Mucus Urine      None Seen /LPF Present !    RBC Urine      <=2 /HPF 2    WBC Urine      <=5 /HPF 1    Squamous Epithelial /HPF Urine      <=1 /HPF 1    Creatinine Urine      mg/dL 89.1    Albumin Urine mg/L      mg/L <12.0    Albumin Urine mg/g Cr --       Component      Latest Ref Rng 10/10/2023  3:40 PM   Creatinine      0.51 - 0.95 mg/dL 0.66    GFR Estimate      >60 mL/min/1.73m2 >90    Albumin      3.5 - 5.2 g/dL 4.5    ALT      0 - 50 U/L 11    AST      0 - 45 U/L 17    CRP Inflammation      <5.00 mg/L <3.00    TSH      0.30 - 4.20 uIU/mL 1.97        Component      Latest Ref Rng  10/10/2023  3:40 PM   WBC      4.0 - 11.0 10e3/uL 4.2    RBC Count      3.80 - 5.20 10e6/uL 4.34    Hemoglobin      11.7 - 15.7 g/dL 12.4    Hematocrit      35.0 - 47.0 % 38.7    MCV      78 - 100 fL 89    MCH      26.5 - 33.0 pg 28.6    MCHC      31.5 - 36.5 g/dL 32.0    RDW      10.0 - 15.0 % 13.4    Platelet Count      150 - 450 10e3/uL 230    % Neutrophils      % 45    % Lymphocytes      % 39    % Monocytes      % 10    % Eosinophils      % 5    % Basophils      % 1    % Immature Granulocytes      % 0    NRBCs per 100 WBC      <1 /100 0    Absolute Neutrophils      1.6 - 8.3 10e3/uL 1.9    Absolute Lymphocytes      0.8 - 5.3 10e3/uL 1.6    Absolute Monocytes      0.0 - 1.3 10e3/uL 0.4    Absolute Eosinophils      0.0 - 0.7 10e3/uL 0.2    Absolute Basophils      0.0 - 0.2 10e3/uL 0.0    Absolute Immature Granulocytes      <=0.4 10e3/uL 0.0    Absolute NRBCs      10e3/uL 0.0        Impression:    Seropositive rheumatoid arthritis-with history of multiple joint replacements and hand deformities but no erosive disease by xray. Today I appreciate no signs of active synovitis or systemic inflammatory disease. It is my impression that she is treated to near target. Good tolerance of the leflunomide and I will continue this agent. Plan to monitor the inflammatory markers. Anticipate repeating the hand Xrays in one year.    Degenerative joint disease-with past bilateral TKA as well as cervical spine and thumb osteoarthritis findings. This contributes to joint pain and dysfunction and confounds the interpretation of inflammatory synovitis. This also contributes to pain amplification. She is currently on low dose pregabalin and I have recommended increasing the evening dose to 75 mg at bedtime with reduced dose during the day, as tolerated. Limit celebrex use to only occasional prn use, with addition of omeprazole as necessary.    Osteoporosis-she is treated with twice yearly prolia (per endocrinology) since . DEXA  4-2023 with modest improvement. I recommend continuing the prolia for a total of 5 years then reassess the DEXA.    Long term use of immunosuppressive-with increased risk for leukopenia and GERD. Plan for toxicity screening every 2 months.     Follow up with me in person in May 2024.      A total of 40 minutes was spent in face to face patient interaction and chart review on the day of service.

## 2023-10-31 NOTE — LETTER
10/31/2023       RE: Ruba Major  [address]  [facility] [address]     Dear Colleague,    Thank you for referring your patient, Ruba Major, to the [facility] RHEUMATOLOGY [facility]INIC [place] at [facility]. Please see a copy of my visit note below.    Virtual Visit Details    Type of service:  Video Visit   Video Start Time: 10:00 AM  Video End Time:10:25 AM    Originating Location (pt. Location): Home    Distant Location (provider location):  Off-site  Platform used for Video Visit: [facility]          Ms. Major has seropositive rheumatoid arthritis affecting multiple joints. +CCP, -RF, -ROBERT. No known erosions.  Failed Enbrel, Humira, Simponi, Orencia. Upadacitinib failed due to leukopenia. Prolia since .    Ruba reports she is stable with very similar joint pains. No reported swelling, redness or warmth of joints or other signs of local inflammation. She has chronic neck pain with arms radicular symptoms and right hand dysfunction. Hand/thumb deformities are stable and no other dysfunction. She has stable energy.     Pregabalen 25 mg is used intermittently through the day but this is complicated by somnolence. Celebrex 200 mg daily prn but regular use is limited by stomach upset. Leflunomide 20 mg daily is well tolerated and no stomach upset. Recent COVID infection complicated by new and persisting rash of uncertain etiology.     PMI:  Medical-osteoporosis (T = -2.4 4-2023), type 1 diabetes, rheumatoid arthritis, osteoarthritis of the hands, PUD, trigeminal neuralgia, hyperlipidemia, mild CAD by cardiac CT, sciatica, GERD, posterior neck surgical incision infection  Surgical-cervical spine fusion surgery, multiple bilateral arthroscopic surgeries, bilateral TKA, bilateral bunionectomy, anterior cervical discectomy and C7-11 fusion, posterior decompressive cervical laminectomy  Injuries-none    SH:  Retired . Lives in [facility]  [place] in the winter. 2 daughters. No tobacco, 1 EtOH daily.     FH:  Mother dead with spinal arthritis and DJD  Father dead with CAD and MI, AAA  Brother with CAD and early CABG  Daughter with celiac disease  Daughter has long COVID    PMSF history personally obtained and updated by me this visit.    ROS:  +right hand muscle wasting and pain since neck surgery  +left hand radicular pain  +long COVID with itching rash on stomach, chest and back  +intolerant of statins  Remainder of the 14 point ROS obtained and found negative    Physical Exam:  Constitutional: WD-WN-WG cooperative  Eyes: nl EOM, sclera  ENT: nl external ears, nose, hearing, lips  Neck: no visible thyroid enlargement  Pulm: nl effort  MS: +Right wrist extension limited to 30 degrees, flexion to 45 degrees with trace styloid swelling; left neck rotation 30 degrees, right to 60 degrees, minimal neck flexion; normal shoulder, elbow, and hand ROM. Normal tuck and prayer  Skin: no alopecia or visible rash  Neuro: nl cranial nerve  Psych: nl judgement, affect    Laboratory:    Component      Latest Ref Rn 10/10/2023  3:42 PM   Color Urine      Colorless, Straw, Light Yellow, Yellow  Light Yellow    Appearance Urine      Clear  Clear    Glucose Urine      Negative mg/dL Negative    Bilirubin Urine      Negative  Negative    Ketones Urine      Negative mg/dL Negative    Specific Gravity Urine      1.003 - 1.035  1.018    Blood Urine      Negative  Negative    pH Urine      5.0 - 7.0  6.5    Protein Albumin Urine      Negative mg/dL Negative    Urobilinogen mg/dL      Normal, 2.0 mg/dL Normal    Nitrite Urine      Negative  Negative    Leukocyte Esterase Urine      Negative  Negative    Mucus Urine      None Seen /LPF Present !    RBC Urine      <=2 /HPF 2    WBC Urine      <=5 /HPF 1    Squamous Epithelial /HPF Urine      <=1 /HPF 1    Creatinine Urine      mg/dL 89.1    Albumin Urine mg/L      mg/L <12.0    Albumin Urine mg/g Cr --       Component       Latest Ref Rng 10/10/2023  3:40 PM   Creatinine      0.51 - 0.95 mg/dL 0.66    GFR Estimate      >60 mL/min/1.73m2 >90    Albumin      3.5 - 5.2 g/dL 4.5    ALT      0 - 50 U/L 11    AST      0 - 45 U/L 17    CRP Inflammation      <5.00 mg/L <3.00    TSH      0.30 - 4.20 uIU/mL 1.97        Component      Latest Ref Rng 10/10/2023  3:40 PM   WBC      4.0 - 11.0 10e3/uL 4.2    RBC Count      3.80 - 5.20 10e6/uL 4.34    Hemoglobin      11.7 - 15.7 g/dL 12.4    Hematocrit      35.0 - 47.0 % 38.7    MCV      78 - 100 fL 89    MCH      26.5 - 33.0 pg 28.6    MCHC      31.5 - 36.5 g/dL 32.0    RDW      10.0 - 15.0 % 13.4    Platelet Count      150 - 450 10e3/uL 230    % Neutrophils      % 45    % Lymphocytes      % 39    % Monocytes      % 10    % Eosinophils      % 5    % Basophils      % 1    % Immature Granulocytes      % 0    NRBCs per 100 WBC      <1 /100 0    Absolute Neutrophils      1.6 - 8.3 10e3/uL 1.9    Absolute Lymphocytes      0.8 - 5.3 10e3/uL 1.6    Absolute Monocytes      0.0 - 1.3 10e3/uL 0.4    Absolute Eosinophils      0.0 - 0.7 10e3/uL 0.2    Absolute Basophils      0.0 - 0.2 10e3/uL 0.0    Absolute Immature Granulocytes      <=0.4 10e3/uL 0.0    Absolute NRBCs      10e3/uL 0.0        Impression:    Seropositive rheumatoid arthritis-with history of multiple joint replacements and hand deformities but no erosive disease by xray. Today I appreciate no signs of active synovitis or systemic inflammatory disease. It is my impression that she is treated to near target. Good tolerance of the leflunomide and I will continue this agent. Plan to monitor the inflammatory markers. Anticipate repeating the hand Xrays in one year.    Degenerative joint disease-with past bilateral TKA as well as cervical spine and thumb osteoarthritis findings. This contributes to joint pain and dysfunction and confounds the interpretation of inflammatory synovitis. This also contributes to pain amplification. She is currently on low  dose pregabalin and I have recommended increasing the evening dose to 75 mg at bedtime with reduced dose during the day, as tolerated. Limit celebrex use to only occasional prn use, with addition of omeprazole as necessary.    Osteoporosis-she is treated with twice yearly prolia (per endocrinology) since . DEXA 4-2023 with modest improvement. I recommend continuing the prolia for a total of 5 years then reassess the DEXA.    Long term use of immunosuppressive-with increased risk for leukopenia and GERD. Plan for toxicity screening every 2 months.     Follow up with me in person in May 2024.      A total of 40 minutes was spent in face to face patient interaction and chart review on the day of service.      Asim Aly MD

## 2023-11-13 ENCOUNTER — TELEPHONE (OUTPATIENT)
Dept: RHEUMATOLOGY | Facility: CLINIC | Age: 68
End: 2023-11-13
Payer: COMMERCIAL

## 2023-11-13 NOTE — TELEPHONE ENCOUNTER
Patient Contacted for the patient to call back and schedule the following:    Appointment type: Return follow up  Provider: Dr. Aly  Return date: June 7th 2024  Specialty phone number: [phone]  Additional appointment(s) needed: Labs (pt will schedule at her own convenience)  Additonal Notes: NA

## 2023-12-06 DIAGNOSIS — E10.9 TYPE 1 DIABETES MELLITUS WITHOUT COMPLICATION (H): Primary | ICD-10-CM

## 2023-12-06 RX ORDER — ACYCLOVIR 400 MG/1
1 TABLET ORAL
Qty: 3 EACH | Refills: 5 | Status: SHIPPED | OUTPATIENT
Start: 2023-12-06 | End: 2024-06-11

## 2023-12-06 RX ORDER — ACYCLOVIR 400 MG/1
1 TABLET ORAL ONCE
Qty: 1 EACH | Refills: 0 | Status: SHIPPED | OUTPATIENT
Start: 2023-12-06 | End: 2023-12-06

## 2024-01-13 ENCOUNTER — HEALTH MAINTENANCE LETTER (OUTPATIENT)
Age: 69
End: 2024-01-13

## 2024-03-10 ENCOUNTER — MYC MEDICAL ADVICE (OUTPATIENT)
Dept: RHEUMATOLOGY | Facility: CLINIC | Age: 69
End: 2024-03-10
Payer: COMMERCIAL

## 2024-03-20 ENCOUNTER — TELEPHONE (OUTPATIENT)
Dept: ENDOCRINOLOGY | Facility: CLINIC | Age: 69
End: 2024-03-20
Payer: COMMERCIAL

## 2024-03-20 NOTE — TELEPHONE ENCOUNTER
Patient Contacted for the patient to call back and schedule the following:    Appointment type: Return Diabetes   Provider: Negar   Return date: re-jose 6/4 to 6/11   Specialty phone number: [phone]  Additional appointment(s) needed: NA   Additonal Notes: Spoke to pt and re-jose per below message  Re-jose appt on 6/4 due to change in provider's schedule. Re-jose day available on 6/11, Slots are on hold. Face to face is preferred but, virtual is okay. If that day does not work than it is next avail. Thank you.    Veronica Alva on 3/20/2024 at 10:34 AM

## 2024-05-15 DIAGNOSIS — Z79.60 LONG-TERM USE OF IMMUNOSUPPRESSANT MEDICATION: Chronic | ICD-10-CM

## 2024-05-15 DIAGNOSIS — M05.741 RHEUMATOID ARTHRITIS INVOLVING BOTH HANDS WITH POSITIVE RHEUMATOID FACTOR (H): ICD-10-CM

## 2024-05-15 DIAGNOSIS — K21.9 GASTROESOPHAGEAL REFLUX DISEASE WITHOUT ESOPHAGITIS: ICD-10-CM

## 2024-05-15 DIAGNOSIS — K21.00 GASTROESOPHAGEAL REFLUX DISEASE WITH ESOPHAGITIS, UNSPECIFIED WHETHER HEMORRHAGE: ICD-10-CM

## 2024-05-15 DIAGNOSIS — M81.0 SENILE OSTEOPOROSIS: ICD-10-CM

## 2024-05-15 DIAGNOSIS — M85.88 OSTEOPENIA OF OTHER SITE: ICD-10-CM

## 2024-05-15 DIAGNOSIS — M05.742 RHEUMATOID ARTHRITIS INVOLVING BOTH HANDS WITH POSITIVE RHEUMATOID FACTOR (H): ICD-10-CM

## 2024-05-23 RX ORDER — LEFLUNOMIDE 20 MG/1
20 TABLET ORAL DAILY
Qty: 90 TABLET | Refills: 1 | Status: SHIPPED | OUTPATIENT
Start: 2024-05-23 | End: 2024-06-07

## 2024-05-23 NOTE — TELEPHONE ENCOUNTER
REFILL REQUEST     Last Written Prescription Date:  10/31/23  Last Fill Quantity: 90,  # refills: 1   Last office visit with prescribing provider: 10/31/2023    Future Office Visit:   Next 5 appointments (look out 90 days)      Jun 07, 2024 10:45 AM  (Arrive by 10:30 AM)  Return Visit with Asim Aly MD  [facility] ([facility] [address] [place] [id number] [phone]             Routing refill request to provider for review/approval because:  Drug not on the [facility] refill protocol     Julianna Tracey RN   [facility]

## 2024-05-29 ENCOUNTER — MYC MEDICAL ADVICE (OUTPATIENT)
Dept: ENDOCRINOLOGY | Facility: CLINIC | Age: 69
End: 2024-05-29
Payer: COMMERCIAL

## 2024-05-29 DIAGNOSIS — E10.9 TYPE 1 DIABETES MELLITUS WITHOUT COMPLICATION (H): Primary | ICD-10-CM

## 2024-05-31 ENCOUNTER — TRANSFERRED RECORDS (OUTPATIENT)
Dept: HEALTH INFORMATION MANAGEMENT | Facility: CLINIC | Age: 69
End: 2024-05-31

## 2024-05-31 ENCOUNTER — LAB (OUTPATIENT)
Dept: LAB | Facility: CLINIC | Age: 69
End: 2024-05-31
Payer: COMMERCIAL

## 2024-05-31 DIAGNOSIS — M05.741 RHEUMATOID ARTHRITIS INVOLVING BOTH HANDS WITH POSITIVE RHEUMATOID FACTOR (H): ICD-10-CM

## 2024-05-31 DIAGNOSIS — M85.88 OSTEOPENIA OF OTHER SITE: ICD-10-CM

## 2024-05-31 DIAGNOSIS — E10.9 TYPE 1 DIABETES MELLITUS WITHOUT COMPLICATION (H): ICD-10-CM

## 2024-05-31 DIAGNOSIS — M05.742 RHEUMATOID ARTHRITIS INVOLVING BOTH HANDS WITH POSITIVE RHEUMATOID FACTOR (H): ICD-10-CM

## 2024-05-31 DIAGNOSIS — Z79.60 LONG-TERM USE OF IMMUNOSUPPRESSANT MEDICATION: ICD-10-CM

## 2024-05-31 DIAGNOSIS — K21.00 GASTROESOPHAGEAL REFLUX DISEASE WITH ESOPHAGITIS, UNSPECIFIED WHETHER HEMORRHAGE: ICD-10-CM

## 2024-05-31 DIAGNOSIS — K21.9 GASTROESOPHAGEAL REFLUX DISEASE WITHOUT ESOPHAGITIS: ICD-10-CM

## 2024-05-31 DIAGNOSIS — M81.0 SENILE OSTEOPOROSIS: ICD-10-CM

## 2024-05-31 LAB
ALBUMIN UR-MCNC: NEGATIVE MG/DL
APPEARANCE UR: CLEAR
BACTERIA #/AREA URNS HPF: ABNORMAL /HPF
BASOPHILS # BLD AUTO: 0 10E3/UL (ref 0–0.2)
BASOPHILS NFR BLD AUTO: 1 %
BILIRUB UR QL STRIP: NEGATIVE
COLOR UR AUTO: YELLOW
EOSINOPHIL # BLD AUTO: 0.2 10E3/UL (ref 0–0.7)
EOSINOPHIL NFR BLD AUTO: 5 %
ERYTHROCYTE [DISTWIDTH] IN BLOOD BY AUTOMATED COUNT: 13.4 % (ref 10–15)
ERYTHROCYTE [SEDIMENTATION RATE] IN BLOOD BY WESTERGREN METHOD: 10 MM/HR (ref 0–30)
GLUCOSE UR STRIP-MCNC: NEGATIVE MG/DL
HBA1C MFR BLD: 6.8 % (ref 0–5.6)
HCT VFR BLD AUTO: 40.4 % (ref 35–47)
HGB BLD-MCNC: 12.6 G/DL (ref 11.7–15.7)
HGB UR QL STRIP: NEGATIVE
IMM GRANULOCYTES # BLD: 0 10E3/UL
IMM GRANULOCYTES NFR BLD: 0 %
KETONES UR STRIP-MCNC: NEGATIVE MG/DL
LEUKOCYTE ESTERASE UR QL STRIP: NEGATIVE
LYMPHOCYTES # BLD AUTO: 1.5 10E3/UL (ref 0.8–5.3)
LYMPHOCYTES NFR BLD AUTO: 43 %
MCH RBC QN AUTO: 28.6 PG (ref 26.5–33)
MCHC RBC AUTO-ENTMCNC: 31.2 G/DL (ref 31.5–36.5)
MCV RBC AUTO: 92 FL (ref 78–100)
MONOCYTES # BLD AUTO: 0.5 10E3/UL (ref 0–1.3)
MONOCYTES NFR BLD AUTO: 14 %
NEUTROPHILS # BLD AUTO: 1.3 10E3/UL (ref 1.6–8.3)
NEUTROPHILS NFR BLD AUTO: 37 %
NITRATE UR QL: NEGATIVE
PH UR STRIP: 5.5 [PH] (ref 5–7)
PLATELET # BLD AUTO: 230 10E3/UL (ref 150–450)
RBC # BLD AUTO: 4.4 10E6/UL (ref 3.8–5.2)
RBC #/AREA URNS AUTO: ABNORMAL /HPF
SP GR UR STRIP: 1.01 (ref 1–1.03)
UROBILINOGEN UR STRIP-ACNC: 0.2 E.U./DL
WBC # BLD AUTO: 3.5 10E3/UL (ref 4–11)
WBC #/AREA URNS AUTO: ABNORMAL /HPF

## 2024-05-31 PROCEDURE — 84450 TRANSFERASE (AST) (SGOT): CPT

## 2024-05-31 PROCEDURE — 83036 HEMOGLOBIN GLYCOSYLATED A1C: CPT

## 2024-05-31 PROCEDURE — 82565 ASSAY OF CREATININE: CPT

## 2024-05-31 PROCEDURE — 36415 COLL VENOUS BLD VENIPUNCTURE: CPT

## 2024-05-31 PROCEDURE — 82040 ASSAY OF SERUM ALBUMIN: CPT

## 2024-05-31 PROCEDURE — 84460 ALANINE AMINO (ALT) (SGPT): CPT

## 2024-05-31 PROCEDURE — 86140 C-REACTIVE PROTEIN: CPT

## 2024-05-31 PROCEDURE — 85025 COMPLETE CBC W/AUTO DIFF WBC: CPT

## 2024-05-31 PROCEDURE — 81001 URINALYSIS AUTO W/SCOPE: CPT

## 2024-05-31 PROCEDURE — 85652 RBC SED RATE AUTOMATED: CPT

## 2024-06-01 LAB
ALBUMIN SERPL BCG-MCNC: 4.4 G/DL (ref 3.5–5.2)
ALT SERPL W P-5'-P-CCNC: 18 U/L (ref 0–50)
AST SERPL W P-5'-P-CCNC: 26 U/L (ref 0–45)
CREAT SERPL-MCNC: 0.72 MG/DL (ref 0.51–0.95)
CRP SERPL-MCNC: <3 MG/L
EGFRCR SERPLBLD CKD-EPI 2021: 90 ML/MIN/1.73M2

## 2024-06-03 NOTE — PROGRESS NOTES
Outcome for 06/03/24 8:24 AM: Data uploaded on [employer]  Jodi Zavaleta LPN   Outcome for 06/07/24 8:12 AM: Data obtained via [employer] website  Jodi Zavaleta LPN     This 69  year-old woman was seen for follow-up of her type 1 diabetes.  I made the diagnosis in 2008 when she presented with hyperglycemia in the absence of DKA.  She currently manages her diabetes with a tandem control IQ pump and the dexcom G6. The data are below.  Overall she thinks things are going well.  She is not having trouble recognizing her hypoglycemia.  She is having very little of it.  She is having some problems with her infusion sets kinking.  It is mostly an annoyance and not a problem.  She knows that if her sugars are unexpectedly high, she should first check if she has got a kink in her tubing.    She is up-to-date with her eye visits and has no retinopathy.  She is taking her blood pressure and cholesterol medicines.  She has no chest pain or shortness of breath.  She is reports that her feet are fine.    She sees Walt Aly for her RA.  Her gyn  managing her prolia treatment for osteoporosis.                    Current Outpatient Medications   Medication Sig Dispense Refill    amitriptyline (ELAVIL) 10 MG tablet Take 20 mg by mouth At Bedtime       blood glucose (NO BRAND SPECIFIED) test strip FARRAH CONTOUR. Use to test blood sugar 9 times daily or as directed. 900 strip 2    celecoxib (CELEBREX) 200 MG capsule Take 1 capsule (200 mg) by mouth daily as needed for pain 90 capsule 3    Continuous Blood Gluc Sensor (DEXCOM G6 SENSOR) MISC 1 each every 10 days Use as directed for continuous glucose monitoring every 10 days. 9 each 4    Continuous Blood Gluc Sensor (DEXCOM G7 SENSOR) MISC 1 each every 10 days Change sensor every 10 days 3 each 5    Continuous Blood Gluc Transmit (DEXCOM G6 TRANSMITTER) MISC 1 each every 3 months Change every 90 days 1 each 11    Glucagon (BAQSIMI ONE PACK) 3 MG/DOSE POWD Spray 3 mg in nostril See Admin  "Instructions USE ONLY FOR SEVERE HYPOGLYCEMIA. 1 each 3    HUMALOG KWIKPEN 100 UNIT/ML soln 1u:10 g carb + 1u:50mg/dl >150, approx 20 units daily. 30 mL 1    insulin glargine (BASAGLAR KWIKPEN) 100 UNIT/ML pen Inject 14 units subcutaneous daily ONLY IF INSULIN PUMP FAILS. 15 mL 1    INSULIN INFUSION PUMP       insulin lispro (HUMALOG KWIKPEN) 100 UNIT/ML (1 unit dial) KWIKPEN Inject 1-8 Units Subcutaneous 4 times daily (before meals and nightly) As instructed (roughly 1 unit: 8 g carbohydrate) incase of pump failure. 3 mL 3    insulin lispro (HUMALOG VIAL) 100 UNIT/ML vial USE WITH INSULIN PUMP TO INJECT 65 UNITS SUBCUTANEOUSLY DAILY 70 mL 3    insulin pen needle (B-D U/F) 31G X 5 MM miscellaneous Inject 1 Units Subcutaneous 4 times daily (before meals and nightly) 100 each 1    Insulin Pen Needle (PEN NEEDLES 3/16\") 31G X 5 MM MISC 1 Units 5 times daily 150 each 1    insulin syringes, disposable, U-100 0.3 ML MISC Use for insulin ONLY IF INSULIN PUMP FAILS. 25 each 1    leflunomide (ARAVA) 20 MG tablet Take 1 tablet (20 mg) by mouth daily Labs every 8-12 weeks 90 tablet 1    melatonin 5 MG tablet Take 5 mg by mouth nightly as needed for sleep      rosuvastatin (CRESTOR) 10 MG tablet Take 10 mg by mouth daily      sulfaSALAzine ER (AZULFIDINE EN) 500 MG EC tablet Take 2 tablets (1,000 mg) by mouth 2 times daily Labs every 8-12 weeks. 120 tablet 5    zolpidem (AMBIEN) 10 MG tablet Take 10 mg by mouth nightly as needed.      FARRAH CONTOUR test strip Use to test blood sugar 9 times daily or as directed. (Patient not taking: Reported on 6/27/2023) 900 strip 1    omeprazole (PRILOSEC) 20 MG DR capsule Take 1 capsule (20 mg) by mouth daily (Patient not taking: Reported on 6/27/2023) 90 capsule 3     No current facility-administered medications for this visit.       Social history-she will be going on a trip to [place] and wants to be sure I give her enough medication to take with her.         127/79  as of 6/7/2024   " "    Resp: -- 16  as of 6/26/2023     SpO2: 98 % 98%  as of 6/7/2024     Body Mass Index:  21.63 kg/m   1.676 m (5' 6\")  as of 10/31/2023  60.8 kg (134 lb)  as of 6/7/2024       On exam she is in no acute distress    Lab on 05/31/2024   Component Date Value Ref Range Status    Albumin 05/31/2024 4.4  3.5 - 5.2 g/dL Final    ALT 05/31/2024 18  0 - 50 U/L Final    AST 05/31/2024 26  0 - 45 U/L Final    Creatinine 05/31/2024 0.72  0.51 - 0.95 mg/dL Final    GFR Estimate 05/31/2024 90  >60 mL/min/1.73m2 Final    CRP Inflammation 05/31/2024 <3.00  <5.00 mg/L Final    Color Urine 05/31/2024 Yellow  Colorless, Straw, Light Yellow, Yellow Final    Appearance Urine 05/31/2024 Clear  Clear Final    Glucose Urine 05/31/2024 Negative  Negative mg/dL Final    Bilirubin Urine 05/31/2024 Negative  Negative Final    Ketones Urine 05/31/2024 Negative  Negative mg/dL Final    Specific Gravity Urine 05/31/2024 1.010  1.003 - 1.035 Final    Blood Urine 05/31/2024 Negative  Negative Final    pH Urine 05/31/2024 5.5  5.0 - 7.0 Final    Protein Albumin Urine 05/31/2024 Negative  Negative mg/dL Final    Urobilinogen Urine 05/31/2024 0.2  0.2, 1.0 E.U./dL Final    Nitrite Urine 05/31/2024 Negative  Negative Final    Leukocyte Esterase Urine 05/31/2024 Negative  Negative Final    Erythrocyte Sedimentation Rate 05/31/2024 10  0 - 30 mm/hr Final    Hemoglobin A1C 05/31/2024 6.8 (H)  0.0 - 5.6 % Final    Normal <5.7%   Prediabetes 5.7-6.4%    Diabetes 6.5% or higher     Note: Adopted from ADA consensus guidelines.    WBC Count 05/31/2024 3.5 (L)  4.0 - 11.0 10e3/uL Final    RBC Count 05/31/2024 4.40  3.80 - 5.20 10e6/uL Final    Hemoglobin 05/31/2024 12.6  11.7 - 15.7 g/dL Final    Hematocrit 05/31/2024 40.4  35.0 - 47.0 % Final    MCV 05/31/2024 92  78 - 100 fL Final    MCH 05/31/2024 28.6  26.5 - 33.0 pg Final    MCHC 05/31/2024 31.2 (L)  31.5 - 36.5 g/dL Final    RDW 05/31/2024 13.4  10.0 - 15.0 % Final    Platelet Count 05/31/2024 230  150 - " 450 10e3/uL Final    % Neutrophils 05/31/2024 37  % Final    % Lymphocytes 05/31/2024 43  % Final    % Monocytes 05/31/2024 14  % Final    % Eosinophils 05/31/2024 5  % Final    % Basophils 05/31/2024 1  % Final    % Immature Granulocytes 05/31/2024 0  % Final    Absolute Neutrophils 05/31/2024 1.3 (L)  1.6 - 8.3 10e3/uL Final    Absolute Lymphocytes 05/31/2024 1.5  0.8 - 5.3 10e3/uL Final    Absolute Monocytes 05/31/2024 0.5  0.0 - 1.3 10e3/uL Final    Absolute Eosinophils 05/31/2024 0.2  0.0 - 0.7 10e3/uL Final    Absolute Basophils 05/31/2024 0.0  0.0 - 0.2 10e3/uL Final    Absolute Immature Granulocytes 05/31/2024 0.0  <=0.4 10e3/uL Final    Bacteria Urine 05/31/2024 Few (A)  None Seen /HPF Final    RBC Urine 05/31/2024 0-2  0-2 /HPF /HPF Final    WBC Urine 05/31/2024 0-5  0-5 /HPF /HPF Final     her last visit with me was on 3/20/2024.      Recent Labs   Lab Test 05/31/24  1028 03/08/24  0950 10/10/23  1542 10/10/23  1540 10/10/23  1439 08/16/23  0948 06/23/23  1031 09/28/22  0925 06/09/22  1337 06/09/22  1337 12/05/19  1129 11/05/19  0000   A1C 6.8*  --   --   --   --   --   --   --   --  6.9*   < >  --    HEMOGLOBINA1  --   --   --   --  7.2  --   --   --   --   --   --  7.1*   TSH  --   --   --  1.97  --   --   --   --   --   --   --   --    LDL  --   --   --   --   --   --  43 73  --   --    < >  --    HDL  --   --   --   --   --   --  67 90  --   --    < >  --    TRIG  --  48  --   --   --   --  64 56   < >  --    < >  --    CR 0.72  --   --  0.66  --    < >  --  0.74  --  0.68   < >  --    MICROL  --   --  <12.0  --   --   --   --   --   --  8   < >  --     < > = values in this interval not displayed.     Assessment and plan:    1.  Diabetes control.  She is doing very well.  She is at target and I made no changes in her pump settings.  I will ask the nurse educator if there are alternative infusion sets that might not kink as much for her.    2.  Diabetes complications.  She has no complications and is  up-to-date with her screenings.    3.CVD risk.  Her blood pressure is in target   She is on a statin with good LDL control.    I spent 20 minutes on the video visit with the patient today (start time 3: 3 0 and end time 3: 5 0).  The visit was done at my office away from clinic.  On the same day of visit I spent an additional 10 minutes reviewing and interpreting her CGM data, her lab data, and doing documentation.      Veronica Bills MD

## 2024-06-07 ENCOUNTER — OFFICE VISIT (OUTPATIENT)
Dept: RHEUMATOLOGY | Facility: CLINIC | Age: 69
End: 2024-06-07
Payer: COMMERCIAL

## 2024-06-07 VITALS
WEIGHT: 134 LBS | DIASTOLIC BLOOD PRESSURE: 79 MMHG | BODY MASS INDEX: 21.63 KG/M2 | HEART RATE: 63 BPM | SYSTOLIC BLOOD PRESSURE: 127 MMHG | OXYGEN SATURATION: 98 %

## 2024-06-07 DIAGNOSIS — K21.9 GASTROESOPHAGEAL REFLUX DISEASE WITHOUT ESOPHAGITIS: ICD-10-CM

## 2024-06-07 DIAGNOSIS — M05.741 RHEUMATOID ARTHRITIS INVOLVING BOTH HANDS WITH POSITIVE RHEUMATOID FACTOR (H): ICD-10-CM

## 2024-06-07 DIAGNOSIS — K21.00 GASTROESOPHAGEAL REFLUX DISEASE WITH ESOPHAGITIS, UNSPECIFIED WHETHER HEMORRHAGE: ICD-10-CM

## 2024-06-07 DIAGNOSIS — M81.0 SENILE OSTEOPOROSIS: ICD-10-CM

## 2024-06-07 DIAGNOSIS — Z79.60 LONG-TERM USE OF IMMUNOSUPPRESSANT MEDICATION: Chronic | ICD-10-CM

## 2024-06-07 DIAGNOSIS — M85.88 OSTEOPENIA OF OTHER SITE: ICD-10-CM

## 2024-06-07 DIAGNOSIS — M05.742 RHEUMATOID ARTHRITIS INVOLVING BOTH HANDS WITH POSITIVE RHEUMATOID FACTOR (H): ICD-10-CM

## 2024-06-07 PROCEDURE — 99215 OFFICE O/P EST HI 40 MIN: CPT | Performed by: INTERNAL MEDICINE

## 2024-06-07 PROCEDURE — G2211 COMPLEX E/M VISIT ADD ON: HCPCS | Performed by: INTERNAL MEDICINE

## 2024-06-07 RX ORDER — SULFASALAZINE 500 MG/1
1000 TABLET, DELAYED RELEASE ORAL 2 TIMES DAILY
Qty: 120 TABLET | Refills: 5 | Status: SHIPPED | OUTPATIENT
Start: 2024-06-07 | End: 2024-09-06

## 2024-06-07 RX ORDER — LEFLUNOMIDE 20 MG/1
20 TABLET ORAL DAILY
Qty: 90 TABLET | Refills: 1 | Status: SHIPPED | OUTPATIENT
Start: 2024-06-07 | End: 2024-09-06

## 2024-06-07 RX ORDER — CELECOXIB 200 MG/1
200 CAPSULE ORAL DAILY PRN
Qty: 90 CAPSULE | Refills: 3 | Status: SHIPPED | OUTPATIENT
Start: 2024-06-07 | End: 2024-09-06

## 2024-06-07 ASSESSMENT — PAIN SCALES - GENERAL: PAINLEVEL: MILD PAIN (3)

## 2024-06-07 NOTE — PROGRESS NOTES
Ms. Major has seropositive rheumatoid arthritis affecting multiple joints. +CCP, -RF, -ROBERT. No known erosions.  Failed methotrexate, Enbrel, Humira, Simponi, Orencia. Upadacitinib failed due to leukopenia. Prolia since .    She reports more problems with nerve pains, residual from neck surgery. This has been refractory to corticosteroid injections. She has left sided hand weakness. She has been using lyrica 150 mg daily with some somnolence.    She describes throbbing aching pain in the hands and wrists. Energy is up and down. She complains of morning wrist aching pains and stiffness.     Celebrex 200 mg daily seems not to be helpful and bother her stomach. Leflunomide 20 mg daily is well tolerated.     PMI:  Medical-osteoporosis (T = -2.4 4-2023), type 1 diabetes, rheumatoid arthritis, osteoarthritis of the hands, PUD, trigeminal neuralgia, hyperlipidemia, mild CAD by cardiac CT, sciatica, GERD, posterior neck surgical incision infection  Surgical-cervical spine fusion surgery, multiple bilateral arthroscopic surgeries, bilateral TKA, bilateral bunionectomy, anterior cervical discectomy and C7-11 fusion, posterior decompressive cervical laminectomy  Injuries-none    SH:  Retired . Lives in [facility] in the winter. 2 daughters. No tobacco, 1 EtOH daily.     FH:  Mother dead with spinal arthritis and DJD  Father dead with CAD and MI, AAA  Brother with CAD and early CABG  Daughter with celiac disease  Daughter has long COVID    PMSF history personally obtained and updated by me this visit.    ROS:  +intolerant of statins  Remainder of the 14 point ROS obtained and found negative    Physical Exam:  Constitutional: WD-WN-WG cooperative  Eyes: nl EOM, sclera  ENT: nl external ears, nose, hearing, lips  Neck: no visible thyroid enlargement  Pulm: nl effort, CTA  Cardiac: nl S1 S2 without m/r/g  Abdomen: soft  MS: +Right wrist extension limited to 30 degrees, flexion to 45 degrees with 1+  styloid swelling; bilateral MCP 2,3 trace swelling; 1st CMC squaring bilaterally; left neck rotation 30 degrees, right to 60 degrees, minimal neck flexion; normal shoulder, elbow, and hand ROM. Normal tuck and prayer  Skin: no alopecia or visible rash  Neuro: nl cranial nerves; bilateral hand wasting  Psych: nl judgement, affect    Laboratory:      Component      Latest Ref [facility] 5/31/2024  10:28 AM 5/31/2024  10:30 AM   Color Urine      Colorless, Straw, Light Yellow, Yellow   Yellow    Appearance Urine      Clear   Clear    Glucose Urine      Negative mg/dL  Negative    Bilirubin Urine      Negative   Negative    Ketones Urine      Negative mg/dL  Negative    Specific Gravity Urine      1.003 - 1.035   1.010    Blood Urine      Negative   Negative    pH Urine      5.0 - 7.0   5.5    Protein Albumin Urine      Negative mg/dL  Negative    Urobilinogen Urine      0.2, 1.0 E.U./dL  0.2    Nitrite Urine      Negative   Negative    Leukocyte Esterase Urine      Negative   Negative    Bacteria Urine      None Seen /HPF  Few !    RBC Urine      0-2 /HPF /HPF  0-2    WBC Urine      0-5 /HPF /HPF  0-5    Creatinine      0.51 - 0.95 mg/dL 0.72     GFR Estimate      >60 mL/min/1.73m2 90     Albumin      3.5 - 5.2 g/dL 4.4     ALT      0 - 50 U/L 18     AST      0 - 45 U/L 26     CRP Inflammation      <5.00 mg/L <3.00     Sed Rate      0 - 30 mm/hr 10     Hemoglobin A1C      0.0 - 5.6 % 6.8 (H)        Component      Latest Ref [facility] 5/31/2024  10:28 AM   WBC      4.0 - 11.0 10e3/uL 3.5 (L)    RBC Count      3.80 - 5.20 10e6/uL 4.40    Hemoglobin      11.7 - 15.7 g/dL 12.6    Hematocrit      35.0 - 47.0 % 40.4    MCV      78 - 100 fL 92    MCH      26.5 - 33.0 pg 28.6    MCHC      31.5 - 36.5 g/dL 31.2 (L)    RDW      10.0 - 15.0 % 13.4    Platelet Count      150 - 450 10e3/uL 230    % Neutrophils      % 37    % Lymphocytes      % 43    % Monocytes      % 14    % Eosinophils      % 5    % Basophils      % 1    % Immature  Granulocytes      % 0    Absolute Neutrophils      1.6 - 8.3 10e3/uL 1.3 (L)    Absolute Lymphocytes      0.8 - 5.3 10e3/uL 1.5    Absolute Monocytes      0.0 - 1.3 10e3/uL 0.5    Absolute Eosinophils      0.0 - 0.7 10e3/uL 0.2    Absolute Basophils      0.0 - 0.2 10e3/uL 0.0    Absolute Immature Granulocytes      <=0.4 10e3/uL 0.0        Impression:    Seropositive rheumatoid arthritis-with history of multiple joint replacements and hand deformities but no erosive disease by xray. Today, it is my impression that she has persistent pannus in her right wrist and bilateral MCP 2-3 joints. No systemic inflammatory disease. She tolerates well the leflunomide and we will continue this. We have discussed the risks and benefits of bDMARD vs cDMARD therapy addition, and we agree to a trial of sulfasalazine 1 gm BID. Continue to monitor the CRP.     Degenerative joint disease-evident in the hands/wrists and this confounds the evaluation of RA. Past bilateral TKA as well as cervical spine and thumb osteoarthritis findings. I agree with the lyrica treatment and recommend advancing the dose as tolerated to 300-450 mg daily. If this fails consideration can be given to night time gabapentin. She may also benefit from switching low dose amitriptyline for full dose duloxetine or venlafaxine. Finallly, she can add acetaminophen 1000 mg twice daily.    Osteoporosis-recommend continuing the prolia for a total of 5 years then reassess the DEXA.    Long term use of immunosuppressive-with risk for cytopenia and bone marrow toxicity. Get lab testing in 1 month and then every 2 months thereafter.     Follow up with me in 4-6 months.       A total of 53 minutes was spent in face to face patient interaction and chart review on the day of service.    The longitudinal plan of care for the diagnosis(es)/condition(s) as documented were addressed during this visit. Due to the added complexity in care, I will continue to support Ruba in the  subsequent management and with ongoing continuity of care.

## 2024-06-07 NOTE — PATIENT INSTRUCTIONS
Talk to your doctor about increasing your dose of Lyrica  Add acetaminophen 1000 mg twice daily  Start sulfasalazine 500 mg and increase the dose to 2 tablets twice daily  Continue the leflunomide  Continue with lab testing in one month and then every 2 months

## 2024-06-07 NOTE — NURSING NOTE
Chief Complaint   Patient presents with    RECHECK     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 factor     /79 (BP Location: Left arm, Patient Position: Sitting, Cuff Size: Adult Regular)   Pulse 63   Wt 60.8 kg (134 lb)   SpO2 98%   BMI 21.63 kg/m    Veronica GIL

## 2024-06-10 ENCOUNTER — MEDICAL CORRESPONDENCE (OUTPATIENT)
Dept: HEALTH INFORMATION MANAGEMENT | Facility: CLINIC | Age: 69
End: 2024-06-10
Payer: COMMERCIAL

## 2024-06-11 ENCOUNTER — VIRTUAL VISIT (OUTPATIENT)
Dept: ENDOCRINOLOGY | Facility: CLINIC | Age: 69
End: 2024-06-11
Payer: COMMERCIAL

## 2024-06-11 DIAGNOSIS — E10.9 TYPE 1 DIABETES MELLITUS WITHOUT COMPLICATION (H): Primary | ICD-10-CM

## 2024-06-11 PROCEDURE — G2211 COMPLEX E/M VISIT ADD ON: HCPCS | Mod: 95 | Performed by: INTERNAL MEDICINE

## 2024-06-11 PROCEDURE — 99214 OFFICE O/P EST MOD 30 MIN: CPT | Mod: 95 | Performed by: INTERNAL MEDICINE

## 2024-06-11 RX ORDER — ACYCLOVIR 400 MG/1
1 TABLET ORAL
Qty: 9 EACH | Refills: 3 | Status: SHIPPED | OUTPATIENT
Start: 2024-06-11

## 2024-06-11 RX ORDER — INSULIN LISPRO 100 [IU]/ML
INJECTION, SOLUTION INTRAVENOUS; SUBCUTANEOUS
Qty: 3 ML | Refills: 3 | Status: SHIPPED | OUTPATIENT
Start: 2024-06-11

## 2024-06-11 RX ORDER — INSULIN GLARGINE 100 [IU]/ML
14 INJECTION, SOLUTION SUBCUTANEOUS DAILY
Qty: 6 ML | Refills: 3 | Status: SHIPPED | OUTPATIENT
Start: 2024-06-11

## 2024-06-11 ASSESSMENT — PAIN SCALES - GENERAL: PAINLEVEL: SEVERE PAIN (6)

## 2024-06-11 NOTE — LETTER
6/11/2024       RE: Ruba Major  [address]  [facility] [address]     Dear Colleague,    Thank you for referring your patient, Ruba Major, to the [facility] [place] at [facility]. Please see a copy of my visit note below.    Outcome for 06/03/24 8:24 AM: Data uploaded on [employer]andem  Jodi Zavaleta LPN   Outcome for 06/07/24 8:12 AM: Data obtained via Tandem website  Jodi Zavaleta LPN     This 69  year-old woman was seen for follow-up of her type 1 diabetes.  I made the diagnosis in 2008 when she presented with hyperglycemia in the absence of DKA.  She currently manages her diabetes with a tandem control IQ pump and the dexcom G6. The data are below.  Overall she thinks things are going well.  She is not having trouble recognizing her hypoglycemia.  She is having very little of it.  She is having some problems with her infusion sets kinking.  It is mostly an annoyance and not a problem.  She knows that if her sugars are unexpectedly high, she should first check if she has got a kink in her tubing.    She is up-to-date with her eye visits and has no retinopathy.  She is taking her blood pressure and cholesterol medicines.  She has no chest pain or shortness of breath.  She is reports that her feet are fine.    She sees Walt Aly for her RA.  Her gyn  managing her prolia treatment for osteoporosis.                    Current Outpatient Medications   Medication Sig Dispense Refill    amitriptyline (ELAVIL) 10 MG tablet Take 20 mg by mouth At Bedtime       blood glucose (NO BRAND SPECIFIED) test strip FARRAH CONTOUR. Use to test blood sugar 9 times daily or as directed. 900 strip 2    celecoxib (CELEBREX) 200 MG capsule Take 1 capsule (200 mg) by mouth daily as needed for pain 90 capsule 3    Continuous Blood Gluc Sensor (DEXCOM G6 SENSOR) MISC 1 each every 10 days Use as directed for continuous glucose monitoring every 10 days.  "9 each 4    Continuous Blood Gluc Sensor (DEXCOM G7 SENSOR) MISC 1 each every 10 days Change sensor every 10 days 3 each 5    Continuous Blood Gluc Transmit (DEXCOM G6 TRANSMITTER) MISC 1 each every 3 months Change every 90 days 1 each 11    Glucagon (BAQSIMI ONE PACK) 3 MG/DOSE POWD Spray 3 mg in nostril See Admin Instructions USE ONLY FOR SEVERE HYPOGLYCEMIA. 1 each 3    HUMALOG KWIKPEN 100 UNIT/ML soln 1u:10 g carb + 1u:50mg/dl >150, approx 20 units daily. 30 mL 1    insulin glargine (BASAGLAR KWIKPEN) 100 UNIT/ML pen Inject 14 units subcutaneous daily ONLY IF INSULIN PUMP FAILS. 15 mL 1    INSULIN INFUSION PUMP       insulin lispro (HUMALOG KWIKPEN) 100 UNIT/ML (1 unit dial) KWIKPEN Inject 1-8 Units Subcutaneous 4 times daily (before meals and nightly) As instructed (roughly 1 unit: 8 g carbohydrate) incase of pump failure. 3 mL 3    insulin lispro (HUMALOG VIAL) 100 UNIT/ML vial USE WITH INSULIN PUMP TO INJECT 65 UNITS SUBCUTANEOUSLY DAILY 70 mL 3    insulin pen needle (B-D U/F) 31G X 5 MM miscellaneous Inject 1 Units Subcutaneous 4 times daily (before meals and nightly) 100 each 1    Insulin Pen Needle (PEN NEEDLES 3/16\") 31G X 5 MM MISC 1 Units 5 times daily 150 each 1    insulin syringes, disposable, U-100 0.3 ML MISC Use for insulin ONLY IF INSULIN PUMP FAILS. 25 each 1    leflunomide (ARAVA) 20 MG tablet Take 1 tablet (20 mg) by mouth daily Labs every 8-12 weeks 90 tablet 1    melatonin 5 MG tablet Take 5 mg by mouth nightly as needed for sleep      rosuvastatin (CRESTOR) 10 MG tablet Take 10 mg by mouth daily      sulfaSALAzine ER (AZULFIDINE EN) 500 MG EC tablet Take 2 tablets (1,000 mg) by mouth 2 times daily Labs every 8-12 weeks. 120 tablet 5    zolpidem (AMBIEN) 10 MG tablet Take 10 mg by mouth nightly as needed.      FARRAH CONTOUR test strip Use to test blood sugar 9 times daily or as directed. (Patient not taking: Reported on 6/27/2023) 900 strip 1    omeprazole (PRILOSEC) 20 MG DR capsule Take 1 " "capsule (20 mg) by mouth daily (Patient not taking: Reported on 6/27/2023) 90 capsule 3     No current facility-administered medications for this visit.       Social history-she will be going on a trip to [place] and wants to be sure I give her enough medication to take with her.         127/79  as of 6/7/2024       Resp: -- 16  as of 6/26/2023     SpO2: 98 % 98%  as of 6/7/2024     Body Mass Index:  21.63 kg/m   1.676 m (5' 6\")  as of 10/31/2023  60.8 kg (134 lb)  as of 6/7/2024       On exam she is in no acute distress    Lab on 05/31/2024   Component Date Value Ref Range Status    Albumin 05/31/2024 4.4  3.5 - 5.2 g/dL Final    ALT 05/31/2024 18  0 - 50 U/L Final    AST 05/31/2024 26  0 - 45 U/L Final    Creatinine 05/31/2024 0.72  0.51 - 0.95 mg/dL Final    GFR Estimate 05/31/2024 90  >60 mL/min/1.73m2 Final    CRP Inflammation 05/31/2024 <3.00  <5.00 mg/L Final    Color Urine 05/31/2024 Yellow  Colorless, Straw, Light Yellow, Yellow Final    Appearance Urine 05/31/2024 Clear  Clear Final    Glucose Urine 05/31/2024 Negative  Negative mg/dL Final    Bilirubin Urine 05/31/2024 Negative  Negative Final    Ketones Urine 05/31/2024 Negative  Negative mg/dL Final    Specific Gravity Urine 05/31/2024 1.010  1.003 - 1.035 Final    Blood Urine 05/31/2024 Negative  Negative Final    pH Urine 05/31/2024 5.5  5.0 - 7.0 Final    Protein Albumin Urine 05/31/2024 Negative  Negative mg/dL Final    Urobilinogen Urine 05/31/2024 0.2  0.2, 1.0 E.U./dL Final    Nitrite Urine 05/31/2024 Negative  Negative Final    Leukocyte Esterase Urine 05/31/2024 Negative  Negative Final    Erythrocyte Sedimentation Rate 05/31/2024 10  0 - 30 mm/hr Final    Hemoglobin A1C 05/31/2024 6.8 (H)  0.0 - 5.6 % Final    Normal <5.7%   Prediabetes 5.7-6.4%    Diabetes 6.5% or higher     Note: Adopted from ADA consensus guidelines.    WBC Count 05/31/2024 3.5 (L)  4.0 - 11.0 10e3/uL Final    RBC Count 05/31/2024 4.40  3.80 - 5.20 10e6/uL Final    " Hemoglobin 05/31/2024 12.6  11.7 - 15.7 g/dL Final    Hematocrit 05/31/2024 40.4  35.0 - 47.0 % Final    MCV 05/31/2024 92  78 - 100 fL Final    MCH 05/31/2024 28.6  26.5 - 33.0 pg Final    MCHC 05/31/2024 31.2 (L)  31.5 - 36.5 g/dL Final    RDW 05/31/2024 13.4  10.0 - 15.0 % Final    Platelet Count 05/31/2024 230  150 - 450 10e3/uL Final    % Neutrophils 05/31/2024 37  % Final    % Lymphocytes 05/31/2024 43  % Final    % Monocytes 05/31/2024 14  % Final    % Eosinophils 05/31/2024 5  % Final    % Basophils 05/31/2024 1  % Final    % Immature Granulocytes 05/31/2024 0  % Final    Absolute Neutrophils 05/31/2024 1.3 (L)  1.6 - 8.3 10e3/uL Final    Absolute Lymphocytes 05/31/2024 1.5  0.8 - 5.3 10e3/uL Final    Absolute Monocytes 05/31/2024 0.5  0.0 - 1.3 10e3/uL Final    Absolute Eosinophils 05/31/2024 0.2  0.0 - 0.7 10e3/uL Final    Absolute Basophils 05/31/2024 0.0  0.0 - 0.2 10e3/uL Final    Absolute Immature Granulocytes 05/31/2024 0.0  <=0.4 10e3/uL Final    Bacteria Urine 05/31/2024 Few (A)  None Seen /HPF Final    RBC Urine 05/31/2024 0-2  0-2 /HPF /HPF Final    WBC Urine 05/31/2024 0-5  0-5 /HPF /HPF Final     her last visit with me was on 3/20/2024.      Recent Labs   Lab Test 05/31/24  1028 03/08/24  0950 10/10/23  1542 10/10/23  1540 10/10/23  1439 08/16/23  0948 06/23/23  1031 09/28/22  0925 06/09/22  1337 06/09/22  1337 12/05/19  1129 11/05/19  0000   A1C 6.8*  --   --   --   --   --   --   --   --  6.9*   < >  --    HEMOGLOBINA1  --   --   --   --  7.2  --   --   --   --   --   --  7.1*   TSH  --   --   --  1.97  --   --   --   --   --   --   --   --    LDL  --   --   --   --   --   --  43 73  --   --    < >  --    HDL  --   --   --   --   --   --  67 90  --   --    < >  --    TRIG  --  48  --   --   --   --  64 56   < >  --    < >  --    CR 0.72  --   --  0.66  --    < >  --  0.74  --  0.68   < >  --    MICROL  --   --  <12.0  --   --   --   --   --   --  8   < >  --     < > = values in this interval  not displayed.     Assessment and plan:    1.  Diabetes control.  She is doing very well.  She is at target and I made no changes in her pump settings.  I will ask the nurse educator if there are alternative infusion sets that might not kink as much for her.    2.  Diabetes complications.  She has no complications and is up-to-date with her screenings.    3.CVD risk.  Her blood pressure is in target   She is on a statin with good LDL control.    I spent 20 minutes on the video visit with the patient today (start time 3: 3 0 and end time 3: 5 0).  The visit was done at my office away from clinic.  On the same day of visit I spent an additional 10 minutes reviewing and interpreting her CGM data, her lab data, and doing documentation.      Veronica Bills MD

## 2024-06-11 NOTE — PROGRESS NOTES
"Virtual Visit Details    Type of service:  Video Visit   Video Start Time: {video visit start/end time for provider to select:896987}  Video End Time:{video visit start/end time for provider to select:392115}    Originating Location (pt. Location): {video visit patient location:241711::\"Home\"}  {PROVIDER LOCATION On-site should be selected for visits conducted from your clinic location or adjoining [facility] hospital, academic office, or other nearby [facility] building. Off-site should be selected for all other provider locations, including home:011516}  Distant Location (provider location):  {virtual location provider:276598}  Platform used for Video Visit: {Virtual Visit Platforms:472424::\"Multiply\"}    "

## 2024-06-11 NOTE — NURSING NOTE
Is the patient currently in the state of [place]? YES    Visit mode:VIDEO    If the visit is dropped, the patient can be reconnected by: VIDEO VISIT: Text to cell phone:   Telephone Information:   Mobile [phone]       Will anyone else be joining the visit? NO  (If patient encounters technical issues they should call [phone])    How would you like to obtain your AVS? MyChart    Are changes needed to the allergy or medication list? No    Are refills needed on medications prescribed by this physician? NO    Reason for visit: RECHECK Shelby Kocher VVF

## 2024-06-15 ENCOUNTER — TRANSCRIBE ORDERS (OUTPATIENT)
Dept: ONCOLOGY | Facility: CLINIC | Age: 69
End: 2024-06-15
Payer: COMMERCIAL

## 2024-06-15 DIAGNOSIS — M81.0 SENILE OSTEOPOROSIS: Primary | ICD-10-CM

## 2024-06-15 RX ORDER — DIPHENHYDRAMINE HYDROCHLORIDE 50 MG/ML
50 INJECTION INTRAMUSCULAR; INTRAVENOUS
Status: CANCELLED
Start: 2024-07-10

## 2024-06-15 RX ORDER — EPINEPHRINE 1 MG/ML
0.3 INJECTION, SOLUTION INTRAMUSCULAR; SUBCUTANEOUS EVERY 5 MIN PRN
Status: CANCELLED | OUTPATIENT
Start: 2024-07-10

## 2024-06-15 RX ORDER — ALBUTEROL SULFATE 0.83 MG/ML
2.5 SOLUTION RESPIRATORY (INHALATION)
Status: CANCELLED | OUTPATIENT
Start: 2024-07-10

## 2024-06-15 RX ORDER — MEPERIDINE HYDROCHLORIDE 25 MG/ML
25 INJECTION INTRAMUSCULAR; INTRAVENOUS; SUBCUTANEOUS EVERY 30 MIN PRN
Status: CANCELLED | OUTPATIENT
Start: 2024-07-10

## 2024-06-15 RX ORDER — ALBUTEROL SULFATE 90 UG/1
1-2 AEROSOL, METERED RESPIRATORY (INHALATION)
Status: CANCELLED
Start: 2024-07-10

## 2024-06-15 RX ORDER — METHYLPREDNISOLONE SODIUM SUCCINATE 125 MG/2ML
125 INJECTION, POWDER, LYOPHILIZED, FOR SOLUTION INTRAMUSCULAR; INTRAVENOUS
Status: CANCELLED
Start: 2024-07-10

## 2024-06-20 ENCOUNTER — MYC MEDICAL ADVICE (OUTPATIENT)
Dept: RHEUMATOLOGY | Facility: CLINIC | Age: 69
End: 2024-06-20
Payer: COMMERCIAL

## 2024-06-20 DIAGNOSIS — Z79.60 LONG-TERM USE OF IMMUNOSUPPRESSANT MEDICATION: ICD-10-CM

## 2024-06-20 DIAGNOSIS — M05.742 RHEUMATOID ARTHRITIS INVOLVING BOTH HANDS WITH POSITIVE RHEUMATOID FACTOR (H): ICD-10-CM

## 2024-06-20 DIAGNOSIS — L27.0 DRUG RASH: Primary | ICD-10-CM

## 2024-06-20 DIAGNOSIS — M05.741 RHEUMATOID ARTHRITIS INVOLVING BOTH HANDS WITH POSITIVE RHEUMATOID FACTOR (H): ICD-10-CM

## 2024-06-20 RX ORDER — PREDNISONE 5 MG/1
TABLET ORAL
Qty: 21 TABLET | Refills: 0 | Status: SHIPPED | OUTPATIENT
Start: 2024-06-20 | End: 2024-07-10

## 2024-06-20 NOTE — TELEPHONE ENCOUNTER
"Patient sent message with attached photos of a upper body rash, small raised bumps on abdomen and large hives elsewhere.  Onset was yesterday, itching is \"out of control\".  Patient believes it is from increasing dose of Sulfasalazine.  Patient started medication as discussed at her  6/7/24 appointment.   She had just increased her dose to 2 tablets in the morning, 1 tablet at night.       Patient thinks she had experienced this before when on a sulfa drug.     Taking Benadryl orally and topically, which is not very helpful.  Advised a 24 hour allergy medication such as Zyrtec.    Asked patient to hold her dose until advisement from Dr. Aly.    Please advise.    Juliana Mcconnell RN        "

## 2024-06-20 NOTE — TELEPHONE ENCOUNTER
Patient is requesting Rx for prednisone to help calm her rash.    Sent in an additional photo.    Patient feels like she is allergic to Sulfa    Juliana Mcconnell RN

## 2024-07-01 ENCOUNTER — VIRTUAL VISIT (OUTPATIENT)
Dept: EDUCATION SERVICES | Facility: CLINIC | Age: 69
End: 2024-07-01
Payer: COMMERCIAL

## 2024-07-01 DIAGNOSIS — E10.9 TYPE 1 DIABETES MELLITUS WITHOUT COMPLICATION (H): Primary | ICD-10-CM

## 2024-07-01 PROCEDURE — G0108 DIAB MANAGE TRN  PER INDIV: HCPCS | Mod: 95 | Performed by: REGISTERED NURSE

## 2024-07-01 NOTE — PROGRESS NOTES
Video-Visit Details    Type of service:  Video Visit  Patient consented to video visit  Video Start Time:  08:02 AM  Video End Time:   08:43 AM  Originating Location (pt. Location): Home  Distant Location (provider location):   [facility] [place] [facility] [place] [facility]  Platform used for Video Visit: Storybird     Diabetes Self-Management Education & Support Virtual Visit- Short    Ruba Major contacted today for education related to Type 1 diabetes    Patient is being treated with:  Tandem X2 Insulin Pump    Year of diagnosis: 2008 at age 53  Referring provider:  Veronica Bills MD and Mikki Gilbert PA-C  Living Situation: Lives with her  part time in [place] and part time in [place], [place].   Has 3 grown children.    Purpose of today's visit:        Subjective/Objective:  Tandem X2 Pump Report appears below:         Assessment/Plan:    Ruba states that she has been having repeated problems with her infusion sets.  She states that 2-3 times per month (about 20-30%) of the time she inserts an infusion set, the set fails and when she removes it, the cannula is kinked.  She states that she is currently using the [employer]osoft XC 6mm cannula.  My guess is that after 20 years of using her abdomen for pump infusion sets, she has developed some scar tissue which may be contributing to the kinking problem.  Suggested that she may do better with a different infusion set like the Mike-Steel or the Autosoft 30 infusion set.  But prior to moving to one of those, she is going to call Tandem to see if she can get a sampling of 9mm Autosoft XC infusion sets.  If the problem continues even after making that change, we should consider the Autosoft 30.  She doesn't like the Mike-Steel idea, as it does not have an automatic  and she doesn't think she could do a manual insertion.      The other issue she is having is the higher glucoses she is experiencing in the morning as soon as she wakes up.  This is a long  "standing problem.  She has a slightly higher basal rate that starts at 07:00 AM, but this doesn't appear to be adequate.  She is still rising significantly after arising, and she states it takes multiple corrections to bring her back to target.      She states that she is often dropping when she walks the golf course, which is several times a week.  She uses activity mode but states that this is not sufficient to keep her from having hypoglycemia while she's playing.  She often removes her pump while golfing but she has had two previous episodes of DKA without hyperglycemia, which makes monitoring her glucose while golfing to be a little less reliable in terms of telling her how long she can safely remove her pump. Suggested that we first try to address the morning rise in glucose, and meet again in one week to review her pump report and formulate a \"golf\" profile that she can use in addition to exercise mode.      She will be leaving on a 2 week trip to [place] on July 16.  She is excited to go, and has lots of extra supplies to take with her.  We reviewed her back up plan, which she has stored in her phone.  Basal rate increased today between 7am and 11am from 0.6 units/hour to 0.8 units/hour.      Follow-up:  July 10 at 08:00am by video.       Time spent in this visit: 41 minutes     Any diabetes medication dose changes were made via the CDE Protocol and Collaborative Practice Agreement. A copy of this encounter was provided to the patient's referring provider.   "

## 2024-07-01 NOTE — NURSING NOTE
Is the patient currently in the state of [place]? YES    Visit mode:VIDEO    If the visit is dropped, the patient can be reconnected by: VIDEO VISIT: Text to cell phone:   Telephone Information:   Mobile [phone]       Will anyone else be joining the visit? NO  (If patient encounters technical issues they should call [phone])    How would you like to obtain your AVS? MyChart    Are changes needed to the allergy or medication list? No, patient reported no changes to e-check in information for visit. VF did not review e-check in information again with patient due to this.    Are refills needed on medications prescribed by this physician? YES, test strips    Reason for visit: RECHECK    Kylie AMANDA      Anemia due to blood loss Anemia due to blood loss Anemia due to blood loss Anemia due to blood loss Anemia due to blood loss Anemia due to blood loss Anemia due to blood loss Anemia due to blood loss Anemia due to blood loss Hyponatremia Hyponatremia Hyponatremia Hyponatremia Hyponatremia Hyponatremia Hyponatremia Anemia due to blood loss

## 2024-07-10 ENCOUNTER — VIRTUAL VISIT (OUTPATIENT)
Dept: EDUCATION SERVICES | Facility: CLINIC | Age: 69
End: 2024-07-10
Payer: COMMERCIAL

## 2024-07-10 ENCOUNTER — INFUSION THERAPY VISIT (OUTPATIENT)
Dept: INFUSION THERAPY | Facility: CLINIC | Age: 69
End: 2024-07-10
Payer: COMMERCIAL

## 2024-07-10 VITALS
RESPIRATION RATE: 16 BRPM | DIASTOLIC BLOOD PRESSURE: 72 MMHG | TEMPERATURE: 98.3 F | SYSTOLIC BLOOD PRESSURE: 139 MMHG | HEART RATE: 79 BPM | OXYGEN SATURATION: 97 %

## 2024-07-10 DIAGNOSIS — M81.0 SENILE OSTEOPOROSIS: Primary | ICD-10-CM

## 2024-07-10 DIAGNOSIS — E10.9 TYPE 1 DIABETES MELLITUS WITHOUT COMPLICATION (H): Primary | ICD-10-CM

## 2024-07-10 PROCEDURE — G0108 DIAB MANAGE TRN  PER INDIV: HCPCS | Mod: 95 | Performed by: REGISTERED NURSE

## 2024-07-10 PROCEDURE — 96372 THER/PROPH/DIAG INJ SC/IM: CPT | Performed by: OBSTETRICS & GYNECOLOGY

## 2024-07-10 PROCEDURE — 250N000011 HC RX IP 250 OP 636: Performed by: OBSTETRICS & GYNECOLOGY

## 2024-07-10 RX ORDER — DIPHENHYDRAMINE HYDROCHLORIDE 50 MG/ML
50 INJECTION INTRAMUSCULAR; INTRAVENOUS
Start: 2025-01-06

## 2024-07-10 RX ORDER — EPINEPHRINE 1 MG/ML
0.3 INJECTION, SOLUTION INTRAMUSCULAR; SUBCUTANEOUS EVERY 5 MIN PRN
OUTPATIENT
Start: 2025-01-06

## 2024-07-10 RX ORDER — ALBUTEROL SULFATE 0.83 MG/ML
2.5 SOLUTION RESPIRATORY (INHALATION)
OUTPATIENT
Start: 2025-01-06

## 2024-07-10 RX ORDER — MEPERIDINE HYDROCHLORIDE 25 MG/ML
25 INJECTION INTRAMUSCULAR; INTRAVENOUS; SUBCUTANEOUS EVERY 30 MIN PRN
OUTPATIENT
Start: 2025-01-06

## 2024-07-10 RX ORDER — METHYLPREDNISOLONE SODIUM SUCCINATE 125 MG/2ML
125 INJECTION, POWDER, LYOPHILIZED, FOR SOLUTION INTRAMUSCULAR; INTRAVENOUS
Start: 2025-01-06

## 2024-07-10 RX ORDER — ALBUTEROL SULFATE 90 UG/1
1-2 AEROSOL, METERED RESPIRATORY (INHALATION)
Start: 2025-01-06

## 2024-07-10 RX ADMIN — DENOSUMAB 60 MG: 60 INJECTION SUBCUTANEOUS at 15:13

## 2024-07-10 ASSESSMENT — PAIN SCALES - GENERAL: PAINLEVEL: MODERATE PAIN (4)

## 2024-07-10 NOTE — PROGRESS NOTES
Infusion Nursing Note:  Ruba NEIL Arbon presents today for prolia.    Patient seen by provider today: No   present during visit today: Not Applicable.    Note: N/A.      Intravenous Access:  No Intravenous access/labs at this visit.    Treatment Conditions:    Ca 9.8, Cr 0.72 on 6/14/24 at [facility]  Results reviewed, labs MET treatment parameters, ok to proceed with treatment.      Post Infusion Assessment:  Patient tolerated injection without incident.  Site patent and intact, free from redness, edema or discomfort.       Discharge Plan:   Discharge instructions reviewed with: Patient.  Patient and/or family verbalized understanding of discharge instructions and all questions answered.  AVS to patient via [employer].  Patient will return in 6 months for next appointment.   Patient discharged in stable condition accompanied by: self.  Departure Mode: Ambulatory.      Ernst Mcfadden RN

## 2024-07-10 NOTE — NURSING NOTE
Current patient location:  home     Is the patient currently in the state of [place]? YES    Visit mode:VIDEO    If the visit is dropped, the patient can be reconnected by: VIDEO VISIT: Text to cell phone:   Telephone Information:   Mobile [phone]       Will anyone else be joining the visit? NO  (If patient encounters technical issues they should call [phone] :[phone])    How would you like to obtain your AVS? MyChart    Are changes needed to the allergy or medication list? Yes - added     Are refills needed on medications prescribed by this physician? NO    Reason for visit: PATRICK Powell VVF      Pt has arm pain from her last neck surgery

## 2024-07-10 NOTE — PROGRESS NOTES
Video-Visit Details    Type of service:  Video Visit  Patient consented to video visit  Video Start Time:  0800  Video End Time:   0835  Originating Location (pt. Location): Home  Distant Location (provider location):   [facility]AB[facility]UC[facility] [place]   Platform used for Video Visit: ScalIT    Diabetes Self-Management Education & Support Virtual Visit- Short    Ruba Major contacted today for education related to Type 1 diabetes    Patient is being treated with:  Tandem X2 Insulin Pump    Year of diagnosis: 2008 at age 53  Referring provider:  Veronica Bills MD and Mikki Gilbert PA-C  Living Situation: Lives with her  part time in [place] and part time in [place], [place].   Has 3 grown children.    Purpose of today's visit:  Follow up DSME and insulin pump management.  2 weeks ago we made some adjustments in her insulin pump settings.        Subjective/Objective:  Insulin Pump Report appears below:           Assessment/Plan:    Average BG was 146 mg/dL  CV = 34%  Average total daily dose = 26 units  Basal delivery constitutes 60%  Bolus delivery constitutes 40%      At last visit, she was experiencing a sudden rise in her glucose on rising in the AM, which has mostly been resolved with the increase in basal rate we made between 6am and 10am.  She has been having a lot of problems with maintaining her glucose while golfing, which she does several times per week.  She points to Tuesday, July 9, a golfing day, and states that although she didn't remove her pump during the game, she states she was eating constantly in order to keep her glucose up.  We had discussed at our last visit, developing a profile for her to use when she is golfing, so that she doesn't have to be eating constantly to keep her glucose up.      Today we developed a GOLF profile as follows:     PUMP SETTINGS:  In Control IQ, the Target BG is 110.  Start time Basal Rate U/hr Correction Factor Insulin to Carb Ratio Target BG     Midnight 0.3 70 12 110   06:00 AM 0.45 70 15 110   16:00 PM 0.6 55 12 110   22:00 PM 0.4 55 12 110            In addition, she has been having some hypoglycemia later in the evening.  We reduced her basal rate starting at 8pm from 0.8 to 0.4 units in her regular profile. Ruba is traveling to [place] in one week, and will be quite active while there.  She plans to try using her GOLF profile while traveling there.      Follow-up:    Ruba will contact me when she gets back from [place].  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        Time spent in this visit: 30 minutes     Any diabetes medication dose changes were made via the CDE Protocol and Collaborative Practice Agreement. A copy of this encounter was provided to the patient's referring provider.

## 2024-09-06 ENCOUNTER — MEDICAL CORRESPONDENCE (OUTPATIENT)
Dept: HEALTH INFORMATION MANAGEMENT | Facility: CLINIC | Age: 69
End: 2024-09-06

## 2024-09-06 ENCOUNTER — OFFICE VISIT (OUTPATIENT)
Dept: RHEUMATOLOGY | Facility: CLINIC | Age: 69
End: 2024-09-06
Payer: COMMERCIAL

## 2024-09-06 ENCOUNTER — ANCILLARY PROCEDURE (OUTPATIENT)
Dept: GENERAL RADIOLOGY | Facility: CLINIC | Age: 69
End: 2024-09-06
Attending: INTERNAL MEDICINE
Payer: COMMERCIAL

## 2024-09-06 ENCOUNTER — LAB (OUTPATIENT)
Dept: LAB | Facility: CLINIC | Age: 69
End: 2024-09-06
Payer: COMMERCIAL

## 2024-09-06 VITALS
WEIGHT: 134 LBS | HEART RATE: 63 BPM | SYSTOLIC BLOOD PRESSURE: 137 MMHG | OXYGEN SATURATION: 97 % | BODY MASS INDEX: 21.63 KG/M2 | DIASTOLIC BLOOD PRESSURE: 81 MMHG

## 2024-09-06 DIAGNOSIS — Z79.60 LONG-TERM USE OF IMMUNOSUPPRESSANT MEDICATION: Chronic | ICD-10-CM

## 2024-09-06 DIAGNOSIS — K21.00 GASTROESOPHAGEAL REFLUX DISEASE WITH ESOPHAGITIS, UNSPECIFIED WHETHER HEMORRHAGE: ICD-10-CM

## 2024-09-06 DIAGNOSIS — K21.9 GASTROESOPHAGEAL REFLUX DISEASE WITHOUT ESOPHAGITIS: ICD-10-CM

## 2024-09-06 DIAGNOSIS — M81.0 SENILE OSTEOPOROSIS: ICD-10-CM

## 2024-09-06 DIAGNOSIS — M05.742 RHEUMATOID ARTHRITIS INVOLVING BOTH HANDS WITH POSITIVE RHEUMATOID FACTOR (H): ICD-10-CM

## 2024-09-06 DIAGNOSIS — Z79.60 LONG-TERM USE OF IMMUNOSUPPRESSANT MEDICATION: ICD-10-CM

## 2024-09-06 DIAGNOSIS — M05.741 RHEUMATOID ARTHRITIS INVOLVING BOTH HANDS WITH POSITIVE RHEUMATOID FACTOR (H): ICD-10-CM

## 2024-09-06 DIAGNOSIS — M85.88 OSTEOPENIA OF OTHER SITE: ICD-10-CM

## 2024-09-06 LAB
ALBUMIN SERPL BCG-MCNC: 4.2 G/DL (ref 3.5–5.2)
ALBUMIN UR-MCNC: NEGATIVE MG/DL
ALT SERPL W P-5'-P-CCNC: 10 U/L (ref 0–50)
APPEARANCE UR: CLEAR
AST SERPL W P-5'-P-CCNC: 22 U/L (ref 0–45)
BASOPHILS # BLD AUTO: 0.1 10E3/UL (ref 0–0.2)
BASOPHILS NFR BLD AUTO: 2 %
BILIRUB UR QL STRIP: NEGATIVE
COLOR UR AUTO: ABNORMAL
CREAT SERPL-MCNC: 0.67 MG/DL (ref 0.51–0.95)
CRP SERPL-MCNC: <3 MG/L
EGFRCR SERPLBLD CKD-EPI 2021: >90 ML/MIN/1.73M2
EOSINOPHIL # BLD AUTO: 0.2 10E3/UL (ref 0–0.7)
EOSINOPHIL NFR BLD AUTO: 5 %
ERYTHROCYTE [DISTWIDTH] IN BLOOD BY AUTOMATED COUNT: 14.1 % (ref 10–15)
ERYTHROCYTE [SEDIMENTATION RATE] IN BLOOD BY WESTERGREN METHOD: 2 MM/HR (ref 0–30)
GLUCOSE UR STRIP-MCNC: NEGATIVE MG/DL
HCT VFR BLD AUTO: 38.5 % (ref 35–47)
HGB BLD-MCNC: 12.2 G/DL (ref 11.7–15.7)
HGB UR QL STRIP: NEGATIVE
IMM GRANULOCYTES # BLD: 0 10E3/UL
IMM GRANULOCYTES NFR BLD: 0 %
KETONES UR STRIP-MCNC: NEGATIVE MG/DL
LEUKOCYTE ESTERASE UR QL STRIP: NEGATIVE
LYMPHOCYTES # BLD AUTO: 1.7 10E3/UL (ref 0.8–5.3)
LYMPHOCYTES NFR BLD AUTO: 42 %
MCH RBC QN AUTO: 28.9 PG (ref 26.5–33)
MCHC RBC AUTO-ENTMCNC: 31.7 G/DL (ref 31.5–36.5)
MCV RBC AUTO: 91 FL (ref 78–100)
MONOCYTES # BLD AUTO: 0.5 10E3/UL (ref 0–1.3)
MONOCYTES NFR BLD AUTO: 12 %
NEUTROPHILS # BLD AUTO: 1.6 10E3/UL (ref 1.6–8.3)
NEUTROPHILS NFR BLD AUTO: 39 %
NITRATE UR QL: NEGATIVE
NRBC # BLD AUTO: 0 10E3/UL
NRBC BLD AUTO-RTO: 0 /100
PH UR STRIP: 7.5 [PH] (ref 5–7)
PLATELET # BLD AUTO: 257 10E3/UL (ref 150–450)
RBC # BLD AUTO: 4.22 10E6/UL (ref 3.8–5.2)
RBC #/AREA URNS AUTO: ABNORMAL /HPF
SKIP: ABNORMAL
SP GR UR STRIP: 1.01 (ref 1–1.03)
SQUAMOUS #/AREA URNS AUTO: ABNORMAL /LPF
UROBILINOGEN UR STRIP-MCNC: NORMAL MG/DL
WBC # BLD AUTO: 4.2 10E3/UL (ref 4–11)
WBC #/AREA URNS AUTO: ABNORMAL /HPF

## 2024-09-06 PROCEDURE — 36415 COLL VENOUS BLD VENIPUNCTURE: CPT

## 2024-09-06 PROCEDURE — 82040 ASSAY OF SERUM ALBUMIN: CPT

## 2024-09-06 PROCEDURE — 84460 ALANINE AMINO (ALT) (SGPT): CPT

## 2024-09-06 PROCEDURE — 81001 URINALYSIS AUTO W/SCOPE: CPT

## 2024-09-06 PROCEDURE — 85025 COMPLETE CBC W/AUTO DIFF WBC: CPT

## 2024-09-06 PROCEDURE — 82565 ASSAY OF CREATININE: CPT

## 2024-09-06 PROCEDURE — 85652 RBC SED RATE AUTOMATED: CPT

## 2024-09-06 PROCEDURE — 73630 X-RAY EXAM OF FOOT: CPT | Mod: LT | Performed by: RADIOLOGY

## 2024-09-06 PROCEDURE — 86140 C-REACTIVE PROTEIN: CPT

## 2024-09-06 PROCEDURE — G2211 COMPLEX E/M VISIT ADD ON: HCPCS | Performed by: INTERNAL MEDICINE

## 2024-09-06 PROCEDURE — 73130 X-RAY EXAM OF HAND: CPT | Mod: LT | Performed by: RADIOLOGY

## 2024-09-06 PROCEDURE — 99215 OFFICE O/P EST HI 40 MIN: CPT | Performed by: INTERNAL MEDICINE

## 2024-09-06 PROCEDURE — 84450 TRANSFERASE (AST) (SGOT): CPT

## 2024-09-06 PROCEDURE — 73610 X-RAY EXAM OF ANKLE: CPT | Mod: LT | Performed by: RADIOLOGY

## 2024-09-06 RX ORDER — LEFLUNOMIDE 20 MG/1
20 TABLET ORAL DAILY
Qty: 90 TABLET | Refills: 1 | Status: SHIPPED | OUTPATIENT
Start: 2024-09-06

## 2024-09-06 RX ORDER — AMITRIPTYLINE HYDROCHLORIDE 10 MG/1
30 TABLET ORAL AT BEDTIME
Qty: 270 TABLET | Refills: 3 | Status: SHIPPED | OUTPATIENT
Start: 2024-09-06

## 2024-09-06 ASSESSMENT — PAIN SCALES - GENERAL: PAINLEVEL: MODERATE PAIN (5)

## 2024-09-06 NOTE — PATIENT INSTRUCTIONS
Increase the amitriptyline to 30 mg at night and continue the leflunomide  
The patient is a 3m3w Male complaining of bloody stools.

## 2024-09-06 NOTE — PROGRESS NOTES
Ms. Major has seropositive rheumatoid arthritis affecting multiple joints. +CCP, -RF, -ROBERT. No known erosions.  Failed methotrexate, Enbrel, Humira, Simponi, Orencia. Upadacitinib failed due to leukopenia. Failed sulfasalazine due to rash. Prolia since .    She complains of fatigue and AM stiffness of 1 hour. She now complains of pulsating pains in her arms and malaise. She remains very function and golfs for 18 holes 2-3 times weekly. She has hand and wrist pains as well as weakened . Her hands can swelling but no redness or warmth. AM stiffness is >1 hour..     Rituximab can be considered as a bDMARD in her care, and we discuss the risks and benefits.     Celebrex 200 mg daily was stopped due stomach upset. She currently uses tylenol 1 gm daily. Leflunomide 20 mg daily is well tolerated. Amitriptyline 20 mg at bedtime is well tolerated. Lyrica 50 mg TID is well tolerated.  No important infections to report.     PMI:  Medical-osteoporosis (T = -2.4 4-2023), type 1 diabetes, rheumatoid arthritis, osteoarthritis of the hands, PUD, trigeminal neuralgia, hyperlipidemia, mild CAD by cardiac CT, sciatica, GERD, posterior neck surgical incision infection  Surgical-cervical spine fusion surgery, multiple bilateral arthroscopic surgeries, bilateral TKA, bilateral bunionectomy, anterior cervical discectomy and C7-11 fusion, posterior decompressive cervical laminectomy  Injuries-none    SH:  Retired . Lives in [place] in the winter. 2 daughters. No tobacco, 1 EtOH daily.     FH:  Mother dead with spinal arthritis and DJD  Father dead with CAD and MI, AAA  Brother with CAD and early CABG  Daughter with celiac disease  Daughter has long COVID    PMSF history personally obtained and updated by me this visit.    ROS:  +intolerant of statins and sulfasalazine  Remainder of the 14 point ROS obtained and found negative    Physical Exam:  Constitutional: WD-WN-WG cooperative  Eyes: nl EOM,  sclera  ENT: nl external ears, nose, hearing, lips  Neck: no visible thyroid enlargement  Pulm: nl effort  MS: +Right wrist extension limited to 30 degrees, flexion to 45 degrees with trace styloid swelling; bilateral MCP 2,3 trace swelling; 1st CMC squaring bilaterally; normal  strength but there is hand muscle wasting. +left ankle deformity.  Normal tuck and prayer  Skin: no alopecia or visible rash  Neuro: nl cranial nerves; bilateral hand wasting  Psych: nl judgement, affect    Laboratory:          important suggestion  View Detailed Reports      important suggestion  View Condensed Results Report      Contains abnormal data CBC WITH AUTO DIFFERENTIAL  Order: [id number]   suggestion  Information displayed in this report may not trend or trigger automated decision support.     Component  Ref Range & Units 2 mo ago   WHITE BLOOD COUNT          4.5 - 11.0 thou/cu mm 3.8 Low    RED BLOOD COUNT            4.00 - 5.20 mil/cu mm 4.20   HEMOGLOBIN                12.0 - 16.0 g/dL 12.6   HEMATOCRIT                33.0 - 51.0 % 38.2   MCV                        80 - 100 fL 91   MCH                        26.0 - 34.0 pg 30.0   MCHC                      32.0 - 36.0 g/dL 33.0   RDW                        11.5 - 15.5 % 13.6   PLATELET COUNT            140 - 440 thou/cu mm 246   MPV                        6.5 - 11.0 fL 10.3   % NEUT  % 50.3   % LYMPH  % 32.5   % MONO  % 11.9   % EOS  % 4.5   % BASO  % 0.8   ABSOLUTE NEUTROPHILS      1.7 - 7.0 thou/cu mm 1.9   ABSOLUTE LYMPHOCYTES      0.9 - 2.9 thou/cu mm 1.2   ABSOLUTE MONOCYTES        <0.9 thou/cu mm 0.5   ABSOLUTE EOSINOPHILS      <0.5 thou/cu mm 0.2   ABSOLUTE BASOPHILS        <0.3 thou/cu mm 0.0     Specimen Collected: 06/14/24 12:02 PM    Performed by: [facility] Last Resulted: 06/14/24 12:19 PM   Received From: [employer] [facility]  Result Received: 07/01/24  7:40 AM    View Encounter        Received Information  CK  TOTAL  Order: [id number]   suggestion  Information displayed in this report may not trend or trigger automated decision support.     Component  Ref Range & Units 2 mo ago   CK,TOTAL  26 - 192 IU/L 70     Specimen Collected: 06/14/24 12:02 PM    Performed by: [facility] Last Resulted: 06/14/24 10:17 PM   [facility]  Result Received: 07/01/24  7:40 AM    View Encounter        Received Information   Contains abnormal data COMP METABOLIC PANEL  Order: [id number]   suggestion  Information displayed in this report may not trend or trigger automated decision support.     Component  Ref Range & Units 2 mo ago   SODIUM  136 - 145 mmol/L 137   POTASSIUM  3.5 - 5.1 mmol/L 4.2   CHLORIDE  98 - 107 mmol/L 100   CO2,TOTAL  22 - 29 mmol/L 28   ANION GAP  5 - 18 9   GLUCOSE  70 - 99 mg/dL 125 High    CALCIUM  8.8 - 10.2 mg/dL 9.8   BUN  8 - 23 mg/dL 23   CREATININE  0.50 - 0.90 mg/dL 0.72   BUN/CREAT RATIO  10 - 20 32 High    eGFR  >90 mL/min/1.73m2 >90   Comment: As of 03/15/2022, eGFR is calculated by the CKD-EPI creatinine equation without race adjustment.  eGFR can be influenced by muscle mass, exercise, and diet.  The reported eGFR is an estimation only and is only applicable if the renal function is stable.   ALBUMIN  4.0 - 4.9 g/dL 4.6   PROTEIN,TOTAL  6.0 - 8.0 g/dL 6.7   BILIRUBIN,TOTAL  0.0 - 1.2 mg/dL 0.8   ALK PHOSPHATASE  35 - 104 IU/L 84   ALT (SGPT)  10 - 35 IU/L 17   AST (SGOT)  10 - 35 IU/L 25     Specimen Collected: 06/14/24 12:02 PM    Performed by: [facility] Last Resulted: 06/14/24  9:53 PM   [facility]  Result Received: 07/01/24  7:40 AM    View Encounter        Received Information  C-reactive protein  Order: [id number]   suggestion  Information displayed in this report may not trend or trigger automated decision support.     Component  Ref Range &  Units 2 mo ago   C-REACTIVE PROTEIN  <0.5 mg/dL <0.3     Specimen Collected: 06/14/24 12:02 PM    Performed by: [facility] Last Resulted: 06/14/24  9:54 PM   Received From: [employer] [facility]  Result Received: 07/01/24  7:40 AM    View Encounter        Received Information    Impression:    Seropositive rheumatoid arthritis-with history of multiple joint replacements and hand deformities but no erosive disease by xray. She continues to struggle with chronic pain, malaise and morning stiffness. Although the inflammatory marker is normal and I appreciate no sign of systemic inflammatory disease, her hand and wrist exam does demonstrate pannus. Therefore, it is my impression that she is not treated to target with leflunomide monotherapy. We have discussed the risks and benefits of rituximab therapy. Nonetheless, her chronic DJD and chronic pain syndrome confound our evaluation of her inflammatory joint disease, and we are less confident that rituximab can be beneficial to improve her symptoms and protect against joint damage. Based on all of this we agree to defer rituximab for the time being but continue the leflunomide.     Degenerative joint disease-evident in the hands/wrists and this confounds the evaluation of RA. She demonstrates a deformity of the left ankle that raises concern for past fracture. Get an xray of the feet and left ankle.    Insomnia-with continued pain amplification. Plan to increase the amitriptyline to 30 mg nightly.    Osteoporosis-recommend continuing the prolia for a total of 5 years then reassess the DEXA.    Long term use of immunosuppressive-with laboratory toxicity testing due again today and every 2-3 months.     Follow up with me in 4-6 months.     A total of 50 minutes was spent in face to face patient interaction and chart review on the day of service.    The longitudinal plan of care for the diagnosis(es)/condition(s) as  documented were addressed during this visit. Due to the added complexity in care, I will continue to support Ruba in the subsequent management and with ongoing continuity of care.

## 2024-09-10 ENCOUNTER — CARE COORDINATION (OUTPATIENT)
Dept: EDUCATION SERVICES | Facility: CLINIC | Age: 69
End: 2024-09-10
Payer: COMMERCIAL

## 2024-09-10 DIAGNOSIS — E10.9 TYPE 1 DIABETES MELLITUS WITHOUT COMPLICATION (H): Primary | ICD-10-CM

## 2024-09-10 RX ORDER — INSULIN INFUSION SET/CARTRIDGE
1 COMBINATION PACKAGE (EA) MISCELLANEOUS
Qty: 40 EACH | Refills: 0 | Status: SHIPPED | OUTPATIENT
Start: 2024-09-10

## 2024-09-10 NOTE — PROGRESS NOTES
"Diabetes Educator Note:     Discussed some of the issues Ruba has been having while golfing.  She states that she is having prolonged episodes of hypoglycemia while golfing, requiring her to eat throughout the game.  A few weeks ago we had set up a \"golf\" profile in her pump which she has been using, however it doesn't seem to be doing the trick.  She is still having to eat large amounts of carbohydrate to keep going.     Today we modified her \"golf\" profile and lowered her basal rates by another 10%  and relaxed her ICR and ISF.  I suggested that when she is golfing that she not only switch to her golf profile about 90 to 120 minutes before starting, but also turn the \"ACTIVITY\" mode on, which will raise her target to 140 mg/dL.   Hopefully this will prevent the worst lows.  Also suggested that she carry some non-diet Gatorade or Powerade with her while golfing and sip on it during the course when she notices down arrows.      She had tired the [employer] 9mm cannula infusion sets and would like to continue using them.  Prescriptions to [facility].       We will re-connect in another couple of weeks.  She will let me know when she has golfed trying the new settings.      Iris Sheikh, LITON, RN, [facility][note 3]  Certified Diabetes Care and   [facility]  Phone [phone]  Hours:  Tuesday:       In Clinic and Virtual Visits  8am to 4pm              Wednesday:  In Clinic and Virtual Visits  8am to 4pm               Thursday:     Virtural  Visits only             8am to 4pm    "

## 2024-09-11 ENCOUNTER — LAB (OUTPATIENT)
Dept: LAB | Facility: CLINIC | Age: 69
End: 2024-09-11
Payer: COMMERCIAL

## 2024-09-11 DIAGNOSIS — Z79.60 LONG-TERM USE OF IMMUNOSUPPRESSANT MEDICATION: ICD-10-CM

## 2024-09-11 DIAGNOSIS — M05.742 RHEUMATOID ARTHRITIS INVOLVING BOTH HANDS WITH POSITIVE RHEUMATOID FACTOR (H): ICD-10-CM

## 2024-09-11 DIAGNOSIS — M05.741 RHEUMATOID ARTHRITIS INVOLVING BOTH HANDS WITH POSITIVE RHEUMATOID FACTOR (H): ICD-10-CM

## 2024-09-11 PROCEDURE — 36415 COLL VENOUS BLD VENIPUNCTURE: CPT

## 2024-09-11 PROCEDURE — 86038 ANTINUCLEAR ANTIBODIES: CPT

## 2024-09-11 PROCEDURE — 86039 ANTINUCLEAR ANTIBODIES (ANA): CPT

## 2024-09-12 LAB
ANA PAT SER IF-IMP: ABNORMAL
ANA SER QL IF: ABNORMAL
ANA TITR SER IF: ABNORMAL {TITER}

## 2024-09-13 ENCOUNTER — TELEPHONE (OUTPATIENT)
Dept: ENDOCRINOLOGY | Facility: CLINIC | Age: 69
End: 2024-09-13
Payer: COMMERCIAL

## 2024-09-13 DIAGNOSIS — E10.9 TYPE 1 DIABETES MELLITUS WITHOUT COMPLICATION (H): Primary | ICD-10-CM

## 2024-09-13 RX ORDER — INSULIN PUMP CARTRIDGE
1 CARTRIDGE (EA) SUBCUTANEOUS SEE ADMIN INSTRUCTIONS
Qty: 40 EACH | Refills: 3 | Status: SHIPPED | OUTPATIENT
Start: 2024-09-13

## 2024-09-13 NOTE — TELEPHONE ENCOUNTER
Pt is requesting new rx for    Tslim x2 3ml cartridge    Did not see on active med list please verify and send new rx. Thank you!     Luis Alberto spec/mail pharmacy  [phone]

## 2024-09-20 ENCOUNTER — LAB (OUTPATIENT)
Dept: LAB | Facility: CLINIC | Age: 69
End: 2024-09-20
Payer: COMMERCIAL

## 2024-09-20 DIAGNOSIS — M05.741 RHEUMATOID ARTHRITIS INVOLVING BOTH HANDS WITH POSITIVE RHEUMATOID FACTOR (H): ICD-10-CM

## 2024-09-20 DIAGNOSIS — M05.742 RHEUMATOID ARTHRITIS INVOLVING BOTH HANDS WITH POSITIVE RHEUMATOID FACTOR (H): ICD-10-CM

## 2024-09-20 PROCEDURE — 36415 COLL VENOUS BLD VENIPUNCTURE: CPT

## 2024-09-20 PROCEDURE — 86200 CCP ANTIBODY: CPT

## 2024-09-23 LAB — CCP AB SER IA-ACNC: 1 U/ML

## 2024-11-19 NOTE — PROGRESS NOTES
Outcome for 11/19/24 2:30 PM: Data uploaded on Tandem  Bobbilynn Grossaint, VICK  Outcome for 12/02/24 4:32 AM: [employer] message sent - 11/25 last data  Bobbilynn Grossaint, VF  Outcome for 12/02/24 1:10 PM: Per patient, will upload device before appointment  Claudine Javed MA

## 2024-12-02 ENCOUNTER — TELEPHONE (OUTPATIENT)
Dept: ENDOCRINOLOGY | Facility: CLINIC | Age: 69
End: 2024-12-02
Payer: COMMERCIAL

## 2024-12-02 NOTE — TELEPHONE ENCOUNTER
Spoke with patient. Pt will upload current tandem data. Claudine Javed CMA on 12/2/2024 at 1:11 PM

## 2024-12-04 ENCOUNTER — VIRTUAL VISIT (OUTPATIENT)
Dept: ENDOCRINOLOGY | Facility: CLINIC | Age: 69
End: 2024-12-04
Payer: COMMERCIAL

## 2024-12-04 DIAGNOSIS — E10.9 TYPE 1 DIABETES MELLITUS WITHOUT COMPLICATION (H): Primary | ICD-10-CM

## 2024-12-04 NOTE — PROGRESS NOTES
Time of start: 12:28 PM  Time of end: 12:44 PM  Total duration of video visit: 16 minutes.  Providers location: offsite.  Patients location: [place].    [facility]  Ruba Major is a 69 year old female with type 1 diabetes mellitus. Video visit for diabetes follow up.  Last visit was with Dr. Bills in June 2024 and I last saw patient in March 2024.  Ruba was dx having type 1 diabetes in 2008.  She also has a hx of RA.  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  She denies hx of retinopathy, nephropathy or neuropathy.  Pt is currently using a Tandem insulin pump- control IQ and DexcomG7 sensor.  Her current basal insulin rates are below:  Midnight 0.4 units/hr  6 AM 0.8 units/hr  10 AM 0.9 units/hr  4 PM 0.8 units/hr  8 PM 0.4 units/hr  I/C ratio midnight 1:12, 6 am 1:10, 8 pm 1:12.  CF 55.  Patient also has a golf profile set on her insulin pump which she uses when playing golf or exercising.  Most recent A1C was 6.8 % on 5/31/2024.  I reviewed and scanned her insulin pump download data below in her note.  Average glucose 147 with an estimated A1C 6.8 %.  Glucose in target range 81% of the time with 0.2% below target range.  She continues to have some kinking with insulin infusion sets, but less often per patient.  On ROS today, some arthritic pain.  She denies blurred vision, n/v, SOB at rest, cough, fever or chills.  No chest pain, abd pain, diarrhea, dysuria or hematuria.  She denies sx of neuropathy or foot ulcers at this time.    Diabetes Care  Retinopathy: none; reminded patient to see ophthalmology annually for diabetic eye exam and glaucoma check.    Nephropathy:none; urine microalbuminuria negative in 4/2023.  Patient tells me she is taking Losartan which was started by a Cardiologist who she saw in [place].  Neuropathy:none.  Foot Exam: no exam today.  Taking aspirin: no.  Lipids: LDL 41 in 4/2023.  Taking Crestor.  CAD: no.  She was seen by Cardiology in [place] and workup was negative for coronary  artery disease per patient.  Her brother had CABG which prompted her to see a Cardiologist.    Mental health: denies depression. Past hx of eating disorder > 20 years ago in remission.  Insulin: Tandem insulin pump - control IQ.  Testing: DexcomG7 sensor.  Hypoglycemia treatment: Baqsimi.  Back up insulin: Patient has basal insulin to use in case of insulin pump failure.                 ROS  Please see under HPI.    Allergies  Allergies   Allergen Reactions    Sulfasalazine Rash     Fever and rash       Medications  Current Outpatient Medications   Medication Sig Dispense Refill    amitriptyline (ELAVIL) 10 MG tablet Take 3 tablets (30 mg) by mouth at bedtime. 270 tablet 3    amitriptyline (ELAVIL) 10 MG tablet Take 20 mg by mouth At Bedtime       FARRAH CONTOUR test strip Use to test blood sugar 9 times daily or as directed. 900 strip 1    blood glucose (NO BRAND SPECIFIED) test strip FARRAH CONTOUR. Use to test blood sugar 9 times daily or as directed. 900 strip 2    Continuous Glucose Sensor (DEXCOM G7 SENSOR) MISC 1 each every 10 days Change sensor every 10 days 9 each 3    Glucagon (BAQSIMI ONE PACK) 3 MG/DOSE POWD Spray 3 mg in nostril See Admin Instructions USE ONLY FOR SEVERE HYPOGLYCEMIA. 1 each 3    Insulin Glargine Solostar 100 UNIT/ML SOPN Inject 14 Units Subcutaneous daily 6 mL 3    Insulin Infusion Pump Supplies (AUTOSOFT XC INFUSION SET) MISC 1 each every 3 days. Change infusion set every 2 to 3 days. 9mm cannula, 23 inch tubing.   Provide 3 month supply. 40 each 0    Insulin Infusion Pump Supplies (T:SLIM X2 3ML CARTRIDGE) MISC 1 each See Admin Instructions. Change every 2-3 days for insulin administration. 40 each 3    INSULIN INFUSION PUMP       insulin lispro (HUMALOG KWIKPEN) 100 UNIT/ML (1 unit dial) KWIKPEN Inject 1-8 Units Subcutaneous 4 times daily (before meals and nightly) As instructed (roughly 1 unit: 8 g carbohydrate) incase of pump failure. 3 mL 3    insulin lispro (HUMALOG VIAL) 100  "UNIT/ML vial USE WITH INSULIN PUMP TO INJECT 65 UNITS SUBCUTANEOUSLY DAILY 70 mL 3    insulin pen needle (B-D U/F) 31G X 5 MM miscellaneous Inject 1 Units Subcutaneous 4 times daily (before meals and nightly) 100 each 1    Insulin Pen Needle (PEN NEEDLES 3/16\") 31G X 5 MM MISC 1 Units 5 times daily 150 each 1    insulin syringes, disposable, U-100 0.3 ML MISC Use for insulin ONLY IF INSULIN PUMP FAILS. 25 each 1    leflunomide (ARAVA) 20 MG tablet Take 1 tablet (20 mg) by mouth daily. Labs every 8-12 weeks 90 tablet 1    melatonin 5 MG tablet Take 5 mg by mouth nightly as needed for sleep      rosuvastatin (CRESTOR) 10 MG tablet Take 10 mg by mouth daily      zolpidem (AMBIEN) 10 MG tablet Take 10 mg by mouth nightly as needed.         Family History  family history is not on file.    Social History   reports that she has never smoked. She has never used smokeless tobacco. She reports current alcohol use. She reports that she does not use drugs.     Past Medical History  Past Medical History:   Diagnosis Date    Arthritis     Diabetes mellitus (H)     insulin pump    PONV (postoperative nausea and vomiting)     Rheumatoid arthritis (H)     Rheumatoid arthritis(714.0)     Type II or unspecified type diabetes mellitus without mention of complication, not stated as uncontrolled        Past Surgical History:   Procedure Laterality Date    ARTHROPLASTY REVISION KNEE Right 10/12/2020    Procedure: REVISION RIGHT TOTAL KNEE ARTHROPLASTY;  Surgeon: Eulalio Nye MD;  [facility]: [facility];  Service: Orthopedics    ARTHROSCOPY KNEE      CERVICAL SPINE SURGERY      fusion    FOOT SURGERY  1998, 10/2010    Bunionectomy    JOINT REPLACEMENT Right     TKA       Physical Exam    No exam today.      RESULTS  Creatinine   Date Value Ref Range Status   09/06/2024 0.67 0.51 - 0.95 mg/dL Final   06/03/2021 0.70 0.52 - 1.04 mg/dL Final     GFR Estimate   Date Value Ref Range Status   09/06/2024 >90 >60 mL/min/1.73m2 Final "     Comment:     eGFR calculated using 2021 CKD-EPI equation.   06/03/2021 >90 >60 mL/min/[1.73_m2] Final     Comment:     Non  GFR Calc  Starting 12/18/2018, serum creatinine based estimated GFR (eGFR) will be   calculated using the Chronic Kidney Disease Epidemiology Collaboration   (CKD-EPI) equation.       Hemoglobin A1C   Date Value Ref Range Status   05/31/2024 6.8 (H) 0.0 - 5.6 % Final     Comment:     Normal <5.7%   Prediabetes 5.7-6.4%    Diabetes 6.5% or higher     Note: Adopted from ADA consensus guidelines.   09/15/2020 7.2 (H) 0 - 5.6 % Final     Comment:     Normal <5.7% Prediabetes 5.7-6.4%  Diabetes 6.5% or higher - adopted from ADA   consensus guidelines.       Potassium   Date Value Ref Range Status   10/13/2020 4.8 3.5 - 5.0 mmol/L Final   09/15/2020 3.8 3.4 - 5.3 mmol/L Final     ALT   Date Value Ref Range Status   09/06/2024 10 0 - 50 U/L Final   06/03/2021 25 0 - 50 U/L Final     AST   Date Value Ref Range Status   09/06/2024 22 0 - 45 U/L Final   06/03/2021 21 0 - 45 U/L Final     TSH   Date Value Ref Range Status   10/10/2023 1.97 0.30 - 4.20 uIU/mL Final   06/11/2015 0.91 0.40 - 4.00 mU/L Final     T4 Free   Date Value Ref Range Status   09/12/2008 1.18 0.70 - 1.85 ng/dL Final       Cholesterol   Date Value Ref Range Status   06/23/2023 123 <200 mg/dL Final   09/28/2022 174 <200 mg/dL Final   09/15/2020 163 <200 mg/dL Final   05/17/2018 167 <200 mg/dL Final     HDL Cholesterol   Date Value Ref Range Status   09/15/2020 75 >49 mg/dL Final   05/17/2018 81 >49 mg/dL Final     Direct Measure HDL   Date Value Ref Range Status   06/23/2023 67 >=50 mg/dL Final   09/28/2022 90 >=50 mg/dL Final     LDL Cholesterol Calculated   Date Value Ref Range Status   06/23/2023 43 <=100 mg/dL Final   09/28/2022 73 <=100 mg/dL Final   09/15/2020 81 <100 mg/dL Final     Comment:     Desirable:       <100 mg/dl   05/17/2018 78 <100 mg/dL Final     Comment:     Desirable:       <100 mg/dl      Triglycerides   Date Value Ref Range Status   06/23/2023 64 <150 mg/dL Final   09/28/2022 56 <150 mg/dL Final   09/15/2020 34 <150 mg/dL Final   05/17/2018 37 <150 mg/dL Final     Triglycerides (External)   Date Value Ref Range Status   03/08/2024 48 <150 mg/dL Final       ASSESSMENT/PLAN:     1.  TYPE 1 DIABETES MELLITUS: Patient's insulin pump download data looks   good at this time.    No change in pump settings today.  I placed an order for an A1C today.  Pt has no known history of diabetic retinopathy.  Reminded patient to see ophthalmology annually for diabetic eye exam and glaucoma check.  Patient's urine microalbuminuria has been negative with normal creatinine/GFR.  She was started on Losartan by a Cardiologist that she saw in [place].    She denies sx of neuropathy or foot ulcers/sores at this time.    2.  LIPIDS: LDL 41 in 4/2023. She is taking Crestor.    3.  RA: Followed here by Rheumatology staff.    4.  HEALTH MAINTENANCE: See primary care provider for health maintenance.    5.  FOLLOW UP: with Dr. Bills in July 2025.   A1C ordered today.      Time spent reviewing chart, labs and Tandem pump download data today = 5 minutes.  Time for video visit today = 16 minutes.  Time for documentation today= 10 minutes.    TOTAL TIME FOR VISIT TODAY = 31 minutes.    Gloria Gilbert PA-C

## 2024-12-04 NOTE — LETTER
12/4/2024       RE: Ruba Major  [address]  [facility] [address]     Dear Colleague,    Thank you for referring your patient, Ruba Major, to the [facility] ENDOCR[facility] [place] [facility]. Please see a copy of my visit note below.    Outcome for 11/19/24 2:30 PM: Data uploaded on Tandem  Bobbilynn Grossaint, VF  Outcome for 12/02/24 4:32 AM: [employer] message sent - 11/25 last data  Chelsean Grossaint, VF  Outcome for 12/02/24 1:10 PM: Per patient, will upload device before appointment  Claudine Javed MA                        Virtual Visit Details    Type of service:  Video Visit   Video Start Time:   Video End Time:    Originating Location (pt. Location):     Distant Location (provider location):    Platform used for Video Visit:       Time of start: 12:28 PM  Time of end: 12:44 PM  Total duration of video visit: 16 minutes.  Providers location: offsite.  Patients location: [place].    [facility]  Ruba Major is a 69 year old female with type 1 diabetes mellitus. Video visit for diabetes follow up.  Last visit was with Dr. Bills in June 2024 and I last saw patient in March 2024.  Ruba was dx having type 1 diabetes in 2008.  She also has a hx of RA.  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  She denies hx of retinopathy, nephropathy or neuropathy.  Pt is currently using a Tandem insulin pump- control IQ and DexcomG7 sensor.  Her current basal insulin rates are below:  Midnight 0.4 units/hr  6 AM 0.8 units/hr  10 AM 0.9 units/hr  4 PM 0.8 units/hr  8 PM 0.4 units/hr  I/C ratio midnight 1:12, 6 am 1:10, 8 pm 1:12.  CF 55.  Patient also has a golf profile set on her insulin pump which she uses when playing golf or exercising.  Most recent A1C was 6.8 % on 5/31/2024.  I reviewed and scanned her insulin pump download data below in her note.  Average glucose 147 with an estimated A1C 6.8 %.  Glucose in target range 81% of the  time with 0.2% below target range.  She continues to have some kinking with insulin infusion sets, but less often per patient.  On ROS today, some arthritic pain.  She denies blurred vision, n/v, SOB at rest, cough, fever or chills.  No chest pain, abd pain, diarrhea, dysuria or hematuria.  She denies sx of neuropathy or foot ulcers at this time.    Diabetes Care  Retinopathy: none; reminded patient to see ophthalmology annually for diabetic eye exam and glaucoma check.    Nephropathy:none; urine microalbuminuria negative in 4/2023.  Patient tells me she is taking Losartan which was started by a Cardiologist who she saw in [place].  Neuropathy:none.  Foot Exam: no exam today.  Taking aspirin: no.  Lipids: LDL 41 in 4/2023.  Taking Crestor.  CAD: no.  She was seen by Cardiology in [place] and workup was negative for coronary artery disease per patient.  Her brother had CABG which prompted her to see a Cardiologist.    Mental health: denies depression. Past hx of eating disorder > 20 years ago in remission.  Insulin: Tandem insulin pump - control IQ.  Testing: DexcomG7 sensor.  Hypoglycemia treatment: Baqsimi.  Back up insulin: Patient has basal insulin to use in case of insulin pump failure.                 ROS  Please see under HPI.    Allergies  Allergies   Allergen Reactions     Sulfasalazine Rash     Fever and rash       Medications  Current Outpatient Medications   Medication Sig Dispense Refill     amitriptyline (ELAVIL) 10 MG tablet Take 3 tablets (30 mg) by mouth at bedtime. 270 tablet 3     amitriptyline (ELAVIL) 10 MG tablet Take 20 mg by mouth At Bedtime        FARRAH CONTOUR test strip Use to test blood sugar 9 times daily or as directed. 900 strip 1     blood glucose (NO BRAND SPECIFIED) test strip FARRAH CONTOUR. Use to test blood sugar 9 times daily or as directed. 900 strip 2     Continuous Glucose Sensor (DEXCOM G7 SENSOR) MISC 1 each every 10 days Change sensor every 10 days 9 each 3      "Glucagon (BAQSIMI ONE PACK) 3 MG/DOSE POWD Spray 3 mg in nostril See Admin Instructions USE ONLY FOR SEVERE HYPOGLYCEMIA. 1 each 3     Insulin Glargine Solostar 100 UNIT/ML SOPN Inject 14 Units Subcutaneous daily 6 mL 3     Insulin Infusion Pump Supplies (AUTOSOFT XC INFUSION SET) MISC 1 each every 3 days. Change infusion set every 2 to 3 days. 9mm cannula, 23 inch tubing.   Provide 3 month supply. 40 each 0     Insulin Infusion Pump Supplies (T:SLIM X2 3ML CARTRIDGE) MISC 1 each See Admin Instructions. Change every 2-3 days for insulin administration. 40 each 3     INSULIN INFUSION PUMP        insulin lispro (HUMALOG KWIKPEN) 100 UNIT/ML (1 unit dial) KWIKPEN Inject 1-8 Units Subcutaneous 4 times daily (before meals and nightly) As instructed (roughly 1 unit: 8 g carbohydrate) incase of pump failure. 3 mL 3     insulin lispro (HUMALOG VIAL) 100 UNIT/ML vial USE WITH INSULIN PUMP TO INJECT 65 UNITS SUBCUTANEOUSLY DAILY 70 mL 3     insulin pen needle (B-D U/F) 31G X 5 MM miscellaneous Inject 1 Units Subcutaneous 4 times daily (before meals and nightly) 100 each 1     Insulin Pen Needle (PEN NEEDLES 3/16\") 31G X 5 MM MISC 1 Units 5 times daily 150 each 1     insulin syringes, disposable, U-100 0.3 ML MISC Use for insulin ONLY IF INSULIN PUMP FAILS. 25 each 1     leflunomide (ARAVA) 20 MG tablet Take 1 tablet (20 mg) by mouth daily. Labs every 8-12 weeks 90 tablet 1     melatonin 5 MG tablet Take 5 mg by mouth nightly as needed for sleep       rosuvastatin (CRESTOR) 10 MG tablet Take 10 mg by mouth daily       zolpidem (AMBIEN) 10 MG tablet Take 10 mg by mouth nightly as needed.         Family History  family history is not on file.    Social History   reports that she has never smoked. She has never used smokeless tobacco. She reports current alcohol use. She reports that she does not use drugs.     Past Medical History  Past Medical History:   Diagnosis Date     Arthritis      Diabetes mellitus (H)     insulin pump "     PONV (postoperative nausea and vomiting)      Rheumatoid arthritis (H)      Rheumatoid arthritis(714.0)      Type II or unspecified type diabetes mellitus without mention of complication, not stated as uncontrolled        Past Surgical History:   Procedure Laterality Date     ARTHROPLASTY REVISION KNEE Right 10/12/2020    Procedure: REVISION RIGHT TOTAL KNEE ARTHROPLASTY;  Surgeon: Eulalio Nye MD;  Location: [facility];  Service: Orthopedics     ARTHROSCOPY KNEE       CERVICAL SPINE SURGERY      fusion     FOOT SURGERY  1998, 10/2010    Bunionectomy     JOINT REPLACEMENT Right     TKA       Physical Exam    No exam today.      RESULTS  Creatinine   Date Value Ref Range Status   09/06/2024 0.67 0.51 - 0.95 mg/dL Final   06/03/2021 0.70 0.52 - 1.04 mg/dL Final     GFR Estimate   Date Value Ref Range Status   09/06/2024 >90 >60 mL/min/1.73m2 Final     Comment:     eGFR calculated using 2021 CKD-EPI equation.   06/03/2021 >90 >60 mL/min/[1.73_m2] Final     Comment:     Non  GFR Calc  Starting 12/18/2018, serum creatinine based estimated GFR (eGFR) will be   calculated using the Chronic Kidney Disease Epidemiology Collaboration   (CKD-EPI) equation.       Hemoglobin A1C   Date Value Ref Range Status   05/31/2024 6.8 (H) 0.0 - 5.6 % Final     Comment:     Normal <5.7%   Prediabetes 5.7-6.4%    Diabetes 6.5% or higher     Note: Adopted from ADA consensus guidelines.   09/15/2020 7.2 (H) 0 - 5.6 % Final     Comment:     Normal <5.7% Prediabetes 5.7-6.4%  Diabetes 6.5% or higher - adopted from ADA   consensus guidelines.       Potassium   Date Value Ref Range Status   10/13/2020 4.8 3.5 - 5.0 mmol/L Final   09/15/2020 3.8 3.4 - 5.3 mmol/L Final     ALT   Date Value Ref Range Status   09/06/2024 10 0 - 50 U/L Final   06/03/2021 25 0 - 50 U/L Final     AST   Date Value Ref Range Status   09/06/2024 22 0 - 45 U/L Final   06/03/2021 21 0 - 45 U/L Final     TSH   Date Value Ref Range Status    10/10/2023 1.97 0.30 - 4.20 uIU/mL Final   06/11/2015 0.91 0.40 - 4.00 mU/L Final     T4 Free   Date Value Ref Range Status   09/12/2008 1.18 0.70 - 1.85 ng/dL Final       Cholesterol   Date Value Ref Range Status   06/23/2023 123 <200 mg/dL Final   09/28/2022 174 <200 mg/dL Final   09/15/2020 163 <200 mg/dL Final   05/17/2018 167 <200 mg/dL Final     HDL Cholesterol   Date Value Ref Range Status   09/15/2020 75 >49 mg/dL Final   05/17/2018 81 >49 mg/dL Final     Direct Measure HDL   Date Value Ref Range Status   06/23/2023 67 >=50 mg/dL Final   09/28/2022 90 >=50 mg/dL Final     LDL Cholesterol Calculated   Date Value Ref Range Status   06/23/2023 43 <=100 mg/dL Final   09/28/2022 73 <=100 mg/dL Final   09/15/2020 81 <100 mg/dL Final     Comment:     Desirable:       <100 mg/dl   05/17/2018 78 <100 mg/dL Final     Comment:     Desirable:       <100 mg/dl     Triglycerides   Date Value Ref Range Status   06/23/2023 64 <150 mg/dL Final   09/28/2022 56 <150 mg/dL Final   09/15/2020 34 <150 mg/dL Final   05/17/2018 37 <150 mg/dL Final     Triglycerides (External)   Date Value Ref Range Status   03/08/2024 48 <150 mg/dL Final       ASSESSMENT/PLAN:     1.  TYPE 1 DIABETES MELLITUS: Patient's insulin pump download data looks   good at this time.    No change in pump settings today.  I placed an order for an A1C today.  Pt has no known history of diabetic retinopathy.  Reminded patient to see ophthalmology annually for diabetic eye exam and glaucoma check.  Patient's urine microalbuminuria has been negative with normal creatinine/GFR.  She was started on Losartan by a Cardiologist that she saw in [place].    She denies sx of neuropathy or foot ulcers/sores at this time.    2.  LIPIDS: LDL 41 in 4/2023. She is taking Crestor.    3.  RA: Followed here by Rheumatology staff.    4.  HEALTH MAINTENANCE: See primary care provider for health maintenance.    5.  FOLLOW UP: with Dr. Bills in July 2025.   A1C ordered  today.      Time spent reviewing chart, labs and Tandem pump download data today = 5 minutes.  Time for video visit today = 16 minutes.  Time for documentation today= 10 minutes.    TOTAL TIME FOR VISIT TODAY = 31 minutes.    Gloria Gilbert PA-C          Again, thank you for allowing me to participate in the care of your patient.      Sincerely,    Gloria Gilbert PA-C

## 2024-12-04 NOTE — NURSING NOTE
Current patient location: [place]    Is the patient currently in the state of [place]? YES    Visit mode:VIDEO    If the visit is dropped, the patient can be reconnected by:VIDEO VISIT: Text to cell phone:   Telephone Information:   Mobile [phone]       Will anyone else be joining the visit? NO  (If patient encounters technical issues they should call [phone]    Are changes needed to the allergy or medication list? No   Ruba reported no changes to e-check in information for visit. VF did not review e-check in information again with Ruba due to this.       Are refills needed on medications prescribed by this physician? No    Rooming Documentation:  Not applicable    Reason for visit: RECHECK    Kylie AMANDA

## 2024-12-18 DIAGNOSIS — E10.9 TYPE 1 DIABETES MELLITUS WITHOUT COMPLICATION (H): Primary | ICD-10-CM

## 2024-12-18 RX ORDER — INSULIN LISPRO 100 [IU]/ML
INJECTION, SOLUTION INTRAVENOUS; SUBCUTANEOUS
Qty: 70 ML | Refills: 3 | Status: SHIPPED | OUTPATIENT
Start: 2024-12-18

## 2024-12-22 ENCOUNTER — HEALTH MAINTENANCE LETTER (OUTPATIENT)
Age: 69
End: 2024-12-22

## 2025-05-19 DIAGNOSIS — E10.9 TYPE 1 DIABETES MELLITUS WITHOUT COMPLICATION (H): ICD-10-CM

## 2025-05-19 RX ORDER — INSULIN LISPRO 100 [IU]/ML
INJECTION, SOLUTION INTRAVENOUS; SUBCUTANEOUS
Qty: 60 ML | Refills: 3 | Status: SHIPPED | OUTPATIENT
Start: 2025-05-19

## 2025-05-21 ENCOUNTER — TELEPHONE (OUTPATIENT)
Dept: ENDOCRINOLOGY | Facility: CLINIC | Age: 70
End: 2025-05-21
Payer: COMMERCIAL

## 2025-05-21 NOTE — TELEPHONE ENCOUNTER
Prior Authorization Retail Medication Request    Medication/Dose: Humalog vial U100 60 unoits daily   Diagnosis and ICD code (if different than what is on RX):  E10.9 Type 1 diabetes  New/renewal/insurance change PA/secondary ins. PA:Renewal   Previously Tried and Failed:  Novolog  Rationale:  Uses Humalog in pump and wants to connnntinue    Insurance   [facility]  Insurance ID:  [id number]      Pharmacy Information (if different than what is on RX)  Name:  [facility]  Phone:  [phone]  Fax:[phone]    Clinic Information  Preferred routing pool for dept communication: patient call     SHE IS OUT NEEDS JASWINDER Pak RN on 5/21/2025 at 8:37 AM

## 2025-05-21 NOTE — TELEPHONE ENCOUNTER
Retail Pharmacy Prior Authorization Team   Phone: [phone]    Prior Authorization Not Needed per Insurance    Medication: HUMALOG VIAL 100 UNIT/ML IJ SOLN  Insurance Company: [employer] [place] - Phone [phone] Fax [phone]  Expected CoPay: $    Pharmacy Filling the Rx: [id number] - [place] [id number] AT [facility] [address]  Pharmacy Notified: YES  Patient Notified: YES (called pharmacy and they received a paid claim)    Stated they will need to order the product in for either tomorrow or Friday.

## 2025-05-21 NOTE — TELEPHONE ENCOUNTER
The pharmacy can't get it to go through . They will need to contact insurance. Donna Pak RN on 5/21/2025 at 10:28 AM

## 2025-05-21 NOTE — TELEPHONE ENCOUNTER
My chart sent to  get another pharmacy to send the  Humalog to. She was out so I don't think she can wait for  delivery Donna Pak, RN on 5/21/2025 at 11:47 AM

## 2025-05-21 NOTE — TELEPHONE ENCOUNTER
"Hello,    Yes, that day showed that a PA is not required and today BRAND NAME HUMALOG still shows a paid claim of $0.00.    If pharmacy is billing generic \"Insulin Lispro\" the insurance excludes that medication.    Thank You!    Rafi Cavazos [facility] Pharmacy Liaison  [facility]  [email or web address]  Phone: [phone]  Fax: [phone]    Test claim (50ml per 83 days):    "

## 2025-06-10 NOTE — PROGRESS NOTES
06/10/25 10:10 AM  PATIENT LAB/IMAGING STATUS : MyChart sent to patient to complete standing/future orders   Outcome for 07/07/25 11:14 AM: Data obtained via Tandem website    Not entering many carbs, relying on correctio ns    This 70 year-old woman was seen virtually for follow-up of her type 1 diabetes.  I made the diagnosis in 2008 when she presented with hyperglycemia in the absence of DKA.      She currently manages her diabetes with a tandem control IQ pump and the dexcom.  The data are below.  She is not very happy with her overall glucose control because her sugars have been higher than usual.  She attributes this to the pain that she has been having in her back and her sciatica.  She was given 3 steroid injections over the last several months to manage this pain.  The last 1 helped.  She is trying to decide if she should have surgery to prevent future episodes of pain.  When we review her pump and CGM data, I pointed out that there are days where she does not  enter any carbohydrates at all.  I show her that the pump will do corrections during those times.  She is aware that she does not always bolus for what she eats.  She is comfortable with carb counting.  She does recognize her hypoglycemia and has not needed the assistance of another.  She really likes the setting on her pump for golf.  This really helps her avoid lows during that activity.    She saw a cardiologist in May 2025 when she was in [place].  They found that she had some calcification of her LAD.  He recommended that she start Repatha and stop the ezetimibe and statin.  Unfortunately her insurance company did not cover that.  Consequently she remains on ezetimibe and statin.    She has a diagnosis of osteoporosis.  This is based on a DEXA scan done a couple of years ago by her OB/GYN.  After seeing the DEXA results, her Rheumatologist started her on Prolia for osteoporosis   She is getting an infusion every 6 months  For the last 2 years  but missed the dose this winter.  She had a repeat DEXA done recently but we do not have the results in the chart.  Pictures of the results on her phone suggest that she has not had any change in bone density over this time.  She is seeing her OB/GYN doctor tomorrow.  She will talk with her about treatment of her osteoporosis.  She is wondering if I can take over management of this problem.  She does take calcium and vitamin D.    She needs to make an appointment to see her eye doctor.  She has no problems with her vision.  She does have some paresthesias in her left great toe that she attributes to her sciatica.  The rest of her toes do not have any numbness.  She denies having chest pain or shortness of breath.  Her mood is good.  Patient Active Problem List   Diagnosis    Encounter for long-term current use of medication    Muscle cramps    Diabetes mellitus (H)    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 factor (H)    Type 1 diabetes mellitus without complication (H)    Senile osteoporosis    Gastroesophageal reflux disease with esophagitis    Long-term use of immunosuppressant medication    Osteopenia of other site    Gastroesophageal reflux disease without esophagitis                      Current Outpatient Medications   Medication Sig Dispense Refill    amitriptyline (ELAVIL) 10 MG tablet Take 3 tablets (30 mg) by mouth at bedtime. 270 tablet 3    FARRAH CONTOUR test strip Use to test blood sugar 9 times daily or as directed. 900 strip 1    blood glucose (NO BRAND SPECIFIED) test strip FARRAH CONTOUR. Use to test blood sugar 9 times daily or as directed. 900 strip 2    Continuous Glucose Sensor (DEXCOM G7 SENSOR) MISC 1 each every 10 days. Change sensor every 10 days 9 each 3    Glucagon (BAQSIMI ONE PACK) 3 MG/DOSE POWD Spray 3 mg in nostril See Admin Instructions USE ONLY FOR SEVERE HYPOGLYCEMIA. 1 each 3    Insulin Glargine Solostar 100 UNIT/ML SOPN Inject 14 Units Subcutaneous  "daily 6 mL 3    Insulin Infusion Pump Supplies (AUTOSOFT XC INFUSION SET) MISC 1 each every 3 days. Change infusion set every 2 to 3 days. 9mm cannula, 23 inch tubing.   Provide 3 month supply. 40 each 0    Insulin Infusion Pump Supplies (T:SLIM X2 3ML CARTRIDGE) MISC 1 each See Admin Instructions. Change every 2-3 days for insulin administration. 40 each 3    INSULIN INFUSION PUMP       insulin lispro (HUMALOG) 100 UNIT/ML vial SE WITH INSULIN PUMP TO INJECT 60 UNITS SUBCUTANEOUSLY DAILY, 60 mL 3    insulin pen needle (B-D U/F) 31G X 5 MM miscellaneous Inject 1 Units Subcutaneous 4 times daily (before meals and nightly) 100 each 1    Insulin Pen Needle (PEN NEEDLES 3/16\") 31G X 5 MM MISC 1 Units 5 times daily 150 each 1    insulin syringes, disposable, U-100 0.3 ML MISC Use for insulin ONLY IF INSULIN PUMP FAILS. 25 each 1    melatonin 5 MG tablet Take 5 mg by mouth nightly as needed for sleep      rosuvastatin (CRESTOR) 10 MG tablet Take 10 mg by mouth daily      zolpidem (AMBIEN) 10 MG tablet Take 10 mg by mouth nightly as needed.       No current facility-administered medications for this visit.       /72   Pulse 63   SpO2 99%    VSS  NAD  Eyes - no periorbital edema, conjunctival injection, scleral icterus  CV - RRR.  Normal pulses in feet.  No edema  Neuro - sensation intact to monofilament on soles of feet.   Skin - normal texture   Feet - no ulcers     Recent Labs   Lab Test 06/13/25  1337 09/06/24  0830 05/31/24  1028 03/08/24  0950 10/10/23  1542 10/10/23  1540 10/10/23  1439 08/16/23  0948 06/23/23  1031 09/28/22  0925 06/09/22  1337 06/09/22  1337 12/05/19  1129 11/05/19  0000   A1C 7.0*  --  6.8*  --   --   --   --   --   --   --   --  6.9*   < >  --    HEMOGLOBINA1  --   --   --   --   --   --  7.2  --   --   --   --   --   --  7.1*   TSH  --   --   --   --   --  1.97  --   --   --   --   --   --   --   --    LDL  --   --   --   --   --   --   --   --  43 73  --   --    < >  --    HDL  --   --  "  --   --   --   --   --   --  67 90  --   --    < >  --    TRIG  --   --   --  48  --   --   --   --  64 56   < >  --    < >  --    CR  --  0.67 0.72  --   --  0.66  --    < >  --  0.74  --  0.68   < >  --    MICROL  --   --   --   --  <12.0  --   --   --   --   --   --  8   < >  --     < > = values in this interval not displayed.     Plan from 2023 shows that she has T-scores between - 2.1 and 2.5    Assessment and plan:    1.  Diabetes control.  her A1c is at target and she is not having problems with hypoglycemia..  We talked about the importance of bolusing before she eats.  This will result in better postprandial control and reduce the risk of subsequent hypoglycemia.  I made no changes in her pump settings today.        2.diabetes complications.her creatinine is normal.  Her urine albumin has not been checked in some time so I will order that today.  She needs to make an appointment with her eye doctor but she has no history of retinopathy.  Her feet are fine today.    3.CVD risk.  She does have a calcification in her LAD.  It is nonobstructive according to her cardiologist.  I agree with aggressive cholesterol management.  Her blood pressure is well-controlled.    4.osteoporosis.  She had T-scores between -2.1 and 2.5 a couple of years ago.  Given her history of diabetes these scores suggest she has an increased risk of osteoporosis.  She was started on Prolia by other providers but has not received her dose in 10 months.  She will be talking to her OB/GYN tomorrow about subsequent plans.  I told her I would recommend she switch to Fosamax 70 mg weekly.  I explained the potential side effects of esophageal ulceration, atypical fractures, and osteonecrosis of the jaw.  She is interested in trying that medication and will let me know if I should prescribe it for her.

## 2025-06-16 DIAGNOSIS — E10.9 TYPE 1 DIABETES MELLITUS WITHOUT COMPLICATION (H): ICD-10-CM

## 2025-06-16 RX ORDER — ACYCLOVIR 400 MG/1
1 TABLET ORAL
Qty: 9 EACH | Refills: 3 | Status: SHIPPED | OUTPATIENT
Start: 2025-06-16

## 2025-07-07 ENCOUNTER — TELEPHONE (OUTPATIENT)
Dept: ENDOCRINOLOGY | Facility: CLINIC | Age: 70
End: 2025-07-07
Payer: COMMERCIAL

## 2025-07-07 NOTE — TELEPHONE ENCOUNTER
[facility] Call Center    Phone Message    May a detailed message be left on voicemail: yes     Reason for Call: Other: Patient states she thinks there is something is wrong with the clinics end of things because she cannot get her pump to upload and she's wondering if someone else is having an issue. Patient states she will come in early on 7/8/25 for her appointment, Advise.     Action Taken: Message routed to:  [facility] endo    Travel Screening: Not Applicable     Date of Service:

## 2025-07-08 ENCOUNTER — OFFICE VISIT (OUTPATIENT)
Dept: ENDOCRINOLOGY | Facility: CLINIC | Age: 70
End: 2025-07-08
Payer: COMMERCIAL

## 2025-07-08 VITALS — HEART RATE: 63 BPM | SYSTOLIC BLOOD PRESSURE: 129 MMHG | DIASTOLIC BLOOD PRESSURE: 72 MMHG | OXYGEN SATURATION: 99 %

## 2025-07-08 DIAGNOSIS — M81.0 SENILE OSTEOPOROSIS: Primary | ICD-10-CM

## 2025-07-08 DIAGNOSIS — E10.9 TYPE 1 DIABETES MELLITUS WITHOUT COMPLICATION (H): ICD-10-CM

## 2025-07-08 DIAGNOSIS — I25.10 CORONARY ARTERY CALCIFICATION: ICD-10-CM

## 2025-07-08 PROCEDURE — 99215 OFFICE O/P EST HI 40 MIN: CPT | Performed by: INTERNAL MEDICINE

## 2025-07-08 PROCEDURE — 3078F DIAST BP <80 MM HG: CPT | Performed by: INTERNAL MEDICINE

## 2025-07-08 PROCEDURE — 1125F AMNT PAIN NOTED PAIN PRSNT: CPT | Performed by: INTERNAL MEDICINE

## 2025-07-08 PROCEDURE — 3074F SYST BP LT 130 MM HG: CPT | Performed by: INTERNAL MEDICINE

## 2025-07-08 RX ORDER — INSULIN GLARGINE-YFGN 100 [IU]/ML
14 INJECTION, SOLUTION SUBCUTANEOUS PRN
Qty: 9 ML | Refills: 3 | Status: SHIPPED | OUTPATIENT
Start: 2025-07-08

## 2025-07-08 RX ORDER — INSULIN LISPRO 100 [IU]/ML
INJECTION, SOLUTION INTRAVENOUS; SUBCUTANEOUS
Qty: 3 ML | Refills: 3 | Status: SHIPPED | OUTPATIENT
Start: 2025-07-08

## 2025-07-08 ASSESSMENT — PAIN SCALES - GENERAL: PAINLEVEL_OUTOF10: MODERATE PAIN (6)

## 2025-07-08 NOTE — LETTER
7/8/2025       RE: Ruba Major  [address]  [place] [address]     Dear Colleague,    Thank you for referring your patient, Ruba Major, to the [facility] [place] [facility]. Please see a copy of my visit note below.    06/10/25 10:10 AM  PATIENT LAB/IMAGING STATUS : [employer] sent to patient to complete standing/future orders   Outcome for 07/07/25 11:14 AM: Data obtained via [employer] website    Not entering many carbs, relying on correctio ns    This 70 year-old woman was seen virtually for follow-up of her type 1 diabetes.  I made the diagnosis in 2008 when she presented with hyperglycemia in the absence of DKA.      She currently manages her diabetes with a tandem control IQ pump and the dexcom.  The data are below.  She is not very happy with her overall glucose control because her sugars have been higher than usual.  She attributes this to the pain that she has been having in her back and her sciatica.  She was given 3 steroid injections over the last several months to manage this pain.  The last 1 helped.  She is trying to decide if she should have surgery to prevent future episodes of pain.  When we review her pump and CGM data, I pointed out that there are days where she does not  enter any carbohydrates at all.  I show her that the pump will do corrections during those times.  She is aware that she does not always bolus for what she eats.  She is comfortable with carb counting.  She does recognize her hypoglycemia and has not needed the assistance of another.  She really likes the setting on her pump for golf.  This really helps her avoid lows during that activity.    She saw a cardiologist in May 2025 when she was in [place].  They found that she had some calcification of her LAD.  He recommended that she start Repatha and stop the ezetimibe and statin.  Unfortunately her insurance company did not cover  that.  Consequently she remains on ezetimibe and statin.    She has a diagnosis of osteoporosis.  This is based on a DEXA scan done a couple of years ago by her OB/GYN.  After seeing the DEXA results, her Rheumatologist started her on Prolia for osteoporosis   She is getting an infusion every 6 months  For the last 2 years but missed the dose this winter.  She had a repeat DEXA done recently but we do not have the results in the chart.  Pictures of the results on her phone suggest that she has not had any change in bone density over this time.  She is seeing her OB/GYN doctor tomorrow.  She will talk with her about treatment of her osteoporosis.  She is wondering if I can take over management of this problem.  She does take calcium and vitamin D.    She needs to make an appointment to see her eye doctor.  She has no problems with her vision.  She does have some paresthesias in her left great toe that she attributes to her sciatica.  The rest of her toes do not have any numbness.  She denies having chest pain or shortness of breath.  Her mood is good.  Patient Active Problem List   Diagnosis     Encounter for long-term current use of medication     Muscle cramps     Diabetes mellitus (H)     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 factor (H)     Type 1 diabetes mellitus without complication (H)     Senile osteoporosis     Gastroesophageal reflux disease with esophagitis     Long-term use of immunosuppressant medication     Osteopenia of other site     Gastroesophageal reflux disease without esophagitis                      Current Outpatient Medications   Medication Sig Dispense Refill     amitriptyline (ELAVIL) 10 MG tablet Take 3 tablets (30 mg) by mouth at bedtime. 270 tablet 3     FARRAH CONTOUR test strip Use to test blood sugar 9 times daily or as directed. 900 strip 1     blood glucose (NO BRAND SPECIFIED) test strip FARRAH CONTOUR. Use to test blood sugar 9 times daily or as directed.  "900 strip 2     Continuous Glucose Sensor (DEXCOM G7 SENSOR) MISC 1 each every 10 days. Change sensor every 10 days 9 each 3     Glucagon (BAQSIMI ONE PACK) 3 MG/DOSE POWD Spray 3 mg in nostril See Admin Instructions USE ONLY FOR SEVERE HYPOGLYCEMIA. 1 each 3     Insulin Glargine Solostar 100 UNIT/ML SOPN Inject 14 Units Subcutaneous daily 6 mL 3     Insulin Infusion Pump Supplies (AUTOSOFT XC INFUSION SET) MISC 1 each every 3 days. Change infusion set every 2 to 3 days. 9mm cannula, 23 inch tubing.   Provide 3 month supply. 40 each 0     Insulin Infusion Pump Supplies (T:SLIM X2 3ML CARTRIDGE) MISC 1 each See Admin Instructions. Change every 2-3 days for insulin administration. 40 each 3     INSULIN INFUSION PUMP        insulin lispro (HUMALOG) 100 UNIT/ML vial SE WITH INSULIN PUMP TO INJECT 60 UNITS SUBCUTANEOUSLY DAILY, 60 mL 3     insulin pen needle (B-D U/F) 31G X 5 MM miscellaneous Inject 1 Units Subcutaneous 4 times daily (before meals and nightly) 100 each 1     Insulin Pen Needle (PEN NEEDLES 3/16\") 31G X 5 MM MISC 1 Units 5 times daily 150 each 1     insulin syringes, disposable, U-100 0.3 ML MISC Use for insulin ONLY IF INSULIN PUMP FAILS. 25 each 1     melatonin 5 MG tablet Take 5 mg by mouth nightly as needed for sleep       rosuvastatin (CRESTOR) 10 MG tablet Take 10 mg by mouth daily       zolpidem (AMBIEN) 10 MG tablet Take 10 mg by mouth nightly as needed.       No current facility-administered medications for this visit.       /72   Pulse 63   SpO2 99%    VSS  NAD  Eyes - no periorbital edema, conjunctival injection, scleral icterus  CV - RRR.  Normal pulses in feet.  No edema  Neuro - sensation intact to monofilament on soles of feet.   Skin - normal texture   Feet - no ulcers     Recent Labs   Lab Test 06/13/25  1337 09/06/24  0830 05/31/24  1028 03/08/24  0950 10/10/23  1542 10/10/23  1540 10/10/23  1439 08/16/23  0948 06/23/23  1031 09/28/22  0925 06/09/22  1337 06/09/22  1337 " 12/05/19  1129 11/05/19  0000   A1C 7.0*  --  6.8*  --   --   --   --   --   --   --   --  6.9*   < >  --    HEMOGLOBINA1  --   --   --   --   --   --  7.2  --   --   --   --   --   --  7.1*   TSH  --   --   --   --   --  1.97  --   --   --   --   --   --   --   --    LDL  --   --   --   --   --   --   --   --  43 73  --   --    < >  --    HDL  --   --   --   --   --   --   --   --  67 90  --   --    < >  --    TRIG  --   --   --  48  --   --   --   --  64 56   < >  --    < >  --    CR  --  0.67 0.72  --   --  0.66  --    < >  --  0.74  --  0.68   < >  --    MICROL  --   --   --   --  <12.0  --   --   --   --   --   --  8   < >  --     < > = values in this interval not displayed.     Plan from 2023 shows that she has T-scores between - 2.1 and 2.5    Assessment and plan:    1.  Diabetes control.  her A1c is at target and she is not having problems with hypoglycemia..  We talked about the importance of bolusing before she eats.  This will result in better postprandial control and reduce the risk of subsequent hypoglycemia.  I made no changes in her pump settings today.        2.diabetes complications.her creatinine is normal.  Her urine albumin has not been checked in some time so I will order that today.  She needs to make an appointment with her eye doctor but she has no history of retinopathy.  Her feet are fine today.    3.CVD risk.  She does have a calcification in her LAD.  It is nonobstructive according to her cardiologist.  I agree with aggressive cholesterol management.  Her blood pressure is well-controlled.    4.osteoporosis.  She had T-scores between -2.1 and 2.5 a couple of years ago.  Given her history of diabetes these scores suggest she has an increased risk of osteoporosis.  She was started on Prolia by other providers but has not received her dose in 10 months.  She will be talking to her OB/GYN tomorrow about subsequent plans.  I told her I would recommend she switch to Fosamax 70 mg weekly.  I  explained the potential side effects of esophageal ulceration, atypical fractures, and osteonecrosis of the jaw.  She is interested in trying that medication and will let me know if I should prescribe it for her.                                Again, thank you for allowing me to participate in the care of your patient.      Sincerely,    Veronica Bills MD

## 2025-07-10 DIAGNOSIS — M81.0 SENILE OSTEOPOROSIS: Primary | ICD-10-CM

## 2025-07-10 RX ORDER — ALENDRONATE SODIUM 70 MG/1
70 TABLET ORAL
Qty: 12 TABLET | Refills: 3 | Status: SHIPPED | OUTPATIENT
Start: 2025-07-10